# Patient Record
Sex: FEMALE | Race: WHITE | NOT HISPANIC OR LATINO | Employment: OTHER | URBAN - METROPOLITAN AREA
[De-identification: names, ages, dates, MRNs, and addresses within clinical notes are randomized per-mention and may not be internally consistent; named-entity substitution may affect disease eponyms.]

---

## 2017-01-03 RX ORDER — CLOPIDOGREL BISULFATE 75 MG/1
TABLET ORAL
Qty: 30 TABLET | Refills: 0 | Status: SHIPPED | OUTPATIENT
Start: 2017-01-03 | End: 2017-02-13 | Stop reason: SDUPTHER

## 2017-02-02 RX ORDER — PANTOPRAZOLE SODIUM 40 MG/1
TABLET, DELAYED RELEASE ORAL
Qty: 30 TABLET | Refills: 0 | Status: SHIPPED | OUTPATIENT
Start: 2017-02-02 | End: 2017-03-02 | Stop reason: SDUPTHER

## 2017-02-02 RX ORDER — EMPAGLIFLOZIN 10 MG/1
TABLET, FILM COATED ORAL
Qty: 30 TABLET | Refills: 0 | Status: SHIPPED | OUTPATIENT
Start: 2017-02-02 | End: 2017-03-02 | Stop reason: SDUPTHER

## 2017-02-13 RX ORDER — CLOPIDOGREL BISULFATE 75 MG/1
TABLET ORAL
Qty: 30 TABLET | Refills: 0 | Status: SHIPPED | OUTPATIENT
Start: 2017-02-13 | End: 2017-03-15 | Stop reason: SDUPTHER

## 2017-03-02 RX ORDER — EMPAGLIFLOZIN 10 MG/1
TABLET, FILM COATED ORAL
Qty: 30 TABLET | Refills: 0 | Status: SHIPPED | OUTPATIENT
Start: 2017-03-02 | End: 2017-03-23 | Stop reason: SDUPTHER

## 2017-03-02 RX ORDER — PANTOPRAZOLE SODIUM 40 MG/1
TABLET, DELAYED RELEASE ORAL
Qty: 30 TABLET | Refills: 0 | Status: SHIPPED | OUTPATIENT
Start: 2017-03-02 | End: 2017-04-03 | Stop reason: SDUPTHER

## 2017-03-06 DIAGNOSIS — E03.9 ACQUIRED HYPOTHYROIDISM: ICD-10-CM

## 2017-03-06 DIAGNOSIS — E11.9 CONTROLLED TYPE 2 DIABETES MELLITUS WITHOUT COMPLICATION, WITHOUT LONG-TERM CURRENT USE OF INSULIN (HCC): ICD-10-CM

## 2017-03-06 DIAGNOSIS — E78.5 HYPERLIPIDEMIA, UNSPECIFIED HYPERLIPIDEMIA TYPE: ICD-10-CM

## 2017-03-06 DIAGNOSIS — E55.9 VITAMIN D DEFICIENCY: ICD-10-CM

## 2017-03-08 LAB
25(OH)D3+25(OH)D2 SERPL-MCNC: 27.1 NG/ML (ref 30–100)
ALBUMIN SERPL-MCNC: 3.9 G/DL (ref 3.5–5.2)
ALBUMIN/GLOB SERPL: 1.4 G/DL
ALP SERPL-CCNC: 117 U/L (ref 40–129)
ALT SERPL-CCNC: 15 U/L (ref 5–33)
AST SERPL-CCNC: 15 U/L (ref 5–32)
BASOPHILS # BLD AUTO: 0.05 10*3/MM3 (ref 0–0.2)
BASOPHILS NFR BLD AUTO: 0.6 % (ref 0–2)
BILIRUB SERPL-MCNC: 0.3 MG/DL (ref 0.2–1.2)
BUN SERPL-MCNC: 17 MG/DL (ref 8–23)
BUN/CREAT SERPL: 18.3 (ref 7–25)
CALCIUM SERPL-MCNC: 9.1 MG/DL (ref 8.8–10.5)
CHLORIDE SERPL-SCNC: 100 MMOL/L (ref 98–107)
CHOLEST SERPL-MCNC: 131 MG/DL (ref 0–200)
CO2 SERPL-SCNC: 27.6 MMOL/L (ref 22–29)
CREAT SERPL-MCNC: 0.93 MG/DL (ref 0.57–1)
EOSINOPHIL # BLD AUTO: 0.1 10*3/MM3 (ref 0.1–0.3)
EOSINOPHIL NFR BLD AUTO: 1.1 % (ref 0–4)
ERYTHROCYTE [DISTWIDTH] IN BLOOD BY AUTOMATED COUNT: 18.9 % (ref 11.5–14.5)
GLOBULIN SER CALC-MCNC: 2.8 GM/DL
GLUCOSE SERPL-MCNC: 135 MG/DL (ref 65–99)
HBA1C MFR BLD: 7.3 % (ref 4.8–5.6)
HCT VFR BLD AUTO: 44.8 % (ref 37–47)
HDLC SERPL-MCNC: 43 MG/DL (ref 40–60)
HGB BLD-MCNC: 13.7 G/DL (ref 12–16)
IMM GRANULOCYTES # BLD: 0.07 10*3/MM3 (ref 0–0.03)
IMM GRANULOCYTES NFR BLD: 0.8 % (ref 0–0.5)
LDLC SERPL CALC-MCNC: 59 MG/DL (ref 0–100)
LYMPHOCYTES # BLD AUTO: 2.5 10*3/MM3 (ref 0.6–4.8)
LYMPHOCYTES NFR BLD AUTO: 27.6 % (ref 20–45)
MCH RBC QN AUTO: 24.9 PG (ref 27–31)
MCHC RBC AUTO-ENTMCNC: 30.6 G/DL (ref 31–37)
MCV RBC AUTO: 81.5 FL (ref 81–99)
MONOCYTES # BLD AUTO: 0.38 10*3/MM3 (ref 0–1)
MONOCYTES NFR BLD AUTO: 4.2 % (ref 3–8)
NEUTROPHILS # BLD AUTO: 5.95 10*3/MM3 (ref 1.5–8.3)
NEUTROPHILS NFR BLD AUTO: 65.7 % (ref 45–70)
NRBC BLD AUTO-RTO: 0 /100 WBC (ref 0–0)
PLATELET # BLD AUTO: 290 10*3/MM3 (ref 140–500)
POTASSIUM SERPL-SCNC: 4.7 MMOL/L (ref 3.5–5.2)
PROT SERPL-MCNC: 6.7 G/DL (ref 6–8.5)
RBC # BLD AUTO: 5.5 10*6/MM3 (ref 4.2–5.4)
SODIUM SERPL-SCNC: 142 MMOL/L (ref 136–145)
TRIGL SERPL-MCNC: 146 MG/DL (ref 0–150)
TSH SERPL DL<=0.005 MIU/L-ACNC: 2.03 MIU/ML (ref 0.27–4.2)
VLDLC SERPL CALC-MCNC: 29.2 MG/DL (ref 7–27)
WBC # BLD AUTO: 9.05 10*3/MM3 (ref 4.8–10.8)

## 2017-03-09 LAB
FERRITIN SERPL-MCNC: 21.14 NG/ML (ref 13–150)
IRON SATN MFR SERPL: 9 %
IRON SERPL-MCNC: 32 MCG/DL (ref 37–145)
TIBC SERPL-MCNC: 351 MCG/DL (ref 261–478)
UIBC SERPL-MCNC: 319 MCG/DL (ref 112–346)
WRITTEN AUTHORIZATION: NORMAL

## 2017-03-14 ENCOUNTER — OFFICE VISIT (OUTPATIENT)
Dept: FAMILY MEDICINE CLINIC | Facility: CLINIC | Age: 62
End: 2017-03-14

## 2017-03-14 VITALS
WEIGHT: 236 LBS | DIASTOLIC BLOOD PRESSURE: 72 MMHG | HEART RATE: 114 BPM | BODY MASS INDEX: 46.33 KG/M2 | OXYGEN SATURATION: 98 % | SYSTOLIC BLOOD PRESSURE: 122 MMHG | TEMPERATURE: 97.8 F | HEIGHT: 60 IN

## 2017-03-14 DIAGNOSIS — IMO0001 UNCONTROLLED TYPE 2 DIABETES MELLITUS WITHOUT COMPLICATION, WITHOUT LONG-TERM CURRENT USE OF INSULIN: ICD-10-CM

## 2017-03-14 DIAGNOSIS — I10 ESSENTIAL HYPERTENSION: ICD-10-CM

## 2017-03-14 DIAGNOSIS — E03.9 ACQUIRED HYPOTHYROIDISM: ICD-10-CM

## 2017-03-14 DIAGNOSIS — E78.5 HYPERLIPIDEMIA, UNSPECIFIED HYPERLIPIDEMIA TYPE: Primary | ICD-10-CM

## 2017-03-14 DIAGNOSIS — F17.210 CIGARETTE NICOTINE DEPENDENCE WITHOUT COMPLICATION: ICD-10-CM

## 2017-03-14 DIAGNOSIS — F41.8 SITUATIONAL ANXIETY: ICD-10-CM

## 2017-03-14 DIAGNOSIS — E66.01 MORBID EXOGENOUS OBESITY (HCC): ICD-10-CM

## 2017-03-14 DIAGNOSIS — E55.9 VITAMIN D DEFICIENCY: ICD-10-CM

## 2017-03-14 PROCEDURE — 99214 OFFICE O/P EST MOD 30 MIN: CPT | Performed by: FAMILY MEDICINE

## 2017-03-14 RX ORDER — ALPRAZOLAM 0.25 MG/1
0.25 TABLET ORAL DAILY
Qty: 30 TABLET | Refills: 0 | Status: SHIPPED | OUTPATIENT
Start: 2017-03-14 | End: 2018-01-23 | Stop reason: SDUPTHER

## 2017-03-14 NOTE — PROGRESS NOTES
Subjective   Rosalee Godwin is a 61 y.o. female with   Chief Complaint   Patient presents with   • Hypertension     follow up on labs   • Diabetes   .    History of Present Illness     Rosalee is a 61 yr old white female that presents for follow up of HTN, Type II Diabetes, HLD, and Vitamin D Deficiency and Hypothyroidism.  Rosalee has been actively losing weight by eating a more restricted diet.  Since losing some weight, she has had an increase of energy and stamina.    Current medications include Atorvastatin and Zetia to control cholesterol, HCTZ and Lisinopril to control HTN, and Jardiance, Metformin, to control diabetes, and Citalopram to improve depression and Levothyroxine to control thyroid.  She has tried Farxiga, Invokana, and then to Jardiance.  She has been checking her sugar at home and states that since starting Jardiance, her sugars have been fluctuating.      Rosalee is receiving injections to her eyes for Macular Degeneration and this has caused her to have an increase of Anxiety.      The following portions of the patient's history were reviewed and updated as appropriate: allergies, current medications, past family history, past medical history, past social history, past surgical history and problem list.    Review of Systems   Constitutional:        Morbidly obese   Cardiovascular:        HTN  HLD   Endocrine:        Type II Diabetes  Vit. D Def     Psychiatric/Behavioral: The patient is nervous/anxious.        Objective     Vitals:    03/14/17 0827   BP: 122/72   Pulse: 114   Temp: 97.8 °F (36.6 °C)   SpO2: 98%     BP Readings from Last 3 Encounters:   03/14/17 122/72   11/15/16 134/76   10/04/16 122/72      Wt Readings from Last 3 Encounters:   03/14/17 236 lb (107 kg)   11/15/16 249 lb (113 kg)   10/04/16 251 lb (114 kg)          No results found for this or any previous visit (from the past 168 hour(s)).  The above results have been reviewed and explained to Rosalee with her understanding.  A  copy has been provided to her.     Physical Exam   Constitutional: She is oriented to person, place, and time. She appears well-developed and well-nourished.   HENT:   Head: Normocephalic and atraumatic.   Neck: Trachea normal and phonation normal. Neck supple. Normal carotid pulses present. Carotid bruit is not present. No thyroid mass and no thyromegaly present.   Cardiovascular: Normal rate, regular rhythm and normal heart sounds.  Exam reveals no gallop and no friction rub.    No murmur heard.  Pulmonary/Chest: Effort normal and breath sounds normal. No respiratory distress. She has no decreased breath sounds. She has no wheezes. She has no rhonchi. She has no rales.   Lymphadenopathy:     She has no cervical adenopathy.   Neurological: She is alert and oriented to person, place, and time.   Skin: Skin is warm and dry. No rash noted.   Psychiatric: She has a normal mood and affect. Her speech is normal and behavior is normal. Judgment and thought content normal. Cognition and memory are normal.   Nursing note and vitals reviewed.      Assessment/Plan   Rosalee was seen today for hypertension and diabetes.    Diagnoses and all orders for this visit:    Hyperlipidemia, unspecified hyperlipidemia type    Essential hypertension    Vitamin D deficiency    Uncontrolled type 2 diabetes mellitus without complication, without long-term current use of insulin    Acquired hypothyroidism    Situational anxiety    Morbid exogenous obesity    Cigarette nicotine dependence without complication    Other orders  -     SITagliptin-MetFORMIN HCl ER (JANUMET XR)  MG tablet sustained-release 24 hour; Take 2 tablets once a day  -     ALPRAZolam (XANAX) 0.25 MG tablet; Take 1 tablet by mouth Daily.      Return in about 4 weeks (around 4/11/2017).      Scribed for Eddie Slater MD by Jacoby Portillo. 03/14/2017    IEddie MD personally performed the services described in this documentation, as scribed by Jacoby Portillo  in my presence, and it is both accurate and complete

## 2017-03-16 RX ORDER — CLOPIDOGREL BISULFATE 75 MG/1
TABLET ORAL
Qty: 30 TABLET | Refills: 0 | Status: SHIPPED | OUTPATIENT
Start: 2017-03-16 | End: 2017-04-17 | Stop reason: SDUPTHER

## 2017-03-16 NOTE — PATIENT INSTRUCTIONS
Patient has been applauded for her efforts at weight loss has been asked to continue same.  Jardiance will be started and be admitted to [facility].  She will continue to monitor sugars and follow-up with her glucometer readings in 4 weeks.  Alprazolam will be given with the purpose of using this prior to ocular injections.

## 2017-03-23 RX ORDER — EZETIMIBE 10 MG/1
TABLET ORAL
Qty: 90 TABLET | Refills: 0 | Status: SHIPPED | OUTPATIENT
Start: 2017-03-23 | End: 2017-06-28 | Stop reason: SDUPTHER

## 2017-03-23 RX ORDER — EMPAGLIFLOZIN 10 MG/1
TABLET, FILM COATED ORAL
Qty: 30 TABLET | Refills: 0 | Status: SHIPPED | OUTPATIENT
Start: 2017-03-23 | End: 2017-04-27 | Stop reason: SDUPTHER

## 2017-04-03 RX ORDER — PANTOPRAZOLE SODIUM 40 MG/1
TABLET, DELAYED RELEASE ORAL
Qty: 30 TABLET | Refills: 3 | Status: SHIPPED | OUTPATIENT
Start: 2017-04-03 | End: 2017-07-31 | Stop reason: SDUPTHER

## 2017-04-03 RX ORDER — HYDROCHLOROTHIAZIDE 25 MG/1
TABLET ORAL
Qty: 30 TABLET | Refills: 3 | Status: SHIPPED | OUTPATIENT
Start: 2017-04-03 | End: 2017-07-31 | Stop reason: SDUPTHER

## 2017-04-11 ENCOUNTER — HOSPITAL ENCOUNTER (OUTPATIENT)
Dept: GENERAL RADIOLOGY | Facility: HOSPITAL | Age: 62
Discharge: HOME | End: 2017-04-11
Admitting: FAMILY MEDICINE

## 2017-04-11 ENCOUNTER — OFFICE VISIT (OUTPATIENT)
Dept: FAMILY MEDICINE CLINIC | Facility: CLINIC | Age: 62
End: 2017-04-11

## 2017-04-11 VITALS
BODY MASS INDEX: 46.13 KG/M2 | OXYGEN SATURATION: 98 % | HEIGHT: 60 IN | DIASTOLIC BLOOD PRESSURE: 78 MMHG | TEMPERATURE: 98.3 F | SYSTOLIC BLOOD PRESSURE: 126 MMHG | WEIGHT: 235 LBS | HEART RATE: 106 BPM

## 2017-04-11 DIAGNOSIS — E55.9 VITAMIN D DEFICIENCY: ICD-10-CM

## 2017-04-11 DIAGNOSIS — E66.01 MORBID EXOGENOUS OBESITY (HCC): ICD-10-CM

## 2017-04-11 DIAGNOSIS — M54.6 THORACIC SPINE PAIN: ICD-10-CM

## 2017-04-11 DIAGNOSIS — E11.9 CONTROLLED TYPE 2 DIABETES MELLITUS WITHOUT COMPLICATION, WITHOUT LONG-TERM CURRENT USE OF INSULIN (HCC): ICD-10-CM

## 2017-04-11 DIAGNOSIS — E78.5 HYPERLIPIDEMIA, UNSPECIFIED HYPERLIPIDEMIA TYPE: ICD-10-CM
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
## 2017-04-11 DIAGNOSIS — I10 ESSENTIAL HYPERTENSION: ICD-10-CM
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
## 2017-04-11 DIAGNOSIS — E03.9 ACQUIRED HYPOTHYROIDISM: ICD-10-CM

## 2017-04-11 PROCEDURE — 99213 OFFICE O/P EST LOW 20 MIN: CPT | Performed by: FAMILY MEDICINE

## 2017-04-11 PROCEDURE — 72072 X-RAY EXAM THORAC SPINE 3VWS: CPT

## 2017-04-11 RX ORDER — CLINDAMYCIN PHOSPHATE 10 MG/ML
SOLUTION TOPICAL
COMMUNITY
Start: 2017-03-17 | End: 2019-11-12
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
## 2017-04-18 RX ORDER — CLOPIDOGREL BISULFATE 75 MG/1
TABLET ORAL
Qty: 30 TABLET | Refills: 1 | Status: SHIPPED | OUTPATIENT
Start: 2017-04-18 | End: 2017-06-20 | Stop reason: SDUPTHER

## 2017-04-27 RX ORDER — CILOSTAZOL 100 MG/1
TABLET ORAL
Qty: 60 TABLET | Refills: 0 | Status: SHIPPED | OUTPATIENT
Start: 2017-04-27 | End: 2017-06-20 | Stop reason: SDUPTHER

## 2017-04-27 RX ORDER — EMPAGLIFLOZIN 10 MG/1
TABLET, FILM COATED ORAL
Qty: 30 TABLET | Refills: 0 | Status: SHIPPED | OUTPATIENT
Start: 2017-04-27 | End: 2017-05-31 | Stop reason: SDUPTHER

## 2017-05-31 RX ORDER — EMPAGLIFLOZIN 10 MG/1
TABLET, FILM COATED ORAL
Qty: 30 TABLET | Refills: 0 | Status: SHIPPED | OUTPATIENT
Start: 2017-05-31 | End: 2017-06-28 | Stop reason: SDUPTHER

## 2017-05-31 RX ORDER — LISINOPRIL 40 MG/1
TABLET ORAL
Qty: 90 TABLET | Refills: 0 | Status: SHIPPED | OUTPATIENT
Start: 2017-05-31 | End: 2017-07-20 | Stop reason: SDUPTHER

## 2017-06-11 ENCOUNTER — RESULTS ENCOUNTER (OUTPATIENT)
Dept: FAMILY MEDICINE CLINIC | Facility: CLINIC | Age: 62
End: 2017-06-11

## 2017-06-11 DIAGNOSIS — E11.9 CONTROLLED TYPE 2 DIABETES MELLITUS WITHOUT COMPLICATION, WITHOUT LONG-TERM CURRENT USE OF INSULIN (HCC): ICD-10-CM

## 2017-06-11 DIAGNOSIS — I10 ESSENTIAL HYPERTENSION: ICD-10-CM

## 2017-06-11 DIAGNOSIS — E78.5 HYPERLIPIDEMIA, UNSPECIFIED HYPERLIPIDEMIA TYPE: ICD-10-CM

## 2017-06-11 DIAGNOSIS — E55.9 VITAMIN D DEFICIENCY: ICD-10-CM

## 2017-06-11 DIAGNOSIS — E03.9 ACQUIRED HYPOTHYROIDISM: ICD-10-CM

## 2017-06-20 RX ORDER — CILOSTAZOL 100 MG/1
TABLET ORAL
Qty: 60 TABLET | Refills: 0 | Status: SHIPPED | OUTPATIENT
Start: 2017-06-20 | End: 2017-08-16 | Stop reason: SDUPTHER

## 2017-06-20 RX ORDER — CLOPIDOGREL BISULFATE 75 MG/1
TABLET ORAL
Qty: 30 TABLET | Refills: 0 | Status: SHIPPED | OUTPATIENT
Start: 2017-06-20 | End: 2017-07-31 | Stop reason: SDUPTHER

## 2017-06-28 RX ORDER — EZETIMIBE 10 MG/1
10 TABLET ORAL DAILY
Qty: 90 TABLET | Refills: 0 | Status: SHIPPED | OUTPATIENT
Start: 2017-06-28 | End: 2017-10-03 | Stop reason: SDUPTHER

## 2017-06-28 RX ORDER — EMPAGLIFLOZIN 10 MG/1
TABLET, FILM COATED ORAL
Qty: 30 TABLET | Refills: 0 | Status: SHIPPED | OUTPATIENT
Start: 2017-06-28 | End: 2017-07-20 | Stop reason: SDUPTHER

## 2017-07-11 LAB
25(OH)D3+25(OH)D2 SERPL-MCNC: 30.5 NG/ML
ALBUMIN SERPL-MCNC: 3.8 G/DL (ref 3.5–5.2)
ALBUMIN/GLOB SERPL: 1.5 G/DL
ALP SERPL-CCNC: 97 U/L (ref 40–129)
ALT SERPL-CCNC: 15 U/L (ref 5–33)
AST SERPL-CCNC: 14 U/L (ref 5–32)
BASOPHILS # BLD AUTO: 0.04 10*3/MM3 (ref 0–0.2)
BASOPHILS NFR BLD AUTO: 0.5 % (ref 0–2)
BILIRUB SERPL-MCNC: 0.3 MG/DL (ref 0.2–1.2)
BUN SERPL-MCNC: 20 MG/DL (ref 8–23)
BUN/CREAT SERPL: 22.2 (ref 7–25)
CALCIUM SERPL-MCNC: 8.6 MG/DL (ref 8.8–10.5)
CHLORIDE SERPL-SCNC: 101 MMOL/L (ref 98–107)
CHOLEST SERPL-MCNC: 135 MG/DL (ref 0–200)
CO2 SERPL-SCNC: 25.8 MMOL/L (ref 22–29)
CREAT SERPL-MCNC: 0.9 MG/DL (ref 0.57–1)
EOSINOPHIL # BLD AUTO: 0.12 10*3/MM3 (ref 0.1–0.3)
EOSINOPHIL NFR BLD AUTO: 1.4 % (ref 0–4)
ERYTHROCYTE [DISTWIDTH] IN BLOOD BY AUTOMATED COUNT: 18.8 % (ref 11.5–14.5)
GLOBULIN SER CALC-MCNC: 2.5 GM/DL
GLUCOSE SERPL-MCNC: 105 MG/DL (ref 65–99)
HBA1C MFR BLD: 6 % (ref 4.8–5.6)
HCT VFR BLD AUTO: 43.2 % (ref 37–47)
HDLC SERPL-MCNC: 38 MG/DL (ref 40–60)
HGB BLD-MCNC: 13.3 G/DL (ref 12–16)
IMM GRANULOCYTES # BLD: 0.03 10*3/MM3 (ref 0–0.03)
IMM GRANULOCYTES NFR BLD: 0.3 % (ref 0–0.5)
LDLC SERPL CALC-MCNC: 68 MG/DL (ref 0–100)
LYMPHOCYTES # BLD AUTO: 1.94 10*3/MM3 (ref 0.6–4.8)
LYMPHOCYTES NFR BLD AUTO: 22.1 % (ref 20–45)
MCH RBC QN AUTO: 25 PG (ref 27–31)
MCHC RBC AUTO-ENTMCNC: 30.8 G/DL (ref 31–37)
MCV RBC AUTO: 81.2 FL (ref 81–99)
MONOCYTES # BLD AUTO: 0.4 10*3/MM3 (ref 0–1)
MONOCYTES NFR BLD AUTO: 4.6 % (ref 3–8)
NEUTROPHILS # BLD AUTO: 6.26 10*3/MM3 (ref 1.5–8.3)
NEUTROPHILS NFR BLD AUTO: 71.1 % (ref 45–70)
NRBC BLD AUTO-RTO: 0 /100 WBC (ref 0–0)
PLATELET # BLD AUTO: 305 10*3/MM3 (ref 140–500)
POTASSIUM SERPL-SCNC: 4.4 MMOL/L (ref 3.5–5.2)
PROT SERPL-MCNC: 6.3 G/DL (ref 6–8.5)
RBC # BLD AUTO: 5.32 10*6/MM3 (ref 4.2–5.4)
SODIUM SERPL-SCNC: 140 MMOL/L (ref 136–145)
TRIGL SERPL-MCNC: 147 MG/DL (ref 0–150)
TSH SERPL DL<=0.005 MIU/L-ACNC: 2.36 MIU/ML (ref 0.27–4.2)
VLDLC SERPL CALC-MCNC: 29.4 MG/DL (ref 7–27)
WBC # BLD AUTO: 8.79 10*3/MM3 (ref 4.8–10.8)

## 2017-07-20 ENCOUNTER — OFFICE VISIT (OUTPATIENT)
Dept: FAMILY MEDICINE CLINIC | Facility: CLINIC | Age: 62
End: 2017-07-20

## 2017-07-20 VITALS
WEIGHT: 231.7 LBS | SYSTOLIC BLOOD PRESSURE: 124 MMHG | DIASTOLIC BLOOD PRESSURE: 60 MMHG | HEART RATE: 93 BPM | HEIGHT: 61 IN | OXYGEN SATURATION: 100 % | BODY MASS INDEX: 43.75 KG/M2

## 2017-07-20 DIAGNOSIS — M25.541 ARTHRALGIA OF RIGHT HAND: ICD-10-CM

## 2017-07-20 DIAGNOSIS — E78.5 HYPERLIPIDEMIA, UNSPECIFIED HYPERLIPIDEMIA TYPE: Primary | ICD-10-CM

## 2017-07-20 DIAGNOSIS — E11.9 CONTROLLED TYPE 2 DIABETES MELLITUS WITHOUT COMPLICATION, WITHOUT LONG-TERM CURRENT USE OF INSULIN (HCC): ICD-10-CM

## 2017-07-20 DIAGNOSIS — L70.0 ACNE VULGARIS: ICD-10-CM

## 2017-07-20 DIAGNOSIS — I10 ESSENTIAL HYPERTENSION: ICD-10-CM

## 2017-07-20 DIAGNOSIS — E03.9 ACQUIRED HYPOTHYROIDISM: ICD-10-CM

## 2017-07-20 DIAGNOSIS — E55.9 VITAMIN D DEFICIENCY: ICD-10-CM

## 2017-07-20 PROCEDURE — 99214 OFFICE O/P EST MOD 30 MIN: CPT | Performed by: FAMILY MEDICINE

## 2017-07-20 RX ORDER — SULFAMETHOXAZOLE AND TRIMETHOPRIM 800; 160 MG/1; MG/1
1 TABLET ORAL 2 TIMES DAILY
Qty: 60 TABLET | Refills: 0 | Status: SHIPPED | OUTPATIENT
Start: 2017-07-20 | End: 2018-01-23 | Stop reason: HOSPADM

## 2017-07-20 RX ORDER — LEVOTHYROXINE SODIUM 0.05 MG/1
50 TABLET ORAL DAILY
Qty: 30 TABLET | Refills: 5 | Status: SHIPPED | OUTPATIENT
Start: 2017-07-20 | End: 2018-01-19 | Stop reason: SDUPTHER

## 2017-07-20 RX ORDER — LISINOPRIL 40 MG/1
40 TABLET ORAL DAILY
Qty: 30 TABLET | Refills: 5 | Status: SHIPPED | OUTPATIENT
Start: 2017-07-20 | End: 2018-02-25 | Stop reason: SDUPTHER

## 2017-07-20 RX ORDER — CITALOPRAM 20 MG/1
20 TABLET ORAL DAILY
Qty: 30 TABLET | Refills: 5 | Status: SHIPPED | OUTPATIENT
Start: 2017-07-20 | End: 2018-03-15 | Stop reason: SDUPTHER

## 2017-07-20 RX ORDER — ATORVASTATIN CALCIUM 20 MG/1
20 TABLET, FILM COATED ORAL DAILY
Qty: 30 TABLET | Refills: 5 | Status: SHIPPED | OUTPATIENT
Start: 2017-07-20 | End: 2018-08-14 | Stop reason: SDUPTHER

## 2017-07-31 RX ORDER — PANTOPRAZOLE SODIUM 40 MG/1
TABLET, DELAYED RELEASE ORAL
Qty: 30 TABLET | Refills: 2 | Status: SHIPPED | OUTPATIENT
Start: 2017-07-31 | End: 2017-11-03 | Stop reason: SDUPTHER

## 2017-07-31 RX ORDER — HYDROCHLOROTHIAZIDE 25 MG/1
TABLET ORAL
Qty: 30 TABLET | Refills: 2 | Status: SHIPPED | OUTPATIENT
Start: 2017-07-31 | End: 2017-11-03 | Stop reason: SDUPTHER

## 2017-08-01 RX ORDER — CLOPIDOGREL BISULFATE 75 MG/1
TABLET ORAL
Qty: 30 TABLET | Refills: 5 | Status: SHIPPED | OUTPATIENT
Start: 2017-08-01 | End: 2018-02-05 | Stop reason: SDUPTHER

## 2017-08-16 RX ORDER — CILOSTAZOL 100 MG/1
TABLET ORAL
Qty: 60 TABLET | Refills: 3 | Status: SHIPPED | OUTPATIENT
Start: 2017-08-16 | End: 2018-01-04 | Stop reason: SDUPTHER

## 2017-08-23 ENCOUNTER — LAB REQUISITION (OUTPATIENT)
Dept: LAB | Facility: HOSPITAL | Age: 62
End: 2017-08-23

## 2017-08-23 ENCOUNTER — OFFICE VISIT (OUTPATIENT)
Dept: CARDIOLOGY | Facility: CLINIC | Age: 62
End: 2017-08-23

## 2017-08-23 VITALS
WEIGHT: 231 LBS | BODY MASS INDEX: 43.61 KG/M2 | HEIGHT: 61 IN | SYSTOLIC BLOOD PRESSURE: 136 MMHG | HEART RATE: 92 BPM | DIASTOLIC BLOOD PRESSURE: 76 MMHG

## 2017-08-23 DIAGNOSIS — L03.012 CELLULITIS OF FINGER OF LEFT HAND: ICD-10-CM

## 2017-08-23 DIAGNOSIS — I25.10 CORONARY ARTERY DISEASE INVOLVING NATIVE CORONARY ARTERY OF NATIVE HEART WITHOUT ANGINA PECTORIS: ICD-10-CM

## 2017-08-23 DIAGNOSIS — R55 SYNCOPE, UNSPECIFIED SYNCOPE TYPE: Primary | ICD-10-CM

## 2017-08-23 PROCEDURE — 87147 CULTURE TYPE IMMUNOLOGIC: CPT | Performed by: PLASTIC SURGERY

## 2017-08-23 PROCEDURE — 99213 OFFICE O/P EST LOW 20 MIN: CPT | Performed by: INTERNAL MEDICINE

## 2017-08-23 PROCEDURE — 87205 SMEAR GRAM STAIN: CPT | Performed by: PLASTIC SURGERY

## 2017-08-23 PROCEDURE — 87070 CULTURE OTHR SPECIMN AEROBIC: CPT | Performed by: PLASTIC SURGERY

## 2017-08-23 PROCEDURE — 87075 CULTR BACTERIA EXCEPT BLOOD: CPT | Performed by: PLASTIC SURGERY

## 2017-08-24 PROCEDURE — 93000 ELECTROCARDIOGRAM COMPLETE: CPT | Performed by: INTERNAL MEDICINE

## 2017-08-24 NOTE — PROGRESS NOTES
Subjective:     Encounter Date:08/23/2017      Patient ID: Rosalee Godwin is a 61 y.o. female.    Chief Complaint: syncope, PAD, CAD    History of Present Illness    Dear Dr. Slater,     I had the pleasure of seeing your patient in cardiac followup today.  As you well know, she is a caitlin 61-year-old woman with history of obesity, smoking and peripheral arterial disease.  She sees Dr. Coats for her peripheral arterial disease care.  She had a right common iliac stent in the past.      I last saw her three years ago.  She has some diffuse atherosclerosis but no significant history of myocardial infarction.      She was recently seeing Dr. Coats and reported episodes of extreme weakness and near syncope.  These occur maybe twice a month and have not changed recently.  Because of her symptoms she was sent to see me.      She denies any complaints of angina or congestive heart failure.      I performed cardiac catheterization on her one year ago which revealed normal biventricular filling pressures and mild diffuse coronary disease.          Review of Systems   All other systems reviewed and are negative.        ECG 12 Lead  Date/Time: 8/24/2017 10:53 AM  Performed by: AUBREE HOLLEY  Authorized by: AUBREE HOLLEY   Comparison: compared with previous ECG   Similar to previous ECG  Rhythm: sinus rhythm  BPM: 92  Clinical impression: low voltage               Objective:     Physical Exam   Constitutional: She is oriented to person, place, and time. She appears well-developed and well-nourished.   HENT:   Head: Normocephalic and atraumatic.   Neck: Normal range of motion. Neck supple.   Cardiovascular: Normal rate, regular rhythm and normal heart sounds.    Pulmonary/Chest: Effort normal and breath sounds normal.   Abdominal: Soft. Bowel sounds are normal.   Musculoskeletal: Normal range of motion.   Neurological: She is alert and oriented to person, place, and time.   Skin: Skin is warm and dry.   Psychiatric: She has a  normal mood and affect. Her behavior is normal. Thought content normal.   Vitals reviewed.      Lab Review:       Assessment:          Diagnosis Plan   1. Syncope, unspecified syncope type  Adult Transthoracic Echo Complete    Cardiac Event Monitor   2. Coronary artery disease involving native coronary artery of native heart without angina pectoris            Plan:       It was a pleasure to see your patient in cardiac follow up today. As you know, she is a caitlin 61-year-old woman with a history of obesity. She has had intermittent recurrent presyncope. This could represent transient changes in her blood pressure and heart rate. I have ordered an event recorder to try and document her rhythm during these episodes.  She will check her blood pressure on a regular basis. I do not suspect she has a coronary reason for this complaint.      She will see me again in two months.       Coronary Artery Disease  Assessment  • The patient has no angina    Plan  • Lifestyle modifications discussed include adhering to a heart healthy diet, avoidance of tobacco products, maintenance of a healthy weight, medication compliance, regular exercise and regular monitoring of cholesterol and blood pressure    Subjective - Objective  • Current antiplatelet therapy includes aspirin 81 mg

## 2017-08-26 LAB
BACTERIA SPEC AEROBE CULT: ABNORMAL
GRAM STN SPEC: ABNORMAL
GRAM STN SPEC: ABNORMAL

## 2017-08-28 LAB — BACTERIA SPEC ANAEROBE CULT: NORMAL

## 2017-08-29 ENCOUNTER — TELEPHONE (OUTPATIENT)
Dept: CARDIOLOGY | Facility: CLINIC | Age: 62
End: 2017-08-29

## 2017-08-29 ENCOUNTER — HOSPITAL ENCOUNTER (OUTPATIENT)
Dept: CARDIOLOGY | Facility: HOSPITAL | Age: 62
Discharge: HOME | End: 2017-08-29
Attending: INTERNAL MEDICINE | Admitting: INTERNAL MEDICINE

## 2017-08-29 VITALS
BODY MASS INDEX: 45.35 KG/M2 | HEIGHT: 60 IN | DIASTOLIC BLOOD PRESSURE: 70 MMHG | HEART RATE: 78 BPM | WEIGHT: 231 LBS | SYSTOLIC BLOOD PRESSURE: 130 MMHG

## 2017-08-29 DIAGNOSIS — R55 SYNCOPE, UNSPECIFIED SYNCOPE TYPE: ICD-10-CM

## 2017-08-29 LAB
ASCENDING AORTA: 3 CM
BH CV ECHO MEAS - ACS: 1.9 CM
BH CV ECHO MEAS - AO MAX PG (FULL): 3.6 MMHG
BH CV ECHO MEAS - AO MAX PG: 8 MMHG
BH CV ECHO MEAS - AO MEAN PG (FULL): 1.9 MMHG
BH CV ECHO MEAS - AO MEAN PG: 4.6 MMHG
BH CV ECHO MEAS - AO ROOT AREA (BSA CORRECTED): 1.5
BH CV ECHO MEAS - AO ROOT AREA: 7.4 CM^2
BH CV ECHO MEAS - AO ROOT DIAM: 3.1 CM
BH CV ECHO MEAS - AO V2 MAX: 141.6 CM/SEC
BH CV ECHO MEAS - AO V2 MEAN: 103 CM/SEC
BH CV ECHO MEAS - AO V2 VTI: 30.5 CM
BH CV ECHO MEAS - AVA(I,A): 2.1 CM^2
BH CV ECHO MEAS - AVA(I,D): 2.1 CM^2
BH CV ECHO MEAS - AVA(V,A): 2 CM^2
BH CV ECHO MEAS - AVA(V,D): 2 CM^2
BH CV ECHO MEAS - BSA(HAYCOCK): 2.2 M^2
BH CV ECHO MEAS - BSA: 2 M^2
BH CV ECHO MEAS - BZI_BMI: 45.1 KILOGRAMS/M^2
BH CV ECHO MEAS - BZI_METRIC_HEIGHT: 152.4 CM
BH CV ECHO MEAS - BZI_METRIC_WEIGHT: 104.8 KG
BH CV ECHO MEAS - CONTRAST EF (2CH): 63.1 ML/M^2
BH CV ECHO MEAS - CONTRAST EF 4CH: 62.3 ML/M^2
BH CV ECHO MEAS - EDV(MOD-SP2): 65 ML
BH CV ECHO MEAS - EDV(MOD-SP4): 69 ML
BH CV ECHO MEAS - EDV(TEICH): 170.8 ML
BH CV ECHO MEAS - EF(CUBED): 74.1 %
BH CV ECHO MEAS - EF(MOD-SP2): 63.1 %
BH CV ECHO MEAS - EF(MOD-SP4): 62.3 %
BH CV ECHO MEAS - EF(TEICH): 65.2 %
BH CV ECHO MEAS - ESV(MOD-SP2): 24 ML
BH CV ECHO MEAS - ESV(MOD-SP4): 26 ML
BH CV ECHO MEAS - ESV(TEICH): 59.5 ML
BH CV ECHO MEAS - FS: 36.3 %
BH CV ECHO MEAS - IVS/LVPW: 1
BH CV ECHO MEAS - IVSD: 1 CM
BH CV ECHO MEAS - LAT PEAK E' VEL: 8 CM/SEC
BH CV ECHO MEAS - LV DIASTOLIC VOL/BSA (35-75): 34.8 ML/M^2
BH CV ECHO MEAS - LV MASS(C)D: 250.7 GRAMS
BH CV ECHO MEAS - LV MASS(C)DI: 126.3 GRAMS/M^2
BH CV ECHO MEAS - LV MAX PG: 4.4 MMHG
BH CV ECHO MEAS - LV MEAN PG: 2.7 MMHG
BH CV ECHO MEAS - LV SYSTOLIC VOL/BSA (12-30): 13.1 ML/M^2
BH CV ECHO MEAS - LV V1 MAX: 104.8 CM/SEC
BH CV ECHO MEAS - LV V1 MEAN: 79 CM/SEC
BH CV ECHO MEAS - LV V1 VTI: 23.8 CM
BH CV ECHO MEAS - LVIDD: 5.9 CM
BH CV ECHO MEAS - LVIDS: 3.7 CM
BH CV ECHO MEAS - LVLD AP2: 6.5 CM
BH CV ECHO MEAS - LVLD AP4: 6.8 CM
BH CV ECHO MEAS - LVLS AP2: 5.5 CM
BH CV ECHO MEAS - LVLS AP4: 5.7 CM
BH CV ECHO MEAS - LVOT AREA (M): 2.8 CM^2
BH CV ECHO MEAS - LVOT AREA: 2.7 CM^2
BH CV ECHO MEAS - LVOT DIAM: 1.9 CM
BH CV ECHO MEAS - LVPWD: 1 CM
BH CV ECHO MEAS - MED PEAK E' VEL: 8 CM/SEC
BH CV ECHO MEAS - MR MAX PG: 17.1 MMHG
BH CV ECHO MEAS - MR MAX VEL: 206.5 CM/SEC
BH CV ECHO MEAS - MV A DUR: 0.11 SEC
BH CV ECHO MEAS - MV A MAX VEL: 110.6 CM/SEC
BH CV ECHO MEAS - MV DEC SLOPE: 427.7 CM/SEC^2
BH CV ECHO MEAS - MV DEC TIME: 0.2 SEC
BH CV ECHO MEAS - MV E MAX VEL: 82.9 CM/SEC
BH CV ECHO MEAS - MV E/A: 0.75
BH CV ECHO MEAS - MV MAX PG: 6.3 MMHG
BH CV ECHO MEAS - MV MEAN PG: 3.1 MMHG
BH CV ECHO MEAS - MV P1/2T MAX VEL: 83.9 CM/SEC
BH CV ECHO MEAS - MV P1/2T: 57.5 MSEC
BH CV ECHO MEAS - MV V2 MAX: 125.1 CM/SEC
BH CV ECHO MEAS - MV V2 MEAN: 82.6 CM/SEC
BH CV ECHO MEAS - MV V2 VTI: 26.2 CM
BH CV ECHO MEAS - MVA P1/2T LCG: 2.6 CM^2
BH CV ECHO MEAS - MVA(P1/2T): 3.8 CM^2
BH CV ECHO MEAS - MVA(VTI): 2.5 CM^2
BH CV ECHO MEAS - PA ACC TIME: 0.08 SEC
BH CV ECHO MEAS - PA MAX PG (FULL): 1.9 MMHG
BH CV ECHO MEAS - PA MAX PG: 6.9 MMHG
BH CV ECHO MEAS - PA PR(ACCEL): 41 MMHG
BH CV ECHO MEAS - PA V2 MAX: 131.4 CM/SEC
BH CV ECHO MEAS - PULM A REVS DUR: 0.09 SEC
BH CV ECHO MEAS - PULM A REVS VEL: 44.1 CM/SEC
BH CV ECHO MEAS - PULM DIAS VEL: 47.5 CM/SEC
BH CV ECHO MEAS - PULM S/D: 1.3
BH CV ECHO MEAS - PULM SYS VEL: 59.7 CM/SEC
BH CV ECHO MEAS - PVA(V,A): 2.9 CM^2
BH CV ECHO MEAS - PVA(V,D): 2.9 CM^2
BH CV ECHO MEAS - QP/QS: 1
BH CV ECHO MEAS - RAP SYSTOLE: 3 MMHG
BH CV ECHO MEAS - RV MAX PG: 5 MMHG
BH CV ECHO MEAS - RV MEAN PG: 2.3 MMHG
BH CV ECHO MEAS - RV V1 MAX: 111.6 CM/SEC
BH CV ECHO MEAS - RV V1 MEAN: 72.4 CM/SEC
BH CV ECHO MEAS - RV V1 VTI: 19.1 CM
BH CV ECHO MEAS - RVOT AREA: 3.5 CM^2
BH CV ECHO MEAS - RVOT DIAM: 2.1 CM
BH CV ECHO MEAS - RVSP: 11 MMHG
BH CV ECHO MEAS - SI(AO): 113.5 ML/M^2
BH CV ECHO MEAS - SI(CUBED): 75.3 ML/M^2
BH CV ECHO MEAS - SI(LVOT): 32.8 ML/M^2
BH CV ECHO MEAS - SI(MOD-SP2): 20.7 ML/M^2
BH CV ECHO MEAS - SI(MOD-SP4): 21.7 ML/M^2
BH CV ECHO MEAS - SI(TEICH): 56.1 ML/M^2
BH CV ECHO MEAS - SUP REN AO DIAM: 1.8 CM
BH CV ECHO MEAS - SV(AO): 225.2 ML
BH CV ECHO MEAS - SV(CUBED): 149.5 ML
BH CV ECHO MEAS - SV(LVOT): 65 ML
BH CV ECHO MEAS - SV(MOD-SP2): 41 ML
BH CV ECHO MEAS - SV(MOD-SP4): 43 ML
BH CV ECHO MEAS - SV(RVOT): 66 ML
BH CV ECHO MEAS - SV(TEICH): 111.3 ML
BH CV ECHO MEAS - TAPSE (>1.6): 2.4 CM2
BH CV ECHO MEAS - TR MAX VEL: 143.5 CM/SEC
BH CV XLRA - RV BASE: 2.9 CM
BH CV XLRA - TDI S': 12 CM/SEC
E/E' RATIO: 10
LEFT ATRIUM VOLUME INDEX: 15 ML/M2
LV EF 2D ECHO EST: 62 %
SINUS: 2.9 CM
STJ: 2.5 CM

## 2017-08-29 PROCEDURE — 93306 TTE W/DOPPLER COMPLETE: CPT | Performed by: INTERNAL MEDICINE

## 2017-08-29 PROCEDURE — 93306 TTE W/DOPPLER COMPLETE: CPT

## 2017-09-18 ENCOUNTER — LAB REQUISITION (OUTPATIENT)
Dept: LAB | Facility: HOSPITAL | Age: 62
End: 2017-09-18

## 2017-09-18 DIAGNOSIS — L03.012 CELLULITIS OF FINGER OF LEFT HAND: ICD-10-CM

## 2017-09-18 PROCEDURE — 87205 SMEAR GRAM STAIN: CPT | Performed by: PLASTIC SURGERY

## 2017-09-18 PROCEDURE — 87076 CULTURE ANAEROBE IDENT EACH: CPT | Performed by: PLASTIC SURGERY

## 2017-09-18 PROCEDURE — 87185 SC STD ENZYME DETCJ PER NZM: CPT | Performed by: PLASTIC SURGERY

## 2017-09-18 PROCEDURE — 87070 CULTURE OTHR SPECIMN AEROBIC: CPT | Performed by: PLASTIC SURGERY

## 2017-09-18 PROCEDURE — 87147 CULTURE TYPE IMMUNOLOGIC: CPT | Performed by: PLASTIC SURGERY

## 2017-09-18 PROCEDURE — 87075 CULTR BACTERIA EXCEPT BLOOD: CPT | Performed by: PLASTIC SURGERY

## 2017-09-22 LAB
BACTERIA SPEC AEROBE CULT: ABNORMAL
GRAM STN SPEC: ABNORMAL
GRAM STN SPEC: ABNORMAL

## 2017-09-24 LAB
BACTERIA SPEC ANAEROBE CULT: ABNORMAL
BACTERIA SPEC ANAEROBE CULT: ABNORMAL

## 2017-10-03 RX ORDER — EZETIMIBE 10 MG/1
10 TABLET ORAL DAILY
Qty: 90 TABLET | Refills: 0 | Status: SHIPPED | OUTPATIENT
Start: 2017-10-03 | End: 2017-10-04 | Stop reason: SDUPTHER

## 2017-10-04 RX ORDER — EZETIMIBE 10 MG/1
10 TABLET ORAL DAILY
Qty: 90 TABLET | Refills: 0 | Status: SHIPPED | OUTPATIENT
Start: 2017-10-04 | End: 2018-08-14 | Stop reason: SDUPTHER

## 2017-10-24 ENCOUNTER — OFFICE VISIT (OUTPATIENT)
Dept: CARDIOLOGY | Facility: CLINIC | Age: 62
End: 2017-10-24

## 2017-10-24 VITALS
OXYGEN SATURATION: 98 % | SYSTOLIC BLOOD PRESSURE: 128 MMHG | HEART RATE: 97 BPM | WEIGHT: 232 LBS | HEIGHT: 60 IN | DIASTOLIC BLOOD PRESSURE: 70 MMHG | BODY MASS INDEX: 45.55 KG/M2

## 2017-10-24 DIAGNOSIS — R55 NEAR SYNCOPE: ICD-10-CM

## 2017-10-24 DIAGNOSIS — I25.10 CORONARY ARTERY DISEASE INVOLVING NATIVE CORONARY ARTERY OF NATIVE HEART WITHOUT ANGINA PECTORIS: Primary | ICD-10-CM

## 2017-10-24 PROCEDURE — 93000 ELECTROCARDIOGRAM COMPLETE: CPT | Performed by: PHYSICIAN ASSISTANT

## 2017-10-24 PROCEDURE — 99213 OFFICE O/P EST LOW 20 MIN: CPT | Performed by: PHYSICIAN ASSISTANT

## 2017-10-24 NOTE — PROGRESS NOTES
Date of Office Visit: 10/24/2017  Encounter Provider: TIMOTHY Bloom  Place of Service: [facility] CARDIOLOGY  Patient Name: Rosalee Godwin  :1955    Chief Complaint   Patient presents with   • Coronary Artery Disease     2 month follow up   :     HPI: Rosalee Godwin is a 62 y.o. female who presents today for Follow-up.  Old records have been obtained and reviewed by me.  She is a patient of Dr. Kapoor's with a past medical history significant for mild coronary artery disease, smoking, obesity, and peripheral arterial disease.  She follows with Dr. Александр Coats for her peripheral vascular disease.  Her last cardiac catheterization was in 2016.  This showed mild to moderate diffuse coronary artery disease.  She had 30% distal left main, 30% diffuse proximal LAD, 30-40% mid to distal LAD, 30% ramus, 30% circumflex, 50% OM1, and 30% diffuse RCA.  She had [place] veins that were feeding the RV directly.  Recommendations at that time were for medical management.  Her last echocardiogram was in 2017.  This showed a normal LV function with an EF of 62%, and no significant valvular abnormalities.  She was last in our office to see Dr. Kapoor on 2017.  At that visit, she had complaints of recurrent near syncope.  Dr. Kapoor felt that it could represent transient changes in her blood pressure and/or her heart rate.  He ordered an event recorder, I do not have the results of this yet.  She is here today for her 2 month follow-up.   She states that since she was last in our office, she's actually been feeling better.  She has not had anymore episodes of near syncope.  She did not have any episodes while she was wearing the event monitor.  I do not have the results of the monitor.  She is having this kind of subxiphoid chest pain that kind of comes and goes.  It does not sound anginal.  The best news is that she has lost 30 pounds over the past year.  This was initially  because of changes in her diabetes medication, however she has started to exercise more and has started to cook at home and limit the amount of food that she eats.  Overall she feels much better.  She is still smoking, however because she is started to feel better she is starting to cut back on the number of cigarettes that she smokes a day.      Past Medical History:   Diagnosis Date   • Allergic    • Anemia    • Angina pectoris    • Anxiety    • Arthritis    • ASCVD (arteriosclerotic cardiovascular disease)    • Bilateral leg edema    • CAD (coronary artery disease)    • Chest pain    • Depression    • Diabetes mellitus    • Disease of thyroid gland    • Dizziness    • Headache    • Hyperlipidemia    • Hypertension    • Morbid obesity    • Orthostatic hypotension    • PAD (peripheral artery disease)    • Shortness of breath    • Sleep apnea    • Tobacco abuse    • Vitamin D deficiency        Past Surgical History:   Procedure Laterality Date   • ANAL FISSURECTOMY     • BRAIN SURGERY     • CARDIAC CATHETERIZATION N/A 2016    Procedure: Coronary angiography;  Surgeon: Fredrick Kapoor MD;  Location: [facility] CATH INVASIVE LOCATION;  Service:    • CARDIAC CATHETERIZATION N/A 2016    Procedure: Left heart cath;  Surgeon: Fredrick Kapoor MD;  Location: [facility] CATH INVASIVE LOCATION;  Service:    • CARDIAC CATHETERIZATION N/A 2016    Procedure: Left ventriculography;  Surgeon: Fredrick Kapoor MD;  Location: [facility] CATH INVASIVE LOCATION;  Service:    • CARDIAC CATHETERIZATION N/A 2016    Procedure: Right heart cath;  Surgeon: Fredrick Kapoor MD;  Location: [facility] CATH INVASIVE LOCATION;  Service:    •  SECTION     • CHOLECYSTECTOMY     • ERCP     • FRACTURE SURGERY      arm   • HYSTERECTOMY     • ILIAC ARTERY STENT         Social History     Social History   • Marital status:      Spouse name: N/A   • Number of children: N/A   • Years of education: N/A     Occupational History   • Not on file.     Social  History Main Topics   • Smoking status: Current Every Day Smoker     Packs/day: 0.50   • Smokeless tobacco: Never Used   • Alcohol use 0.6 oz/week     1 Glasses of wine per week      Comment: rare   • Drug use: No   • Sexual activity: Defer     Other Topics Concern   • Not on file     Social History Narrative       Family History   Problem Relation Age of Onset   • Heart disease Mother    • Hypertension Mother    • Hyperlipidemia Mother    • Heart disease Father    • Heart attack Sister    • Heart disease Sister    • Heart disease Brother    • Heart attack Brother    • Hyperlipidemia Brother    • Diabetes Brother    • Heart attack Maternal Grandmother    • Heart disease Maternal Grandmother    • Heart failure Maternal Grandmother    • Heart failure Maternal Grandfather    • Heart disease Maternal Grandfather    • Heart attack Maternal Grandfather    • Heart attack Paternal Grandmother    • Heart disease Paternal Grandmother    • Heart failure Paternal Grandmother    • Hypertension Paternal Grandmother        Review of Systems   Constitution: Negative for chills, fever, malaise/fatigue, weight gain and weight loss.   HENT: Negative for ear pain, hearing loss, nosebleeds and sore throat.    Eyes: Negative for double vision, pain and visual disturbance.   Cardiovascular: Positive for chest pain. Negative for dyspnea on exertion, irregular heartbeat, leg swelling, near-syncope, orthopnea, palpitations, paroxysmal nocturnal dyspnea and syncope.   Respiratory: Negative for cough, shortness of breath, sleep disturbances due to breathing, snoring and wheezing.    Endocrine: Negative for cold intolerance, heat intolerance and polyuria.   Skin: Negative for itching and rash.   Musculoskeletal: Negative for joint pain, joint swelling and myalgias.   Gastrointestinal: Negative for abdominal pain, diarrhea, melena, nausea and vomiting.   Genitourinary: Negative for frequency, hematuria and hesitancy.   Neurological: Negative for  excessive daytime sleepiness, headaches, light-headedness, numbness, paresthesias and seizures.   Psychiatric/Behavioral: Negative for altered mental status and depression.   Allergic/Immunologic: Negative.    All other systems reviewed and are negative.      Allergies   Allergen Reactions   • Ciprofloxacin          Current Outpatient Prescriptions:   •  ALPRAZolam (XANAX) 0.25 MG tablet, Take 1 tablet by mouth Daily., Disp: 30 tablet, Rfl: 0  •  aspirin 81 MG chewable tablet, Chew 81 mg daily., Disp: , Rfl:   •  atorvastatin (LIPITOR) 20 MG tablet, Take 1 tablet by mouth Daily., Disp: 30 tablet, Rfl: 5  •  Calcium Carb-Cholecalciferol (CALCIUM+D3) 600-800 MG-UNIT tablet, Take 1 tablet by mouth daily., Disp: , Rfl:   •  cholecalciferol (VITAMIN D3) 1000 UNITS tablet, Take 1,000 Units by mouth daily., Disp: , Rfl:   •  cilostazol (PLETAL) 100 MG tablet, TAKE ONE TABLET BY MOUTH TWICE DAILY , Disp: 60 tablet, Rfl: 3  •  citalopram (CeleXA) 20 MG tablet, Take 1 tablet by mouth Daily., Disp: 30 tablet, Rfl: 5  •  clindamycin (CLEOCIN T) 1 % swab, , Disp: , Rfl:   •  clopidogrel (PLAVIX) 75 MG tablet, TAKE ONE TABLET BY MOUTH ONE TIME DAILY , Disp: 30 tablet, Rfl: 5  •  coenzyme Q10 100 MG capsule, Take 100 mg by mouth daily., Disp: , Rfl:   •  Empagliflozin (JARDIANCE) 10 MG tablet, Take 1 tablet by mouth Daily., Disp: 30 tablet, Rfl: 0  •  ezetimibe (ZETIA) 10 MG tablet, Take 1 tablet by mouth Daily., Disp: 90 tablet, Rfl: 0  •  hydrochlorothiazide (HYDRODIURIL) 25 MG tablet, TAKE ONE TABLET BY MOUTH ONE TIME DAILY , Disp: 30 tablet, Rfl: 2  •  levothyroxine (SYNTHROID, LEVOTHROID) 50 MCG tablet, Take 1 tablet by mouth Daily., Disp: 30 tablet, Rfl: 5  •  lisinopril (PRINIVIL,ZESTRIL) 40 MG tablet, Take 1 tablet by mouth Daily., Disp: 30 tablet, Rfl: 5  •  Multiple Vitamins-Minerals (EYE VITAMINS PO), Take 1 tablet by mouth Daily., Disp: , Rfl:   •  pantoprazole (PROTONIX) 40 MG EC tablet, TAKE ONE TABLET BY MOUTH ONE  "TIME DAILY , Disp: 30 tablet, Rfl: 2  •  Pediatric Multivitamins-Iron (FLINTSTONES PLUS IRON PO), Take 1 tablet by mouth daily., Disp: , Rfl:   •  SITagliptin-MetFORMIN HCl ER (JANUMET XR)  MG tablet sustained-release 24 hour, Take 2 tablets once a day, Disp: 60 tablet, Rfl: 2  •  sulfamethoxazole-trimethoprim (BACTRIM DS) 800-160 MG per tablet, Take 1 tablet by mouth 2 (Two) Times a Day., Disp: 60 tablet, Rfl: 0     Objective:     Vitals:    10/24/17 1024 10/24/17 1043   BP: 122/70 128/70   BP Location: Right arm Left arm   Pulse: 97    SpO2: 98%    Weight: 232 lb (105 kg)    Height: 60\" (152.4 cm)      Body mass index is 45.31 kg/(m^2).    PHYSICAL EXAM:    Physical Exam   Constitutional: She is oriented to person, place, and time. She appears well-developed and well-nourished. No distress.   HENT:   Head: Normocephalic and atraumatic.   Eyes: Pupils are equal, round, and reactive to light.   Neck: No JVD present. Carotid bruit is present. No thyromegaly present.   Cardiovascular: Normal rate, regular rhythm, normal heart sounds and intact distal pulses.    No murmur heard.  Pulmonary/Chest: Effort normal and breath sounds normal. No respiratory distress.   Abdominal: Soft. Bowel sounds are normal. She exhibits no distension. There is no splenomegaly or hepatomegaly. There is no tenderness.   Musculoskeletal: Normal range of motion. She exhibits no edema.   Neurological: She is alert and oriented to person, place, and time.   Skin: Skin is warm and dry. She is not diaphoretic. No erythema.   Psychiatric: She has a normal mood and affect. Her behavior is normal. Judgment normal.         ECG 12 Lead  Date/Time: 10/24/2017 11:03 AM  Performed by: MARLYN CORRAL.  Authorized by: MARLYN CORRAL.   Comparison: compared with previous ECG from 8/23/2017  Similar to previous ECG  Rhythm: sinus rhythm  BPM: 97  Clinical impression: normal ECG  Comments: Indication: Near syncope, coronary disease.            "   Assessment:       Diagnosis Plan   1. Coronary artery disease involving native coronary artery of native heart without angina pectoris  ECG 12 Lead   2. Near syncope  ECG 12 Lead     Orders Placed This Encounter   Procedures   • ECG 12 Lead     This order was created via procedure documentation          Plan:       1.  Coronary Artery Disease  Assessment  • The patient has no angina    Plan  • Lifestyle modifications discussed include adhering to a heart healthy diet, avoidance of tobacco products, maintenance of a healthy weight and regular exercise    Subjective - Objective  • Current antiplatelet therapy includes aspirin 81 mg and clopidogrel 75 mg  • Overall she is doing well from a cardiac standpoint.  The best news is that she has lost 30 pounds and plans to continue losing weight and exercising.  I think this is fantastic.  She knows that she needs to quit smoking, and I do think that because she is starting to feel better with her lifestyle modifications, she is going to have more success of cutting back on the cigarette smoking.  She is having this atypical chest pain, and I do not think that it is related to her heart.  I'm not going to make any changes to her medical regimen today, and I've encouraged her to keep the good work.    2.  Near-syncope.  Unfortunately she did not have any episodes while she was wearing her event monitor, I am going to obtain the results of this sometime this week.  Overall she is doing better, and her near-syncope has almost completely resolved.  I certainly think that lysed on modification has helped in the situation.  Further recommendations will be made pending the results of the event monitor.    She will follow-up with either me or Dr. Kapoor in 1 year.    As always, it has been a pleasure to participate in your patient's care.      Sincerely,         Barbara White PA-C

## 2017-11-03 ENCOUNTER — TELEPHONE (OUTPATIENT)
Dept: CARDIOLOGY | Facility: CLINIC | Age: 62
End: 2017-11-03

## 2017-11-03 RX ORDER — PANTOPRAZOLE SODIUM 40 MG/1
TABLET, DELAYED RELEASE ORAL
Qty: 30 TABLET | Refills: 1 | Status: SHIPPED | OUTPATIENT
Start: 2017-11-03 | End: 2018-01-04 | Stop reason: SDUPTHER

## 2017-11-03 RX ORDER — HYDROCHLOROTHIAZIDE 25 MG/1
TABLET ORAL
Qty: 30 TABLET | Refills: 1 | Status: SHIPPED | OUTPATIENT
Start: 2017-11-03 | End: 2018-01-04 | Stop reason: SDUPTHER

## 2017-11-03 NOTE — TELEPHONE ENCOUNTER
I spoke with the patient about the results of her event monitor.  In a 30 day period, she had a 2 minute run of atrial fibrillation and a 13 beat run of ventricular tachycardia.  She has not been symptomatic or near syncopal anymore.  She has lost 30 pounds.  She is artery on Plavix for her peripheral vascular disease.  At this time, I do not want to anticoagulate her for her atrial fibrillation that was only 2 minutes long.  She does have sleep apnea, but she has not been compliant with her CPAP machine because it is old and does not fit her anymore.  I sent a message to her PCP about another sleep study.

## 2017-11-06 DIAGNOSIS — G47.33 OBSTRUCTIVE SLEEP APNEA SYNDROME: Primary | ICD-10-CM

## 2017-12-11 ENCOUNTER — APPOINTMENT (OUTPATIENT)
Dept: SLEEP MEDICINE | Facility: HOSPITAL | Age: 62
End: 2017-12-11
Attending: INTERNAL MEDICINE

## 2018-01-04 RX ORDER — HYDROCHLOROTHIAZIDE 25 MG/1
TABLET ORAL
Qty: 30 TABLET | Refills: 0 | Status: SHIPPED | OUTPATIENT
Start: 2018-01-04 | End: 2018-02-05 | Stop reason: SDUPTHER

## 2018-01-04 RX ORDER — CILOSTAZOL 100 MG/1
TABLET ORAL
Qty: 60 TABLET | Refills: 0 | Status: SHIPPED | OUTPATIENT
Start: 2018-01-04 | End: 2018-06-14 | Stop reason: SDUPTHER

## 2018-01-04 RX ORDER — PANTOPRAZOLE SODIUM 40 MG/1
TABLET, DELAYED RELEASE ORAL
Qty: 30 TABLET | Refills: 0 | Status: SHIPPED | OUTPATIENT
Start: 2018-01-04 | End: 2018-02-05 | Stop reason: SDUPTHER

## 2018-01-19 RX ORDER — LEVOTHYROXINE SODIUM 0.05 MG/1
TABLET ORAL
Qty: 30 TABLET | Refills: 0 | Status: SHIPPED | OUTPATIENT
Start: 2018-01-19 | End: 2018-02-25 | Stop reason: SDUPTHER

## 2018-01-20 ENCOUNTER — RESULTS ENCOUNTER (OUTPATIENT)
Dept: FAMILY MEDICINE CLINIC | Facility: CLINIC | Age: 63
End: 2018-01-20

## 2018-01-20 DIAGNOSIS — E03.9 ACQUIRED HYPOTHYROIDISM: ICD-10-CM

## 2018-01-20 DIAGNOSIS — E55.9 VITAMIN D DEFICIENCY: ICD-10-CM

## 2018-01-20 DIAGNOSIS — E11.9 CONTROLLED TYPE 2 DIABETES MELLITUS WITHOUT COMPLICATION, WITHOUT LONG-TERM CURRENT USE OF INSULIN (HCC): ICD-10-CM

## 2018-01-20 DIAGNOSIS — I10 ESSENTIAL HYPERTENSION: ICD-10-CM

## 2018-01-20 DIAGNOSIS — E78.5 HYPERLIPIDEMIA, UNSPECIFIED HYPERLIPIDEMIA TYPE: ICD-10-CM

## 2018-01-20 DIAGNOSIS — M25.541 ARTHRALGIA OF RIGHT HAND: ICD-10-CM

## 2018-01-23 ENCOUNTER — OFFICE VISIT (OUTPATIENT)
Dept: FAMILY MEDICINE CLINIC | Facility: CLINIC | Age: 63
End: 2018-01-23

## 2018-01-23 VITALS
TEMPERATURE: 98.1 F | SYSTOLIC BLOOD PRESSURE: 120 MMHG | OXYGEN SATURATION: 98 % | HEIGHT: 60 IN | DIASTOLIC BLOOD PRESSURE: 72 MMHG | WEIGHT: 227 LBS | BODY MASS INDEX: 44.57 KG/M2 | HEART RATE: 99 BPM

## 2018-01-23 DIAGNOSIS — F41.0 PANIC DISORDER: ICD-10-CM

## 2018-01-23 DIAGNOSIS — E11.9 CONTROLLED TYPE 2 DIABETES MELLITUS WITHOUT COMPLICATION, WITHOUT LONG-TERM CURRENT USE OF INSULIN (HCC): Primary | ICD-10-CM

## 2018-01-23 DIAGNOSIS — F41.9 ANXIETY: ICD-10-CM

## 2018-01-23 PROCEDURE — 99213 OFFICE O/P EST LOW 20 MIN: CPT | Performed by: FAMILY MEDICINE

## 2018-01-23 RX ORDER — ALPRAZOLAM 0.25 MG/1
0.25 TABLET ORAL DAILY
Qty: 30 TABLET | Refills: 0 | Status: SHIPPED | OUTPATIENT
Start: 2018-01-23 | End: 2018-08-14 | Stop reason: SDUPTHER

## 2018-01-23 NOTE — PROGRESS NOTES
Subjective   Rosalee Godwin is a 62 y.o. female with   Chief Complaint   Patient presents with   • Diabetes     follow up medications     History of Present Illness     Patient presents today for follow up of Type II Diabetes.  She reports that she checks her sugar on a regular basis and this AM was 121 and she was upset with that reading.  She continues to tolerate current medications well.  She has been using one tablet of Jardiance at 10 mg per day  at this time, and  is no longer using Janumet.  Patient states that occasionally she would find whole tablets of Janumet in stool material.  Diet was somewhat strict over the recent holidays.  She has lost a bit of weight since last visit.  While on Janumet, she did have diarrhea approximately once a day usually in the AM.  She typically does not eat 3 meals a day but does eat breakfast and lunch regularly.  Peripheral Neuropathy has developed bilaterally to her feet after having a slight addiction to very sweet coffee and has since stopped the coffee products and the neuropathy has improved.     The following portions of the patient's history were reviewed and updated as appropriate: allergies, current medications, past family history, past medical history, past social history, past surgical history and problem list.    Review of Systems   Endocrine: Negative for cold intolerance and heat intolerance.        Type II Diabetes       Objective     Vitals:    01/23/18 0812   BP: 120/72   Pulse: 99   Temp: 98.1 °F (36.7 °C)   SpO2: 98%     BP Readings from Last 3 Encounters:   01/23/18 120/72   10/24/17 128/70   08/29/17 130/70      Wt Readings from Last 3 Encounters:   01/23/18 103 kg (227 lb)   10/24/17 105 kg (232 lb)   08/29/17 105 kg (231 lb)        Physical Exam   Constitutional: She is oriented to person, place, and time. She appears well-developed and well-nourished.   HENT:   Head: Normocephalic and atraumatic.   Neck: Trachea normal and phonation normal. Neck  supple. Normal carotid pulses present. Carotid bruit is not present. No thyroid mass and no thyromegaly present.   Cardiovascular: Normal rate, regular rhythm and normal heart sounds.  Exam reveals no gallop and no friction rub.    No murmur heard.  Pulmonary/Chest: Effort normal and breath sounds normal. No respiratory distress. She has no decreased breath sounds. She has no wheezes. She has no rhonchi. She has no rales.   Lymphadenopathy:     She has no cervical adenopathy.   Neurological: She is alert and oriented to person, place, and time.   Skin: Skin is warm and dry. No rash noted.   Psychiatric: She has a normal mood and affect. Her speech is normal and behavior is normal. Judgment and thought content normal. Cognition and memory are normal.   Nursing note and vitals reviewed.      Assessment/Plan   Rosalee was seen today for diabetes.    Diagnoses and all orders for this visit:    Controlled type 2 diabetes mellitus without complication, without long-term current use of insulin    Anxiety    Panic disorder    Other orders  -     ALPRAZolam (XANAX) 0.25 MG tablet; Take 1 tablet by mouth Daily.      Patient Instructions   Keep averages of Glucose over the next month.  Call the office with the averages.  Glimepiride may be initiated at that time.       Return in about 6 months (around 7/23/2018).     Patient's BMI is above normal parameters. Follow-up plan includes:  exercise counseling and nutrition counseling.        Scribed for Eddie Slater MD by Jacoby Portillo. 01/23/2018    IEddie MD personally performed the services described in this documentation, as scribed by Jacoby Portillo in my presence, and it is both accurate and complete

## 2018-01-23 NOTE — PATIENT INSTRUCTIONS
Keep averages of Glucose over the next month.  Call the office with the averages.  Glimepiride may be initiated at that time.

## 2018-02-05 RX ORDER — HYDROCHLOROTHIAZIDE 25 MG/1
TABLET ORAL
Qty: 30 TABLET | Refills: 5 | Status: SHIPPED | OUTPATIENT
Start: 2018-02-05 | End: 2018-08-01 | Stop reason: SDUPTHER

## 2018-02-05 RX ORDER — PANTOPRAZOLE SODIUM 40 MG/1
TABLET, DELAYED RELEASE ORAL
Qty: 30 TABLET | Refills: 5 | Status: SHIPPED | OUTPATIENT
Start: 2018-02-05 | End: 2018-08-02 | Stop reason: SDUPTHER

## 2018-02-05 RX ORDER — CLOPIDOGREL BISULFATE 75 MG/1
TABLET ORAL
Qty: 30 TABLET | Refills: 5 | Status: SHIPPED | OUTPATIENT
Start: 2018-02-05 | End: 2018-08-14 | Stop reason: SDUPTHER

## 2018-02-20 ENCOUNTER — TELEPHONE (OUTPATIENT)
Dept: FAMILY MEDICINE CLINIC | Facility: CLINIC | Age: 63
End: 2018-02-20

## 2018-02-26 RX ORDER — LEVOTHYROXINE SODIUM 0.05 MG/1
TABLET ORAL
Qty: 30 TABLET | Refills: 4 | Status: SHIPPED | OUTPATIENT
Start: 2018-02-26 | End: 2018-08-01 | Stop reason: SDUPTHER

## 2018-02-26 RX ORDER — LISINOPRIL 40 MG/1
TABLET ORAL
Qty: 30 TABLET | Refills: 4 | Status: SHIPPED | OUTPATIENT
Start: 2018-02-26 | End: 2018-08-02 | Stop reason: SDUPTHER

## 2018-03-15 RX ORDER — CITALOPRAM 20 MG/1
TABLET ORAL
Qty: 30 TABLET | Refills: 4 | Status: SHIPPED | OUTPATIENT
Start: 2018-03-15 | End: 2018-08-14 | Stop reason: SDUPTHER

## 2018-06-14 RX ORDER — CILOSTAZOL 100 MG/1
TABLET ORAL
Qty: 60 TABLET | Refills: 0 | Status: SHIPPED | OUTPATIENT
Start: 2018-06-14 | End: 2018-08-01 | Stop reason: SDUPTHER

## 2018-07-23 DIAGNOSIS — M25.541 ARTHRALGIA OF RIGHT HAND: ICD-10-CM

## 2018-07-23 DIAGNOSIS — E78.5 HYPERLIPIDEMIA, UNSPECIFIED HYPERLIPIDEMIA TYPE: Primary | ICD-10-CM

## 2018-07-23 DIAGNOSIS — E55.9 VITAMIN D DEFICIENCY: ICD-10-CM

## 2018-07-23 DIAGNOSIS — E03.9 ACQUIRED HYPOTHYROIDISM: ICD-10-CM

## 2018-07-23 DIAGNOSIS — E11.9 CONTROLLED TYPE 2 DIABETES MELLITUS WITHOUT COMPLICATION, WITHOUT LONG-TERM CURRENT USE OF INSULIN (HCC): ICD-10-CM

## 2018-08-02 RX ORDER — PANTOPRAZOLE SODIUM 40 MG/1
TABLET, DELAYED RELEASE ORAL
Qty: 30 TABLET | Refills: 4 | Status: SHIPPED | OUTPATIENT
Start: 2018-08-02 | End: 2018-08-14 | Stop reason: SDUPTHER

## 2018-08-02 RX ORDER — LISINOPRIL 40 MG/1
TABLET ORAL
Qty: 30 TABLET | Refills: 3 | Status: SHIPPED | OUTPATIENT
Start: 2018-08-02 | End: 2018-11-06 | Stop reason: SDUPTHER

## 2018-08-02 RX ORDER — CILOSTAZOL 100 MG/1
TABLET ORAL
Qty: 60 TABLET | Refills: 0 | Status: SHIPPED | OUTPATIENT
Start: 2018-08-02 | End: 2018-08-14 | Stop reason: SDUPTHER

## 2018-08-02 RX ORDER — HYDROCHLOROTHIAZIDE 25 MG/1
TABLET ORAL
Qty: 30 TABLET | Refills: 4 | Status: SHIPPED | OUTPATIENT
Start: 2018-08-02 | End: 2018-08-14 | Stop reason: SDUPTHER

## 2018-08-02 RX ORDER — LEVOTHYROXINE SODIUM 0.05 MG/1
TABLET ORAL
Qty: 30 TABLET | Refills: 3 | Status: SHIPPED | OUTPATIENT
Start: 2018-08-02 | End: 2018-08-14 | Stop reason: DRUGHIGH

## 2018-08-07 LAB
25(OH)D3+25(OH)D2 SERPL-MCNC: 29.9 NG/ML
ALBUMIN SERPL-MCNC: 4 G/DL (ref 3.5–5.2)
ALBUMIN/GLOB SERPL: 1.4 G/DL
ALP SERPL-CCNC: 136 U/L (ref 40–129)
ALT SERPL-CCNC: 16 U/L (ref 5–33)
AST SERPL-CCNC: 15 U/L (ref 5–32)
BASOPHILS # BLD AUTO: 0.04 10*3/MM3 (ref 0–0.2)
BASOPHILS NFR BLD AUTO: 0.4 % (ref 0–2)
BILIRUB SERPL-MCNC: 0.3 MG/DL (ref 0.2–1.2)
BUN SERPL-MCNC: 23 MG/DL (ref 8–23)
BUN/CREAT SERPL: 19.8 (ref 7–25)
CALCIUM SERPL-MCNC: 9.3 MG/DL (ref 8.8–10.5)
CHLORIDE SERPL-SCNC: 98 MMOL/L (ref 98–107)
CHOLEST SERPL-MCNC: 182 MG/DL (ref 0–200)
CO2 SERPL-SCNC: 28.4 MMOL/L (ref 22–29)
CREAT SERPL-MCNC: 1.16 MG/DL (ref 0.57–1)
EOSINOPHIL # BLD AUTO: 0.1 10*3/MM3 (ref 0.1–0.3)
EOSINOPHIL NFR BLD AUTO: 0.9 % (ref 0–4)
ERYTHROCYTE [DISTWIDTH] IN BLOOD BY AUTOMATED COUNT: 19 % (ref 11.5–14.5)
GLOBULIN SER CALC-MCNC: 2.9 GM/DL
GLUCOSE SERPL-MCNC: 200 MG/DL (ref 65–99)
HBA1C MFR BLD: 8.9 % (ref 4.8–5.6)
HCT VFR BLD AUTO: 42.9 % (ref 37–47)
HDLC SERPL-MCNC: 48 MG/DL (ref 40–60)
HGB BLD-MCNC: 12.8 G/DL (ref 12–16)
IMM GRANULOCYTES # BLD: 0.05 10*3/MM3 (ref 0–0.03)
IMM GRANULOCYTES NFR BLD: 0.5 % (ref 0–0.5)
LDLC SERPL CALC-MCNC: 105 MG/DL (ref 0–100)
LYMPHOCYTES # BLD AUTO: 1.79 10*3/MM3 (ref 0.6–4.8)
LYMPHOCYTES NFR BLD AUTO: 16.2 % (ref 20–45)
MCH RBC QN AUTO: 24.2 PG (ref 27–31)
MCHC RBC AUTO-ENTMCNC: 29.8 G/DL (ref 31–37)
MCV RBC AUTO: 80.9 FL (ref 81–99)
MONOCYTES # BLD AUTO: 0.59 10*3/MM3 (ref 0–1)
MONOCYTES NFR BLD AUTO: 5.3 % (ref 3–8)
NEUTROPHILS # BLD AUTO: 8.49 10*3/MM3 (ref 1.5–8.3)
NEUTROPHILS NFR BLD AUTO: 76.7 % (ref 45–70)
NRBC BLD AUTO-RTO: 0 /100 WBC (ref 0–0)
PLATELET # BLD AUTO: 305 10*3/MM3 (ref 140–500)
POTASSIUM SERPL-SCNC: 5.2 MMOL/L (ref 3.5–5.2)
PROT SERPL-MCNC: 6.9 G/DL (ref 6–8.5)
RBC # BLD AUTO: 5.3 10*6/MM3 (ref 4.2–5.4)
SODIUM SERPL-SCNC: 139 MMOL/L (ref 136–145)
TRIGL SERPL-MCNC: 147 MG/DL (ref 0–150)
TSH SERPL DL<=0.005 MIU/L-ACNC: 2.92 MIU/ML (ref 0.27–4.2)
URATE SERPL-MCNC: 6.4 MG/DL (ref 3.4–7)
VLDLC SERPL CALC-MCNC: 29.4 MG/DL (ref 7–27)
WBC # BLD AUTO: 11.06 10*3/MM3 (ref 4.8–10.8)

## 2018-08-09 LAB
FERRITIN SERPL-MCNC: 18 NG/ML (ref 13–150)
IRON SATN MFR SERPL: 7 %
IRON SERPL-MCNC: 26 MCG/DL (ref 37–145)
TIBC SERPL-MCNC: 351 MCG/DL (ref 261–478)
UIBC SERPL-MCNC: 325 MCG/DL (ref 112–346)
WRITTEN AUTHORIZATION: NORMAL

## 2018-08-14 ENCOUNTER — OFFICE VISIT (OUTPATIENT)
Dept: FAMILY MEDICINE CLINIC | Facility: CLINIC | Age: 63
End: 2018-08-14

## 2018-08-14 VITALS
TEMPERATURE: 98.3 F | SYSTOLIC BLOOD PRESSURE: 120 MMHG | WEIGHT: 235 LBS | DIASTOLIC BLOOD PRESSURE: 72 MMHG | OXYGEN SATURATION: 98 % | HEIGHT: 60 IN | RESPIRATION RATE: 18 BRPM | BODY MASS INDEX: 46.13 KG/M2 | HEART RATE: 89 BPM

## 2018-08-14 DIAGNOSIS — E78.5 HYPERLIPIDEMIA, UNSPECIFIED HYPERLIPIDEMIA TYPE: ICD-10-CM

## 2018-08-14 DIAGNOSIS — E55.9 VITAMIN D DEFICIENCY: ICD-10-CM

## 2018-08-14 DIAGNOSIS — I10 ESSENTIAL HYPERTENSION: Primary | ICD-10-CM

## 2018-08-14 DIAGNOSIS — E61.1 IRON DEFICIENCY: ICD-10-CM

## 2018-08-14 DIAGNOSIS — R71.8 MICROCYTOSIS: ICD-10-CM

## 2018-08-14 DIAGNOSIS — IMO0001 UNCONTROLLED TYPE 2 DIABETES MELLITUS WITHOUT COMPLICATION, WITHOUT LONG-TERM CURRENT USE OF INSULIN: ICD-10-CM

## 2018-08-14 DIAGNOSIS — F17.210 CIGARETTE NICOTINE DEPENDENCE WITHOUT COMPLICATION: ICD-10-CM

## 2018-08-14 DIAGNOSIS — E03.9 ACQUIRED HYPOTHYROIDISM: ICD-10-CM

## 2018-08-14 DIAGNOSIS — E66.01 MORBID EXOGENOUS OBESITY (HCC): ICD-10-CM

## 2018-08-14 PROBLEM — D64.9 ANEMIA: Status: RESOLVED | Noted: 2018-08-14 | Resolved: 2018-08-14

## 2018-08-14 PROCEDURE — 99214 OFFICE O/P EST MOD 30 MIN: CPT | Performed by: FAMILY MEDICINE

## 2018-08-14 RX ORDER — CITALOPRAM 20 MG/1
20 TABLET ORAL DAILY
Qty: 90 TABLET | Refills: 1 | Status: SHIPPED | OUTPATIENT
Start: 2018-08-14 | End: 2019-02-28 | Stop reason: SDUPTHER

## 2018-08-14 RX ORDER — HYDROCHLOROTHIAZIDE 25 MG/1
25 TABLET ORAL DAILY
Qty: 90 TABLET | Refills: 1 | Status: SHIPPED | OUTPATIENT
Start: 2018-08-14 | End: 2019-02-28 | Stop reason: SDUPTHER

## 2018-08-14 RX ORDER — DAPAGLIFLOZIN 5 MG/1
5 TABLET, FILM COATED ORAL DAILY
Qty: 30 TABLET | Refills: 0 | COMMUNITY
Start: 2018-08-14 | End: 2018-09-25 | Stop reason: DRUGHIGH

## 2018-08-14 RX ORDER — METFORMIN HYDROCHLORIDE 750 MG/1
750 TABLET, EXTENDED RELEASE ORAL
Qty: 30 TABLET | Refills: 1 | Status: SHIPPED | OUTPATIENT
Start: 2018-08-14 | End: 2018-09-25 | Stop reason: DRUGHIGH

## 2018-08-14 RX ORDER — CILOSTAZOL 100 MG/1
100 TABLET ORAL 2 TIMES DAILY
Qty: 180 TABLET | Refills: 1 | Status: SHIPPED | OUTPATIENT
Start: 2018-08-14 | End: 2019-11-12

## 2018-08-14 RX ORDER — ATORVASTATIN CALCIUM 20 MG/1
20 TABLET, FILM COATED ORAL DAILY
Qty: 90 TABLET | Refills: 1 | Status: SHIPPED | OUTPATIENT
Start: 2018-08-14 | End: 2019-07-31 | Stop reason: SDUPTHER

## 2018-08-14 RX ORDER — CLOPIDOGREL BISULFATE 75 MG/1
75 TABLET ORAL DAILY
Qty: 90 TABLET | Refills: 1 | Status: SHIPPED | OUTPATIENT
Start: 2018-08-14 | End: 2019-02-28 | Stop reason: SDUPTHER

## 2018-08-14 RX ORDER — METFORMIN HYDROCHLORIDE 750 MG/1
750 TABLET, EXTENDED RELEASE ORAL
Qty: 90 TABLET | Refills: 1 | Status: SHIPPED | OUTPATIENT
Start: 2018-08-14 | End: 2018-08-14 | Stop reason: SDUPTHER

## 2018-08-14 RX ORDER — LEVOTHYROXINE SODIUM 0.07 MG/1
75 TABLET ORAL DAILY
Qty: 30 TABLET | Refills: 1 | Status: SHIPPED | OUTPATIENT
Start: 2018-08-14 | End: 2019-10-29 | Stop reason: SDUPTHER

## 2018-08-14 RX ORDER — EZETIMIBE 10 MG/1
10 TABLET ORAL DAILY
Qty: 90 TABLET | Refills: 1 | Status: SHIPPED | OUTPATIENT
Start: 2018-08-14 | End: 2018-11-06

## 2018-08-14 RX ORDER — LEVOTHYROXINE SODIUM 0.07 MG/1
75 TABLET ORAL DAILY
Qty: 90 TABLET | Refills: 1 | Status: SHIPPED | OUTPATIENT
Start: 2018-08-14 | End: 2018-08-14 | Stop reason: SDUPTHER

## 2018-08-14 RX ORDER — ALPRAZOLAM 0.25 MG/1
0.25 TABLET ORAL DAILY
Qty: 90 TABLET | Refills: 1 | Status: SHIPPED | OUTPATIENT
Start: 2018-08-14 | End: 2018-09-25 | Stop reason: SDUPTHER

## 2018-08-14 RX ORDER — PANTOPRAZOLE SODIUM 40 MG/1
40 TABLET, DELAYED RELEASE ORAL DAILY
Qty: 90 TABLET | Refills: 1 | Status: SHIPPED | OUTPATIENT
Start: 2018-08-14 | End: 2019-02-28 | Stop reason: SDUPTHER

## 2018-08-14 NOTE — PATIENT INSTRUCTIONS
Results for orders placed or performed in visit on 07/23/18   Comprehensive Metabolic Panel   Result Value Ref Range    Glucose 200 (H) 65 - 99 mg/dL    BUN 23 8 - 23 mg/dL    Creatinine 1.16 (H) 0.57 - 1.00 mg/dL    eGFR Non African Am 47 (L) >60 mL/min/1.73    eGFR African Am 57 (L) >60 mL/min/1.73    BUN/Creatinine Ratio 19.8 7.0 - 25.0    Sodium 139 136 - 145 mmol/L    Potassium 5.2 3.5 - 5.2 mmol/L    Chloride 98 98 - 107 mmol/L    Total CO2 28.4 22.0 - 29.0 mmol/L    Calcium 9.3 8.8 - 10.5 mg/dL    Total Protein 6.9 6.0 - 8.5 g/dL    Albumin 4.00 3.50 - 5.20 g/dL    Globulin 2.9 gm/dL    A/G Ratio 1.4 g/dL    Total Bilirubin 0.3 0.2 - 1.2 mg/dL    Alkaline Phosphatase 136 (H) 40 - 129 U/L    AST (SGOT) 15 5 - 32 U/L    ALT (SGPT) 16 5 - 33 U/L   Hemoglobin A1c   Result Value Ref Range    Hemoglobin A1C 8.90 (H) 4.80 - 5.60 %   Lipid Panel   Result Value Ref Range    Total Cholesterol 182 0 - 200 mg/dL    Triglycerides 147 0 - 150 mg/dL    HDL Cholesterol 48 40 - 60 mg/dL    VLDL Cholesterol 29.4 (H) 7 - 27 mg/dL    LDL Cholesterol  105 (H) 0 - 100 mg/dL   TSH   Result Value Ref Range    TSH 2.920 0.270 - 4.200 mIU/mL   Uric Acid   Result Value Ref Range    Uric Acid 6.4 3.4 - 7.0 mg/dL   Vitamin D 25 Hydroxy   Result Value Ref Range    25 Hydroxy, Vitamin D 29.9 ng/ml   Iron Profile   Result Value Ref Range    TIBC 351 261 - 478 mcg/dL    UIBC 325 112 - 346 mcg/dL    Iron 26 (L) 37 - 145 mcg/dL    Iron Saturation 7 %   Ferritin   Result Value Ref Range    Ferritin 18.00 13.00 - 150.00 ng/mL   Written Authorization   Result Value Ref Range    Written Authorization Comment    CBC & Differential   Result Value Ref Range    WBC 11.06 (H) 4.80 - 10.80 10*3/mm3    RBC 5.30 4.20 - 5.40 10*6/mm3    Hemoglobin 12.8 12.0 - 16.0 g/dL    Hematocrit 42.9 37.0 - 47.0 %    MCV 80.9 (L) 81.0 - 99.0 fL    MCH 24.2 (L) 27.0 - 31.0 pg    MCHC 29.8 (L) 31.0 - 37.0 g/dL    RDW 19.0 (H) 11.5 - 14.5 %    Platelets 305 140 - 500  10*3/mm3    Neutrophil Rel % 76.7 (H) 45.0 - 70.0 %    Lymphocyte Rel % 16.2 (L) 20.0 - 45.0 %    Monocyte Rel % 5.3 3.0 - 8.0 %    Eosinophil Rel % 0.9 0.0 - 4.0 %    Basophil Rel % 0.4 0.0 - 2.0 %    Neutrophils Absolute 8.49 (H) 1.50 - 8.30 10*3/mm3    Lymphocytes Absolute 1.79 0.60 - 4.80 10*3/mm3    Monocytes Absolute 0.59 0.00 - 1.00 10*3/mm3    Eosinophils Absolute 0.10 0.10 - 0.30 10*3/mm3    Basophils Absolute 0.04 0.00 - 0.20 10*3/mm3    Immature Granulocyte Rel % 0.5 0.0 - 0.5 %    Immature Grans Absolute 0.05 (H) 0.00 - 0.03 10*3/mm3    nRBC 0.0 0.0 - 0.0 /100 WBC     Consider starting Vitamin D3 5000 Daily.  Watch fat and cholesterol in your diet.  (Over-the-counter iron sulfate at 325 mg daily.  This will be in addition to her multivitamins that contain iron.  Patient will return to using her glucometer fasting 3 times a week and be followed in this office in one month to review these numbers.  Return in 6-8 weeks for follow up on labs for Thyroid and possible Anemia

## 2018-08-14 NOTE — PROGRESS NOTES
Subjective   Rosalee Hargrove is a 62 y.o. female with   Chief Complaint   Patient presents with   • Follow-up     labs, discuss medication   .    History of Present Illness     Pt is a 61 yo white female who presents to follow up on recent labwork and to discuss medications.  Pt's labs were done for stable HLD, unstable Type  2 Diabetes, and unstable Vitamin D Def.  Pt is a smoker.  Pt recently fell in her kitchen she has a broken area in her shoulder, humerus and the radial head of the shoulder.  She had contusions of the knee and her face.  Pt has never had a Dexa Scan.  Pt presents with stable HTN, she has a history of morbid exogenous obesity.  She has gained 8 pounds since she was last seen.  Patient is a type II non-insulin-dependent diabetic who has been using Jardiance at 10 mg daily.  She had used Farxiga in the past with some success but her previous insurance forced to use the Jardiance.  She now has Medicare with silver scripts coverage.  She has not attempted metformin even though this has been prescribed in the past due to things that she has read on the Internet.  Pt has no other questions or complaints.    The following portions of the patient's history were reviewed and updated as appropriate: allergies, current medications, past family history, past medical history, past social history, past surgical history and problem list.    Review of Systems   Constitutional:        Morbid exogenous obesity   Cardiovascular:        HTN  HLD   Endocrine: Negative for cold intolerance and heat intolerance.        Type 2 Diabees  Hypothyroidism  Vitamin D Def   All other systems reviewed and are negative.      Objective     Vitals:    08/14/18 0814   BP: 120/72   Pulse: 89   Resp: 18   Temp: 98.3 °F (36.8 °C)   SpO2: 98%     BP Readings from Last 3 Encounters:   08/14/18 120/72   01/23/18 120/72   10/24/17 128/70      Wt Readings from Last 3 Encounters:   08/14/18 107 kg (235 lb)   01/23/18 103 kg (227 lb)    10/24/17 105 kg (232 lb)        No results found for this or any previous visit (from the past 168 hour(s)).    Physical Exam   Constitutional: She is oriented to person, place, and time. She appears well-developed and well-nourished.   HENT:   Head: Normocephalic and atraumatic.   Neck: Trachea normal and phonation normal. Neck supple. Normal carotid pulses present. Carotid bruit is not present. No thyroid mass and no thyromegaly present.   Cardiovascular: Normal rate, regular rhythm and normal heart sounds.  Exam reveals no gallop and no friction rub.    No murmur heard.  Pulmonary/Chest: Effort normal and breath sounds normal. No respiratory distress. She has no decreased breath sounds. She has no wheezes. She has no rhonchi. She has no rales.   Lymphadenopathy:     She has no cervical adenopathy.   Neurological: She is alert and oriented to person, place, and time.   Skin: Skin is warm and dry. No rash noted.   Psychiatric: She has a normal mood and affect. Her speech is normal and behavior is normal. Judgment and thought content normal. Cognition and memory are normal.   Nursing note and vitals reviewed.    Orders Only on 07/23/2018   Component Date Value Ref Range Status   • WBC 08/07/2018 11.06* 4.80 - 10.80 10*3/mm3 Final   • RBC 08/07/2018 5.30  4.20 - 5.40 10*6/mm3 Final   • Hemoglobin 08/07/2018 12.8  12.0 - 16.0 g/dL Final   • Hematocrit 08/07/2018 42.9  37.0 - 47.0 % Final   • MCV 08/07/2018 80.9* 81.0 - 99.0 fL Final   • MCH 08/07/2018 24.2* 27.0 - 31.0 pg Final   • MCHC 08/07/2018 29.8* 31.0 - 37.0 g/dL Final   • RDW 08/07/2018 19.0* 11.5 - 14.5 % Final   • Platelets 08/07/2018 305  140 - 500 10*3/mm3 Final   • Neutrophil Rel % 08/07/2018 76.7* 45.0 - 70.0 % Final   • Lymphocyte Rel % 08/07/2018 16.2* 20.0 - 45.0 % Final   • Monocyte Rel % 08/07/2018 5.3  3.0 - 8.0 % Final   • Eosinophil Rel % 08/07/2018 0.9  0.0 - 4.0 % Final   • Basophil Rel % 08/07/2018 0.4  0.0 - 2.0 % Final   • Neutrophils  Absolute 08/07/2018 8.49* 1.50 - 8.30 10*3/mm3 Final   • Lymphocytes Absolute 08/07/2018 1.79  0.60 - 4.80 10*3/mm3 Final   • Monocytes Absolute 08/07/2018 0.59  0.00 - 1.00 10*3/mm3 Final   • Eosinophils Absolute 08/07/2018 0.10  0.10 - 0.30 10*3/mm3 Final   • Basophils Absolute 08/07/2018 0.04  0.00 - 0.20 10*3/mm3 Final   • Immature Granulocyte Rel % 08/07/2018 0.5  0.0 - 0.5 % Final   • Immature Grans Absolute 08/07/2018 0.05* 0.00 - 0.03 10*3/mm3 Final   • nRBC 08/07/2018 0.0  0.0 - 0.0 /100 WBC Final   • Glucose 08/07/2018 200* 65 - 99 mg/dL Final   • BUN 08/07/2018 23  8 - 23 mg/dL Final   • Creatinine 08/07/2018 1.16* 0.57 - 1.00 mg/dL Final   • eGFR Non  Am 08/07/2018 47* >60 mL/min/1.73 Final   • eGFR African Am 08/07/2018 57* >60 mL/min/1.73 Final   • BUN/Creatinine Ratio 08/07/2018 19.8  7.0 - 25.0 Final   • Sodium 08/07/2018 139  136 - 145 mmol/L Final   • Potassium 08/07/2018 5.2  3.5 - 5.2 mmol/L Final   • Chloride 08/07/2018 98  98 - 107 mmol/L Final   • Total CO2 08/07/2018 28.4  22.0 - 29.0 mmol/L Final   • Calcium 08/07/2018 9.3  8.8 - 10.5 mg/dL Final   • Total Protein 08/07/2018 6.9  6.0 - 8.5 g/dL Final   • Albumin 08/07/2018 4.00  3.50 - 5.20 g/dL Final   • Globulin 08/07/2018 2.9  gm/dL Final   • A/G Ratio 08/07/2018 1.4  g/dL Final   • Total Bilirubin 08/07/2018 0.3  0.2 - 1.2 mg/dL Final   • Alkaline Phosphatase 08/07/2018 136* 40 - 129 U/L Final   • AST (SGOT) 08/07/2018 15  5 - 32 U/L Final   • ALT (SGPT) 08/07/2018 16  5 - 33 U/L Final   • Hemoglobin A1C 08/07/2018 8.90* 4.80 - 5.60 % Final   • Total Cholesterol 08/07/2018 182  0 - 200 mg/dL Final   • Triglycerides 08/07/2018 147  0 - 150 mg/dL Final   • HDL Cholesterol 08/07/2018 48  40 - 60 mg/dL Final   • VLDL Cholesterol 08/07/2018 29.4* 7 - 27 mg/dL Final   • LDL Cholesterol  08/07/2018 105* 0 - 100 mg/dL Final   • TSH 08/07/2018 2.920  0.270 - 4.200 mIU/mL Final   • Uric Acid 08/07/2018 6.4  3.4 - 7.0 mg/dL Final   • 25  Hydroxy, Vitamin D 08/07/2018 29.9  ng/ml Final    Comment: Reference Range for Total Vitamin D 25(OH)  Deficiency    <20.0 ng/mL  Insufficiency 21-29 ng/mL  Sufficiency   30-74 ng/mL     • TIBC 08/07/2018 351  261 - 478 mcg/dL Final   • UIBC 08/07/2018 325  112 - 346 mcg/dL Final   • Iron 08/07/2018 26* 37 - 145 mcg/dL Final   • Iron Saturation 08/07/2018 7  % Final   • Ferritin 08/07/2018 18.00  13.00 - 150.00 ng/mL Final   • Written Authorization 08/07/2018 Comment   Final    Comment: Written Authorization Received.  Authorization received from YARELY BELTRAN DO 08-  Logged by Gisela Lee           Assessment/Elena   Rosalee was seen today for follow-up.    Diagnoses and all orders for this visit:    Essential hypertension    Hyperlipidemia, unspecified hyperlipidemia type  -     Comprehensive Metabolic Panel; Future    Uncontrolled type 2 diabetes mellitus without complication, without long-term current use of insulin (CMS/HCC)    Acquired hypothyroidism  -     TSH; Future    Vitamin D deficiency    Morbid exogenous obesity (CMS/HCC)    Microcytosis  -     CBC & Differential; Future  -     Ferritin; Future  -     Iron Profile; Future    Iron deficiency    Cigarette nicotine dependence without complication    Other orders  -     levothyroxine (SYNTHROID) 75 MCG tablet; Take 1 tablet by mouth Daily.  -     metFORMIN ER (GLUCOPHAGE-XR) 750 MG 24 hr tablet; Take 1 tablet by mouth Daily With Breakfast.  -     FARXIGA 5 MG tablet tablet; Take 1 tablet by mouth Daily.      Patient Instructions     Results for orders placed or performed in visit on 07/23/18   Comprehensive Metabolic Panel   Result Value Ref Range    Glucose 200 (H) 65 - 99 mg/dL    BUN 23 8 - 23 mg/dL    Creatinine 1.16 (H) 0.57 - 1.00 mg/dL    eGFR Non African Am 47 (L) >60 mL/min/1.73    eGFR African Am 57 (L) >60 mL/min/1.73    BUN/Creatinine Ratio 19.8 7.0 - 25.0    Sodium 139 136 - 145 mmol/L    Potassium 5.2 3.5 - 5.2 mmol/L    Chloride  98 98 - 107 mmol/L    Total CO2 28.4 22.0 - 29.0 mmol/L    Calcium 9.3 8.8 - 10.5 mg/dL    Total Protein 6.9 6.0 - 8.5 g/dL    Albumin 4.00 3.50 - 5.20 g/dL    Globulin 2.9 gm/dL    A/G Ratio 1.4 g/dL    Total Bilirubin 0.3 0.2 - 1.2 mg/dL    Alkaline Phosphatase 136 (H) 40 - 129 U/L    AST (SGOT) 15 5 - 32 U/L    ALT (SGPT) 16 5 - 33 U/L   Hemoglobin A1c   Result Value Ref Range    Hemoglobin A1C 8.90 (H) 4.80 - 5.60 %   Lipid Panel   Result Value Ref Range    Total Cholesterol 182 0 - 200 mg/dL    Triglycerides 147 0 - 150 mg/dL    HDL Cholesterol 48 40 - 60 mg/dL    VLDL Cholesterol 29.4 (H) 7 - 27 mg/dL    LDL Cholesterol  105 (H) 0 - 100 mg/dL   TSH   Result Value Ref Range    TSH 2.920 0.270 - 4.200 mIU/mL   Uric Acid   Result Value Ref Range    Uric Acid 6.4 3.4 - 7.0 mg/dL   Vitamin D 25 Hydroxy   Result Value Ref Range    25 Hydroxy, Vitamin D 29.9 ng/ml   Iron Profile   Result Value Ref Range    TIBC 351 261 - 478 mcg/dL    UIBC 325 112 - 346 mcg/dL    Iron 26 (L) 37 - 145 mcg/dL    Iron Saturation 7 %   Ferritin   Result Value Ref Range    Ferritin 18.00 13.00 - 150.00 ng/mL   Written Authorization   Result Value Ref Range    Written Authorization Comment    CBC & Differential   Result Value Ref Range    WBC 11.06 (H) 4.80 - 10.80 10*3/mm3    RBC 5.30 4.20 - 5.40 10*6/mm3    Hemoglobin 12.8 12.0 - 16.0 g/dL    Hematocrit 42.9 37.0 - 47.0 %    MCV 80.9 (L) 81.0 - 99.0 fL    MCH 24.2 (L) 27.0 - 31.0 pg    MCHC 29.8 (L) 31.0 - 37.0 g/dL    RDW 19.0 (H) 11.5 - 14.5 %    Platelets 305 140 - 500 10*3/mm3    Neutrophil Rel % 76.7 (H) 45.0 - 70.0 %    Lymphocyte Rel % 16.2 (L) 20.0 - 45.0 %    Monocyte Rel % 5.3 3.0 - 8.0 %    Eosinophil Rel % 0.9 0.0 - 4.0 %    Basophil Rel % 0.4 0.0 - 2.0 %    Neutrophils Absolute 8.49 (H) 1.50 - 8.30 10*3/mm3    Lymphocytes Absolute 1.79 0.60 - 4.80 10*3/mm3    Monocytes Absolute 0.59 0.00 - 1.00 10*3/mm3    Eosinophils Absolute 0.10 0.10 - 0.30 10*3/mm3    Basophils Absolute  0.04 0.00 - 0.20 10*3/mm3    Immature Granulocyte Rel % 0.5 0.0 - 0.5 %    Immature Grans Absolute 0.05 (H) 0.00 - 0.03 10*3/mm3    nRBC 0.0 0.0 - 0.0 /100 WBC     Consider starting Vitamin D3 5000 Daily.  Watch fat and cholesterol in your diet.  (Over-the-counter iron sulfate at 325 mg daily.  This will be in addition to her multivitamins that contain iron.  Patient will return to using her glucometer fasting 3 times a week and be followed in this office in one month to review these numbers.  Return in 6-8 weeks for follow up on labs for Thyroid and possible Anemia      Return in about 6 months (around 2/14/2019).    Scribed for Eddie Slater MD by Gris Johnston CMA. 08/14/2018    IEddie MD personally performed the services described in this documentation, as scribed by Gris Johnston CMA in my presence, and it is both accurate and complete

## 2018-08-28 ENCOUNTER — TELEPHONE (OUTPATIENT)
Dept: FAMILY MEDICINE CLINIC | Facility: CLINIC | Age: 63
End: 2018-08-28

## 2018-08-28 NOTE — TELEPHONE ENCOUNTER
Should Rosalee be taking Farxiga 5 mg one daily or twice? Or should she just be taking a ten mg once daily.

## 2018-09-25 ENCOUNTER — OFFICE VISIT (OUTPATIENT)
Dept: FAMILY MEDICINE CLINIC | Facility: CLINIC | Age: 63
End: 2018-09-25

## 2018-09-25 VITALS
WEIGHT: 228 LBS | HEART RATE: 82 BPM | BODY MASS INDEX: 44.76 KG/M2 | DIASTOLIC BLOOD PRESSURE: 84 MMHG | OXYGEN SATURATION: 97 % | HEIGHT: 60 IN | RESPIRATION RATE: 18 BRPM | TEMPERATURE: 98 F | SYSTOLIC BLOOD PRESSURE: 122 MMHG

## 2018-09-25 DIAGNOSIS — E11.9 CONTROLLED TYPE 2 DIABETES MELLITUS WITHOUT COMPLICATION, WITHOUT LONG-TERM CURRENT USE OF INSULIN (HCC): Primary | ICD-10-CM

## 2018-09-25 DIAGNOSIS — E66.01 MORBID EXOGENOUS OBESITY (HCC): ICD-10-CM

## 2018-09-25 DIAGNOSIS — I10 ESSENTIAL HYPERTENSION: ICD-10-CM

## 2018-09-25 PROCEDURE — 99213 OFFICE O/P EST LOW 20 MIN: CPT | Performed by: FAMILY MEDICINE

## 2018-09-25 RX ORDER — ALPRAZOLAM 0.25 MG/1
0.25 TABLET ORAL DAILY
Qty: 90 TABLET | Refills: 1 | Status: SHIPPED | OUTPATIENT
Start: 2018-09-25 | End: 2019-07-03 | Stop reason: SDUPTHER

## 2018-09-25 RX ORDER — GLIMEPIRIDE 2 MG/1
2 TABLET ORAL
Qty: 30 TABLET | Refills: 5 | Status: SHIPPED | OUTPATIENT
Start: 2018-09-25 | End: 2019-04-09 | Stop reason: SDUPTHER

## 2018-09-25 RX ORDER — GLIMEPIRIDE 2 MG/1
2 TABLET ORAL
Qty: 30 TABLET | Refills: 5 | Status: SHIPPED | OUTPATIENT
Start: 2018-09-25 | End: 2018-09-25 | Stop reason: SDUPTHER

## 2018-09-25 NOTE — PROGRESS NOTES
Subjective   Rosalee Hargrove is a 63 y.o. female with   Chief Complaint   Patient presents with   • Follow-up     check on sugars   • Diabetes   .    History of Present Illness     Pt is a 64 yo white female who presents for follow up on controlled type 2 diabetes.  Pt has a history of stable HTN and morbid exogenous obesity.  She has actually lost 7 pounds since her last visit.  She checks her blood sugar at home and her results, she states have been high.  Her 7 day average has been 156 her 14 day average around 155 and her 30 day average around 164.  She is currently prescribed Farxiga 10 mg and Metformin 750 mg daily.  She also takes HCTZ 25 mg for HTN.    The following portions of the patient's history were reviewed and updated as appropriate: allergies, current medications, past family history, past medical history, past social history, past surgical history and problem list.    Review of Systems   Cardiovascular: Negative for chest pain, palpitations and leg swelling.        HTN   Endocrine: Negative for cold intolerance and heat intolerance.        Controlled Type 2 Diabetes   All other systems reviewed and are negative.      Objective     Vitals:    09/25/18 0800   BP: 122/84   Pulse: 82   Resp: 18   Temp: 98 °F (36.7 °C)   SpO2: 97%     BP Readings from Last 3 Encounters:   09/25/18 122/84   08/14/18 120/72   01/23/18 120/72      Wt Readings from Last 3 Encounters:   09/25/18 103 kg (228 lb)   08/14/18 107 kg (235 lb)   01/23/18 103 kg (227 lb)        No results found for this or any previous visit (from the past 168 hour(s)).    Physical Exam   Constitutional: She is oriented to person, place, and time. She appears well-developed and well-nourished.   HENT:   Head: Normocephalic and atraumatic.   Neck: Trachea normal and phonation normal. Neck supple. Normal carotid pulses present. Carotid bruit is not present. No thyroid mass and no thyromegaly present.   Cardiovascular: Normal rate, regular rhythm and  normal heart sounds.  Exam reveals no gallop and no friction rub.    No murmur heard.  Pulmonary/Chest: Effort normal and breath sounds normal. No respiratory distress. She has no decreased breath sounds. She has no wheezes. She has no rhonchi. She has no rales.   Lymphadenopathy:     She has no cervical adenopathy.   Neurological: She is alert and oriented to person, place, and time.   Skin: Skin is warm and dry. No rash noted.   Psychiatric: She has a normal mood and affect. Her speech is normal and behavior is normal. Judgment and thought content normal. Cognition and memory are normal.   Nursing note and vitals reviewed.      Assessment/Plan   Rosalee was seen today for follow-up and diabetes.    Diagnoses and all orders for this visit:    Controlled type 2 diabetes mellitus without complication, without long-term current use of insulin (CMS/[facility])  -     Dapagliflozin Propanediol (FARXIGA) 10 MG tablet; Take 1 daily  -     Discontinue: metFORMIN (GLUCOPHAGE) 1000 MG tablet; Take 1 tablet by mouth 2 (Two) Times a Day With Meals.  -     Discontinue: glimepiride (AMARYL) 2 MG tablet; Take 1 tablet by mouth Every Morning Before Breakfast.  -     metFORMIN (GLUCOPHAGE) 1000 MG tablet; Take 1 tablet by mouth 2 (Two) Times a Day With Meals.  -     glimepiride (AMARYL) 2 MG tablet; Take 1 tablet by mouth Every Morning Before Breakfast.    Essential hypertension    Morbid exogenous obesity (CMS/[facility])    Other orders  -     ALPRAZolam (XANAX) 0.25 MG tablet; Take 1 tablet by mouth Daily.      Return in about 1 month (around 10/25/2018).    Scribed for Eddie Slater MD by Gris Johnston CMA. 09/25/2018    IEddie MD personally performed the services described in this documentation, as scribed by Gris Johnston CMA in my presence, and it is both accurate and complete

## 2018-11-05 ENCOUNTER — OFFICE VISIT (OUTPATIENT)
Dept: CARDIOLOGY | Facility: CLINIC | Age: 63
End: 2018-11-05

## 2018-11-05 VITALS
BODY MASS INDEX: 44.57 KG/M2 | HEIGHT: 60 IN | WEIGHT: 227 LBS | HEART RATE: 82 BPM | OXYGEN SATURATION: 99 % | SYSTOLIC BLOOD PRESSURE: 122 MMHG | DIASTOLIC BLOOD PRESSURE: 62 MMHG

## 2018-11-05 DIAGNOSIS — I25.10 CORONARY ARTERY DISEASE INVOLVING NATIVE CORONARY ARTERY OF NATIVE HEART WITHOUT ANGINA PECTORIS: Primary | ICD-10-CM

## 2018-11-05 PROCEDURE — 99213 OFFICE O/P EST LOW 20 MIN: CPT | Performed by: PHYSICIAN ASSISTANT

## 2018-11-05 PROCEDURE — 93000 ELECTROCARDIOGRAM COMPLETE: CPT | Performed by: PHYSICIAN ASSISTANT

## 2018-11-05 NOTE — PROGRESS NOTES
Date of Office Visit: 2018  Encounter Provider: TIMOTHY Mcgraw  Place of Service: [facility] CARDIOLOGY  Patient Name: Rosalee Hargrove  :1955    Chief Complaint   Patient presents with   • Coronary Artery Disease     1 year follow up   :     HPI: Rosalee Hargrove is a 63 y.o. female who presents today for follow-up.  Old records have been obtained and reviewed by me.  She is a patient of Dr. Kapoor's with a past medical history significant for mild coronary artery disease, smoking, obesity, and peripheral arterial disease.  She follows with Dr. Александр Coats for her peripheral vascular disease.  Her last cardiac catheterization was in 2016.  This showed mild to moderate diffuse coronary artery disease.  She had 30% distal left main, 30% diffuse proximal LAD, 30-40% mid to distal LAD, 30% ramus, 30% circumflex, 50% OM1, and 30% diffuse RCA.  She had [place] veins that were feeding the RV directly.  Recommendations at that time were for medical management.  Her last echocardiogram was in 2017.  This showed a normal LV function with an EF of 62%, and no significant valvular abnormalities.  She was last in our office to see Dr. Kapoor on 2017.  At that visit, she had complaints of recurrent near syncope.  Dr. Kapoor felt that it could represent transient changes in her blood pressure and/or her heart rate.  He ordered an event recorder.  Her 30 day event monitor showed a single 2 minute episode of atrial fibrillation and one run of ventricular tachycardia that lasted 13 beats at 121 bpm.  She was anticoagulated on Plavix for her peripheral vascular disease, and my recommendation was to hold off on any further anticoagulation considering her one of atrial fibrillation was so short.  I did recommend that she get another sleep study as if she had been noncompliant with her CPAP machine.  She does follow with neurology for her near syncopal episodes.   Over the past year  she's been doing fairly well from a cardiac standpoint.  She is still having these near syncopal episodes.  They are random in their occurrence.  The other day she was standing in the kitchen cooking dinner, and felt as if the energy drained out of her body.  She did not lose consciousness.  She stated at the kitchen counter and took a couple of deep breaths and after a few seconds the episode passed.  She also fell and broke her arm this year, she tripped over something.  Overall her life is pretty stressful.  She cares for her mother who fell and broke her hip, she is now helping to care for her grandchildren, and she still works full-time.  She is back to smoking.  She denies any chest pain, palpitations, edema, orthopnea, or PND.  She did go get a sleep study and is trying to work with the pulmonology office to get her CPAP adjusted.    Past Medical History:   Diagnosis Date   • Allergic    • Anemia    • Angina pectoris (CMS/HCC)    • Anxiety    • Arthritis    • ASCVD (arteriosclerotic cardiovascular disease)    • Bilateral leg edema    • CAD (coronary artery disease)    • Chest pain    • Depression    • Diabetes mellitus (CMS/HCC)    • Disease of thyroid gland    • Dizziness    • Headache    • Hyperlipidemia    • Hypertension    • Morbid obesity (CMS/HCC)    • Orthostatic hypotension    • PAD (peripheral artery disease) (CMS/HCC)    • Shortness of breath    • Sleep apnea    • Tobacco abuse    • Vitamin D deficiency        Past Surgical History:   Procedure Laterality Date   • ANAL FISSURECTOMY     • BRAIN SURGERY     • CARDIAC CATHETERIZATION N/A 9/23/2016    Procedure: Coronary angiography;  Surgeon: Fredrick Kapoor MD;  Location: [facility] INVASIVE LOCATION;  Service:    • CARDIAC CATHETERIZATION N/A 9/23/2016    Procedure: Left heart cath;  Surgeon: Fredrick Kapoor MD;  Location: [facility] CATH INVASIVE LOCATION;  Service:    • CARDIAC CATHETERIZATION N/A 9/23/2016    Procedure: Left ventriculography;  Surgeon: Fredrick  MD Antwon;  Location: [facility] CATH INVASIVE LOCATION;  Service:    • CARDIAC CATHETERIZATION N/A 2016    Procedure: Right heart cath;  Surgeon: Fredrick Kapoor MD;  Location: [facility] INVASIVE LOCATION;  Service:    •  SECTION     • CHOLECYSTECTOMY     • ERCP     • FRACTURE SURGERY      arm   • HYSTERECTOMY     • ILIAC ARTERY STENT         Social History     Social History   • Marital status:      Spouse name: N/A   • Number of children: N/A   • Years of education: N/A     Occupational History   • Not on file.     Social History Main Topics   • Smoking status: Current Every Day Smoker     Packs/day: 0.50   • Smokeless tobacco: Never Used   • Alcohol use 0.6 oz/week     1 Glasses of wine per week      Comment: rare   • Drug use: No   • Sexual activity: Defer     Other Topics Concern   • Not on file     Social History Narrative   • No narrative on file       Family History   Problem Relation Age of Onset   • Heart disease Mother    • Hypertension Mother    • Hyperlipidemia Mother    • Heart disease Father    • Heart attack Sister    • Heart disease Sister    • Heart disease Brother    • Heart attack Brother    • Hyperlipidemia Brother    • Diabetes Brother    • Heart attack Maternal Grandmother    • Heart disease Maternal Grandmother    • Heart failure Maternal Grandmother    • Heart failure Maternal Grandfather    • Heart disease Maternal Grandfather    • Heart attack Maternal Grandfather    • Heart attack Paternal Grandmother    • Heart disease Paternal Grandmother    • Heart failure Paternal Grandmother    • Hypertension Paternal Grandmother        Review of Systems   Constitution: Negative for chills, fever and malaise/fatigue.   Cardiovascular: Positive for near-syncope. Negative for chest pain, dyspnea on exertion, leg swelling, orthopnea, palpitations, paroxysmal nocturnal dyspnea and syncope.   Respiratory: Negative for cough and shortness of breath.    Musculoskeletal: Negative for joint pain,  joint swelling and myalgias.   Gastrointestinal: Negative for abdominal pain, diarrhea, melena, nausea and vomiting.   Genitourinary: Negative for frequency and hematuria.   Neurological: Negative for light-headedness, numbness, paresthesias and seizures.   Allergic/Immunologic: Negative.    All other systems reviewed and are negative.      Allergies   Allergen Reactions   • Ciprofloxacin          Current Outpatient Prescriptions:   •  ALPRAZolam (XANAX) 0.25 MG tablet, Take 1 tablet by mouth Daily., Disp: 90 tablet, Rfl: 1  •  aspirin 81 MG chewable tablet, Chew 81 mg daily., Disp: , Rfl:   •  atorvastatin (LIPITOR) 20 MG tablet, Take 1 tablet by mouth Daily., Disp: 90 tablet, Rfl: 1  •  Calcium Carb-Cholecalciferol (CALCIUM+D3) 600-800 MG-UNIT tablet, Take 1 tablet by mouth daily., Disp: , Rfl:   •  cholecalciferol (VITAMIN D3) 1000 UNITS tablet, Take 1,000 Units by mouth daily., Disp: , Rfl:   •  cilostazol (PLETAL) 100 MG tablet, Take 1 tablet by mouth 2 (Two) Times a Day., Disp: 180 tablet, Rfl: 1  •  citalopram (CeleXA) 20 MG tablet, Take 1 tablet by mouth Daily., Disp: 90 tablet, Rfl: 1  •  clindamycin (CLEOCIN T) 1 % swab, , Disp: , Rfl:   •  clopidogrel (PLAVIX) 75 MG tablet, Take 1 tablet by mouth Daily., Disp: 90 tablet, Rfl: 1  •  coenzyme Q10 100 MG capsule, Take 100 mg by mouth daily., Disp: , Rfl:   •  Dapagliflozin Propanediol (FARXIGA) 10 MG tablet, Take 1 daily, Disp: 30 tablet, Rfl: 5  •  ezetimibe (ZETIA) 10 MG tablet, Take 1 tablet by mouth Daily., Disp: 90 tablet, Rfl: 1  •  glimepiride (AMARYL) 2 MG tablet, Take 1 tablet by mouth Every Morning Before Breakfast., Disp: 30 tablet, Rfl: 5  •  hydrochlorothiazide (HYDRODIURIL) 25 MG tablet, Take 1 tablet by mouth Daily., Disp: 90 tablet, Rfl: 1  •  levothyroxine (SYNTHROID) 75 MCG tablet, Take 1 tablet by mouth Daily., Disp: 30 tablet, Rfl: 1  •  lisinopril (PRINIVIL,ZESTRIL) 40 MG tablet, TAKE ONE TABLET BY MOUTH ONE TIME DAILY , Disp: 30 tablet,  "Rfl: 3  •  metFORMIN (GLUCOPHAGE) 1000 MG tablet, Take 1 tablet by mouth 2 (Two) Times a Day With Meals., Disp: 60 tablet, Rfl: 5  •  Multiple Vitamins-Minerals (EYE VITAMINS PO), Take 1 tablet by mouth Daily., Disp: , Rfl:   •  pantoprazole (PROTONIX) 40 MG EC tablet, Take 1 tablet by mouth Daily., Disp: 90 tablet, Rfl: 1  •  Pediatric Multivitamins-Iron (FLINTSTONES PLUS IRON PO), Take 1 tablet by mouth daily., Disp: , Rfl:       Objective:     Vitals:    11/05/18 0936 11/05/18 0950   BP: 118/58 122/62   BP Location: Right arm Left arm   Pulse: 82    SpO2: 99%    Weight: 103 kg (227 lb)    Height: 152.4 cm (60\")      Body mass index is 44.33 kg/m².    PHYSICAL EXAM:    Physical Exam   Constitutional: She is oriented to person, place, and time. She appears well-developed and well-nourished. No distress.   HENT:   Head: Normocephalic and atraumatic.   Eyes: Pupils are equal, round, and reactive to light.   Neck: No JVD present. No thyromegaly present.   Cardiovascular: Normal rate, regular rhythm, normal heart sounds and intact distal pulses.    No murmur heard.  Pulmonary/Chest: Effort normal and breath sounds normal. No respiratory distress.   Abdominal: Soft. Bowel sounds are normal. She exhibits no distension. There is no splenomegaly or hepatomegaly. There is no tenderness.   Musculoskeletal: Normal range of motion. She exhibits no edema.   Neurological: She is alert and oriented to person, place, and time.   Skin: Skin is warm and dry. She is not diaphoretic. No erythema.   Psychiatric: She has a normal mood and affect. Her behavior is normal. Judgment normal.         ECG 12 Lead  Date/Time: 11/5/2018 10:02 AM  Performed by: MARLYN NEWTON  Authorized by: MARLYN NEWTON   Comparison: compared with previous ECG from 10/24/2017  Similar to previous ECG  Rhythm: sinus rhythm  BPM: 88  Clinical impression: normal ECG  Comments: Indication: Coronary disease.              Assessment:       Diagnosis Plan   1. " Coronary artery disease involving native coronary artery of native heart without angina pectoris  ECG 12 Lead     Orders Placed This Encounter   Procedures   • ECG 12 Lead     This order was created via procedure documentation          Plan:       1.  Coronary Artery Disease  Assessment  • The patient has no angina    Plan  • Lifestyle modifications discussed include adhering to a heart healthy diet, avoidance of tobacco products, regular exercise and regular monitoring of cholesterol and blood pressure    Subjective - Objective  • Current antiplatelet therapy includes aspirin 81 mg and clopidogrel 75 mg  • Overall I think she stable from a cardiac standpoint.  I'm not really sure what to make of these episodes that she's having.  I honestly think it could be stress and anxiety.  She could also be having some episodes of hypotension or some vasovagal episodes.  I did encourage her to stay well-hydrated and to wear compression stockings.  I've also asked her to try cutting her hydrochlorothiazide and half to see if this helps.  She will monitor her blood pressure at home and call me if it starts to creep up.  We had a long discussion about the necessity for her to take care of herself, this includes getting regular exercise.  I think that the majority of her issues stem from stress.    I'm not going to make any changes to her medical regimen today other than cut the hydrochlorothiazide in half.  She will call me if she has any issues with her blood pressure.  Otherwise we will see her back in 1 year with Dr. Kapoor.  As always, it has been a pleasure to participate in your patient's care.      Sincerely,         Barbara Quinones PA-C

## 2018-11-06 ENCOUNTER — OFFICE VISIT (OUTPATIENT)
Dept: FAMILY MEDICINE CLINIC | Facility: CLINIC | Age: 63
End: 2018-11-06

## 2018-11-06 VITALS
SYSTOLIC BLOOD PRESSURE: 122 MMHG | OXYGEN SATURATION: 96 % | HEIGHT: 60 IN | RESPIRATION RATE: 18 BRPM | BODY MASS INDEX: 45.16 KG/M2 | TEMPERATURE: 98 F | WEIGHT: 230 LBS | DIASTOLIC BLOOD PRESSURE: 80 MMHG | HEART RATE: 78 BPM

## 2018-11-06 DIAGNOSIS — E78.2 MIXED HYPERLIPIDEMIA: ICD-10-CM

## 2018-11-06 DIAGNOSIS — E03.9 ACQUIRED HYPOTHYROIDISM: ICD-10-CM

## 2018-11-06 DIAGNOSIS — E66.01 MORBID EXOGENOUS OBESITY (HCC): ICD-10-CM

## 2018-11-06 DIAGNOSIS — I10 ESSENTIAL HYPERTENSION: ICD-10-CM

## 2018-11-06 DIAGNOSIS — E11.9 CONTROLLED TYPE 2 DIABETES MELLITUS WITHOUT COMPLICATION, WITHOUT LONG-TERM CURRENT USE OF INSULIN (HCC): Primary | ICD-10-CM

## 2018-11-06 DIAGNOSIS — E55.9 VITAMIN D DEFICIENCY: ICD-10-CM

## 2018-11-06 PROCEDURE — 99213 OFFICE O/P EST LOW 20 MIN: CPT | Performed by: FAMILY MEDICINE

## 2018-11-06 RX ORDER — LISINOPRIL 40 MG/1
40 TABLET ORAL DAILY
Qty: 90 TABLET | Refills: 2 | Status: SHIPPED | OUTPATIENT
Start: 2018-11-06 | End: 2019-06-24 | Stop reason: SDUPTHER

## 2018-11-06 NOTE — PROGRESS NOTES
Subjective   Rosalee Hargrove is a 63 y.o. female with   Chief Complaint   Patient presents with   • Follow-up     one month on Dm   .    History of Present Illness     62 yo white female who presents for a follow up on Diabetes Mellitus type 2.  She states she usually wants something sweet in the mornings.  She brought her glucometer from home and her 7 day average shows to be 103, 14 day average is 104, and a 30 day average is 100.  She has been finding that her sugar drops each morning and she becomes quite shaky.  Pt's with stable HTN today, she also has a history of morbid exogenous obesity and her weight remains stable.  Pt is currently prescribed Farxiga 10 mg daily Amaryl 2 mg daily and Metformin 100 mg twice daily.  Pt tolerates those medications.    The following portions of the patient's history were reviewed and updated as appropriate: allergies, current medications, past family history, past medical history, past social history, past surgical history and problem list.    Review of Systems   Constitutional:        Morbid Exogenous Obesity   Cardiovascular: Negative for chest pain, palpitations and leg swelling.        HTN   Endocrine: Negative for cold intolerance and heat intolerance.        Controlled Type 2 Diabetes Mellitus   All other systems reviewed and are negative.      Objective     Vitals:    11/06/18 0950   BP: 122/80   Pulse: 78   Resp: 18   Temp: 98 °F (36.7 °C)   SpO2: 96%     BP Readings from Last 3 Encounters:   11/06/18 122/80   11/05/18 122/62   09/25/18 122/84      Wt Readings from Last 3 Encounters:   11/06/18 104 kg (230 lb)   11/05/18 103 kg (227 lb)   09/25/18 103 kg (228 lb)        No results found for this or any previous visit (from the past 168 hour(s)).    Physical Exam   Constitutional: She is oriented to person, place, and time. She appears well-developed and well-nourished.   HENT:   Head: Normocephalic and atraumatic.   Neck: Trachea normal and phonation normal. Neck  supple. Normal carotid pulses present. Carotid bruit is not present. No thyroid mass and no thyromegaly present.   Cardiovascular: Normal rate, regular rhythm and normal heart sounds.  Exam reveals no gallop and no friction rub.    No murmur heard.  Pulmonary/Chest: Effort normal and breath sounds normal. No respiratory distress. She has no decreased breath sounds. She has no wheezes. She has no rhonchi. She has no rales.   Lymphadenopathy:     She has no cervical adenopathy.   Neurological: She is alert and oriented to person, place, and time.   Skin: Skin is warm and dry. No rash noted.   Psychiatric: She has a normal mood and affect. Her speech is normal and behavior is normal. Judgment and thought content normal. Cognition and memory are normal.   Nursing note and vitals reviewed.      Assessment/Plan   Rosalee was seen today for follow-up.    Diagnoses and all orders for this visit:    Controlled type 2 diabetes mellitus without complication, without long-term current use of insulin (CMS/HCC)  -     CBC & Differential; Future  -     Comprehensive Metabolic Panel; Future  -     Hemoglobin A1c; Future    Essential hypertension    Mixed hyperlipidemia  -     CBC & Differential; Future  -     Comprehensive Metabolic Panel; Future  -     Lipid Panel; Future    Vitamin D deficiency  -     Vitamin D 25 Hydroxy; Future    Acquired hypothyroidism  -     TSH; Future    Morbid exogenous obesity (CMS/HCC)        Return in about 2 months (around 1/6/2019).    Scribed for Eddie Slater MD by Gris Johnston CMA. 11/06/2018    IEddie MD personally performed the services described in this documentation, as scribed by Gris Johnston CMA in my presence, and it is both accurate and complete

## 2018-11-14 ENCOUNTER — RESULTS ENCOUNTER (OUTPATIENT)
Dept: FAMILY MEDICINE CLINIC | Facility: CLINIC | Age: 63
End: 2018-11-14

## 2018-11-14 DIAGNOSIS — E03.9 ACQUIRED HYPOTHYROIDISM: ICD-10-CM

## 2018-11-14 DIAGNOSIS — R71.8 MICROCYTOSIS: ICD-10-CM

## 2018-11-14 DIAGNOSIS — E78.5 HYPERLIPIDEMIA, UNSPECIFIED HYPERLIPIDEMIA TYPE: ICD-10-CM

## 2018-12-19 DIAGNOSIS — E78.2 MIXED HYPERLIPIDEMIA: ICD-10-CM

## 2018-12-19 DIAGNOSIS — E03.9 ACQUIRED HYPOTHYROIDISM: ICD-10-CM

## 2018-12-19 DIAGNOSIS — E55.9 VITAMIN D DEFICIENCY: ICD-10-CM

## 2018-12-19 DIAGNOSIS — E11.9 CONTROLLED TYPE 2 DIABETES MELLITUS WITHOUT COMPLICATION, WITHOUT LONG-TERM CURRENT USE OF INSULIN (HCC): ICD-10-CM

## 2019-01-04 LAB
25(OH)D3+25(OH)D2 SERPL-MCNC: 25.2 NG/ML
ALBUMIN SERPL-MCNC: 4.2 G/DL (ref 3.5–5.2)
ALBUMIN/GLOB SERPL: 1.7 G/DL
ALP SERPL-CCNC: 100 U/L (ref 40–129)
ALT SERPL-CCNC: 20 U/L (ref 5–33)
AST SERPL-CCNC: 15 U/L (ref 5–32)
BASOPHILS # BLD AUTO: 0.03 10*3/MM3 (ref 0–0.2)
BASOPHILS NFR BLD AUTO: 0.4 % (ref 0–2)
BILIRUB SERPL-MCNC: 0.2 MG/DL (ref 0.2–1.2)
BUN SERPL-MCNC: 23 MG/DL (ref 8–23)
BUN/CREAT SERPL: 28.8 (ref 7–25)
CALCIUM SERPL-MCNC: 9 MG/DL (ref 8.8–10.5)
CHLORIDE SERPL-SCNC: 105 MMOL/L (ref 98–107)
CHOLEST SERPL-MCNC: 177 MG/DL (ref 0–200)
CO2 SERPL-SCNC: 28.5 MMOL/L (ref 22–29)
CREAT SERPL-MCNC: 0.8 MG/DL (ref 0.57–1)
EOSINOPHIL # BLD AUTO: 0.16 10*3/MM3 (ref 0.1–0.3)
EOSINOPHIL NFR BLD AUTO: 1.9 % (ref 0–4)
ERYTHROCYTE [DISTWIDTH] IN BLOOD BY AUTOMATED COUNT: 19.7 % (ref 11.5–14.5)
GLOBULIN SER CALC-MCNC: 2.5 GM/DL
GLUCOSE SERPL-MCNC: 126 MG/DL (ref 65–99)
HBA1C MFR BLD: 6.2 % (ref 4.8–5.6)
HCT VFR BLD AUTO: 43.6 % (ref 37–47)
HDLC SERPL-MCNC: 35 MG/DL (ref 40–60)
HGB BLD-MCNC: 12.8 G/DL (ref 12–16)
IMM GRANULOCYTES # BLD AUTO: 0.04 10*3/MM3 (ref 0–0.03)
IMM GRANULOCYTES NFR BLD AUTO: 0.5 % (ref 0–0.5)
LDLC SERPL CALC-MCNC: 115 MG/DL (ref 0–100)
LYMPHOCYTES # BLD AUTO: 2.03 10*3/MM3 (ref 0.6–4.8)
LYMPHOCYTES NFR BLD AUTO: 24.6 % (ref 20–45)
MCH RBC QN AUTO: 24.3 PG (ref 27–31)
MCHC RBC AUTO-ENTMCNC: 29.4 G/DL (ref 31–37)
MCV RBC AUTO: 82.7 FL (ref 81–99)
MONOCYTES # BLD AUTO: 0.56 10*3/MM3 (ref 0–1)
MONOCYTES NFR BLD AUTO: 6.8 % (ref 3–8)
NEUTROPHILS # BLD AUTO: 5.44 10*3/MM3 (ref 1.5–8.3)
NEUTROPHILS NFR BLD AUTO: 65.8 % (ref 45–70)
NRBC BLD AUTO-RTO: 0 /100 WBC (ref 0–0)
PLATELET # BLD AUTO: 303 10*3/MM3 (ref 140–500)
POTASSIUM SERPL-SCNC: 4.8 MMOL/L (ref 3.5–5.2)
PROT SERPL-MCNC: 6.7 G/DL (ref 6–8.5)
RBC # BLD AUTO: 5.27 10*6/MM3 (ref 4.2–5.4)
SODIUM SERPL-SCNC: 144 MMOL/L (ref 136–145)
TRIGL SERPL-MCNC: 135 MG/DL (ref 0–150)
TSH SERPL DL<=0.005 MIU/L-ACNC: 2.78 MIU/ML (ref 0.27–4.2)
VLDLC SERPL CALC-MCNC: 27 MG/DL (ref 7–27)
WBC # BLD AUTO: 8.26 10*3/MM3 (ref 4.8–10.8)

## 2019-01-08 ENCOUNTER — OFFICE VISIT (OUTPATIENT)
Dept: FAMILY MEDICINE CLINIC | Facility: CLINIC | Age: 64
End: 2019-01-08

## 2019-01-08 VITALS
SYSTOLIC BLOOD PRESSURE: 130 MMHG | RESPIRATION RATE: 16 BRPM | WEIGHT: 230 LBS | HEART RATE: 78 BPM | DIASTOLIC BLOOD PRESSURE: 80 MMHG | TEMPERATURE: 98.1 F | HEIGHT: 60 IN | BODY MASS INDEX: 45.16 KG/M2 | OXYGEN SATURATION: 94 %

## 2019-01-08 DIAGNOSIS — I67.5 MOYAMOYA DISEASE: ICD-10-CM

## 2019-01-08 DIAGNOSIS — I65.23 ATHEROSCLEROSIS OF BOTH CAROTID ARTERIES: ICD-10-CM

## 2019-01-08 DIAGNOSIS — I70.0 ATHEROSCLEROSIS OF AORTA (HCC): ICD-10-CM

## 2019-01-08 DIAGNOSIS — I25.10 CORONARY ARTERY DISEASE INVOLVING NATIVE CORONARY ARTERY OF NATIVE HEART WITHOUT ANGINA PECTORIS: ICD-10-CM

## 2019-01-08 DIAGNOSIS — I10 ESSENTIAL HYPERTENSION: Primary | ICD-10-CM

## 2019-01-08 DIAGNOSIS — E78.2 MIXED HYPERLIPIDEMIA: ICD-10-CM

## 2019-01-08 DIAGNOSIS — E03.9 ACQUIRED HYPOTHYROIDISM: ICD-10-CM

## 2019-01-08 DIAGNOSIS — E55.9 VITAMIN D DEFICIENCY: ICD-10-CM

## 2019-01-08 DIAGNOSIS — I77.9 PERIPHERAL ARTERIAL OCCLUSIVE DISEASE (HCC): ICD-10-CM

## 2019-01-08 DIAGNOSIS — E11.319 TYPE 2 DIABETES MELLITUS WITH RETINOPATHY WITHOUT MACULAR EDEMA, WITHOUT LONG-TERM CURRENT USE OF INSULIN, UNSPECIFIED LATERALITY, UNSPECIFIED RETINOPATHY SEVERITY (HCC): ICD-10-CM

## 2019-01-08 DIAGNOSIS — E66.01 MORBID EXOGENOUS OBESITY (HCC): ICD-10-CM

## 2019-01-08 DIAGNOSIS — E11.9 CONTROLLED TYPE 2 DIABETES MELLITUS WITHOUT COMPLICATION, WITHOUT LONG-TERM CURRENT USE OF INSULIN (HCC): ICD-10-CM

## 2019-01-08 PROCEDURE — 99213 OFFICE O/P EST LOW 20 MIN: CPT | Performed by: FAMILY MEDICINE

## 2019-01-09 PROBLEM — E11.319 TYPE 2 DIABETES MELLITUS WITH RETINOPATHY WITHOUT MACULAR EDEMA, WITHOUT LONG-TERM CURRENT USE OF INSULIN (HCC): Status: ACTIVE | Noted: 2019-01-09

## 2019-01-09 NOTE — PROGRESS NOTES
Subjective   Rosalee Hargrove is a 63 y.o. female with   Chief Complaint   Patient presents with   • Follow-up     on medication   .    History of Present Illness   63-year-old white female with multiple medical problems here in follow-up.  Patient with known history of atherosclerotic cardiovascular disease, essential hypertension, hyperlipidemia as well as type II non-insulin-dependent diabetes mellitus.  There is also a long history of morbid exogenous obesity which at least to the holidays has not worsened.  Hypothyroidism as well as GERD are also issues the patient deals with.  Medications are multiple and are as listed.  All medications are used on a regular basis and are well tolerated.  Fasting labs have been acquired prior to this visit.  The following portions of the patient's history were reviewed and updated as appropriate: allergies, current medications, past family history, past medical history, past social history, past surgical history and problem list.    Review of Systems   Constitutional:        Morbid exogenous obesity   Cardiovascular:        Atherosclerotic cardiovascular disease, essential hypertension, hyperlipidemia   Endocrine: Negative for polydipsia, polyphagia and polyuria.        Type II non-insulin-dependent diabetes mellitus       Objective     Vitals:    01/08/19 0851   BP: 130/80   Pulse: 78   Resp: 16   Temp: 98.1 °F (36.7 °C)   SpO2: 94%       Recent Results (from the past 168 hour(s))   Vitamin D 25 Hydroxy    Collection Time: 01/04/19  7:59 AM   Result Value Ref Range    25 Hydroxy, Vitamin D 25.2 ng/ml   TSH    Collection Time: 01/04/19  7:59 AM   Result Value Ref Range    TSH 2.780 0.270 - 4.200 mIU/mL   Lipid Panel    Collection Time: 01/04/19  7:59 AM   Result Value Ref Range    Total Cholesterol 177 0 - 200 mg/dL    Triglycerides 135 0 - 150 mg/dL    HDL Cholesterol 35 (L) 40 - 60 mg/dL    VLDL Cholesterol 27 7 - 27 mg/dL    LDL Cholesterol  115 (H) 0 - 100 mg/dL   Hemoglobin  A1c    Collection Time: 01/04/19  7:59 AM   Result Value Ref Range    Hemoglobin A1C 6.20 (H) 4.80 - 5.60 %   Comprehensive Metabolic Panel    Collection Time: 01/04/19  7:59 AM   Result Value Ref Range    Glucose 126 (H) 65 - 99 mg/dL    BUN 23 8 - 23 mg/dL    Creatinine 0.80 0.57 - 1.00 mg/dL    eGFR Non African Am 72 >60 mL/min/1.73    eGFR African Am 87 >60 mL/min/1.73    BUN/Creatinine Ratio 28.8 (H) 7.0 - 25.0    Sodium 144 136 - 145 mmol/L    Potassium 4.8 3.5 - 5.2 mmol/L    Chloride 105 98 - 107 mmol/L    Total CO2 28.5 22.0 - 29.0 mmol/L    Calcium 9.0 8.8 - 10.5 mg/dL    Total Protein 6.7 6.0 - 8.5 g/dL    Albumin 4.20 3.50 - 5.20 g/dL    Globulin 2.5 gm/dL    A/G Ratio 1.7 g/dL    Total Bilirubin 0.2 0.2 - 1.2 mg/dL    Alkaline Phosphatase 100 40 - 129 U/L    AST (SGOT) 15 5 - 32 U/L    ALT (SGPT) 20 5 - 33 U/L   CBC & Differential    Collection Time: 01/04/19  7:59 AM   Result Value Ref Range    WBC 8.26 4.80 - 10.80 10*3/mm3    RBC 5.27 4.20 - 5.40 10*6/mm3    Hemoglobin 12.8 12.0 - 16.0 g/dL    Hematocrit 43.6 37.0 - 47.0 %    MCV 82.7 81.0 - 99.0 fL    MCH 24.3 (L) 27.0 - 31.0 pg    MCHC 29.4 (L) 31.0 - 37.0 g/dL    RDW 19.7 (H) 11.5 - 14.5 %    Platelets 303 140 - 500 10*3/mm3    Neutrophil Rel % 65.8 45.0 - 70.0 %    Lymphocyte Rel % 24.6 20.0 - 45.0 %    Monocyte Rel % 6.8 3.0 - 8.0 %    Eosinophil Rel % 1.9 0.0 - 4.0 %    Basophil Rel % 0.4 0.0 - 2.0 %    Neutrophils Absolute 5.44 1.50 - 8.30 10*3/mm3    Lymphocytes Absolute 2.03 0.60 - 4.80 10*3/mm3    Monocytes Absolute 0.56 0.00 - 1.00 10*3/mm3    Eosinophils Absolute 0.16 0.10 - 0.30 10*3/mm3    Basophils Absolute 0.03 0.00 - 0.20 10*3/mm3    Immature Granulocyte Rel % 0.5 0.0 - 0.5 %    Immature Grans Absolute 0.04 (H) 0.00 - 0.03 10*3/mm3    nRBC 0.0 0.0 - 0.0 /100 WBC       Physical Exam   Constitutional: She is oriented to person, place, and time. She appears well-developed and well-nourished.   HENT:   Head: Normocephalic and atraumatic.    Neck: Trachea normal and phonation normal. Neck supple. Normal carotid pulses present. Carotid bruit is not present. No thyroid mass and no thyromegaly present.   Cardiovascular: Normal rate, regular rhythm and normal heart sounds. Exam reveals no gallop and no friction rub.   No murmur heard.  Pulmonary/Chest: Effort normal and breath sounds normal. No respiratory distress. She has no decreased breath sounds. She has no wheezes. She has no rhonchi. She has no rales.   Lymphadenopathy:     She has no cervical adenopathy.   Neurological: She is alert and oriented to person, place, and time.   Skin: Skin is warm and dry. No rash noted.   Psychiatric: She has a normal mood and affect. Her speech is normal and behavior is normal. Judgment and thought content normal. Cognition and memory are normal.   Nursing note and vitals reviewed.      Assessment/Plan   Rosalee was seen today for follow-up.    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

## 2019-03-01 RX ORDER — CLOPIDOGREL BISULFATE 75 MG/1
TABLET ORAL
Qty: 90 TABLET | Refills: 1 | Status: SHIPPED | OUTPATIENT
Start: 2019-03-01 | End: 2019-10-29 | Stop reason: SDUPTHER

## 2019-03-01 RX ORDER — CITALOPRAM 20 MG/1
TABLET ORAL
Qty: 90 TABLET | Refills: 1 | Status: SHIPPED | OUTPATIENT
Start: 2019-03-01 | End: 2019-10-29 | Stop reason: SDUPTHER

## 2019-03-01 RX ORDER — LEVOTHYROXINE SODIUM 0.07 MG/1
TABLET ORAL
Qty: 90 TABLET | Refills: 1 | Status: SHIPPED | OUTPATIENT
Start: 2019-03-01 | End: 2019-07-02 | Stop reason: SDUPTHER

## 2019-03-01 RX ORDER — PANTOPRAZOLE SODIUM 40 MG/1
TABLET, DELAYED RELEASE ORAL
Qty: 90 TABLET | Refills: 1 | Status: SHIPPED | OUTPATIENT
Start: 2019-03-01 | End: 2019-10-30 | Stop reason: SDUPTHER

## 2019-03-01 RX ORDER — HYDROCHLOROTHIAZIDE 25 MG/1
TABLET ORAL
Qty: 90 TABLET | Refills: 1 | Status: SHIPPED | OUTPATIENT
Start: 2019-03-01 | End: 2019-10-29 | Stop reason: SDUPTHER

## 2019-04-09 DIAGNOSIS — E11.9 CONTROLLED TYPE 2 DIABETES MELLITUS WITHOUT COMPLICATION, WITHOUT LONG-TERM CURRENT USE OF INSULIN (HCC): ICD-10-CM

## 2019-04-09 RX ORDER — GLIMEPIRIDE 2 MG/1
TABLET ORAL
Qty: 30 TABLET | Refills: 5 | Status: SHIPPED | OUTPATIENT
Start: 2019-04-09 | End: 2019-10-09 | Stop reason: SDUPTHER

## 2019-04-11 DIAGNOSIS — E11.9 CONTROLLED TYPE 2 DIABETES MELLITUS WITHOUT COMPLICATION, WITHOUT LONG-TERM CURRENT USE OF INSULIN (HCC): ICD-10-CM

## 2019-04-15 DIAGNOSIS — E11.9 CONTROLLED TYPE 2 DIABETES MELLITUS WITHOUT COMPLICATION, WITHOUT LONG-TERM CURRENT USE OF INSULIN (HCC): ICD-10-CM

## 2019-04-25 DIAGNOSIS — E78.2 MIXED HYPERLIPIDEMIA: Primary | ICD-10-CM

## 2019-04-25 DIAGNOSIS — E11.9 CONTROLLED TYPE 2 DIABETES MELLITUS WITHOUT COMPLICATION, WITHOUT LONG-TERM CURRENT USE OF INSULIN (HCC): ICD-10-CM

## 2019-04-25 DIAGNOSIS — E55.9 VITAMIN D DEFICIENCY: ICD-10-CM

## 2019-04-25 DIAGNOSIS — E03.9 ACQUIRED HYPOTHYROIDISM: ICD-10-CM

## 2019-06-24 RX ORDER — LEVOTHYROXINE SODIUM 0.07 MG/1
TABLET ORAL
Qty: 90 TABLET | Refills: 0 | Status: SHIPPED | OUTPATIENT
Start: 2019-06-24 | End: 2019-07-02 | Stop reason: SDUPTHER

## 2019-06-24 RX ORDER — CLOPIDOGREL BISULFATE 75 MG/1
TABLET ORAL
Qty: 90 TABLET | Refills: 0 | Status: SHIPPED | OUTPATIENT
Start: 2019-06-24 | End: 2019-07-02 | Stop reason: SDUPTHER

## 2019-06-24 RX ORDER — HYDROCHLOROTHIAZIDE 25 MG/1
TABLET ORAL
Qty: 90 TABLET | Refills: 0 | Status: SHIPPED | OUTPATIENT
Start: 2019-06-24 | End: 2019-07-02 | Stop reason: SDUPTHER

## 2019-06-24 RX ORDER — CITALOPRAM 20 MG/1
TABLET ORAL
Qty: 90 TABLET | Refills: 0 | Status: SHIPPED | OUTPATIENT
Start: 2019-06-24 | End: 2019-07-02 | Stop reason: SDUPTHER

## 2019-06-24 RX ORDER — LISINOPRIL 40 MG/1
TABLET ORAL
Qty: 90 TABLET | Refills: 0 | Status: SHIPPED | OUTPATIENT
Start: 2019-06-24 | End: 2019-10-29 | Stop reason: SDUPTHER

## 2019-06-24 RX ORDER — PANTOPRAZOLE SODIUM 40 MG/1
TABLET, DELAYED RELEASE ORAL
Qty: 90 TABLET | Refills: 0 | Status: SHIPPED | OUTPATIENT
Start: 2019-06-24 | End: 2019-07-02 | Stop reason: SDUPTHER

## 2019-06-26 LAB
25(OH)D3+25(OH)D2 SERPL-MCNC: 27.7 NG/ML (ref 30–100)
ALBUMIN SERPL-MCNC: 4 G/DL (ref 3.5–5.2)
ALBUMIN/GLOB SERPL: 1.6 G/DL
ALP SERPL-CCNC: 104 U/L (ref 39–117)
ALT SERPL-CCNC: 16 U/L (ref 1–33)
AST SERPL-CCNC: 11 U/L (ref 1–32)
BASOPHILS # BLD AUTO: 0.01 10*3/MM3 (ref 0–0.2)
BASOPHILS NFR BLD AUTO: 0.1 % (ref 0–1.5)
BILIRUB SERPL-MCNC: 0.2 MG/DL (ref 0.2–1.2)
BUN SERPL-MCNC: 22 MG/DL (ref 8–23)
BUN/CREAT SERPL: 22.9 (ref 7–25)
CALCIUM SERPL-MCNC: 8.9 MG/DL (ref 8.6–10.5)
CHLORIDE SERPL-SCNC: 102 MMOL/L (ref 98–107)
CHOLEST SERPL-MCNC: 152 MG/DL (ref 0–200)
CO2 SERPL-SCNC: 28 MMOL/L (ref 22–29)
CREAT SERPL-MCNC: 0.96 MG/DL (ref 0.57–1)
EOSINOPHIL # BLD AUTO: 0.14 10*3/MM3 (ref 0–0.4)
EOSINOPHIL NFR BLD AUTO: 1.4 % (ref 0.3–6.2)
ERYTHROCYTE [DISTWIDTH] IN BLOOD BY AUTOMATED COUNT: 19.7 % (ref 12.3–15.4)
GLOBULIN SER CALC-MCNC: 2.5 GM/DL
GLUCOSE SERPL-MCNC: 97 MG/DL (ref 65–99)
HBA1C MFR BLD: 6.9 % (ref 4.8–5.6)
HCT VFR BLD AUTO: 40.6 % (ref 34–46.6)
HDLC SERPL-MCNC: 34 MG/DL (ref 40–60)
HGB BLD-MCNC: 12.2 G/DL (ref 12–15.9)
IMM GRANULOCYTES # BLD AUTO: 0.03 10*3/MM3 (ref 0–0.05)
IMM GRANULOCYTES NFR BLD AUTO: 0.3 % (ref 0–0.5)
LDLC SERPL CALC-MCNC: 84 MG/DL (ref 0–100)
LYMPHOCYTES # BLD AUTO: 2.76 10*3/MM3 (ref 0.7–3.1)
LYMPHOCYTES NFR BLD AUTO: 27.3 % (ref 19.6–45.3)
MCH RBC QN AUTO: 24.4 PG (ref 26.6–33)
MCHC RBC AUTO-ENTMCNC: 30 G/DL (ref 31.5–35.7)
MCV RBC AUTO: 81.4 FL (ref 79–97)
MONOCYTES # BLD AUTO: 0.6 10*3/MM3 (ref 0.1–0.9)
MONOCYTES NFR BLD AUTO: 5.9 % (ref 5–12)
NEUTROPHILS # BLD AUTO: 6.6 10*3/MM3 (ref 1.7–7)
NEUTROPHILS NFR BLD AUTO: 65.3 % (ref 42.7–76)
PLATELET # BLD AUTO: 281 10*3/MM3 (ref 140–450)
POTASSIUM SERPL-SCNC: 4.6 MMOL/L (ref 3.5–5.2)
PROT SERPL-MCNC: 6.5 G/DL (ref 6–8.5)
RBC # BLD AUTO: 4.99 10*6/MM3 (ref 3.77–5.28)
SODIUM SERPL-SCNC: 140 MMOL/L (ref 136–145)
TRIGL SERPL-MCNC: 172 MG/DL (ref 0–150)
TSH SERPL DL<=0.005 MIU/L-ACNC: 2.17 MIU/ML (ref 0.27–4.2)
VLDLC SERPL CALC-MCNC: 34.4 MG/DL
WBC # BLD AUTO: 10.11 10*3/MM3 (ref 3.4–10.8)

## 2019-07-02 ENCOUNTER — OFFICE VISIT (OUTPATIENT)
Dept: FAMILY MEDICINE CLINIC | Facility: CLINIC | Age: 64
End: 2019-07-02

## 2019-07-02 VITALS
HEART RATE: 95 BPM | SYSTOLIC BLOOD PRESSURE: 126 MMHG | BODY MASS INDEX: 45.55 KG/M2 | HEIGHT: 60 IN | WEIGHT: 232 LBS | OXYGEN SATURATION: 95 % | DIASTOLIC BLOOD PRESSURE: 70 MMHG

## 2019-07-02 DIAGNOSIS — E03.9 ACQUIRED HYPOTHYROIDISM: ICD-10-CM

## 2019-07-02 DIAGNOSIS — E78.2 MIXED HYPERLIPIDEMIA: ICD-10-CM

## 2019-07-02 DIAGNOSIS — Z79.899 HIGH RISK MEDICATION USE: Primary | ICD-10-CM

## 2019-07-02 DIAGNOSIS — E11.319 TYPE 2 DIABETES MELLITUS WITH RETINOPATHY WITHOUT MACULAR EDEMA, WITHOUT LONG-TERM CURRENT USE OF INSULIN, UNSPECIFIED LATERALITY, UNSPECIFIED RETINOPATHY SEVERITY (HCC): ICD-10-CM

## 2019-07-02 DIAGNOSIS — Z79.899 HIGH RISK MEDICATION USE: ICD-10-CM

## 2019-07-02 DIAGNOSIS — I25.10 CORONARY ARTERY DISEASE INVOLVING NATIVE CORONARY ARTERY OF NATIVE HEART WITHOUT ANGINA PECTORIS: ICD-10-CM

## 2019-07-02 DIAGNOSIS — I10 ESSENTIAL HYPERTENSION: ICD-10-CM

## 2019-07-02 DIAGNOSIS — E55.9 VITAMIN D DEFICIENCY: ICD-10-CM

## 2019-07-02 DIAGNOSIS — E66.01 MORBID EXOGENOUS OBESITY (HCC): ICD-10-CM

## 2019-07-02 PROCEDURE — 99213 OFFICE O/P EST LOW 20 MIN: CPT | Performed by: FAMILY MEDICINE

## 2019-07-02 NOTE — PATIENT INSTRUCTIONS
Weight loss would be beneficial in regards to sugar status.  This will require a more disciplined diet and activity.  It has been recommended that patient use vitamin D3 at 5000 IU/day.  Anticipate follow-up in 4 to 5 months with repeat lab work.

## 2019-07-02 NOTE — PROGRESS NOTES
Subjective   Rosalee Hargrove is a 63 y.o. female with   Chief Complaint   Patient presents with   • Hypertension     med and lab follow up   • Diabetes   .    History of Present Illness   63-year-old white female here in follow-up for essential hypertension as well as type II non-insulin-dependent diabetes mellitus.  Patient with multiple medical problems and uses a multitude of medications-are as listed.  Medications are used on a regular basis and are well-tolerated without side effects.  Fasting labs have been acquired prior to this visit.  Patient with long history of morbid exogenous obesity and with actual weight gain since last visit.  She states that she is gone back to drinking 1 large regular Pepsi a day.  The following portions of the patient's history were reviewed and updated as appropriate: allergies, current medications, past family history, past medical history, past social history, past surgical history and problem list.    Review of Systems   Constitutional:        Morbidly obese   Cardiovascular: Negative for chest pain, palpitations and leg swelling.        Hyperlipidemia-hypertension   Endocrine: Negative for polydipsia, polyphagia and polyuria.        Type II non-insulin-dependent diabetes mellitus       Objective     Vitals:    07/02/19 0849   BP: 126/70   Pulse: 95   SpO2: 95%       No results found for this or any previous visit (from the past 168 hour(s)).    Physical Exam   Constitutional: She is oriented to person, place, and time. She appears well-developed and well-nourished.   HENT:   Head: Normocephalic and atraumatic.   Neck: Trachea normal and phonation normal. Neck supple. Normal carotid pulses present. Carotid bruit is not present. No thyroid mass and no thyromegaly present.   Cardiovascular: Normal rate, regular rhythm and normal heart sounds. Exam reveals no gallop and no friction rub.   No murmur heard.  Pulmonary/Chest: Effort normal and breath sounds normal. No respiratory  distress. She has no decreased breath sounds. She has no wheezes. She has no rhonchi. She has no rales.   Lymphadenopathy:     She has no cervical adenopathy.   Neurological: She is alert and oriented to person, place, and time.   Skin: Skin is warm and dry. No rash noted.   Psychiatric: She has a normal mood and affect. Her speech is normal and behavior is normal. Judgment and thought content normal. Cognition and memory are normal.   Nursing note and vitals reviewed.    No visits with results within 4 Week(s) from this visit.   Latest known visit with results is:   Orders Only on 04/25/2019   Component Date Value Ref Range Status   • WBC 06/25/2019 10.11  3.40 - 10.80 10*3/mm3 Final   • RBC 06/25/2019 4.99  3.77 - 5.28 10*6/mm3 Final   • Hemoglobin 06/25/2019 12.2  12.0 - 15.9 g/dL Final   • Hematocrit 06/25/2019 40.6  34.0 - 46.6 % Final   • MCV 06/25/2019 81.4  79.0 - 97.0 fL Final   • MCH 06/25/2019 24.4* 26.6 - 33.0 pg Final   • MCHC 06/25/2019 30.0* 31.5 - 35.7 g/dL Final   • RDW 06/25/2019 19.7* 12.3 - 15.4 % Final   • Platelets 06/25/2019 281  140 - 450 10*3/mm3 Final   • Neutrophil Rel % 06/25/2019 65.3  42.7 - 76.0 % Final   • Lymphocyte Rel % 06/25/2019 27.3  19.6 - 45.3 % Final   • Monocyte Rel % 06/25/2019 5.9  5.0 - 12.0 % Final   • Eosinophil Rel % 06/25/2019 1.4  0.3 - 6.2 % Final   • Basophil Rel % 06/25/2019 0.1  0.0 - 1.5 % Final   • Neutrophils Absolute 06/25/2019 6.60  1.70 - 7.00 10*3/mm3 Final   • Lymphocytes Absolute 06/25/2019 2.76  0.70 - 3.10 10*3/mm3 Final   • Monocytes Absolute 06/25/2019 0.60  0.10 - 0.90 10*3/mm3 Final   • Eosinophils Absolute 06/25/2019 0.14  0.00 - 0.40 10*3/mm3 Final   • Basophils Absolute 06/25/2019 0.01  0.00 - 0.20 10*3/mm3 Final   • Immature Granulocyte Rel % 06/25/2019 0.3  0.0 - 0.5 % Final   • Immature Grans Absolute 06/25/2019 0.03  0.00 - 0.05 10*3/mm3 Final   • Glucose 06/25/2019 97  65 - 99 mg/dL Final   • BUN 06/25/2019 22  8 - 23 mg/dL Final   •  Creatinine 06/25/2019 0.96  0.57 - 1.00 mg/dL Final   • eGFR Non African Am 06/25/2019 58* >60 mL/min/1.73 Final   • eGFR African Am 06/25/2019 71  >60 mL/min/1.73 Final   • BUN/Creatinine Ratio 06/25/2019 22.9  7.0 - 25.0 Final   • Sodium 06/25/2019 140  136 - 145 mmol/L Final   • Potassium 06/25/2019 4.6  3.5 - 5.2 mmol/L Final   • Chloride 06/25/2019 102  98 - 107 mmol/L Final   • Total CO2 06/25/2019 28.0  22.0 - 29.0 mmol/L Final   • Calcium 06/25/2019 8.9  8.6 - 10.5 mg/dL Final   • Total Protein 06/25/2019 6.5  6.0 - 8.5 g/dL Final   • Albumin 06/25/2019 4.00  3.50 - 5.20 g/dL Final   • Globulin 06/25/2019 2.5  gm/dL Final   • A/G Ratio 06/25/2019 1.6  g/dL Final   • Total Bilirubin 06/25/2019 0.2  0.2 - 1.2 mg/dL Final   • Alkaline Phosphatase 06/25/2019 104  39 - 117 U/L Final   • AST (SGOT) 06/25/2019 11  1 - 32 U/L Final   • ALT (SGPT) 06/25/2019 16  1 - 33 U/L Final   • Hemoglobin A1C 06/25/2019 6.90* 4.80 - 5.60 % Final    Comment: Hemoglobin A1C Ranges:  Increased Risk for Diabetes  5.7% to 6.4%  Diabetes                     >= 6.5%  Diabetic Goal                < 7.0%     • Total Cholesterol 06/25/2019 152  0 - 200 mg/dL Final   • Triglycerides 06/25/2019 172* 0 - 150 mg/dL Final   • HDL Cholesterol 06/25/2019 34* 40 - 60 mg/dL Final   • VLDL Cholesterol 06/25/2019 34.4  mg/dL Final   • LDL Cholesterol  06/25/2019 84  0 - 100 mg/dL Final   • TSH 06/25/2019 2.170  0.270 - 4.200 mIU/mL Final   • 25 Hydroxy, Vitamin D 06/25/2019 27.7* 30.0 - 100.0 ng/ml Final    Comment: Reference Range for Total Vitamin D 25(OH)  Deficiency <20.0 ng/mL  Insufficiency 21-29 ng/mL  Sufficiency  ng/mL  Toxicity >100 ng/ml           Assessment/Plan   Rosalee was seen today for hypertension and diabetes.    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

## 2019-07-03 RX ORDER — ALPRAZOLAM 0.25 MG/1
0.25 TABLET ORAL DAILY
Qty: 90 TABLET | Refills: 1 | Status: SHIPPED | OUTPATIENT
Start: 2019-07-03 | End: 2020-11-17 | Stop reason: ALTCHOICE

## 2019-07-07 LAB — DRUGS UR: NORMAL

## 2019-07-31 RX ORDER — ATORVASTATIN CALCIUM 20 MG/1
20 TABLET, FILM COATED ORAL DAILY
Qty: 90 TABLET | Refills: 1 | Status: SHIPPED | OUTPATIENT
Start: 2019-07-31 | End: 2019-07-31 | Stop reason: SDUPTHER

## 2019-07-31 RX ORDER — ATORVASTATIN CALCIUM 20 MG/1
20 TABLET, FILM COATED ORAL DAILY
Qty: 90 TABLET | Refills: 1 | Status: SHIPPED | OUTPATIENT
Start: 2019-07-31 | End: 2020-08-04 | Stop reason: SDUPTHER

## 2019-10-09 DIAGNOSIS — E11.9 CONTROLLED TYPE 2 DIABETES MELLITUS WITHOUT COMPLICATION, WITHOUT LONG-TERM CURRENT USE OF INSULIN (HCC): ICD-10-CM

## 2019-10-09 RX ORDER — GLIMEPIRIDE 2 MG/1
TABLET ORAL
Qty: 90 TABLET | Refills: 1 | Status: SHIPPED | OUTPATIENT
Start: 2019-10-09 | End: 2020-04-07

## 2019-10-14 RX ORDER — CITALOPRAM 20 MG/1
TABLET ORAL
Qty: 90 TABLET | Refills: 0 | Status: SHIPPED | OUTPATIENT
Start: 2019-10-14 | End: 2021-01-12 | Stop reason: SDUPTHER

## 2019-10-14 RX ORDER — LISINOPRIL 40 MG/1
TABLET ORAL
Qty: 90 TABLET | Refills: 0 | Status: SHIPPED | OUTPATIENT
Start: 2019-10-14 | End: 2020-08-05 | Stop reason: SDUPTHER

## 2019-10-14 RX ORDER — CLOPIDOGREL BISULFATE 75 MG/1
TABLET ORAL
Qty: 90 TABLET | Refills: 0 | Status: SHIPPED | OUTPATIENT
Start: 2019-10-14 | End: 2020-08-05 | Stop reason: SDUPTHER

## 2019-10-14 RX ORDER — HYDROCHLOROTHIAZIDE 25 MG/1
TABLET ORAL
Qty: 90 TABLET | Refills: 0 | Status: SHIPPED | OUTPATIENT
Start: 2019-10-14 | End: 2020-08-05 | Stop reason: SDUPTHER

## 2019-10-14 RX ORDER — LEVOTHYROXINE SODIUM 0.07 MG/1
TABLET ORAL
Qty: 90 TABLET | Refills: 0 | Status: SHIPPED | OUTPATIENT
Start: 2019-10-14 | End: 2020-08-05 | Stop reason: SDUPTHER

## 2019-10-23 LAB
25(OH)D3+25(OH)D2 SERPL-MCNC: 27.4 NG/ML (ref 30–100)
ALBUMIN SERPL-MCNC: 4.2 G/DL (ref 3.5–5.2)
ALBUMIN/GLOB SERPL: 1.8 G/DL
ALP SERPL-CCNC: 103 U/L (ref 39–117)
ALT SERPL-CCNC: 15 U/L (ref 1–33)
AST SERPL-CCNC: 11 U/L (ref 1–32)
BASOPHILS # BLD AUTO: 0.04 10*3/MM3 (ref 0–0.2)
BASOPHILS NFR BLD AUTO: 0.4 % (ref 0–1.5)
BILIRUB SERPL-MCNC: <0.2 MG/DL (ref 0.2–1.2)
BUN SERPL-MCNC: 21 MG/DL (ref 8–23)
BUN/CREAT SERPL: 20.6 (ref 7–25)
CALCIUM SERPL-MCNC: 9.2 MG/DL (ref 8.6–10.5)
CHLORIDE SERPL-SCNC: 103 MMOL/L (ref 98–107)
CHOLEST SERPL-MCNC: 168 MG/DL (ref 0–200)
CO2 SERPL-SCNC: 29.5 MMOL/L (ref 22–29)
CREAT SERPL-MCNC: 1.02 MG/DL (ref 0.57–1)
EOSINOPHIL # BLD AUTO: 0.13 10*3/MM3 (ref 0–0.4)
EOSINOPHIL NFR BLD AUTO: 1.3 % (ref 0.3–6.2)
ERYTHROCYTE [DISTWIDTH] IN BLOOD BY AUTOMATED COUNT: 17.8 % (ref 12.3–15.4)
GLOBULIN SER CALC-MCNC: 2.4 GM/DL
GLUCOSE SERPL-MCNC: 111 MG/DL (ref 65–99)
HBA1C MFR BLD: 7 % (ref 4.8–5.6)
HCT VFR BLD AUTO: 40.9 % (ref 34–46.6)
HDLC SERPL-MCNC: 37 MG/DL (ref 40–60)
HGB BLD-MCNC: 12.5 G/DL (ref 12–15.9)
IMM GRANULOCYTES # BLD AUTO: 0.04 10*3/MM3 (ref 0–0.05)
IMM GRANULOCYTES NFR BLD AUTO: 0.4 % (ref 0–0.5)
LDLC SERPL CALC-MCNC: 98 MG/DL (ref 0–100)
LYMPHOCYTES # BLD AUTO: 2.73 10*3/MM3 (ref 0.7–3.1)
LYMPHOCYTES NFR BLD AUTO: 28.3 % (ref 19.6–45.3)
MCH RBC QN AUTO: 24.2 PG (ref 26.6–33)
MCHC RBC AUTO-ENTMCNC: 30.6 G/DL (ref 31.5–35.7)
MCV RBC AUTO: 79.1 FL (ref 79–97)
MONOCYTES # BLD AUTO: 0.55 10*3/MM3 (ref 0.1–0.9)
MONOCYTES NFR BLD AUTO: 5.7 % (ref 5–12)
NEUTROPHILS # BLD AUTO: 6.14 10*3/MM3 (ref 1.7–7)
NEUTROPHILS NFR BLD AUTO: 63.9 % (ref 42.7–76)
NRBC BLD AUTO-RTO: 0 /100 WBC (ref 0–0.2)
PLATELET # BLD AUTO: 260 10*3/MM3 (ref 140–450)
POTASSIUM SERPL-SCNC: 5 MMOL/L (ref 3.5–5.2)
PROT SERPL-MCNC: 6.6 G/DL (ref 6–8.5)
RBC # BLD AUTO: 5.17 10*6/MM3 (ref 3.77–5.28)
SODIUM SERPL-SCNC: 142 MMOL/L (ref 136–145)
TRIGL SERPL-MCNC: 165 MG/DL (ref 0–150)
TSH SERPL DL<=0.005 MIU/L-ACNC: 2.21 UIU/ML (ref 0.27–4.2)
VLDLC SERPL CALC-MCNC: 33 MG/DL
WBC # BLD AUTO: 9.63 10*3/MM3 (ref 3.4–10.8)

## 2019-10-29 ENCOUNTER — OFFICE VISIT (OUTPATIENT)
Dept: FAMILY MEDICINE CLINIC | Facility: CLINIC | Age: 64
End: 2019-10-29

## 2019-10-29 VITALS
HEIGHT: 60 IN | DIASTOLIC BLOOD PRESSURE: 80 MMHG | OXYGEN SATURATION: 96 % | SYSTOLIC BLOOD PRESSURE: 126 MMHG | WEIGHT: 234 LBS | BODY MASS INDEX: 45.94 KG/M2 | HEART RATE: 80 BPM

## 2019-10-29 DIAGNOSIS — Z23 IMMUNIZATION DUE: ICD-10-CM

## 2019-10-29 DIAGNOSIS — E78.2 MIXED HYPERLIPIDEMIA: ICD-10-CM

## 2019-10-29 DIAGNOSIS — E03.9 ACQUIRED HYPOTHYROIDISM: ICD-10-CM

## 2019-10-29 DIAGNOSIS — K21.9 GASTROESOPHAGEAL REFLUX DISEASE WITHOUT ESOPHAGITIS: ICD-10-CM

## 2019-10-29 DIAGNOSIS — E66.01 MORBID EXOGENOUS OBESITY (HCC): ICD-10-CM

## 2019-10-29 DIAGNOSIS — F17.210 CIGARETTE NICOTINE DEPENDENCE WITHOUT COMPLICATION: ICD-10-CM

## 2019-10-29 DIAGNOSIS — I10 ESSENTIAL HYPERTENSION: ICD-10-CM

## 2019-10-29 DIAGNOSIS — E11.319 TYPE 2 DIABETES MELLITUS WITH RETINOPATHY WITHOUT MACULAR EDEMA, WITHOUT LONG-TERM CURRENT USE OF INSULIN, UNSPECIFIED LATERALITY, UNSPECIFIED RETINOPATHY SEVERITY (HCC): ICD-10-CM

## 2019-10-29 DIAGNOSIS — I25.10 CORONARY ARTERY DISEASE INVOLVING NATIVE CORONARY ARTERY OF NATIVE HEART WITHOUT ANGINA PECTORIS: ICD-10-CM

## 2019-10-29 DIAGNOSIS — I70.0 ATHEROSCLEROSIS OF AORTA (HCC): ICD-10-CM

## 2019-10-29 DIAGNOSIS — E11.9 CONTROLLED TYPE 2 DIABETES MELLITUS WITHOUT COMPLICATION, WITHOUT LONG-TERM CURRENT USE OF INSULIN (HCC): Primary | ICD-10-CM

## 2019-10-29 DIAGNOSIS — I65.23 ATHEROSCLEROSIS OF BOTH CAROTID ARTERIES: ICD-10-CM

## 2019-10-29 DIAGNOSIS — F41.0 PANIC DISORDER: ICD-10-CM

## 2019-10-29 DIAGNOSIS — I77.9 PERIPHERAL ARTERIAL OCCLUSIVE DISEASE (HCC): ICD-10-CM

## 2019-10-29 DIAGNOSIS — E55.9 VITAMIN D DEFICIENCY: ICD-10-CM

## 2019-10-29 PROCEDURE — G0439 PPPS, SUBSEQ VISIT: HCPCS | Performed by: FAMILY MEDICINE

## 2019-10-29 PROCEDURE — 99213 OFFICE O/P EST LOW 20 MIN: CPT | Performed by: FAMILY MEDICINE

## 2019-10-29 RX ORDER — ZOSTER VACCINE RECOMBINANT, ADJUVANTED 50 MCG/0.5
50 KIT INTRAMUSCULAR
Qty: 1 EACH | Refills: 1 | Status: SHIPPED | OUTPATIENT
Start: 2019-10-29 | End: 2020-04-27

## 2019-10-29 NOTE — PROGRESS NOTES
QUICK REFERENCE INFORMATION:  The ABCs of Providing the Welcome to Medicare Wellness Visit   CMS.[facility]    Welcome to Medicare Visit    Subjective   History of Present Illness    Rosalee Hargrove is a 64 y.o. female an established patient presenting for a Welcome to Medicare Visit. In addition, we addressed the following health issues: 64-year-old white female here for further medical management of essential hypertension, hyperlipidemia, peripheral arterial disease, CAD, carotid artery disease, and atherosclerosis of the aorta.  A history of vitamin D deficiency as well as morbid exogenous obesity.  She for the most part has a controlled type II non-insulin-dependent diabetes mellitus although retinopathy is present.  She continues to work as a hairdresser and otherwise leads a rather sedentary lifestyle.  She has a long history of nicotine addiction there is a past history of panic disorder with generalized anxiety.  Medications are as listed and used on a regular basis-well-tolerated.  Fasting labs have been acquired prior to this visit.       PMH, PSH, SocHx, FamHx, Allergies, and Medications: Reviewed and updated in the Visit Navigator.     Outpatient Medications Prior to Visit   Medication Sig Dispense Refill   • ALPRAZolam (XANAX) 0.25 MG tablet Take 1 tablet by mouth Daily. 90 tablet 1   • aspirin 81 MG chewable tablet Chew 81 mg daily.     • atorvastatin (LIPITOR) 20 MG tablet Take 1 tablet by mouth Daily. 90 tablet 1   • Calcium Carb-Cholecalciferol (CALCIUM+D3) 600-800 MG-UNIT tablet Take 1 tablet by mouth daily.     • cholecalciferol (VITAMIN D3) 1000 UNITS tablet Take 1,000 Units by mouth daily.     • cilostazol (PLETAL) 100 MG tablet Take 1 tablet by mouth 2 (Two) Times a Day. 180 tablet 1   • citalopram (CeleXA) 20 MG tablet TAKE 1 TABLET DAILY 90 tablet 0   • clindamycin (CLEOCIN T) 1 % swab      • clopidogrel (PLAVIX) 75 MG tablet TAKE 1 TABLET DAILY.       DISCONTINUE PLETAL. 90 tablet 0    • coenzyme Q10 100 MG capsule Take 100 mg by mouth daily.     • glimepiride (AMARYL) 2 MG tablet TAKE 1 TABLET BY MOUTH EVERY DAY BEFORE BREAKFAST 90 tablet 1   • hydroCHLOROthiazide (HYDRODIURIL) 25 MG tablet TAKE 1 TABLET DAILY 90 tablet 0   • levothyroxine (SYNTHROID, LEVOTHROID) 75 MCG tablet TAKE 1 TABLET DAILY 90 tablet 0   • lisinopril (PRINIVIL,ZESTRIL) 40 MG tablet TAKE 1 TABLET DAILY 90 tablet 0   • metFORMIN (GLUCOPHAGE) 1000 MG tablet TAKE 1 TABLET BY MOUTH TWICE A DAY WITH MEALS 180 tablet 1   • Multiple Vitamins-Minerals (EYE VITAMINS PO) Take 1 tablet by mouth Daily.     • Pediatric Multivitamins-Iron (FLINTSTONES PLUS IRON PO) Take 1 tablet by mouth daily.     • Dapagliflozin Propanediol (FARXIGA) 10 MG tablet TAKE 1 TABLET BY MOUTH EVERY DAY 30 tablet 5   • pantoprazole (PROTONIX) 40 MG EC tablet TAKE 1 TABLET DAILY 90 tablet 1   • citalopram (CeleXA) 20 MG tablet TAKE 1 TABLET DAILY 90 tablet 1   • clopidogrel (PLAVIX) 75 MG tablet TAKE 1 TABLET DAILY.       DISCONTINUE PLETAL. 90 tablet 1   • hydrochlorothiazide (HYDRODIURIL) 25 MG tablet TAKE 1 TABLET DAILY 90 tablet 1   • levothyroxine (SYNTHROID) 75 MCG tablet Take 1 tablet by mouth Daily. 30 tablet 1   • lisinopril (PRINIVIL,ZESTRIL) 40 MG tablet TAKE 1 TABLET DAILY 90 tablet 0     No facility-administered medications prior to visit.        Patient Active Problem List   Diagnosis   • Mixed hyperlipidemia   • Essential hypertension   • Acquired hypothyroidism   • Vitamin D deficiency   • Morbid exogenous obesity (CMS/[facility])   • Nicotine dependence, uncomplicated   • Atopic rhinitis   • Anxiety   • Gastroesophageal reflux disease without esophagitis   • Panic disorder   • Peripheral arterial occlusive disease (CMS/[facility])   • Peripheral edema   • Calculus of kidney   • CAD (coronary artery disease)   • Type 2 diabetes mellitus, controlled (CMS/[facility])   • Carotid atherosclerosis   • [place] disease   • Atherosclerosis of aorta (CMS/[facility])   • Type 2  diabetes mellitus with retinopathy without macular edema, without long-term current use of insulin (CMS/[facility])       Health Habits:  Dental Exam. up to date  Eye Exam. up to date  Exercise: 0 times/week.  Current exercise activities include: none.    Social:  See review in SnapShot activity and in SocHx section of Visit Navigator.    Health Risk Assessment:  The patient has completed a Health Risk Assessment. This has been reviewed with them and has been scanned into OnBase as a separate document.    Current Medical Providers:  Patient Care Team:  Eddie Slater DO as PCP - General  Dominic Ho MD as PCP - Claims Attributed    The [facility] providers who are involved in the care of this patient are listed above. Additional providers and suppliers are listed below:  Dr. Fredrick Kapoor    Recent Hospitalizations:  No recent hospitalization(s)..     Age-appropriate Screening Schedule:  Refer to the list below for future screening recommendations based on patient's age. Orders for these recommended tests are listed in the plan section. The patient has been provided with a written plan.    Health Maintenance   Topic Date Due   • HEMOGLOBIN A1C  04/22/2020   • DIABETIC EYE EXAM  09/17/2020   • PNEUMOCOCCAL VACCINES (65+ LOW/MEDIUM RISK) (2 of 2 - PPSV23) 10/15/2020   • LIPID PANEL  10/22/2020   • MEDICARE ANNUAL WELLNESS  10/29/2020   • DIABETIC FOOT EXAM  10/29/2020   • URINE MICROALBUMIN  10/29/2020   • COLONOSCOPY  08/14/2028   • INFLUENZA VACCINE  Completed   • HEPATITIS C SCREENING  Addressed   • MAMMOGRAM  Discontinued   • TDAP/TD VACCINES  Discontinued   • ZOSTER VACCINE  Discontinued       Depression Screen:   PHQ-2/PHQ-9 Depression Screening 10/29/2019   Little interest or pleasure in doing things 0   Feeling down, depressed, or hopeless 0   Total Score 0       Functional and Cognitive Screening:  Functional & Cognitive Status 10/29/2019   Do you have difficulty preparing food and eating? No   Do  you have difficulty bathing yourself, getting dressed or grooming yourself? No   Do you have difficulty using the toilet? No   Do you have difficulty moving around from place to place? No   Do you have trouble with steps or getting out of a bed or a chair? No   Current Diet Well Balanced Diet   Dental Exam Up to date   Eye Exam Up to date   Exercise (times per week) 0 times per week   Do you need help using the phone?  No   Are you deaf or do you have serious difficulty hearing?  No   Do you need help with transportation? No   Do you need help shopping? No   Do you need help preparing meals?  No   Do you need help with housework?  No   Do you need help with laundry? No   Do you need help taking your medications? No   Do you need help managing money? No   Do you ever drive or ride in a car without wearing a seat belt? No   Have you felt unusual stress, anger or loneliness in the last month? No   Who do you live with? Spouse   If you need help, do you have trouble finding someone available to you? No   Do you have difficulty concentrating, remembering or making decisions? No       Does the patient have evidence of cognitive impairment? No    Advanced Care Planning:  Patient does not have an advance directive - not interested in additional information    Identification of Risk Factors:  Risk factors include: Abdominal Aortic Aneurysm Screening  Advance Directive Discussion  Breast Cancer/Mammogram Screening  Cardiovascular risk  Colon Cancer Screening  Depression/Dysphoria  Diabetic Lab Screening   Fall Risk  Glaucoma Risk  Immunizations Discussed/Encouraged (specific immunizations; Pneumococcal 23 and Shingrix )  Inactivity/Sedentary  Obesity/Overweight   Osteoprorosis Risk  Polypharmacy  Tobacco Use/Dependance (use dotphrase .tobaccocessation for documentation).    Review of Systems   Constitutional:        Exogenous obesity   Cardiovascular: Positive for leg swelling.        CAD, carotid artery disease, PAD,  "essential hypertension, hyperlipidemia   Endocrine:        Type 2 diabetes mellitus, hypothyroidism   Allergic/Immunologic: Positive for environmental allergies.   All other systems reviewed and are negative.      Pertinent items are noted in HPI.    Objective    Physical Exam   Constitutional: She is oriented to person, place, and time. She appears well-developed and well-nourished.   HENT:   Head: Normocephalic and atraumatic.   Neck: Trachea normal and phonation normal. Neck supple. Normal carotid pulses present. Carotid bruit is not present. No thyroid mass and no thyromegaly present.   Cardiovascular: Normal rate, regular rhythm and normal heart sounds. Exam reveals no gallop and no friction rub.   No murmur heard.  Pulmonary/Chest: Effort normal and breath sounds normal. No respiratory distress. She has no decreased breath sounds. She has no wheezes. She has no rhonchi. She has no rales.    Rosalee had a diabetic foot exam performed today.   During the foot exam she had a monofilament test performed.  Lymphadenopathy:     She has no cervical adenopathy.   Neurological: She is alert and oriented to person, place, and time.   Skin: Skin is warm and dry. No rash noted.   Psychiatric: She has a normal mood and affect. Her speech is normal and behavior is normal. Judgment and thought content normal. Cognition and memory are normal.   Nursing note and vitals reviewed.      Physical exam as listed above.    Vitals:    10/29/19 0935   BP: 126/80   Pulse: 80   SpO2: 96%   Weight: 106 kg (234 lb)   Height: 152.4 cm (60\")       Body mass index is 45.7 kg/m².    Assessment/Plan   Patient Self-Management and Personalized Health Advice  The patient has been provided with information about: diet, exercise, weight management and prevention of cardiac or vascular disease and preventive services including:   · Annual Wellness Visit (AWV)  · Bone Density Measurements  · Cardiovascular Disease Screening Tests (may do this order " every 5 years in beneficiaries without signs or symptoms of cardiovascular disease)  · Colorectal Cancer Screening, Colonoscopy  · Counseling to Prevent Tobacco Use (use of smartset and @cessation@ smartphrase for documentation)  · Diabetes Screening-Lab Order for either glucose quantitative blood (except reagent strip), glucose;post glucose dose(includes glucose), or glucose tolerance test-3 specimens(includes glucose)  · Glaucoma screening (for individuals with diabetes mellitus, family history of glaucoma, -Americans (> or =) age 50, -Americans (> or =) age 65)  · Influenza Vaccine and Administration  · Lung Cancer Screening Counseling and Annual Screening for Lung Cancer with Low Dose Computed Tomography (LDCT); (use of smartset for low dose CT for lung cancer screening for documentation and orders)  · Pneumococcal Vaccine and Administration  · Screening Mammography   · Screening Pap Tests.    Discussed the patient's BMI with her. The BMI is above average; no BMI management plan is appropriate..    Orders:  Orders Placed This Encounter   Procedures   • Microalbumin / Creatinine Urine Ratio - Urine, Clean Catch   • Lipid panel   • Hemoglobin A1c   • Comprehensive metabolic panel   • Vitamin D 25 hydroxy   • TSH   • CBC w AUTO Differential       Follow Up:  Return in about 6 months (around 4/29/2020) for Recheck.     An After Visit Summary and PPPS with all of these plans were given to the patient.

## 2019-10-30 PROBLEM — R55 NEAR SYNCOPE: Status: RESOLVED | Noted: 2017-10-24 | Resolved: 2019-10-30

## 2019-10-30 LAB
ALBUMIN/CREAT UR: 14.1 MG/G CREAT (ref 0–30)
CREAT UR-MCNC: 46.2 MG/DL
MICROALBUMIN UR-MCNC: 6.5 UG/ML

## 2019-10-30 RX ORDER — PANTOPRAZOLE SODIUM 40 MG/1
40 TABLET, DELAYED RELEASE ORAL DAILY
Qty: 90 TABLET | Refills: 1 | Status: SHIPPED | OUTPATIENT
Start: 2019-10-30 | End: 2020-06-15

## 2019-10-31 ENCOUNTER — TELEPHONE (OUTPATIENT)
Dept: FAMILY MEDICINE CLINIC | Facility: CLINIC | Age: 64
End: 2019-10-31

## 2019-11-12 ENCOUNTER — OFFICE VISIT (OUTPATIENT)
Dept: CARDIOLOGY | Facility: CLINIC | Age: 64
End: 2019-11-12

## 2019-11-12 VITALS
HEART RATE: 74 BPM | BODY MASS INDEX: 45.16 KG/M2 | DIASTOLIC BLOOD PRESSURE: 80 MMHG | WEIGHT: 230 LBS | OXYGEN SATURATION: 95 % | HEIGHT: 60 IN | SYSTOLIC BLOOD PRESSURE: 128 MMHG

## 2019-11-12 DIAGNOSIS — I25.10 CORONARY ARTERY DISEASE INVOLVING NATIVE CORONARY ARTERY OF NATIVE HEART WITHOUT ANGINA PECTORIS: Primary | ICD-10-CM

## 2019-11-12 DIAGNOSIS — I48.0 PAROXYSMAL ATRIAL FIBRILLATION (HCC): ICD-10-CM

## 2019-11-12 PROCEDURE — 99213 OFFICE O/P EST LOW 20 MIN: CPT | Performed by: PHYSICIAN ASSISTANT

## 2019-11-12 PROCEDURE — 93000 ELECTROCARDIOGRAM COMPLETE: CPT | Performed by: PHYSICIAN ASSISTANT

## 2019-11-12 NOTE — PROGRESS NOTES
PCP controls cholesterol   General Sunscreen Counseling: I recommended a broad spectrum sunscreen with a SPF of 30 or higher.  I explained that SPF 30 sunscreens block approximately 97 percent of the sun's harmful rays.  Sunscreens should be applied at least 15 minutes prior to expected sun exposure and then every 2 hours after that as long as sun exposure continues. If swimming or exercising sunscreen should be reapplied every 45 minutes to an hour after getting wet or sweating.  One ounce, or the equivalent of a shot glass full of sunscreen, is adequate to protect the skin not covered by a bathing suit. I also recommended a lip balm with a sunscreen as well. Sun protective clothing can be used in lieu of sunscreen but must be worn the entire time you are exposed to the sun's rays. Detail Level: Generalized

## 2019-11-12 NOTE — PROGRESS NOTES
Date of Office Visit: 2019  Encounter Provider: TIMOTHY Mcgraw  Place of Service: [facility] CARDIOLOGY  Patient Name: Rosalee Hargrove  :1955    Chief Complaint   Patient presents with   • Coronary Artery Disease     1 year follow up   :     HPI: Rosalee Hargrove is a 64 y.o. female who presents today for follow-up.  Old records have been obtained and reviewed by me.  She is a patient of Dr. Kapoor's with a past medical history significant for mild coronary artery disease, smoking, obesity, and peripheral arterial disease.  She follows with Dr. Александр Coats for her peripheral vascular disease.  Her last cardiac catheterization was in 2016.  This showed mild to moderate diffuse coronary artery disease.  She had 30% distal left main, 30% diffuse proximal LAD, 30-40% mid to distal LAD, 30% ramus, 30% circumflex, 50% OM1, and 30% diffuse RCA.  She had [place] veins that were feeding the RV directly.  Recommendations at that time were for medical management.  Her last echocardiogram was in 2017.  This showed a normal LV function with an EF of 62%, and no significant valvular abnormalities.  She was last in our office to see Dr. Kapoor on 2017.  At that visit, she had complaints of recurrent near syncope.  Dr. Kapoor felt that it could represent transient changes in her blood pressure and/or her heart rate.  He ordered an event recorder.  Her 30 day event monitor showed a single 2 minute episode of atrial fibrillation and one run of ventricular tachycardia that lasted 13 beats at 121 bpm.  She was anticoagulated on Plavix for her peripheral vascular disease, and my recommendation was to hold off on any further anticoagulation considering her one of atrial fibrillation was so short.  I did recommend that she get another sleep study as if she had been noncompliant with her CPAP machine.  She does follow with neurology for her near syncopal episodes.   She was last in  our office to see me on 11/5/2018.  She was still having near syncopal episodes.  Her life is very stressful, she was caring for her mother and her grandchildren and she was still working full-time.  She was back to smoking.  She did see sleep medicine and was working to get her CPAP adjusted.  I felt that her near syncope was stress and anxiety or possibly hypotension or vasovagal episodes.  My suggestion was to stay well-hydrated and wear compression stockings, and I asked her to cut her hydrochlorothiazide in half.  We had a long discussion about managing stress.  She is here today for yearly visit.   Last year she is been about the same.  She still smoking.  She states that she quit for a few months but then started back when her  decided to go cold turkey from any kind of substance.  He evidently is a recovering alcoholic.  As far as her near syncopal episodes ago, these have pretty much resolved.  She complains of a pain in the center of her chest that happens when she positions her torso in a certain way.  It does not happen on exertion although she really does not exert herself.  She denies any worsening shortness of breath, palpitations, edema, or syncope.  She follows regularly with Dr. Александр Coats for her vascular disease.  This has been stable.    Past Medical History:   Diagnosis Date   • Allergic    • Anemia    • Angina pectoris (CMS/HCC)    • Anxiety    • Arthritis    • ASCVD (arteriosclerotic cardiovascular disease)    • Bilateral leg edema    • CAD (coronary artery disease)    • Chest pain    • Depression    • Diabetes mellitus (CMS/HCC)    • Disease of thyroid gland    • Dizziness    • Headache    • Hyperlipidemia    • Hypertension    • Morbid obesity (CMS/HCC)    • Orthostatic hypotension    • PAD (peripheral artery disease) (CMS/HCC)    • Shortness of breath    • Sleep apnea    • Tobacco abuse    • Vitamin D deficiency        Past Surgical History:   Procedure Laterality Date   • ANAL  FISSURECTOMY     • BRAIN SURGERY     • CARDIAC CATHETERIZATION N/A 2016    Procedure: Coronary angiography;  Surgeon: Fredrick Kapoor MD;  Location:  [facility] CATH INVASIVE LOCATION;  Service:    • CARDIAC CATHETERIZATION N/A 2016    Procedure: Left heart cath;  Surgeon: Fredrick Kapoor MD;  Location:  [facility] CATH INVASIVE LOCATION;  Service:    • CARDIAC CATHETERIZATION N/A 2016    Procedure: Left ventriculography;  Surgeon: Fredrick Kapoor MD;  [facility];  Service:    • CARDIAC CATHETERIZATION N/A 2016    Procedure: Right heart cath;  Surgeon: Fredrick Kapoor MD;  Location:  [facility] CATH INVASIVE LOCATION;  Service:    •  SECTION     • CHOLECYSTECTOMY     • ERCP     • FRACTURE SURGERY      arm   • HYSTERECTOMY     • ILIAC ARTERY STENT         Social History     Socioeconomic History   • Marital status:      Spouse name: Not on file   • Number of children: Not on file   • Years of education: Not on file   • Highest education level: Not on file   Tobacco Use   • Smoking status: Current Every Day Smoker     Packs/day: 0.50   • Smokeless tobacco: Never Used   Substance and Sexual Activity   • Alcohol use: Yes     Alcohol/week: 0.6 oz     Types: 1 Glasses of wine per week     Comment: rare   • Drug use: No   • Sexual activity: Defer       Family History   Problem Relation Age of Onset   • Heart disease Mother    • Hypertension Mother    • Hyperlipidemia Mother    • Heart disease Father    • Heart attack Sister    • Heart disease Sister    • Heart disease Brother    • Heart attack Brother    • Hyperlipidemia Brother    • Diabetes Brother    • Heart attack Maternal Grandmother    • Heart disease Maternal Grandmother    • Heart failure Maternal Grandmother    • Heart failure Maternal Grandfather    • Heart disease Maternal Grandfather    • Heart attack Maternal Grandfather    • Heart attack Paternal Grandmother    • Heart disease Paternal Grandmother    • Heart failure Paternal  Grandmother    • Hypertension Paternal Grandmother        Review of Systems   Constitution: Negative for chills, fever and malaise/fatigue.   Cardiovascular: Positive for chest pain and dyspnea on exertion. Negative for leg swelling, near-syncope, orthopnea, palpitations, paroxysmal nocturnal dyspnea and syncope.   Respiratory: Negative for cough and shortness of breath.    Musculoskeletal: Negative for joint pain, joint swelling and myalgias.   Gastrointestinal: Negative for abdominal pain, diarrhea, melena, nausea and vomiting.   Genitourinary: Negative for frequency and hematuria.   Neurological: Negative for light-headedness, numbness, paresthesias and seizures.   Allergic/Immunologic: Negative.    All other systems reviewed and are negative.      Allergies   Allergen Reactions   • Ciprofloxacin          Current Outpatient Medications:   •  ALPRAZolam (XANAX) 0.25 MG tablet, Take 1 tablet by mouth Daily., Disp: 90 tablet, Rfl: 1  •  aspirin 81 MG chewable tablet, Chew 81 mg daily., Disp: , Rfl:   •  atorvastatin (LIPITOR) 20 MG tablet, Take 1 tablet by mouth Daily., Disp: 90 tablet, Rfl: 1  •  Calcium Carb-Cholecalciferol (CALCIUM+D3) 600-800 MG-UNIT tablet, Take 1 tablet by mouth daily., Disp: , Rfl:   •  cholecalciferol (VITAMIN D3) 1000 UNITS tablet, Take 1,000 Units by mouth daily., Disp: , Rfl:   •  citalopram (CeleXA) 20 MG tablet, TAKE 1 TABLET DAILY, Disp: 90 tablet, Rfl: 0  •  clopidogrel (PLAVIX) 75 MG tablet, TAKE 1 TABLET DAILY.       DISCONTINUE PLETAL., Disp: 90 tablet, Rfl: 0  •  Dapagliflozin Propanediol (FARXIGA) 10 MG tablet, TAKE 1 TABLET BY MOUTH EVERY DAY, Disp: 30 tablet, Rfl: 5  •  glimepiride (AMARYL) 2 MG tablet, TAKE 1 TABLET BY MOUTH EVERY DAY BEFORE BREAKFAST, Disp: 90 tablet, Rfl: 1  •  hydroCHLOROthiazide (HYDRODIURIL) 25 MG tablet, TAKE 1 TABLET DAILY, Disp: 90 tablet, Rfl: 0  •  levothyroxine (SYNTHROID, LEVOTHROID) 75 MCG tablet, TAKE 1 TABLET DAILY, Disp: 90 tablet, Rfl: 0  •   "lisinopril (PRINIVIL,ZESTRIL) 40 MG tablet, TAKE 1 TABLET DAILY, Disp: 90 tablet, Rfl: 0  •  metFORMIN (GLUCOPHAGE) 1000 MG tablet, TAKE 1 TABLET BY MOUTH TWICE A DAY WITH MEALS, Disp: 180 tablet, Rfl: 1  •  Multiple Vitamins-Minerals (EYE VITAMINS PO), Take 1 tablet by mouth Daily., Disp: , Rfl:   •  pantoprazole (PROTONIX) 40 MG EC tablet, Take 1 tablet by mouth Daily., Disp: 90 tablet, Rfl: 1  •  Pediatric Multivitamins-Iron (FLINTSTONES PLUS IRON PO), Take 1 tablet by mouth daily., Disp: , Rfl:   •  SHINGRIX 50 MCG/0.5ML reconstituted suspension, Inject 0.5 mL into the appropriate muscle as directed by prescriber Every 6 (Six) Months for 2 doses., Disp: 1 each, Rfl: 1      Objective:     Vitals:    11/12/19 1053 11/12/19 1111   BP: 122/78 128/80   BP Location: Right arm Left arm   Pulse: 74    SpO2: 95%    Weight: 104 kg (230 lb)    Height: 152.4 cm (60\")      Body mass index is 44.92 kg/m².    PHYSICAL EXAM:    Physical Exam   Constitutional: She is oriented to person, place, and time. She appears well-developed and well-nourished. No distress.   HENT:   Head: Normocephalic and atraumatic.   Eyes: Pupils are equal, round, and reactive to light.   Neck: No JVD present. No thyromegaly present.   Cardiovascular: Normal rate, regular rhythm, normal heart sounds and intact distal pulses.   No murmur heard.  Pulmonary/Chest: Effort normal and breath sounds normal. No respiratory distress.   Abdominal: Soft. Bowel sounds are normal. She exhibits no distension. There is no splenomegaly or hepatomegaly. There is no tenderness.   Musculoskeletal: Normal range of motion. She exhibits no edema.   Neurological: She is alert and oriented to person, place, and time.   Skin: Skin is warm and dry. She is not diaphoretic. No erythema.   Psychiatric: She has a normal mood and affect. Her behavior is normal. Judgment normal.         ECG 12 Lead  Date/Time: 11/12/2019 11:24 AM  Performed by: Barbara Quinones PA  Authorized by: " Barbara Quinones PA   Comparison: compared with previous ECG from 11/5/2018  Similar to previous ECG  Rhythm: sinus rhythm  BPM: 77    Clinical impression: normal ECG  Comments: Indication: Coronary disease              Assessment:       Diagnosis Plan   1. Coronary artery disease involving native coronary artery of native heart without angina pectoris  ECG 12 Lead   2. Paroxysmal atrial fibrillation (CMS/HCC)  ECG 12 Lead     Orders Placed This Encounter   Procedures   • ECG 12 Lead     This order was created via procedure documentation          Plan:       Overall she is stable.  She has coronary disease that has been mild to moderate and we have been managing this medically.  She had about a 2-minute episode of atrial fibrillation on a 30-day event monitor and because of its short duration we have elected to just keep an eye on this.  Since then she has gotten her CPAP change to a BiPAP and things are straightening out on that and.  Her biggest issue remains her inability to really make significant lifestyle changes.  We had a long discussion about this again today.  She is a very sweet lady who really just needs to take better care of herself.  I think the first priority is to quit smoking.  Otherwise she is stable and I am not going to make any changes.  She will follow-up with Dr. Mei in 1 year as a transference of care from Dr. Kapoor.    As always, it has been a pleasure to participate in your patient's care.      Sincerely,         Barbara Quinones PA-C

## 2019-11-18 RX ORDER — ATORVASTATIN CALCIUM 20 MG/1
TABLET, FILM COATED ORAL
Qty: 90 TABLET | Refills: 1 | Status: SHIPPED | OUTPATIENT
Start: 2019-11-18 | End: 2020-08-05 | Stop reason: SDUPTHER

## 2020-02-28 ENCOUNTER — TELEPHONE (OUTPATIENT)
Dept: FAMILY MEDICINE CLINIC | Facility: CLINIC | Age: 65
End: 2020-02-28

## 2020-02-28 DIAGNOSIS — Z20.828 EXPOSURE TO THE FLU: Primary | ICD-10-CM

## 2020-02-28 RX ORDER — OSELTAMIVIR PHOSPHATE 75 MG/1
75 CAPSULE ORAL DAILY
Qty: 10 CAPSULE | Refills: 0 | Status: SHIPPED | OUTPATIENT
Start: 2020-02-28 | End: 2020-03-09

## 2020-02-28 NOTE — TELEPHONE ENCOUNTER
Pt called requesting tamiflu. She stated her brother was diagnosis yesterday and she was exposed to him.     If sending in she would like this sent to the [facility] in [place]

## 2020-04-07 DIAGNOSIS — E11.9 CONTROLLED TYPE 2 DIABETES MELLITUS WITHOUT COMPLICATION, WITHOUT LONG-TERM CURRENT USE OF INSULIN (HCC): ICD-10-CM

## 2020-04-07 RX ORDER — GLIMEPIRIDE 2 MG/1
TABLET ORAL
Qty: 90 TABLET | Refills: 1 | Status: SHIPPED | OUTPATIENT
Start: 2020-04-07 | End: 2020-10-01

## 2020-04-30 ENCOUNTER — RESULTS ENCOUNTER (OUTPATIENT)
Dept: FAMILY MEDICINE CLINIC | Facility: CLINIC | Age: 65
End: 2020-04-30

## 2020-04-30 DIAGNOSIS — E11.319 TYPE 2 DIABETES MELLITUS WITH RETINOPATHY WITHOUT MACULAR EDEMA, WITHOUT LONG-TERM CURRENT USE OF INSULIN, UNSPECIFIED LATERALITY, UNSPECIFIED RETINOPATHY SEVERITY (HCC): ICD-10-CM

## 2020-04-30 DIAGNOSIS — K21.9 GASTROESOPHAGEAL REFLUX DISEASE WITHOUT ESOPHAGITIS: ICD-10-CM

## 2020-04-30 DIAGNOSIS — Z23 IMMUNIZATION DUE: ICD-10-CM

## 2020-04-30 DIAGNOSIS — E03.9 ACQUIRED HYPOTHYROIDISM: ICD-10-CM

## 2020-04-30 DIAGNOSIS — E11.9 CONTROLLED TYPE 2 DIABETES MELLITUS WITHOUT COMPLICATION, WITHOUT LONG-TERM CURRENT USE OF INSULIN (HCC): ICD-10-CM

## 2020-04-30 DIAGNOSIS — I70.0 ATHEROSCLEROSIS OF AORTA (HCC): ICD-10-CM

## 2020-04-30 DIAGNOSIS — I10 ESSENTIAL HYPERTENSION: ICD-10-CM

## 2020-04-30 DIAGNOSIS — I65.23 ATHEROSCLEROSIS OF BOTH CAROTID ARTERIES: ICD-10-CM

## 2020-04-30 DIAGNOSIS — E55.9 VITAMIN D DEFICIENCY: ICD-10-CM

## 2020-04-30 DIAGNOSIS — E66.01 MORBID EXOGENOUS OBESITY (HCC): ICD-10-CM

## 2020-04-30 DIAGNOSIS — F41.0 PANIC DISORDER: ICD-10-CM

## 2020-04-30 DIAGNOSIS — E78.2 MIXED HYPERLIPIDEMIA: ICD-10-CM

## 2020-04-30 DIAGNOSIS — I77.9 PERIPHERAL ARTERIAL OCCLUSIVE DISEASE (HCC): ICD-10-CM

## 2020-04-30 DIAGNOSIS — F17.210 CIGARETTE NICOTINE DEPENDENCE WITHOUT COMPLICATION: ICD-10-CM

## 2020-04-30 DIAGNOSIS — I25.10 CORONARY ARTERY DISEASE INVOLVING NATIVE CORONARY ARTERY OF NATIVE HEART WITHOUT ANGINA PECTORIS: ICD-10-CM

## 2020-06-14 DIAGNOSIS — K21.9 GASTROESOPHAGEAL REFLUX DISEASE WITHOUT ESOPHAGITIS: ICD-10-CM

## 2020-06-15 RX ORDER — PANTOPRAZOLE SODIUM 40 MG/1
TABLET, DELAYED RELEASE ORAL
Qty: 90 TABLET | Refills: 1 | Status: SHIPPED | OUTPATIENT
Start: 2020-06-15 | End: 2020-12-21

## 2020-07-29 LAB
25(OH)D3+25(OH)D2 SERPL-MCNC: 27.7 NG/ML (ref 30–100)
ALBUMIN SERPL-MCNC: 4.2 G/DL (ref 3.5–5.2)
ALBUMIN/GLOB SERPL: 1.8 G/DL
ALP SERPL-CCNC: 103 U/L (ref 39–117)
ALT SERPL-CCNC: 17 U/L (ref 1–33)
AST SERPL-CCNC: 14 U/L (ref 1–32)
BASOPHILS # BLD AUTO: 0.04 10*3/MM3 (ref 0–0.2)
BASOPHILS NFR BLD AUTO: 0.5 % (ref 0–1.5)
BILIRUB SERPL-MCNC: 0.3 MG/DL (ref 0–1.2)
BUN SERPL-MCNC: 21 MG/DL (ref 8–23)
BUN/CREAT SERPL: 23.9 (ref 7–25)
CALCIUM SERPL-MCNC: 8.9 MG/DL (ref 8.6–10.5)
CHLORIDE SERPL-SCNC: 100 MMOL/L (ref 98–107)
CHOLEST SERPL-MCNC: 199 MG/DL (ref 0–200)
CO2 SERPL-SCNC: 27.4 MMOL/L (ref 22–29)
CREAT SERPL-MCNC: 0.88 MG/DL (ref 0.57–1)
EOSINOPHIL # BLD AUTO: 0.13 10*3/MM3 (ref 0–0.4)
EOSINOPHIL NFR BLD AUTO: 1.6 % (ref 0.3–6.2)
ERYTHROCYTE [DISTWIDTH] IN BLOOD BY AUTOMATED COUNT: 17.3 % (ref 12.3–15.4)
GLOBULIN SER CALC-MCNC: 2.3 GM/DL
GLUCOSE SERPL-MCNC: 114 MG/DL (ref 65–99)
HBA1C MFR BLD: 7.3 % (ref 4.8–5.6)
HCT VFR BLD AUTO: 40.5 % (ref 34–46.6)
HDLC SERPL-MCNC: 33 MG/DL (ref 40–60)
HGB BLD-MCNC: 13.1 G/DL (ref 12–15.9)
IMM GRANULOCYTES # BLD AUTO: 0.04 10*3/MM3 (ref 0–0.05)
IMM GRANULOCYTES NFR BLD AUTO: 0.5 % (ref 0–0.5)
LDLC SERPL CALC-MCNC: 127 MG/DL (ref 0–100)
LYMPHOCYTES # BLD AUTO: 2.35 10*3/MM3 (ref 0.7–3.1)
LYMPHOCYTES NFR BLD AUTO: 29.5 % (ref 19.6–45.3)
MCH RBC QN AUTO: 24.8 PG (ref 26.6–33)
MCHC RBC AUTO-ENTMCNC: 32.3 G/DL (ref 31.5–35.7)
MCV RBC AUTO: 76.7 FL (ref 79–97)
MONOCYTES # BLD AUTO: 0.36 10*3/MM3 (ref 0.1–0.9)
MONOCYTES NFR BLD AUTO: 4.5 % (ref 5–12)
NEUTROPHILS # BLD AUTO: 5.04 10*3/MM3 (ref 1.7–7)
NEUTROPHILS NFR BLD AUTO: 63.4 % (ref 42.7–76)
NRBC BLD AUTO-RTO: 0 /100 WBC (ref 0–0.2)
PLATELET # BLD AUTO: 282 10*3/MM3 (ref 140–450)
POTASSIUM SERPL-SCNC: 4.6 MMOL/L (ref 3.5–5.2)
PROT SERPL-MCNC: 6.5 G/DL (ref 6–8.5)
RBC # BLD AUTO: 5.28 10*6/MM3 (ref 3.77–5.28)
SODIUM SERPL-SCNC: 137 MMOL/L (ref 136–145)
TRIGL SERPL-MCNC: 197 MG/DL (ref 0–150)
TSH SERPL DL<=0.005 MIU/L-ACNC: 1.71 UIU/ML (ref 0.27–4.2)
VLDLC SERPL CALC-MCNC: 39.4 MG/DL
WBC # BLD AUTO: 7.96 10*3/MM3 (ref 3.4–10.8)

## 2020-08-04 ENCOUNTER — OFFICE VISIT (OUTPATIENT)
Dept: FAMILY MEDICINE CLINIC | Facility: CLINIC | Age: 65
End: 2020-08-04

## 2020-08-04 VITALS
WEIGHT: 227 LBS | DIASTOLIC BLOOD PRESSURE: 80 MMHG | SYSTOLIC BLOOD PRESSURE: 120 MMHG | OXYGEN SATURATION: 97 % | TEMPERATURE: 97.8 F | HEIGHT: 60 IN | HEART RATE: 71 BPM | BODY MASS INDEX: 44.57 KG/M2

## 2020-08-04 DIAGNOSIS — E11.9 CONTROLLED TYPE 2 DIABETES MELLITUS WITHOUT COMPLICATION, WITHOUT LONG-TERM CURRENT USE OF INSULIN (HCC): ICD-10-CM

## 2020-08-04 DIAGNOSIS — K21.9 GASTROESOPHAGEAL REFLUX DISEASE WITHOUT ESOPHAGITIS: ICD-10-CM

## 2020-08-04 DIAGNOSIS — E78.2 MIXED HYPERLIPIDEMIA: ICD-10-CM

## 2020-08-04 DIAGNOSIS — E66.01 MORBID EXOGENOUS OBESITY (HCC): ICD-10-CM

## 2020-08-04 DIAGNOSIS — E03.9 ACQUIRED HYPOTHYROIDISM: ICD-10-CM

## 2020-08-04 DIAGNOSIS — E11.319 TYPE 2 DIABETES MELLITUS WITH RETINOPATHY WITHOUT MACULAR EDEMA, WITHOUT LONG-TERM CURRENT USE OF INSULIN, UNSPECIFIED LATERALITY, UNSPECIFIED RETINOPATHY SEVERITY (HCC): ICD-10-CM

## 2020-08-04 DIAGNOSIS — I65.23 ATHEROSCLEROSIS OF BOTH CAROTID ARTERIES: ICD-10-CM

## 2020-08-04 DIAGNOSIS — F17.210 CIGARETTE NICOTINE DEPENDENCE WITHOUT COMPLICATION: ICD-10-CM

## 2020-08-04 DIAGNOSIS — F41.0 PANIC DISORDER: ICD-10-CM

## 2020-08-04 DIAGNOSIS — E55.9 VITAMIN D DEFICIENCY: ICD-10-CM

## 2020-08-04 DIAGNOSIS — I25.10 CORONARY ARTERY DISEASE INVOLVING NATIVE CORONARY ARTERY OF NATIVE HEART WITHOUT ANGINA PECTORIS: ICD-10-CM

## 2020-08-04 DIAGNOSIS — I10 ESSENTIAL HYPERTENSION: ICD-10-CM

## 2020-08-04 DIAGNOSIS — Z79.899 HIGH RISK MEDICATION USE: Primary | ICD-10-CM

## 2020-08-04 DIAGNOSIS — I77.9 PERIPHERAL ARTERIAL OCCLUSIVE DISEASE (HCC): ICD-10-CM

## 2020-08-04 PROCEDURE — 99213 OFFICE O/P EST LOW 20 MIN: CPT | Performed by: FAMILY MEDICINE

## 2020-08-05 RX ORDER — HYDROCHLOROTHIAZIDE 25 MG/1
25 TABLET ORAL DAILY
Qty: 90 TABLET | Refills: 1 | Status: SHIPPED | OUTPATIENT
Start: 2020-08-05 | End: 2021-01-12 | Stop reason: SDUPTHER

## 2020-08-05 RX ORDER — LEVOTHYROXINE SODIUM 0.07 MG/1
75 TABLET ORAL DAILY
Qty: 90 TABLET | Refills: 1 | Status: SHIPPED | OUTPATIENT
Start: 2020-08-05 | End: 2021-01-12 | Stop reason: SDUPTHER

## 2020-08-05 RX ORDER — LISINOPRIL 40 MG/1
40 TABLET ORAL DAILY
Qty: 90 TABLET | Refills: 1 | Status: SHIPPED | OUTPATIENT
Start: 2020-08-05 | End: 2020-12-21

## 2020-08-05 RX ORDER — CLOPIDOGREL BISULFATE 75 MG/1
75 TABLET ORAL DAILY
Qty: 90 TABLET | Refills: 1 | Status: SHIPPED | OUTPATIENT
Start: 2020-08-05 | End: 2021-01-12 | Stop reason: SDUPTHER

## 2020-08-05 RX ORDER — ATORVASTATIN CALCIUM 20 MG/1
20 TABLET, FILM COATED ORAL DAILY
Qty: 90 TABLET | Refills: 1 | Status: SHIPPED | OUTPATIENT
Start: 2020-08-05 | End: 2020-11-17 | Stop reason: SDUPTHER

## 2020-08-05 NOTE — PROGRESS NOTES
Subjective   Rosalee Hargrove is a 64 y.o. female with   Chief Complaint   Patient presents with   • Diabetes   .    History of Present Illness   64-year-old white female with multiple medical problems here for further medical management.  Patient with known history of essential hypertension, hyperlipidemia as well as CAD, carotid atherosclerosis as well as morbid exogenous obesity and vitamin D deficiency.  There is also history of hypothyroidism type II non-insulin-dependent diabetes mellitus.  Medications are multiple and are as listed.  She does use her medications on a regular basis and very have been well-tolerated without side effects.  She admits to a rather poor diet during coronavirus pandemic and does not exercise at all.  Fasting labs have been acquired prior to this visit.  She continues to smoke in the form of cigarettes and is currently not willing to quit.  The following portions of the patient's history were reviewed and updated as appropriate: allergies, current medications, past family history, past medical history, past social history, past surgical history and problem list.    Review of Systems   Constitutional:        Nicotine addiction, vitamin D deficiency, morbid exogenous obesity   Cardiovascular:        CAD, carotid atherosclerosis, hypertension, hyperlipidemia   Gastrointestinal:        GERD   Endocrine:        Hypothyroidism, type II non-insulin-dependent diabetes mellitus   Psychiatric/Behavioral: The patient is nervous/anxious.        Objective     Vitals:    08/04/20 0921   BP: 120/80   Pulse: 71   Temp: 97.8 °F (36.6 °C)   SpO2: 97%       Recent Results (from the past 672 hour(s))   Lipid panel    Collection Time: 07/28/20  9:38 AM   Result Value Ref Range    Total Cholesterol 199 0 - 200 mg/dL    Triglycerides 197 (H) 0 - 150 mg/dL    HDL Cholesterol 33 (L) 40 - 60 mg/dL    VLDL Cholesterol 39.4 mg/dL    LDL Cholesterol  127 (H) 0 - 100 mg/dL   Hemoglobin A1c    Collection Time:  07/28/20  9:38 AM   Result Value Ref Range    Hemoglobin A1C 7.30 (H) 4.80 - 5.60 %   Comprehensive metabolic panel    Collection Time: 07/28/20  9:38 AM   Result Value Ref Range    Glucose 114 (H) 65 - 99 mg/dL    BUN 21 8 - 23 mg/dL    Creatinine 0.88 0.57 - 1.00 mg/dL    eGFR Non African Am 64 >60 mL/min/1.73    eGFR African Am 78 >60 mL/min/1.73    BUN/Creatinine Ratio 23.9 7.0 - 25.0    Sodium 137 136 - 145 mmol/L    Potassium 4.6 3.5 - 5.2 mmol/L    Chloride 100 98 - 107 mmol/L    Total CO2 27.4 22.0 - 29.0 mmol/L    Calcium 8.9 8.6 - 10.5 mg/dL    Total Protein 6.5 6.0 - 8.5 g/dL    Albumin 4.20 3.50 - 5.20 g/dL    Globulin 2.3 gm/dL    A/G Ratio 1.8 g/dL    Total Bilirubin 0.3 0.0 - 1.2 mg/dL    Alkaline Phosphatase 103 39 - 117 U/L    AST (SGOT) 14 1 - 32 U/L    ALT (SGPT) 17 1 - 33 U/L   Vitamin D 25 hydroxy    Collection Time: 07/28/20  9:38 AM   Result Value Ref Range    25 Hydroxy, Vitamin D 27.7 (L) 30.0 - 100.0 ng/ml   TSH    Collection Time: 07/28/20  9:38 AM   Result Value Ref Range    TSH 1.710 0.270 - 4.200 uIU/mL   CBC w AUTO Differential    Collection Time: 07/28/20  9:38 AM   Result Value Ref Range    WBC 7.96 3.40 - 10.80 10*3/mm3    RBC 5.28 3.77 - 5.28 10*6/mm3    Hemoglobin 13.1 12.0 - 15.9 g/dL    Hematocrit 40.5 34.0 - 46.6 %    MCV 76.7 (L) 79.0 - 97.0 fL    MCH 24.8 (L) 26.6 - 33.0 pg    MCHC 32.3 31.5 - 35.7 g/dL    RDW 17.3 (H) 12.3 - 15.4 %    Platelets 282 140 - 450 10*3/mm3    Neutrophil Rel % 63.4 42.7 - 76.0 %    Lymphocyte Rel % 29.5 19.6 - 45.3 %    Monocyte Rel % 4.5 (L) 5.0 - 12.0 %    Eosinophil Rel % 1.6 0.3 - 6.2 %    Basophil Rel % 0.5 0.0 - 1.5 %    Neutrophils Absolute 5.04 1.70 - 7.00 10*3/mm3    Lymphocytes Absolute 2.35 0.70 - 3.10 10*3/mm3    Monocytes Absolute 0.36 0.10 - 0.90 10*3/mm3    Eosinophils Absolute 0.13 0.00 - 0.40 10*3/mm3    Basophils Absolute 0.04 0.00 - 0.20 10*3/mm3    Immature Granulocyte Rel % 0.5 0.0 - 0.5 %    Immature Grans Absolute 0.04 0.00 -  0.05 10*3/mm3    nRBC 0.0 0.0 - 0.2 /100 WBC       Physical Exam   Constitutional: She is oriented to person, place, and time. She appears well-developed and well-nourished.   Morbidly obese with a BMI of 44.3   HENT:   Head: Normocephalic and atraumatic.   Neck: Trachea normal and phonation normal. Neck supple. Normal carotid pulses present. Carotid bruit is not present. No thyroid mass and no thyromegaly present.   Cardiovascular: Normal rate, regular rhythm and normal heart sounds. Exam reveals no gallop and no friction rub.   No murmur heard.  Pulmonary/Chest: Effort normal and breath sounds normal. No respiratory distress. She has no decreased breath sounds. She has no wheezes. She has no rhonchi. She has no rales.   Lymphadenopathy:     She has no cervical adenopathy.   Neurological: She is alert and oriented to person, place, and time.   Skin: Skin is warm and dry. No rash noted.   Psychiatric: She has a normal mood and affect. Her speech is normal and behavior is normal. Judgment and thought content normal. Cognition and memory are normal.   Nursing note and vitals reviewed.      Assessment/Plan   Rosalee was seen today for diabetes.    Diagnoses and all orders for this visit:    High risk medication use  -     Compliance Drug Analysis, Ur - Urine, Clean Catch  -     Lipid panel; Future  -     Hemoglobin A1c; Future  -     Comprehensive metabolic panel; Future  -     TSH; Future  -     Vitamin D 25 hydroxy; Future  -     CBC w AUTO Differential; Future    Essential hypertension  -     lisinopril (PRINIVIL,ZESTRIL) 40 MG tablet; Take 1 tablet by mouth Daily.  -     hydroCHLOROthiazide (HYDRODIURIL) 25 MG tablet; Take 1 tablet by mouth Daily.  -     Lipid panel; Future  -     Hemoglobin A1c; Future  -     Comprehensive metabolic panel; Future  -     TSH; Future  -     Vitamin D 25 hydroxy; Future  -     CBC w AUTO Differential; Future    Mixed hyperlipidemia  -     atorvastatin (LIPITOR) 20 MG tablet; Take 1  tablet by mouth Daily.  -     Lipid panel; Future  -     Hemoglobin A1c; Future  -     Comprehensive metabolic panel; Future  -     TSH; Future  -     Vitamin D 25 hydroxy; Future  -     CBC w AUTO Differential; Future    Peripheral arterial occlusive disease (CMS/HCC)  -     clopidogrel (PLAVIX) 75 MG tablet; Take 1 tablet by mouth Daily.  -     Lipid panel; Future  -     Hemoglobin A1c; Future  -     Comprehensive metabolic panel; Future  -     TSH; Future  -     Vitamin D 25 hydroxy; Future  -     CBC w AUTO Differential; Future    Coronary artery disease involving native coronary artery of native heart without angina pectoris  -     clopidogrel (PLAVIX) 75 MG tablet; Take 1 tablet by mouth Daily.  -     Lipid panel; Future  -     Hemoglobin A1c; Future  -     Comprehensive metabolic panel; Future  -     TSH; Future  -     Vitamin D 25 hydroxy; Future  -     CBC w AUTO Differential; Future    Atherosclerosis of both carotid arteries  -     clopidogrel (PLAVIX) 75 MG tablet; Take 1 tablet by mouth Daily.  -     Lipid panel; Future  -     Hemoglobin A1c; Future  -     Comprehensive metabolic panel; Future  -     TSH; Future  -     Vitamin D 25 hydroxy; Future  -     CBC w AUTO Differential; Future    Vitamin D deficiency  -     Lipid panel; Future  -     Hemoglobin A1c; Future  -     Comprehensive metabolic panel; Future  -     TSH; Future  -     Vitamin D 25 hydroxy; Future  -     CBC w AUTO Differential; Future    Morbid exogenous obesity (CMS/[facility])  -     Lipid panel; Future  -     Hemoglobin A1c; Future  -     Comprehensive metabolic panel; Future  -     TSH; Future  -     Vitamin D 25 hydroxy; Future  -     CBC w AUTO Differential; Future    Gastroesophageal reflux disease without esophagitis  -     Lipid panel; Future  -     Hemoglobin A1c; Future  -     Comprehensive metabolic panel; Future  -     TSH; Future  -     Vitamin D 25 hydroxy; Future  -     CBC w AUTO Differential; Future    Type 2 diabetes  mellitus with retinopathy without macular edema, without long-term current use of insulin, unspecified laterality, unspecified retinopathy severity (CMS/HCC)  -     Lipid panel; Future  -     Hemoglobin A1c; Future  -     Comprehensive metabolic panel; Future  -     TSH; Future  -     Vitamin D 25 hydroxy; Future  -     CBC w AUTO Differential; Future    Acquired hypothyroidism  -     levothyroxine (SYNTHROID, LEVOTHROID) 75 MCG tablet; Take 1 tablet by mouth Daily.  -     Lipid panel; Future  -     Hemoglobin A1c; Future  -     Comprehensive metabolic panel; Future  -     TSH; Future  -     Vitamin D 25 hydroxy; Future  -     CBC w AUTO Differential; Future    Cigarette nicotine dependence without complication  -     Lipid panel; Future  -     Hemoglobin A1c; Future  -     Comprehensive metabolic panel; Future  -     TSH; Future  -     Vitamin D 25 hydroxy; Future  -     CBC w AUTO Differential; Future    Panic disorder  -     Lipid panel; Future  -     Hemoglobin A1c; Future  -     Comprehensive metabolic panel; Future  -     TSH; Future  -     Vitamin D 25 hydroxy; Future  -     CBC w AUTO Differential; Future    Controlled type 2 diabetes mellitus without complication, without long-term current use of insulin (CMS/[facility])  -     metFORMIN (GLUCOPHAGE) 1000 MG tablet; Take 1 tablet by mouth 2 (Two) Times a Day With Meals.  -     Lipid panel; Future  -     Hemoglobin A1c; Future  -     Comprehensive metabolic panel; Future  -     TSH; Future  -     Vitamin D 25 hydroxy; Future  -     CBC w AUTO Differential; Future    Patient must improve lifestyle including a more disciplined diet and increase activity.  Weight loss would be essential.  We will not change medication regimen but place the burden on her lifestyle change and recheck labs in mid November prior to the holidays with follow-up with me thereafter.    Return in about 3 months (around 11/4/2020) for Recheck.

## 2020-08-06 ENCOUNTER — TELEPHONE (OUTPATIENT)
Dept: FAMILY MEDICINE CLINIC | Facility: CLINIC | Age: 65
End: 2020-08-06

## 2020-08-06 RX ORDER — EMPAGLIFLOZIN 10 MG/1
10 TABLET, FILM COATED ORAL DAILY
Qty: 30 TABLET | Refills: 0 | COMMUNITY
Start: 2020-08-06 | End: 2021-06-29

## 2020-08-06 NOTE — TELEPHONE ENCOUNTER
Pt called to let you know she is taking Jardiance 10mg not the Farxiga.  I have updated her med list.  She has 5 bottles of 30 each at home currently

## 2020-08-07 LAB — DRUGS UR: NORMAL

## 2020-10-01 DIAGNOSIS — E11.9 CONTROLLED TYPE 2 DIABETES MELLITUS WITHOUT COMPLICATION, WITHOUT LONG-TERM CURRENT USE OF INSULIN (HCC): ICD-10-CM

## 2020-10-01 RX ORDER — GLIMEPIRIDE 2 MG/1
TABLET ORAL
Qty: 90 TABLET | Refills: 1 | Status: SHIPPED | OUTPATIENT
Start: 2020-10-01 | End: 2021-05-01 | Stop reason: SDUPTHER

## 2020-11-17 ENCOUNTER — OFFICE VISIT (OUTPATIENT)
Dept: CARDIOLOGY | Facility: CLINIC | Age: 65
End: 2020-11-17

## 2020-11-17 VITALS
BODY MASS INDEX: 44.76 KG/M2 | HEIGHT: 60 IN | SYSTOLIC BLOOD PRESSURE: 138 MMHG | WEIGHT: 228 LBS | HEART RATE: 73 BPM | DIASTOLIC BLOOD PRESSURE: 90 MMHG

## 2020-11-17 DIAGNOSIS — I48.0 PAROXYSMAL ATRIAL FIBRILLATION (HCC): Primary | ICD-10-CM

## 2020-11-17 DIAGNOSIS — E78.2 MIXED HYPERLIPIDEMIA: ICD-10-CM

## 2020-11-17 PROCEDURE — 93000 ELECTROCARDIOGRAM COMPLETE: CPT | Performed by: INTERNAL MEDICINE

## 2020-11-17 PROCEDURE — 99214 OFFICE O/P EST MOD 30 MIN: CPT | Performed by: INTERNAL MEDICINE

## 2020-11-17 RX ORDER — ATORVASTATIN CALCIUM 40 MG/1
40 TABLET, FILM COATED ORAL DAILY
Qty: 90 TABLET | Refills: 3 | Status: SHIPPED | OUTPATIENT
Start: 2020-11-17 | End: 2022-01-03 | Stop reason: SDUPTHER

## 2020-11-17 NOTE — PROGRESS NOTES
Subjective:     Encounter Date:11/17/2020      Patient ID: Rosalee Hargrove is a 65 y.o. female.    Chief Complaint: Nonobstructive CAD, peripheral vascular disease, A. fib  HPI:   This is a 65-year-old woman previously followed by Dr. Kapoor.  She presents today to establish care with me.  Several years of ago she underwent cardiac catheterization which showed mild, nonobstructive coronary disease throughout her coronary arteries.  She has a history of peripheral arterial disease and is followed by Dr. Coats.  She has a right iliac artery stent which on last evaluation earlier this year showed moderate in-stent restenosis.  She also has severe peripheral disease of the descending aorta with scattered ulcerations and plaque formation.    In 2017 she wore a cardiac event monitor due to complaints of presyncope.  She was noted to have an episode of atrial fibrillation lasting 2 minutes.  Despite her elevated chads Vascor she was not anticoagulated due to the short duration of A. Fib.    She has never had a stroke as far she knows.  She continues to feel presyncopal at times.  She denies palpitations, angina.  She is obese and has stable dyspnea.  She has atypical sharp stabbing chest pain in the substernal area, however this is stable over a number of years.    She continues to smoke 1/2 pack/day.  There is a strong family history of coronary disease.  Her younger brother was treated by my father.  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    The following portions of the patient's history were reviewed and updated as appropriate: allergies, current medications, past family history, past medical history, past social history, past surgical history and problem list.     REVIEW OF SYSTEMS:   All systems reviewed.  Pertinent positives identified in HPI.  All other systems are negative.    Past Medical History:   Diagnosis Date   • Allergic    • Anemia    • Angina pectoris (CMS/HCC)    • Anxiety    • Arthritis     • ASCVD (arteriosclerotic cardiovascular disease)    • Bilateral leg edema    • CAD (coronary artery disease)    • Chest pain    • Depression    • Diabetes mellitus (CMS/[facility])    • Disease of thyroid gland    • Dizziness    • Headache    • Hyperlipidemia    • Hypertension    • Morbid obesity (CMS/[facility])    • Orthostatic hypotension    • PAD (peripheral artery disease) (CMS/[facility])    • Shortness of breath    • Sleep apnea    • Tobacco abuse    • Vitamin D deficiency        Family History   Problem Relation Age of Onset   • Heart disease Mother    • Hypertension Mother    • Hyperlipidemia Mother    • Heart disease Father    • Heart attack Sister    • Heart disease Sister    • Heart disease Brother    • Heart attack Brother    • Hyperlipidemia Brother    • Diabetes Brother    • Heart attack Maternal Grandmother    • Heart disease Maternal Grandmother    • Heart failure Maternal Grandmother    • Heart failure Maternal Grandfather    • Heart disease Maternal Grandfather    • Heart attack Maternal Grandfather    • Heart attack Paternal Grandmother    • Heart disease Paternal Grandmother    • Heart failure Paternal Grandmother    • Hypertension Paternal Grandmother        Social History     Socioeconomic History   • Marital status:      Spouse name: Not on file   • Number of children: Not on file   • Years of education: Not on file   • Highest education level: Not on file   Tobacco Use   • Smoking status: Current Every Day Smoker     Packs/day: 0.50   • Smokeless tobacco: Never Used   Substance and Sexual Activity   • Alcohol use: Yes     Alcohol/week: 1.0 standard drinks     Types: 1 Glasses of wine per week     Comment: rare   • Drug use: No   • Sexual activity: Defer       Allergies   Allergen Reactions   • Ciprofloxacin Hives and Swelling       Past Surgical History:   Procedure Laterality Date   • ANAL FISSURECTOMY     • BRAIN SURGERY     • CARDIAC CATHETERIZATION N/A 9/23/2016    Procedure: Coronary angiography;   Surgeon: Fredrick Kapoor MD;  Location:  [facility] CATH INVASIVE LOCATION;  Service:    • CARDIAC CATHETERIZATION N/A 2016    Procedure: Left heart cath;  Surgeon: Fredrick Kapoor MD;  Location:  [facility];  Service:    • CARDIAC CATHETERIZATION N/A 2016    Procedure: Left ventriculography;  Surgeon: Fredrick Kapoor MD;  Location:  [facility];  Service:    • CARDIAC CATHETERIZATION N/A 2016    Procedure: Right heart cath;  Surgeon: Fredrick Kapoor MD;  [facility];  Service:    •  SECTION     • CHOLECYSTECTOMY     • ERCP     • FRACTURE SURGERY      arm   • HYSTERECTOMY     • ILIAC ARTERY STENT           ECG 12 Lead    Date/Time: 2020 12:44 PM  Performed by: Nohemi Mei MD  Authorized by: Nohemi eMi MD   Comparison: compared with previous ECG from 2019  Similar to previous ECG  Rhythm: sinus rhythm  Rate: normal  Conduction: conduction normal  ST Segments: ST segments normal  T Waves: T waves normal  QRS axis: normal  Other findings: low voltage    Clinical impression: normal ECG               Objective:         PHYSICAL EXAM:  GEN: VSS, no distress,   Eyes: normal sclera, normal lids and lashes  HENT: moist mucus membranes,   Respiratory: CTAB, no rales or wheezes  CV: RRR, no murmurs, , +2 DP and 2+ carotid pulses b/l  GI: NABS, soft,  Nontender, nondistended  MSK: no edema, no scoliosis or kyphosis  Skin: no rash, warm, dry  Heme/Lymph: no bruising or bleeding  Psych: organized thought, normal behavior and affect  Neuro: Cranial nerves grossly intact, Alert and Oriented x 3.         Assessment:          Diagnosis Plan   1. Paroxysmal atrial fibrillation (CMS/HCC)  Cardiac Event Monitor   2. Mixed hyperlipidemia  atorvastatin (LIPITOR) 40 MG tablet          Plan:       1.  Paroxysmal atrial fibrillation: Noted on a cardiac event monitor 3 years ago.  She had one episode 2 minutes in duration has not been anticoagulated.  Her  CHADS2-VASc score is 4.  We will repeat her event monitor today.  If she continues to have A. fib I would strongly suggest anticoagulating her given her numerous comorbid conditions.  2.  Hyperlipidemia: Her LDL is greater than 100.  Goal is less than 70 due to her severe peripheral vascular disease.  I will increase her atorvastatin from 20 mg to 40 mg.  She has had myalgias on high doses of Crestor in the past.  3.  Coronary artery disease, nonobstructive: No real angina.  Continue to monitor.  4.  Peripheral arterial disease: She has evidence of aortic ulceration on the CT angiogram 2015.  We discussed the importance of tobacco cessation, blood pressure control, aggressive lipid reduction.  He follows with Dr. Coats who implanted a right iliac stent.  Continue aspirin and Plavix for now.  5.  Hypertension: Well-controlled on lisinopril 40 and HCTZ.  6.  Diabetes: On Jardiance.    Dr. Slater, thank you very much for referring this kind patient to me. Please call me with any questions or concerns. I will see the patient again in the office in 6 months.       Nohemi Mei MD  11/17/20  [facility]    Outpatient Encounter Medications as of 11/17/2020   Medication Sig Dispense Refill   • ALPRAZolam (XANAX) 0.25 MG tablet Take 1 tablet by mouth Daily. 90 tablet 1   • aspirin 81 MG chewable tablet Chew 81 mg daily.     • atorvastatin (LIPITOR) 20 MG tablet Take 1 tablet by mouth Daily. 90 tablet 1   • Calcium Carb-Cholecalciferol (CALCIUM+D3) 600-800 MG-UNIT tablet Take 1 tablet by mouth daily.     • cholecalciferol (VITAMIN D3) 1000 UNITS tablet Take 1,000 Units by mouth daily.     • citalopram (CeleXA) 20 MG tablet TAKE 1 TABLET DAILY 90 tablet 0   • clopidogrel (PLAVIX) 75 MG tablet Take 1 tablet by mouth Daily. 90 tablet 1   • glimepiride (AMARYL) 2 MG tablet TAKE 1 TABLET BY MOUTH EVERY DAY BEFORE BREAKFAST 90 tablet 1   • hydroCHLOROthiazide (HYDRODIURIL) 25 MG tablet Take 1 tablet by mouth Daily.  90 tablet 1   • JARDIANCE 10 MG tablet Take 10 mg by mouth Daily. 30 tablet 0   • levothyroxine (SYNTHROID, LEVOTHROID) 75 MCG tablet Take 1 tablet by mouth Daily. 90 tablet 1   • lisinopril (PRINIVIL,ZESTRIL) 40 MG tablet Take 1 tablet by mouth Daily. 90 tablet 1   • metFORMIN (GLUCOPHAGE) 1000 MG tablet Take 1 tablet by mouth 2 (Two) Times a Day With Meals. 180 tablet 1   • Multiple Vitamins-Minerals (EYE VITAMINS PO) Take 1 tablet by mouth Daily.     • pantoprazole (PROTONIX) 40 MG EC tablet TAKE 1 TABLET BY MOUTH EVERY DAY 90 tablet 1   • Pediatric Multivitamins-Iron (FLINTSTONES PLUS IRON PO) Take 1 tablet by mouth daily.       No facility-administered encounter medications on file as of 11/17/2020.

## 2020-11-30 ENCOUNTER — RESULTS ENCOUNTER (OUTPATIENT)
Dept: FAMILY MEDICINE CLINIC | Facility: CLINIC | Age: 65
End: 2020-11-30

## 2020-11-30 DIAGNOSIS — F17.210 CIGARETTE NICOTINE DEPENDENCE WITHOUT COMPLICATION: ICD-10-CM

## 2020-11-30 DIAGNOSIS — E66.01 MORBID EXOGENOUS OBESITY (HCC): ICD-10-CM

## 2020-11-30 DIAGNOSIS — K21.9 GASTROESOPHAGEAL REFLUX DISEASE WITHOUT ESOPHAGITIS: ICD-10-CM

## 2020-11-30 DIAGNOSIS — E78.2 MIXED HYPERLIPIDEMIA: ICD-10-CM

## 2020-11-30 DIAGNOSIS — I25.10 CORONARY ARTERY DISEASE INVOLVING NATIVE CORONARY ARTERY OF NATIVE HEART WITHOUT ANGINA PECTORIS: ICD-10-CM

## 2020-11-30 DIAGNOSIS — F41.0 PANIC DISORDER: ICD-10-CM

## 2020-11-30 DIAGNOSIS — E03.9 ACQUIRED HYPOTHYROIDISM: ICD-10-CM

## 2020-11-30 DIAGNOSIS — E11.9 CONTROLLED TYPE 2 DIABETES MELLITUS WITHOUT COMPLICATION, WITHOUT LONG-TERM CURRENT USE OF INSULIN (HCC): ICD-10-CM

## 2020-11-30 DIAGNOSIS — Z79.899 HIGH RISK MEDICATION USE: ICD-10-CM

## 2020-11-30 DIAGNOSIS — I10 ESSENTIAL HYPERTENSION: ICD-10-CM

## 2020-11-30 DIAGNOSIS — I77.9 PERIPHERAL ARTERIAL OCCLUSIVE DISEASE (HCC): ICD-10-CM

## 2020-11-30 DIAGNOSIS — E11.319 TYPE 2 DIABETES MELLITUS WITH RETINOPATHY WITHOUT MACULAR EDEMA, WITHOUT LONG-TERM CURRENT USE OF INSULIN, UNSPECIFIED LATERALITY, UNSPECIFIED RETINOPATHY SEVERITY (HCC): ICD-10-CM

## 2020-11-30 DIAGNOSIS — E55.9 VITAMIN D DEFICIENCY: ICD-10-CM

## 2020-11-30 DIAGNOSIS — I65.23 ATHEROSCLEROSIS OF BOTH CAROTID ARTERIES: ICD-10-CM

## 2020-12-19 DIAGNOSIS — I10 ESSENTIAL HYPERTENSION: ICD-10-CM

## 2020-12-19 DIAGNOSIS — K21.9 GASTROESOPHAGEAL REFLUX DISEASE WITHOUT ESOPHAGITIS: ICD-10-CM

## 2020-12-19 DIAGNOSIS — E78.2 MIXED HYPERLIPIDEMIA: ICD-10-CM

## 2020-12-21 RX ORDER — PANTOPRAZOLE SODIUM 40 MG/1
TABLET, DELAYED RELEASE ORAL
Qty: 90 TABLET | Refills: 1 | Status: SHIPPED | OUTPATIENT
Start: 2020-12-21 | End: 2021-06-18

## 2020-12-21 RX ORDER — LISINOPRIL 40 MG/1
TABLET ORAL
Qty: 90 TABLET | Refills: 1 | Status: SHIPPED | OUTPATIENT
Start: 2020-12-21 | End: 2021-04-29 | Stop reason: SDUPTHER

## 2020-12-21 RX ORDER — ATORVASTATIN CALCIUM 20 MG/1
TABLET, FILM COATED ORAL
Qty: 90 TABLET | Refills: 1 | Status: SHIPPED | OUTPATIENT
Start: 2020-12-21 | End: 2021-01-12 | Stop reason: DRUGHIGH

## 2020-12-29 ENCOUNTER — TELEPHONE (OUTPATIENT)
Dept: CARDIOLOGY | Facility: CLINIC | Age: 65
End: 2020-12-29

## 2020-12-29 NOTE — TELEPHONE ENCOUNTER
Pt called requesting results for event, she was notified that results are not in as of yet and we will call when they are.

## 2020-12-30 LAB
25(OH)D3+25(OH)D2 SERPL-MCNC: 27.9 NG/ML (ref 30–100)
ALBUMIN SERPL-MCNC: 4 G/DL (ref 3.5–5.2)
ALBUMIN/GLOB SERPL: 1.4 G/DL
ALP SERPL-CCNC: 124 U/L (ref 39–117)
ALT SERPL-CCNC: 19 U/L (ref 1–33)
AST SERPL-CCNC: 13 U/L (ref 1–32)
BASOPHILS # BLD AUTO: 0.03 10*3/MM3 (ref 0–0.2)
BASOPHILS NFR BLD AUTO: 0.3 % (ref 0–1.5)
BILIRUB SERPL-MCNC: 0.2 MG/DL (ref 0–1.2)
BUN SERPL-MCNC: 27 MG/DL (ref 8–23)
BUN/CREAT SERPL: 23.3 (ref 7–25)
CALCIUM SERPL-MCNC: 8.8 MG/DL (ref 8.6–10.5)
CHLORIDE SERPL-SCNC: 104 MMOL/L (ref 98–107)
CHOLEST SERPL-MCNC: 141 MG/DL (ref 0–200)
CO2 SERPL-SCNC: 31.1 MMOL/L (ref 22–29)
CREAT SERPL-MCNC: 1.16 MG/DL (ref 0.57–1)
EOSINOPHIL # BLD AUTO: 0.22 10*3/MM3 (ref 0–0.4)
EOSINOPHIL NFR BLD AUTO: 2.3 % (ref 0.3–6.2)
ERYTHROCYTE [DISTWIDTH] IN BLOOD BY AUTOMATED COUNT: 17 % (ref 12.3–15.4)
GLOBULIN SER CALC-MCNC: 2.8 GM/DL
GLUCOSE SERPL-MCNC: 94 MG/DL (ref 65–99)
HBA1C MFR BLD: 6.7 % (ref 4.8–5.6)
HCT VFR BLD AUTO: 40.6 % (ref 34–46.6)
HDLC SERPL-MCNC: 33 MG/DL (ref 40–60)
HGB BLD-MCNC: 12.8 G/DL (ref 12–15.9)
IMM GRANULOCYTES # BLD AUTO: 0.03 10*3/MM3 (ref 0–0.05)
IMM GRANULOCYTES NFR BLD AUTO: 0.3 % (ref 0–0.5)
LDLC SERPL CALC-MCNC: 73 MG/DL (ref 0–100)
LYMPHOCYTES # BLD AUTO: 2.55 10*3/MM3 (ref 0.7–3.1)
LYMPHOCYTES NFR BLD AUTO: 27.2 % (ref 19.6–45.3)
MCH RBC QN AUTO: 25 PG (ref 26.6–33)
MCHC RBC AUTO-ENTMCNC: 31.5 G/DL (ref 31.5–35.7)
MCV RBC AUTO: 79.1 FL (ref 79–97)
MONOCYTES # BLD AUTO: 0.58 10*3/MM3 (ref 0.1–0.9)
MONOCYTES NFR BLD AUTO: 6.2 % (ref 5–12)
NEUTROPHILS # BLD AUTO: 5.98 10*3/MM3 (ref 1.7–7)
NEUTROPHILS NFR BLD AUTO: 63.7 % (ref 42.7–76)
NRBC BLD AUTO-RTO: 0 /100 WBC (ref 0–0.2)
PLATELET # BLD AUTO: 294 10*3/MM3 (ref 140–450)
POTASSIUM SERPL-SCNC: 4.9 MMOL/L (ref 3.5–5.2)
PROT SERPL-MCNC: 6.8 G/DL (ref 6–8.5)
RBC # BLD AUTO: 5.13 10*6/MM3 (ref 3.77–5.28)
SODIUM SERPL-SCNC: 145 MMOL/L (ref 136–145)
TRIGL SERPL-MCNC: 211 MG/DL (ref 0–150)
TSH SERPL DL<=0.005 MIU/L-ACNC: 2.08 UIU/ML (ref 0.27–4.2)
VLDLC SERPL CALC-MCNC: 35 MG/DL (ref 5–40)
WBC # BLD AUTO: 9.39 10*3/MM3 (ref 3.4–10.8)

## 2021-01-04 ENCOUNTER — TELEPHONE (OUTPATIENT)
Dept: CARDIOLOGY | Facility: CLINIC | Age: 66
End: 2021-01-04

## 2021-01-04 NOTE — TELEPHONE ENCOUNTER
----- Message from Nohemi Mei MD sent at 1/4/2021 12:23 PM EST -----  Please call patient with their normal test results. she had no AF

## 2021-01-12 ENCOUNTER — TELEMEDICINE (OUTPATIENT)
Dept: FAMILY MEDICINE CLINIC | Facility: CLINIC | Age: 66
End: 2021-01-12

## 2021-01-12 DIAGNOSIS — F41.0 PANIC DISORDER: ICD-10-CM

## 2021-01-12 DIAGNOSIS — E66.01 MORBID EXOGENOUS OBESITY (HCC): ICD-10-CM

## 2021-01-12 DIAGNOSIS — I10 ESSENTIAL HYPERTENSION: Primary | ICD-10-CM

## 2021-01-12 DIAGNOSIS — I25.10 CORONARY ARTERY DISEASE INVOLVING NATIVE CORONARY ARTERY OF NATIVE HEART WITHOUT ANGINA PECTORIS: ICD-10-CM

## 2021-01-12 DIAGNOSIS — F41.9 ANXIETY: ICD-10-CM

## 2021-01-12 DIAGNOSIS — E55.9 VITAMIN D DEFICIENCY: ICD-10-CM

## 2021-01-12 DIAGNOSIS — I77.9 PERIPHERAL ARTERIAL OCCLUSIVE DISEASE (HCC): ICD-10-CM

## 2021-01-12 DIAGNOSIS — E11.9 CONTROLLED TYPE 2 DIABETES MELLITUS WITHOUT COMPLICATION, WITHOUT LONG-TERM CURRENT USE OF INSULIN (HCC): ICD-10-CM

## 2021-01-12 DIAGNOSIS — I67.5 MOYAMOYA DISEASE: ICD-10-CM

## 2021-01-12 DIAGNOSIS — E78.2 MIXED HYPERLIPIDEMIA: ICD-10-CM

## 2021-01-12 DIAGNOSIS — K21.9 GASTROESOPHAGEAL REFLUX DISEASE WITHOUT ESOPHAGITIS: ICD-10-CM

## 2021-01-12 DIAGNOSIS — F17.210 CIGARETTE NICOTINE DEPENDENCE WITHOUT COMPLICATION: ICD-10-CM

## 2021-01-12 DIAGNOSIS — E03.9 ACQUIRED HYPOTHYROIDISM: ICD-10-CM

## 2021-01-12 DIAGNOSIS — I65.23 ATHEROSCLEROSIS OF BOTH CAROTID ARTERIES: ICD-10-CM

## 2021-01-12 DIAGNOSIS — E11.319 TYPE 2 DIABETES MELLITUS WITH RETINOPATHY WITHOUT MACULAR EDEMA, WITHOUT LONG-TERM CURRENT USE OF INSULIN, UNSPECIFIED LATERALITY, UNSPECIFIED RETINOPATHY SEVERITY (HCC): ICD-10-CM

## 2021-01-12 PROCEDURE — 99213 OFFICE O/P EST LOW 20 MIN: CPT | Performed by: FAMILY MEDICINE

## 2021-01-12 RX ORDER — LEVOTHYROXINE SODIUM 0.07 MG/1
75 TABLET ORAL DAILY
Qty: 90 TABLET | Refills: 1 | Status: SHIPPED | OUTPATIENT
Start: 2021-01-12 | End: 2021-06-29 | Stop reason: SDUPTHER

## 2021-01-12 RX ORDER — CITALOPRAM 20 MG/1
20 TABLET ORAL DAILY
Qty: 90 TABLET | Refills: 1 | Status: SHIPPED | OUTPATIENT
Start: 2021-01-12 | End: 2021-06-29 | Stop reason: SDUPTHER

## 2021-01-12 RX ORDER — HYDROCHLOROTHIAZIDE 25 MG/1
25 TABLET ORAL DAILY
Qty: 90 TABLET | Refills: 1 | Status: SHIPPED | OUTPATIENT
Start: 2021-01-12 | End: 2021-06-29 | Stop reason: SDUPTHER

## 2021-01-12 RX ORDER — CLOPIDOGREL BISULFATE 75 MG/1
75 TABLET ORAL DAILY
Qty: 90 TABLET | Refills: 1 | Status: SHIPPED | OUTPATIENT
Start: 2021-01-12 | End: 2021-06-17 | Stop reason: DRUGHIGH

## 2021-01-12 NOTE — PROGRESS NOTES
Subjective   Rosalee Hargrove is a 65 y.o. female with No chief complaint on file.  .    History of Present Illness   65-year-old white female with multiple medical issues here in a consented video visit for further medical management of the above.  Patient with coronary artery disease, hypertension, hyperlipidemia as well as type II non-insulin-dependent diabetes mellitus.  She is addicted to nicotine in the form of cigarettes and continues to smoke in spite of warnings from her cardiologist.  She has recently worn a 30-day monitor with no evidence of arrhythmia.  She has tried to discipline her diet over the last several months in spite of Donald and fasting labs were performed at the end of December 2020.  Overall she feels that she is doing well and is without acute complaint.  She does state that she has had some upper thoracic pain and she has been using leftover 800 mg ibuprofen per day.  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  The following portions of the patient's history were reviewed and updated as appropriate: allergies, current medications, past family history, past medical history, past social history, past surgical history and problem list.    Review of Systems   Constitutional:        Nicotine addiction, morbidly obese, vitamin D deficiency   Cardiovascular:        CAD, hypertension, hyperlipidemia   Musculoskeletal: Positive for arthralgias and back pain.       Objective     There were no vitals filed for this visit.    Recent Results (from the past 672 hour(s))   Lipid panel    Collection Time: 12/29/20  9:02 AM    Specimen: Blood   Result Value Ref Range    Total Cholesterol 141 0 - 200 mg/dL    Triglycerides 211 (H) 0 - 150 mg/dL    HDL Cholesterol 33 (L) 40 - 60 mg/dL    VLDL Cholesterol [place] 35 5 - 40 mg/dL    LDL Chol Calc (NIH) 73 0 - 100 mg/dL   Hemoglobin A1c     Collection Time: 12/29/20  9:02 AM    Specimen: Blood   Result Value Ref Range    Hemoglobin A1C 6.70 (H) 4.80 - 5.60 %   Comprehensive metabolic panel    Collection Time: 12/29/20  9:02 AM    Specimen: Blood   Result Value Ref Range    Glucose 94 65 - 99 mg/dL    BUN 27 (H) 8 - 23 mg/dL    Creatinine 1.16 (H) 0.57 - 1.00 mg/dL    eGFR Non African Am 47 (L) >60 mL/min/1.73    eGFR African Am 56 (L) >60 mL/min/1.73    BUN/Creatinine Ratio 23.3 7.0 - 25.0    Sodium 145 136 - 145 mmol/L    Potassium 4.9 3.5 - 5.2 mmol/L    Chloride 104 98 - 107 mmol/L    Total CO2 31.1 (H) 22.0 - 29.0 mmol/L    Calcium 8.8 8.6 - 10.5 mg/dL    Total Protein 6.8 6.0 - 8.5 g/dL    Albumin 4.00 3.50 - 5.20 g/dL    Globulin 2.8 gm/dL    A/G Ratio 1.4 g/dL    Total Bilirubin 0.2 0.0 - 1.2 mg/dL    Alkaline Phosphatase 124 (H) 39 - 117 U/L    AST (SGOT) 13 1 - 32 U/L    ALT (SGPT) 19 1 - 33 U/L   TSH    Collection Time: 12/29/20  9:02 AM    Specimen: Blood   Result Value Ref Range    TSH 2.080 0.270 - 4.200 uIU/mL   Vitamin D 25 hydroxy    Collection Time: 12/29/20  9:02 AM    Specimen: Blood   Result Value Ref Range    25 Hydroxy, Vitamin D 27.9 (L) 30.0 - 100.0 ng/ml   CBC w AUTO Differential    Collection Time: 12/29/20  9:02 AM    Specimen: Blood   Result Value Ref Range    WBC 9.39 3.40 - 10.80 10*3/mm3    RBC 5.13 3.77 - 5.28 10*6/mm3    Hemoglobin 12.8 12.0 - 15.9 g/dL    Hematocrit 40.6 34.0 - 46.6 %    MCV 79.1 79.0 - 97.0 fL    MCH 25.0 (L) 26.6 - 33.0 pg    MCHC 31.5 31.5 - 35.7 g/dL    RDW 17.0 (H) 12.3 - 15.4 %    Platelets 294 140 - 450 10*3/mm3    Neutrophil Rel % 63.7 42.7 - 76.0 %    Lymphocyte Rel % 27.2 19.6 - 45.3 %    Monocyte Rel % 6.2 5.0 - 12.0 %    Eosinophil Rel % 2.3 0.3 - 6.2 %    Basophil Rel % 0.3 0.0 - 1.5 %    Neutrophils Absolute 5.98 1.70 - 7.00 10*3/mm3    Lymphocytes Absolute 2.55 0.70 - 3.10 10*3/mm3    Monocytes Absolute 0.58 0.10 - 0.90 10*3/mm3    Eosinophils Absolute 0.22 0.00 - 0.40 10*3/mm3     Basophils Absolute 0.03 0.00 - 0.20 10*3/mm3    Immature Granulocyte Rel % 0.3 0.0 - 0.5 %    Immature Grans Absolute 0.03 0.00 - 0.05 10*3/mm3    nRBC 0.0 0.0 - 0.2 /100 WBC       Physical Exam  Constitutional:       Appearance: Normal appearance. She is well-developed and well-groomed. She is morbidly obese.   HENT:      Head: Normocephalic and atraumatic.   Neck:      Musculoskeletal: Neck supple.   Neurological:      Mental Status: She is alert and oriented to person, place, and time.   Psychiatric:         Attention and Perception: Attention and perception normal.         Mood and Affect: Mood and affect normal.         Speech: Speech normal.         Behavior: Behavior normal. Behavior is cooperative.         Thought Content: Thought content normal.         Cognition and Memory: Cognition and memory normal.         Judgment: Judgment normal.     Further medical evaluation is not possible given this mode of visit.    Assessment/Plan   Diagnoses and all orders for this visit:    1. Essential hypertension (Primary)  -     hydroCHLOROthiazide (HYDRODIURIL) 25 MG tablet; Take 1 tablet by mouth Daily.  Dispense: 90 tablet; Refill: 1  -     Lipid panel; Future  -     Hemoglobin A1c; Future  -     Comprehensive metabolic panel; Future  -     TSH; Future  -     Vitamin D 25 hydroxy; Future  -     CBC w AUTO Differential; Future    2. Mixed hyperlipidemia  -     Lipid panel; Future  -     Hemoglobin A1c; Future  -     Comprehensive metabolic panel; Future  -     TSH; Future  -     Vitamin D 25 hydroxy; Future  -     CBC w AUTO Differential; Future    3. Coronary artery disease involving native coronary artery of native heart without angina pectoris  -     clopidogrel (PLAVIX) 75 MG tablet; Take 1 tablet by mouth Daily.  Dispense: 90 tablet; Refill: 1  -     Lipid panel; Future  -     Hemoglobin A1c; Future  -     Comprehensive metabolic panel; Future  -     TSH; Future  -     Vitamin D 25 hydroxy; Future  -     CBC w  AUTO Differential; Future    4. Atherosclerosis of both carotid arteries  -     clopidogrel (PLAVIX) 75 MG tablet; Take 1 tablet by mouth Daily.  Dispense: 90 tablet; Refill: 1  -     Lipid panel; Future  -     Hemoglobin A1c; Future  -     Comprehensive metabolic panel; Future  -     TSH; Future  -     Vitamin D 25 hydroxy; Future  -     CBC w AUTO Differential; Future    5. Type 2 diabetes mellitus with retinopathy without macular edema, without long-term current use of insulin, unspecified laterality, unspecified retinopathy severity (CMS/[facility])  -     Lipid panel; Future  -     Hemoglobin A1c; Future  -     Comprehensive metabolic panel; Future  -     TSH; Future  -     Vitamin D 25 hydroxy; Future  -     CBC w AUTO Differential; Future    6. Acquired hypothyroidism  -     levothyroxine (SYNTHROID, LEVOTHROID) 75 MCG tablet; Take 1 tablet by mouth Daily.  Dispense: 90 tablet; Refill: 1  -     Lipid panel; Future  -     Hemoglobin A1c; Future  -     Comprehensive metabolic panel; Future  -     TSH; Future  -     Vitamin D 25 hydroxy; Future  -     CBC w AUTO Differential; Future    7. Vitamin D deficiency  -     Lipid panel; Future  -     Hemoglobin A1c; Future  -     Comprehensive metabolic panel; Future  -     TSH; Future  -     Vitamin D 25 hydroxy; Future  -     CBC w AUTO Differential; Future    8. Morbid exogenous obesity (CMS/[facility])  -     Lipid panel; Future  -     Hemoglobin A1c; Future  -     Comprehensive metabolic panel; Future  -     TSH; Future  -     Vitamin D 25 hydroxy; Future  -     CBC w AUTO Differential; Future    9. Gastroesophageal reflux disease without esophagitis  -     Lipid panel; Future  -     Hemoglobin A1c; Future  -     Comprehensive metabolic panel; Future  -     TSH; Future  -     Vitamin D 25 hydroxy; Future  -     CBC w AUTO Differential; Future    10. [place] disease  -     Lipid panel; Future  -     Hemoglobin A1c; Future  -     Comprehensive metabolic panel; Future  -     TSH;  Future  -     Vitamin D 25 hydroxy; Future  -     CBC w AUTO Differential; Future    11. Cigarette nicotine dependence without complication  -     Lipid panel; Future  -     Hemoglobin A1c; Future  -     Comprehensive metabolic panel; Future  -     TSH; Future  -     Vitamin D 25 hydroxy; Future  -     CBC w AUTO Differential; Future    12. Panic disorder  -     citalopram (CeleXA) 20 MG tablet; Take 1 tablet by mouth Daily.  Dispense: 90 tablet; Refill: 1  -     Lipid panel; Future  -     Hemoglobin A1c; Future  -     Comprehensive metabolic panel; Future  -     TSH; Future  -     Vitamin D 25 hydroxy; Future  -     CBC w AUTO Differential; Future    13. Anxiety  -     citalopram (CeleXA) 20 MG tablet; Take 1 tablet by mouth Daily.  Dispense: 90 tablet; Refill: 1  -     Lipid panel; Future  -     Hemoglobin A1c; Future  -     Comprehensive metabolic panel; Future  -     TSH; Future  -     Vitamin D 25 hydroxy; Future  -     CBC w AUTO Differential; Future    14. Controlled type 2 diabetes mellitus without complication, without long-term current use of insulin (CMS/[facility])  -     metFORMIN (GLUCOPHAGE) 1000 MG tablet; Take 1 tablet by mouth 2 (Two) Times a Day With Meals.  Dispense: 180 tablet; Refill: 1  -     Lipid panel; Future  -     Hemoglobin A1c; Future  -     Comprehensive metabolic panel; Future  -     TSH; Future  -     Vitamin D 25 hydroxy; Future  -     CBC w AUTO Differential; Future    15. Peripheral arterial occlusive disease (CMS/[facility])  -     clopidogrel (PLAVIX) 75 MG tablet; Take 1 tablet by mouth Daily.  Dispense: 90 tablet; Refill: 1  -     Lipid panel; Future  -     Hemoglobin A1c; Future  -     Comprehensive metabolic panel; Future  -     TSH; Future  -     Vitamin D 25 hydroxy; Future  -     CBC w AUTO Differential; Future      Consented video visit required 15 minutes for completion.  Return in about 6 months (around 7/12/2021) for Recheck.

## 2021-03-02 ENCOUNTER — OFFICE VISIT (OUTPATIENT)
Dept: FAMILY MEDICINE CLINIC | Facility: CLINIC | Age: 66
End: 2021-03-02

## 2021-03-02 VITALS
TEMPERATURE: 97.1 F | DIASTOLIC BLOOD PRESSURE: 66 MMHG | OXYGEN SATURATION: 96 % | WEIGHT: 222 LBS | BODY MASS INDEX: 43.59 KG/M2 | SYSTOLIC BLOOD PRESSURE: 122 MMHG | RESPIRATION RATE: 18 BRPM | HEART RATE: 80 BPM | HEIGHT: 60 IN

## 2021-03-02 DIAGNOSIS — H66.92 LEFT OTITIS MEDIA, UNSPECIFIED OTITIS MEDIA TYPE: Primary | ICD-10-CM

## 2021-03-02 PROCEDURE — 99213 OFFICE O/P EST LOW 20 MIN: CPT | Performed by: NURSE PRACTITIONER

## 2021-03-02 RX ORDER — METRONIDAZOLE 7.5 MG/G
GEL TOPICAL 2 TIMES DAILY
Qty: 45 G | Refills: 0 | Status: SHIPPED | OUTPATIENT
Start: 2021-03-02 | End: 2021-05-18

## 2021-03-02 RX ORDER — FLUTICASONE PROPIONATE 50 MCG
2 SPRAY, SUSPENSION (ML) NASAL DAILY
COMMUNITY
Start: 2021-03-02

## 2021-03-02 RX ORDER — AMOXICILLIN 500 MG/1
1000 CAPSULE ORAL 3 TIMES DAILY
Qty: 42 CAPSULE | Refills: 0 | Status: SHIPPED | OUTPATIENT
Start: 2021-03-02 | End: 2021-03-09

## 2021-03-02 NOTE — PROGRESS NOTES
"Subjective      Chief Complaint   Patient presents with   • Earache       Rosalee Hargrove is a 65 y.o. female who presents with ear pain and possible ear infection. Symptoms include: bilateral ear pain. Onset of symptoms was several weeks ago, and have been gradually worsening since that time. Left ear worse than right.  Notices white drainage from ear.  Associated symptoms include: ears itching.  Patient denies: achiness, chills, congestion, post nasal drip, productive cough and sore throat. She is drinking moderate amounts of fluids.  Home remedies: OTC:  Cortisone cream behind ear due to drainage causing irritation to back of ear.     The following portions of the patient's history were reviewed and updated as appropriate: allergies, current medications, past family history, past medical history, past social history, past surgical history and problem list.      Objective   /66 (BP Location: Left arm, Patient Position: Sitting, Cuff Size: Large Adult)   Pulse 80   Temp 97.1 °F (36.2 °C)   Resp 18   Ht 152.4 cm (60\")   Wt 101 kg (222 lb)   SpO2 96%   BMI 43.36 kg/m²   General:  alert, appears stated age and cooperative   Right Ear: Normal TM.  No redness or drainage.     Left Ear: red, with serous discharge and erythema rash behind left ear   Mouth:  lips, mucosa, and tongue normal; teeth and gums normal   Neck: Enlarged left cervical lymph node.       Assessment/Plan   Left acute otitis media    Treatment: Amoxicillin.  MetroGel bid to erythema behind left ear.  OTC analgesia as needed.  Fluids, rest, avoid carbonated/alcoholic and caffeinated beverages.   Follow up in 1 week if not improving.    Patient was wearing face mask when I entered the room and throughout our encounter. Protective equipment was worn throughout this patient encounter including a face mask, eye protection, and gloves. Hand hygiene was performed before donning protective equipment and after removal when leaving the room. "       Lucy Lopez, APRN

## 2021-04-16 ENCOUNTER — TELEPHONE (OUTPATIENT)
Dept: FAMILY MEDICINE CLINIC | Facility: CLINIC | Age: 66
End: 2021-04-16

## 2021-04-21 ENCOUNTER — TELEPHONE (OUTPATIENT)
Dept: FAMILY MEDICINE CLINIC | Facility: CLINIC | Age: 66
End: 2021-04-21

## 2021-04-21 NOTE — TELEPHONE ENCOUNTER
Caller: Rosalee Hargrove    Relationship to patient: Self    Best call back number: [phone]    New or established patient?  [] New  [x] Established    Date of discharge: 04/18/2021    Facility discharged from: [facility]    Diagnosis/Symptoms: STROKE    Length of stay (If applicable): 2 DAYS

## 2021-04-29 ENCOUNTER — OFFICE VISIT (OUTPATIENT)
Dept: FAMILY MEDICINE CLINIC | Facility: CLINIC | Age: 66
End: 2021-04-29

## 2021-04-29 VITALS
HEART RATE: 85 BPM | TEMPERATURE: 97.1 F | HEIGHT: 60 IN | BODY MASS INDEX: 42.84 KG/M2 | DIASTOLIC BLOOD PRESSURE: 62 MMHG | WEIGHT: 218.2 LBS | SYSTOLIC BLOOD PRESSURE: 118 MMHG | OXYGEN SATURATION: 94 %

## 2021-04-29 DIAGNOSIS — E11.319 TYPE 2 DIABETES MELLITUS WITH RETINOPATHY WITHOUT MACULAR EDEMA, WITHOUT LONG-TERM CURRENT USE OF INSULIN, UNSPECIFIED LATERALITY, UNSPECIFIED RETINOPATHY SEVERITY (HCC): ICD-10-CM

## 2021-04-29 DIAGNOSIS — K21.9 GASTROESOPHAGEAL REFLUX DISEASE WITHOUT ESOPHAGITIS: ICD-10-CM

## 2021-04-29 DIAGNOSIS — E11.9 CONTROLLED TYPE 2 DIABETES MELLITUS WITHOUT COMPLICATION, WITHOUT LONG-TERM CURRENT USE OF INSULIN (HCC): ICD-10-CM

## 2021-04-29 DIAGNOSIS — E66.01 MORBID EXOGENOUS OBESITY (HCC): ICD-10-CM

## 2021-04-29 DIAGNOSIS — I70.0 ATHEROSCLEROSIS OF AORTA (HCC): ICD-10-CM

## 2021-04-29 DIAGNOSIS — I77.9 PERIPHERAL ARTERIAL OCCLUSIVE DISEASE (HCC): ICD-10-CM

## 2021-04-29 DIAGNOSIS — E55.9 VITAMIN D DEFICIENCY: ICD-10-CM

## 2021-04-29 DIAGNOSIS — I67.5 MOYAMOYA DISEASE: ICD-10-CM

## 2021-04-29 DIAGNOSIS — E03.9 ACQUIRED HYPOTHYROIDISM: ICD-10-CM

## 2021-04-29 DIAGNOSIS — E78.2 MIXED HYPERLIPIDEMIA: ICD-10-CM

## 2021-04-29 DIAGNOSIS — I65.23 ATHEROSCLEROSIS OF BOTH CAROTID ARTERIES: ICD-10-CM

## 2021-04-29 DIAGNOSIS — Z86.73 STATUS POST CVA: Primary | ICD-10-CM

## 2021-04-29 DIAGNOSIS — I25.10 CORONARY ARTERY DISEASE INVOLVING NATIVE CORONARY ARTERY OF NATIVE HEART WITHOUT ANGINA PECTORIS: ICD-10-CM

## 2021-04-29 DIAGNOSIS — F17.210 CIGARETTE NICOTINE DEPENDENCE WITHOUT COMPLICATION: ICD-10-CM

## 2021-04-29 DIAGNOSIS — I10 ESSENTIAL HYPERTENSION: ICD-10-CM

## 2021-04-29 PROCEDURE — 99214 OFFICE O/P EST MOD 30 MIN: CPT | Performed by: FAMILY MEDICINE

## 2021-04-29 RX ORDER — FLUOCINOLONE ACETONIDE 0.11 MG/ML
OIL AURICULAR (OTIC)
COMMUNITY
Start: 2021-03-15 | End: 2022-01-12 | Stop reason: SDUPTHER

## 2021-04-29 RX ORDER — LISINOPRIL 40 MG/1
40 TABLET ORAL DAILY
COMMUNITY
Start: 2020-12-21 | End: 2021-07-19

## 2021-05-01 RX ORDER — GLIMEPIRIDE 2 MG/1
2 TABLET ORAL
Qty: 90 TABLET | Refills: 1 | Status: SHIPPED | OUTPATIENT
Start: 2021-05-01 | End: 2022-01-03 | Stop reason: SDUPTHER

## 2021-05-01 NOTE — PROGRESS NOTES
Subjective   Rosalee Hargrove is a 65 y.o. female with   Chief Complaint   Patient presents with   • Hospital Follow Up Visit     stroke    .    History of Present Illness   65-year-old white female with recent hospitalization for CVA here for hospital follow-up. She was told that her stroke was secondary to moyamoya and will be seen the specialist in this area within the next 1 to 2 weeks. Work-up was extensive at [facility] to include lab work, CT of the brain as well as CT of the chest to evaluate thoracic aorta.  MRI of the brain was achieved as well as 2D echo of the heart.  Labs for the most part were unremarkable with a hemoglobin A1c of 6.8.  LDL cholesterol however was 105.  CT of the brain was unremarkable with CT of the chest showing extensive atherosclerosis of the thoracic aorta without aneurysm or dissection.  MRI of the brain did show a punctate area of ischemia in the subcortical area of the right parietal region.  She had weakness of the left side of her upper and lower extremity correlating with the above findings.  During her hospitalization she seemed to improve and was discharged in good condition.  She continues to have fatigue and although has returned to work is finding that working more than 3 to 4 hours is not possible at this time.  She has quit smoking which she understands is crucial.  Her  who also smokes has agreed to also quit to support patient.  She also has appointment with cardiology and neurology in the near future.  The following portions of the patient's history were reviewed and updated as appropriate: allergies, current medications, past family history, past medical history, past social history, past surgical history and problem list.    Review of Systems   Constitutional: Positive for fatigue.        Morbidly obese, nicotine addiction   Cardiovascular:        CVA-right parietal, CAD, carotid artery disease, hypertension, hyperlipidemia    Gastrointestinal:        GERD   Endocrine:        Type 2 diabetes mellitus, hypothyroidism       Objective     Vitals:    04/29/21 1101   BP: 118/62   Pulse: 85   Temp: 97.1 °F (36.2 °C)   SpO2: 94%       Recent Results (from the past 672 hour(s))   TROPONIN (IN-HOUSE)    Collection Time: 04/16/21  8:35 PM    Specimen: Fresh Frozen Plasma    Specimen type and source: Plasma, Blood   Result Value Ref Range    Troponin I <0.010 0.000 - 0.034 ng/mL   CBC AND DIFFERENTIAL    Collection Time: 04/16/21  8:35 PM    Specimen type and source: Whole Blood, Blood   Result Value Ref Range    WBC 10.97 4.5 - 11.0 10*3/uL    RBC 5.26 (H) 4.0 - 5.2 10*6/uL    Hemoglobin 12.7 12.0 - 16.0 g/dL    Hematocrit 41.4 36.0 - 46.0 %    MCV 78.7 (L) 80.0 - 100.0 fL    MCH 24.1 (L) 26.0 - 34.0 pg    MCHC 30.7 (L) 31.0 - 37.0 g/dL    RDW 18.6 (H) 12.0 - 16.8 %    Platelets 326 140 - 440 10*3/uL    MPV 10.2 8.4 - 12.4 fL    Differential Type Hospital CBC w/AutoDiff (arb'U)    Neutrophil Rel % 61.4 45 - 80 %    Lymphocyte Rel % 29.6 15 - 50 %    Monocyte Rel % 5.7 0 - 15 %    Eosinophil % 2.6 0 - 7 %    Basophil Rel % 0.3 0 - 2 %    Immature Grans % 0.4 0.0 - 1.0 %    nRBC 0 0 /100(WBC)    Neutrophils Absolute 6.74 2.0 - 8.8 10*3/uL    Lymphocytes Absolute 3.25 0.7 - 5.5 10*3/uL    Monocytes Absolute 0.62 0.0 - 1.7 10*3/uL    Eosinophils Absolute 0.29 0.0 - 0.8 10*3/uL    Basophils Absolute 0.03 0.0 - 0.2 10*3/uL    Immature Grans, Absolute 0.04 0.00 - 0.10 10*3/uL   PROTIME-INR    Collection Time: 04/16/21  8:35 PM    Specimen: Fresh Frozen Plasma    Specimen type and source: Plasma, Blood   Result Value Ref Range    Protime 12.1 10.3 - 13.3 s    INR 1.0 INR   APTT    Collection Time: 04/16/21  8:35 PM    Specimen: Fresh Frozen Plasma    Specimen type and source: Plasma, Blood   Result Value Ref Range    PTT 35.2 (H) 25.3 - 35.0 s   TYPE AND SCREEN    Collection Time: 04/16/21  8:36 PM    Specimen type and source: Whole Blood, Blood   Result  Value Ref Range    ABORh O Positive     Antibody Screen Negative     Crossmatch Expiration 04/19/2021    CBC AND DIFFERENTIAL    Collection Time: 04/17/21  3:55 AM    Specimen type and source: Whole Blood, Blood   Result Value Ref Range    WBC 8.59 4.5 - 11.0 10*3/uL    RBC 4.83 4.0 - 5.2 10*6/uL    Hemoglobin 11.6 (L) 12.0 - 16.0 g/dL    Hematocrit 38.4 36.0 - 46.0 %    MCV 79.5 (L) 80.0 - 100.0 fL    MCH 24.0 (L) 26.0 - 34.0 pg    MCHC 30.2 (L) 31.0 - 37.0 g/dL    RDW 18.3 (H) 12.0 - 16.8 %    Platelets 290 140 - 440 10*3/uL    MPV 9.7 8.4 - 12.4 fL    Differential Type Hospital CBC w/AutoDiff (arb'U)    Neutrophil Rel % 54.7 45 - 80 %    Lymphocyte Rel % 34.2 15 - 50 %    Monocyte Rel % 6.2 0 - 15 %    Eosinophil % 4.1 0 - 7 %    Basophil Rel % 0.3 0 - 2 %    Immature Grans % 0.5 0.0 - 1.0 %    nRBC 0 0 /100(WBC)    Neutrophils Absolute 4.70 2.0 - 8.8 10*3/uL    Lymphocytes Absolute 2.94 0.7 - 5.5 10*3/uL    Monocytes Absolute 0.53 0.0 - 1.7 10*3/uL    Eosinophils Absolute 0.35 0.0 - 0.8 10*3/uL    Basophils Absolute 0.03 0.0 - 0.2 10*3/uL    Immature Grans, Absolute 0.04 0.00 - 0.10 10*3/uL   VITAMIN B12    Collection Time: 04/17/21  3:55 AM    Specimen type and source: Serum, Blood   Result Value Ref Range    Vitamin B-12 348 213 - 816 pg/mL   TSH    Collection Time: 04/17/21  3:55 AM    Specimen: Fresh Frozen Plasma    Specimen type and source: Plasma, Blood   Result Value Ref Range    TSH 1.800 0.470 - 4.680 u(iU)/mL   HEMOGLOBIN A1C    Collection Time: 04/17/21  3:55 AM    Specimen type and source: Whole Blood, Blood   Result Value Ref Range    Hemoglobin A1C 6.8 (H) 4.3 - 5.6 %    Mean Bld Glu Estim. 148 mg/dL       Physical Exam  Vitals and nursing note reviewed.   Constitutional:       Appearance: Normal appearance. She is well-developed and well-groomed. She is morbidly obese.      Comments: Morbid exogenous obesity with a BMI of 42.6   HENT:      Head: Normocephalic and atraumatic.   Neck:      Thyroid:  No thyroid mass or thyromegaly.      Vascular: Normal carotid pulses. No carotid bruit.      Trachea: Trachea and phonation normal.   Cardiovascular:      Rate and Rhythm: Normal rate and regular rhythm.      Heart sounds: Normal heart sounds. No murmur heard.   No friction rub. No gallop.    Pulmonary:      Effort: Pulmonary effort is normal. No respiratory distress.      Breath sounds: Normal breath sounds. No decreased breath sounds, wheezing, rhonchi or rales.   Musculoskeletal:      Cervical back: Neck supple.   Lymphadenopathy:      Cervical: No cervical adenopathy.   Skin:     General: Skin is warm and dry.      Findings: No rash.   Neurological:      Mental Status: She is alert and oriented to person, place, and time.   Psychiatric:         Attention and Perception: Attention and perception normal.         Mood and Affect: Mood and affect normal.         Speech: Speech normal.         Behavior: Behavior normal. Behavior is cooperative.         Thought Content: Thought content normal.         Cognition and Memory: Cognition and memory normal.         Judgment: Judgment normal.         Assessment/Plan   Diagnoses and all orders for this visit:    1. Status post CVA (Primary)  -     Hemoglobin A1c; Future  -     Comprehensive metabolic panel; Future  -     TSH; Future  -     Vitamin D 25 hydroxy; Future  -     Lipid panel; Future  -     CBC w AUTO Differential; Future    2. Moyamoya disease  -     Hemoglobin A1c; Future  -     Comprehensive metabolic panel; Future  -     TSH; Future  -     Vitamin D 25 hydroxy; Future  -     Lipid panel; Future  -     CBC w AUTO Differential; Future    3. Cigarette nicotine dependence without complication  -     Hemoglobin A1c; Future  -     Comprehensive metabolic panel; Future  -     TSH; Future  -     Vitamin D 25 hydroxy; Future  -     Lipid panel; Future  -     CBC w AUTO Differential; Future    4. 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 Morbid exogenous obesity (CMS/[facility])  -     Hemoglobin A1c; Future  -     Comprehensive metabolic panel; Future  -     TSH; Future  -     Vitamin D 25 hydroxy; Future  -     Lipid panel; Future  -     CBC w AUTO Differential; Future    6. Acquired hypothyroidism  -     Hemoglobin A1c; Future  -     Comprehensive metabolic panel; Future  -     TSH; Future  -     Vitamin D 25 hydroxy; Future  -     Lipid panel; Future  -     CBC w AUTO Differential; Future    7. Peripheral arterial occlusive disease (CMS/[facility])  -     Hemoglobin A1c; Future  -     Comprehensive metabolic panel; Future  -     TSH; Future  -     Vitamin D 25 hydroxy; Future  -     Lipid panel; Future  -     CBC w AUTO Differential; Future    8. Mixed hyperlipidemia  -     Hemoglobin A1c; Future  -     Comprehensive metabolic panel; Future  -     TSH; Future  -     Vitamin D 25 hydroxy; Future  -     Lipid panel; Future  -     CBC w AUTO Differential; Future    9. Essential hypertension  -     Hemoglobin A1c; Future  -     Comprehensive metabolic panel; Future  -     TSH; Future  -     Vitamin D 25 hydroxy; Future  -     Lipid panel; Future  -     CBC w AUTO Differential; Future    10. Atherosclerosis of both carotid arteries  -     Hemoglobin A1c; Future  -     Comprehensive metabolic panel; Future  -     TSH; Future  -     Vitamin D 25 hydroxy; Future  -     Lipid panel; Future  -     CBC w AUTO Differential; Future    11. Coronary artery disease involving native coronary artery of native heart without angina pectoris  -     Hemoglobin A1c; Future  -     Comprehensive metabolic panel; Future  -     TSH; Future  -     Vitamin D 25 hydroxy; Future  -     Lipid  panel; Future  -     CBC w AUTO Differential; Future    12. Atherosclerosis of aorta (CMS/HCC)  -     Hemoglobin A1c; Future  -     Comprehensive metabolic panel; Future  -     TSH; Future  -     Vitamin D 25 hydroxy; Future  -     Lipid panel; Future  -     CBC w AUTO Differential; Future    13. Vitamin D deficiency  -     Hemoglobin A1c; Future  -     Comprehensive metabolic panel; Future  -     TSH; Future  -     Vitamin D 25 hydroxy; Future  -     Lipid panel; Future  -     CBC w AUTO Differential; Future    14. Gastroesophageal reflux disease without esophagitis  -     Hemoglobin A1c; Future  -     Comprehensive metabolic panel; Future  -     TSH; Future  -     Vitamin D 25 hydroxy; Future  -     Lipid panel; Future  -     CBC w AUTO Differential; Future    15. Controlled type 2 diabetes mellitus without complication, without long-term current use of insulin (CMS/[facility])  -     glimepiride (AMARYL) 2 MG tablet; Take 1 tablet by mouth Every Morning Before Breakfast.  Dispense: 90 tablet; Refill: 1  -     Hemoglobin A1c; Future  -     Comprehensive metabolic panel; Future  -     TSH; Future  -     Vitamin D 25 hydroxy; Future  -     Lipid panel; Future  -     CBC w AUTO Differential; Future    Entire hospital record was reviewed while patient was sitting in the exam room.  Anticipate follow-up with specialist as mentioned above with repeat fasting labs in 3 months and follow-up with me thereafter.  Patient will continue with a smoking cessation plan as well as decreasing cholesterol in her diet.  20 minutes of a 30-minute appointment spent counseling in regards to expectation after a stroke.    Return in about 3 months (around 7/29/2021) for Recheck.

## 2021-05-18 ENCOUNTER — OFFICE VISIT (OUTPATIENT)
Dept: CARDIOLOGY | Facility: CLINIC | Age: 66
End: 2021-05-18

## 2021-05-18 VITALS
WEIGHT: 223 LBS | HEIGHT: 60 IN | BODY MASS INDEX: 43.78 KG/M2 | DIASTOLIC BLOOD PRESSURE: 72 MMHG | SYSTOLIC BLOOD PRESSURE: 130 MMHG | HEART RATE: 68 BPM

## 2021-05-18 DIAGNOSIS — I10 ESSENTIAL HYPERTENSION: ICD-10-CM

## 2021-05-18 DIAGNOSIS — I63.19 CEREBRAL INFARCTION DUE TO EMBOLISM OF OTHER PRECEREBRAL ARTERY (HCC): ICD-10-CM

## 2021-05-18 DIAGNOSIS — E78.5 HYPERLIPIDEMIA, UNSPECIFIED HYPERLIPIDEMIA TYPE: ICD-10-CM

## 2021-05-18 DIAGNOSIS — I63.9 CEREBROVASCULAR ACCIDENT (CVA), UNSPECIFIED MECHANISM (HCC): Primary | ICD-10-CM

## 2021-05-18 PROCEDURE — 99215 OFFICE O/P EST HI 40 MIN: CPT | Performed by: INTERNAL MEDICINE

## 2021-05-18 NOTE — PROGRESS NOTES
Subjective:     Encounter Date: 05/18/21      Patient ID: Rosalee Hargrove is a 65 y.o. female.    Chief Complaint: Nonobstructive CAD, peripheral vascular disease, A. Fib, CVA  HPI:     This is a 65-year-old woman with recent CVA, nonobstructive CAD, PAD, graham graham disease s/p r CEA, HTN, HLD.      Several years of ago she underwent cardiac catheterization which showed mild, nonobstructive coronary disease throughout her coronary arteries.  She has a history of peripheral arterial disease and is followed by Dr. Caots. 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 She has a right iliac artery stent which on last evaluation earlier this year showed moderate in-stent restenosis.  She also has severe peripheral disease of the descending aorta with scattered ulcerations and plaque formation.    In 2017 she wore a cardiac event monitor due to complaints of presyncope.  She was noted to have an episode of atrial fibrillation lasting 2 minutes.  Despite her elevated chads Vascor she was not anticoagulated due to the short duration of A. Fib.    In November 2020 I saw her and she wore a cardiac event monitor for 30 days without AF.     In April 2021 she had acute left sided weakness and facial droop. She was evaluated at [facility] and found to have a small CVA. She was hypotensive so lisionpril was held. Echo unremarkable. 48 hour holter showed no AF. LDL and TG were uncontrolled and atorva increased to 40.     Since returning home about 4 weeks ago she has cut back smoking form 1ppd to 1/2. She is measuring BP running between 110-140 with and without lisinopril. She is working part time and has occasional weakness and fatigue.      There is a strong family history of coronary disease.  Her younger brother was treated by my father.  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    The following portions of the patient's history were reviewed and updated as appropriate: allergies, current medications,  past family history, past medical history, past social history, past surgical history and problem list.     REVIEW OF SYSTEMS:   All systems reviewed.  Pertinent positives identified in HPI.  All other systems are negative.    Past Medical History:   Diagnosis Date   • Allergic    • Anemia    • Angina pectoris (CMS/[facility])    • Anxiety    • Arthritis    • ASCVD (arteriosclerotic cardiovascular disease)    • Bilateral leg edema    • CAD (coronary artery disease)    • Chest pain    • Depression    • Diabetes mellitus (CMS/[facility])    • Disease of thyroid gland    • Dizziness    • Headache    • Hyperlipidemia    • Hypertension    • Morbid obesity (CMS/[facility])    • Orthostatic hypotension    • PAD (peripheral artery disease) (CMS/[facility])    • Shortness of breath    • Sleep apnea    • Tobacco abuse    • Vitamin D deficiency        Family History   Problem Relation Age of Onset   • Heart disease Mother    • Hypertension Mother    • Hyperlipidemia Mother    • Heart disease Father    • Heart attack Sister    • Heart disease Sister    • Heart disease Brother    • Heart attack Brother    • Hyperlipidemia Brother    • Diabetes Brother    • Heart attack Maternal Grandmother    • Heart disease Maternal Grandmother    • Heart failure Maternal Grandmother    • Heart failure Maternal Grandfather    • Heart disease Maternal Grandfather    • Heart attack Maternal Grandfather    • Heart attack Paternal Grandmother    • Heart disease Paternal Grandmother    • Heart failure Paternal Grandmother    • Hypertension Paternal Grandmother        Social History     Socioeconomic History   • Marital status:      Spouse name: Not on file   • Number of children: Not on file   • Years of education: Not on file   • Highest education level: Not on file   Tobacco Use   • Smoking status: Current Every Day Smoker     Packs/day: 0.50   • Smokeless tobacco: Never Used   Substance and Sexual Activity   • Alcohol use: Yes     Alcohol/week: 1.0 standard drinks      Types: 1 Glasses of wine per week     Comment: rare   • Drug use: No   • Sexual activity: Defer       Allergies   Allergen Reactions   • Ciprofloxacin Hives and Swelling       Past Surgical History:   Procedure Laterality Date   • ANAL FISSURECTOMY     • BRAIN SURGERY     • CARDIAC CATHETERIZATION N/A 2016    Procedure: Coronary angiography;  Surgeon: Fredrick Kapoor MD;  Location:  [facility] CATH INVASIVE LOCATION;  Service:    • CARDIAC CATHETERIZATION N/A 2016    Procedure: Left heart cath;  Surgeon: Fredrick Kapoor MD;  Location:  [facility]VAS[facility]ATION;  Service:    • CARDIAC CATHETERIZATION N/A 2016    Procedure: Left ventriculography;  Surgeon: Fredrick Kapoor MD;  Location:  [facility] INVAS[facility] LOCATION;  Service:    • CARDIAC CATHETERIZATION N/A 2016    Procedure: Right heart cath;  Surgeon: Fredrick Kapoor MD;  [facility];  Service:    •  SECTION     • CHOLECYSTECTOMY     • ERCP     • FRACTURE SURGERY      arm   • HYSTERECTOMY     • ILIAC ARTERY STENT         Procedures       Objective:         PHYSICAL EXAM:  GEN: VSS, no distress,   Eyes: normal sclera, normal lids and lashes  HENT: moist mucus membranes,   Respiratory: CTAB, no rales or wheezes  CV: RRR, no murmurs, , +2 DP and 2+ carotid pulses b/l  GI: NABS, soft,  Nontender, nondistended  MSK: no edema, no scoliosis or kyphosis  Skin: no rash, warm, dry  Heme/Lymph: no bruising or bleeding  Psych: organized thought, normal behavior and affect  Neuro: Cranial nerves grossly intact, Alert and Oriented x 3.         Assessment:          Diagnosis Plan   1. Cerebrovascular accident (CVA), unspecified mechanism (CMS/HCC)  Duplex Carotid Ultrasound CAR    Case Request Cath Lab: Loop insertion   2. Cerebral infarction due to embolism of other precerebral artery (CMS/HCC)   Duplex Carotid Ultrasound CAR   3. Essential hypertension     4. Hyperlipidemia, unspecified hyperlipidemia type            Plan:       1.   Paroxysmal atrial fibrillation: CHADSVASC is 6. One episode documented several years ago for a few minutes and was not anticoagulated. I suggested a Linq implant given her recent stroke. She is agreeable.   2.  Hyperlipidemia: Her LDL and TG uncontrolled. Atorva was increased from 20 to 40 at hospital d/c. She will have labs repeated in June. If still uncontrolled I would use lipitor 80 and add Vascepa. Discussed.   3.  Coronary artery disease, nonobstructive: No real angina.  Continue to monitor.  4.  Peripheral arterial disease: She  follows with Dr. Coats who implanted a right iliac stent and is s/p r CEA. She now complains of left sided vision changes, optho has suggested repeat carotids which I have ordered.  Continue aspirin and Plavix for now.  5.  Hypertension: IS taking a strict diary BID. I advised her to go back on lisinopril, if she gets hypotensive we will decrease to 20mg daily.   6.  Smoking cessation: I suggested Wellbutrin given her worsened anxiety and difficulty quitting. She will consider it.     We spent over 40 minutes discussing her recent CVA, smoking cessation, the need for possible full anticoagulation, Linq implant risks and beneftis, and BP management.     Dr. Slater, thank you very much for referring this kind patient to me. Please call me with any questions or concerns. I will see the patient again in the office in 2-3 months.       Nohemi Mei MD  05/18/21  [facility]    Outpatient Encounter Medications as of 5/18/2021   Medication Sig Dispense Refill   • aspirin 81 MG chewable tablet Chew 81 mg daily.     • atorvastatin (LIPITOR) 40 MG tablet Take 1 tablet by mouth Daily. 90 tablet 3   • citalopram (CeleXA) 20 MG tablet Take 1 tablet by mouth Daily. 90 tablet 1   • clopidogrel (PLAVIX) 75 MG tablet Take 1 tablet by mouth Daily. 90 tablet 1   • fluocinolone acetonide (DERMOTIC) 0.01 % oil otic oil LOCATION  BOTH EARS. 4 5 DROPS IN AFFECTED EAR(S) ONCE DAILY X 2 WEEKS,  THEN 2 3 X A WEEK AS NEEDED     • fluticasone (Flonase) 50 MCG/ACT nasal spray 2 sprays into the nostril(s) as directed by provider Daily.     • glimepiride (AMARYL) 2 MG tablet Take 1 tablet by mouth Every Morning Before Breakfast. 90 tablet 1   • hydroCHLOROthiazide (HYDRODIURIL) 25 MG tablet Take 1 tablet by mouth Daily. 90 tablet 1   • JARDIANCE 10 MG tablet Take 10 mg by mouth Daily. 30 tablet 0   • levothyroxine (SYNTHROID, LEVOTHROID) 75 MCG tablet Take 1 tablet by mouth Daily. 90 tablet 1   • lisinopril (PRINIVIL,ZESTRIL) 40 MG tablet Take 40 mg by mouth Daily.     • metFORMIN (GLUCOPHAGE) 1000 MG tablet Take 1 tablet by mouth 2 (Two) Times a Day With Meals. 180 tablet 1   • mupirocin (BACTROBAN) 2 % ointment APPLY PEA SIZE DROPLET TO QTIP AND APPLY TWICE DAILY FOR 7 DAYS     • pantoprazole (PROTONIX) 40 MG EC tablet TAKE 1 TABLET BY MOUTH EVERY DAY 90 tablet 1   • [DISCONTINUED] metroNIDAZOLE (METROGEL) 0.75 % gel Apply  topically to the appropriate area as directed 2 (Two) Times a Day. 45 g 0   • [DISCONTINUED] Multiple Vitamins-Minerals (EYE VITAMINS PO) Take 1 tablet by mouth Daily.     • [DISCONTINUED] Pediatric Multivitamins-Iron (FLINTSTONES PLUS IRON PO) Take 1 tablet by mouth daily.       No facility-administered encounter medications on file as of 5/18/2021.

## 2021-05-23 ENCOUNTER — HOSPITAL ENCOUNTER (OUTPATIENT)
Facility: HOSPITAL | Age: 66
Setting detail: HOSPITAL OUTPATIENT SURGERY
End: 2021-05-23
Attending: INTERNAL MEDICINE | Admitting: INTERNAL MEDICINE

## 2021-05-23 DIAGNOSIS — I63.9 CEREBROVASCULAR ACCIDENT (CVA), UNSPECIFIED MECHANISM (HCC): ICD-10-CM

## 2021-05-24 ENCOUNTER — TRANSCRIBE ORDERS (OUTPATIENT)
Dept: OBSTETRICS AND GYNECOLOGY | Facility: CLINIC | Age: 66
End: 2021-05-24

## 2021-05-24 DIAGNOSIS — Z01.818 OTHER SPECIFIED PRE-OPERATIVE EXAMINATION: Primary | ICD-10-CM

## 2021-05-25 ENCOUNTER — HOSPITAL ENCOUNTER (OUTPATIENT)
Dept: CARDIOLOGY | Facility: HOSPITAL | Age: 66
Discharge: HOME | End: 2021-05-25
Admitting: INTERNAL MEDICINE

## 2021-05-25 DIAGNOSIS — I63.9 CEREBROVASCULAR ACCIDENT (CVA), UNSPECIFIED MECHANISM (HCC): ICD-10-CM

## 2021-05-25 DIAGNOSIS — I63.19 CEREBRAL INFARCTION DUE TO EMBOLISM OF OTHER PRECEREBRAL ARTERY (HCC): ICD-10-CM

## 2021-05-25 PROCEDURE — 93880 EXTRACRANIAL BILAT STUDY: CPT | Performed by: INTERNAL MEDICINE

## 2021-05-25 PROCEDURE — 93880 EXTRACRANIAL BILAT STUDY: CPT

## 2021-05-26 LAB
BH CV XLRA MEAS LEFT DIST CCA EDV: -26 CM/SEC
BH CV XLRA MEAS LEFT DIST CCA PSV: -96.2 CM/SEC
BH CV XLRA MEAS LEFT DIST ICA EDV: -54.9 CM/SEC
BH CV XLRA MEAS LEFT DIST ICA PSV: -140 CM/SEC
BH CV XLRA MEAS LEFT ICA/CCA RATIO: 1.5
BH CV XLRA MEAS LEFT MID CCA EDV: 24.3 CM/SEC
BH CV XLRA MEAS LEFT MID CCA PSV: 76.3 CM/SEC
BH CV XLRA MEAS LEFT MID ICA EDV: -39 CM/SEC
BH CV XLRA MEAS LEFT MID ICA PSV: -101 CM/SEC
BH CV XLRA MEAS LEFT PROX ECA PSV: -187 CM/SEC
BH CV XLRA MEAS LEFT PROX ICA EDV: -29.3 CM/SEC
BH CV XLRA MEAS LEFT PROX ICA PSV: -89 CM/SEC
BH CV XLRA MEAS LEFT PROX SCLA PSV: 260 CM/SEC
BH CV XLRA MEAS LEFT VERTEBRAL A PSV: -36.8 CM/SEC
BH CV XLRA MEAS RIGHT DIST CCA EDV: 78.5 CM/SEC
BH CV XLRA MEAS RIGHT DIST CCA PSV: 415 CM/SEC
BH CV XLRA MEAS RIGHT MID CCA EDV: 10.5 CM/SEC
BH CV XLRA MEAS RIGHT MID CCA PSV: 40 CM/SEC
BH CV XLRA MEAS RIGHT PROX ECA PSV: -93.9 CM/SEC
BH CV XLRA MEAS RIGHT PROX ICA EDV: -10.5 CM/SEC
BH CV XLRA MEAS RIGHT PROX ICA PSV: -26.8 CM/SEC
BH CV XLRA MEAS RIGHT PROX SCLA PSV: 244 CM/SEC
BH CV XLRA MEAS RIGHT VERTEBRAL A PSV: -59 CM/SEC
MAXIMAL PREDICTED HEART RATE: 153 BPM
STRESS TARGET HR: 130 BPM

## 2021-05-27 NOTE — PROGRESS NOTES
Left VM with results- apparently occluded right carotid. She follows with Dr. Coats and has had a CEA on the right in the past with him. I asked her to schedule a f/u with him to look over these results.

## 2021-06-01 ENCOUNTER — LAB (OUTPATIENT)
Dept: LAB | Facility: HOSPITAL | Age: 66
End: 2021-06-01

## 2021-06-01 DIAGNOSIS — Z01.818 OTHER SPECIFIED PRE-OPERATIVE EXAMINATION: ICD-10-CM

## 2021-06-02 ENCOUNTER — TELEPHONE (OUTPATIENT)
Dept: CARDIOLOGY | Facility: CLINIC | Age: 66
End: 2021-06-02

## 2021-06-02 NOTE — TELEPHONE ENCOUNTER
Patient was scheduled for loop insertion 6/3, but had a stroke and went to [facility].  If you and the patient decide to reschedule this procedure in the future please place a new order at that time.    Thank you    Fernanda GTZ

## 2021-06-14 DIAGNOSIS — I77.9 PERIPHERAL ARTERIAL OCCLUSIVE DISEASE (HCC): ICD-10-CM

## 2021-06-14 DIAGNOSIS — I25.10 CORONARY ARTERY DISEASE INVOLVING NATIVE CORONARY ARTERY OF NATIVE HEART WITHOUT ANGINA PECTORIS: ICD-10-CM

## 2021-06-14 DIAGNOSIS — E11.9 CONTROLLED TYPE 2 DIABETES MELLITUS WITHOUT COMPLICATION, WITHOUT LONG-TERM CURRENT USE OF INSULIN (HCC): ICD-10-CM

## 2021-06-14 DIAGNOSIS — E55.9 VITAMIN D DEFICIENCY: ICD-10-CM

## 2021-06-14 DIAGNOSIS — E66.01 MORBID EXOGENOUS OBESITY (HCC): ICD-10-CM

## 2021-06-14 DIAGNOSIS — F41.9 ANXIETY: ICD-10-CM

## 2021-06-14 DIAGNOSIS — I67.5 MOYAMOYA DISEASE: ICD-10-CM

## 2021-06-14 DIAGNOSIS — I10 ESSENTIAL HYPERTENSION: ICD-10-CM

## 2021-06-14 DIAGNOSIS — E11.319 TYPE 2 DIABETES MELLITUS WITH RETINOPATHY WITHOUT MACULAR EDEMA, WITHOUT LONG-TERM CURRENT USE OF INSULIN, UNSPECIFIED LATERALITY, UNSPECIFIED RETINOPATHY SEVERITY (HCC): ICD-10-CM

## 2021-06-14 DIAGNOSIS — I65.23 ATHEROSCLEROSIS OF BOTH CAROTID ARTERIES: ICD-10-CM

## 2021-06-14 DIAGNOSIS — K21.9 GASTROESOPHAGEAL REFLUX DISEASE WITHOUT ESOPHAGITIS: ICD-10-CM

## 2021-06-14 DIAGNOSIS — E03.9 ACQUIRED HYPOTHYROIDISM: ICD-10-CM

## 2021-06-14 DIAGNOSIS — F41.0 PANIC DISORDER: ICD-10-CM

## 2021-06-14 DIAGNOSIS — F17.210 CIGARETTE NICOTINE DEPENDENCE WITHOUT COMPLICATION: ICD-10-CM

## 2021-06-14 DIAGNOSIS — E78.2 MIXED HYPERLIPIDEMIA: ICD-10-CM

## 2021-06-17 ENCOUNTER — OFFICE VISIT (OUTPATIENT)
Dept: FAMILY MEDICINE CLINIC | Facility: CLINIC | Age: 66
End: 2021-06-17

## 2021-06-17 VITALS
HEIGHT: 60 IN | HEART RATE: 82 BPM | DIASTOLIC BLOOD PRESSURE: 76 MMHG | WEIGHT: 220 LBS | BODY MASS INDEX: 43.19 KG/M2 | TEMPERATURE: 97.3 F | OXYGEN SATURATION: 97 % | SYSTOLIC BLOOD PRESSURE: 124 MMHG

## 2021-06-17 DIAGNOSIS — H69.82 DYSFUNCTION OF LEFT EUSTACHIAN TUBE: ICD-10-CM

## 2021-06-17 DIAGNOSIS — I63.231 CEREBROVASCULAR ACCIDENT (CVA) DUE TO OCCLUSION OF RIGHT CAROTID ARTERY (HCC): ICD-10-CM

## 2021-06-17 DIAGNOSIS — J01.00 ACUTE NON-RECURRENT MAXILLARY SINUSITIS: Primary | ICD-10-CM

## 2021-06-17 PROCEDURE — 99214 OFFICE O/P EST MOD 30 MIN: CPT | Performed by: FAMILY MEDICINE

## 2021-06-17 RX ORDER — AMOXICILLIN AND CLAVULANATE POTASSIUM 875; 125 MG/1; MG/1
1 TABLET, FILM COATED ORAL 2 TIMES DAILY
Qty: 20 TABLET | Refills: 0 | Status: SHIPPED | OUTPATIENT
Start: 2021-06-17 | End: 2021-06-29

## 2021-06-17 RX ORDER — CLOPIDOGREL BISULFATE 75 MG/1
75 TABLET ORAL
COMMUNITY
Start: 2021-06-04 | End: 2021-07-19

## 2021-06-17 NOTE — PROGRESS NOTES
Subjective   Rosalee Hargrove is a 65 y.o. female with   Chief Complaint   Patient presents with   • Hospital Follow Up Visit     S/P CVA   .    History of Present Illness   65-year-old white female with multiple medical problems here for further medical management.  Patient with significant atherosclerotic cardiovascular disease who was recently admitted to [facility] with a CVA.  Work-up ensued finding nearly completely occluded carotid artery on the right.  These underwent angioplasty and stent placement.  She apparently tolerated the procedure well without any further adverse effect.  She will see vascular surgery next week.  She apparently had work-up started with her cardiologist who performed carotid Doppler study.  Before she could get back to the cardiologist in follow-up the CVA occurred.  Her biggest complaint at this point is marked decreased hearing in the left ear.  She states that listening to somebody on the phone is like listening to [employer] talk to her.  She has had sinus pressure with increased congestion and onset of the above within the last 3 to 4 days.  She does admit to some drainage and cough and there has been no fever, chest pain, shortness of air or audible wheezing.  She does smoke and has reduce cigarette intake to less than half a pack per day.  With her vascular issues she is trying to quit altogether.  The following portions of the patient's history were reviewed and updated as appropriate: allergies, current medications, past family history, past medical history, past social history, past surgical history and problem list.    Review of Systems   Constitutional: Positive for fatigue. Negative for fever.        Morbid exogenous obesity   HENT: Positive for congestion, ear pain, hearing loss, postnasal drip and sinus pressure.    Respiratory: Positive for cough. Negative for shortness of breath and wheezing.    Cardiovascular: Negative for chest pain.         [place], hypertension, hyperlipidemia, CAD, PAD, carotid artery disease, status post CVA   Endocrine:        Type II non-insulin-dependent diabetes mellitus       Objective     Vitals:    06/17/21 1326   BP: 124/76   Pulse: 82   Temp: 97.3 °F (36.3 °C)   SpO2: 97%       Recent Results (from the past 672 hour(s))   Duplex Carotid Ultrasound CAR    Collection Time: 05/25/21  2:21 PM   Result Value Ref Range    left Mid CCA PSV 76.3 cm/sec    Right Mid CCA PSV 40.0 cm/sec    left Mid CCA EDV 24.3 cm/sec    right Mid CCA EDV 10.5 cm/sec    Dist CCA PSV -96.2 cm/sec    Dist CCA .0 cm/sec    Dist CCA EDV -26.0 cm/sec    Dist CCA EDV 78.5 cm/sec    Prox ECA PSV -187.0 cm/sec    Prox ECA PSV -93.9 cm/sec    Prox ICA PSV -89.0 cm/sec    Prox ICA PSV -26.8 cm/sec    Prox ICA EDV -29.3 cm/sec    Prox ICA EDV -10.5 cm/sec    Mid ICA PSV -101.0 cm/sec    Mid ICA EDV -39.0 cm/sec    Dist ICA PSV -140.0 cm/sec    Dist ICA EDV -54.9 cm/sec    Vertebral A PSV -36.8 cm/sec    Vertebral A PSV -59.0 cm/sec    Prox SCLA .0 cm/sec    Prox SCLA .0 cm/sec    ICA/CCA ratio 1.5     Target HR (85%) 130 bpm    Max. Pred. HR (100%) 153 bpm   TROPONIN (IN-HOUSE)    Collection Time: 05/31/21  6:45 PM    Specimen: Fresh Frozen Plasma    Specimen type and source: Plasma, Blood   Result Value Ref Range    Troponin I <0.010 0.000 - 0.034 ng/mL   CBC AND DIFFERENTIAL    Collection Time: 05/31/21  6:45 PM    Specimen type and source: Whole Blood, Blood   Result Value Ref Range    WBC 10.98 4.5 - 11.0 10*3/uL    RBC 4.90 4.0 - 5.2 10*6/uL    Hemoglobin 11.9 (L) 12.0 - 16.0 g/dL    Hematocrit 38.4 36.0 - 46.0 %    MCV 78.4 (L) 80.0 - 100.0 fL    MCH 24.3 (L) 26.0 - 34.0 pg    MCHC 31.0 31.0 - 37.0 g/dL    RDW 17.9 (H) 12.0 - 16.8 %    Platelets 305 140 - 440 10*3/uL    MPV 10.2 8.4 - 12.4 fL    Differential [facility] CBC w/AutoDiff (arb'U)    Neutrophil Rel % 58.7 45 - 80 %    Lymphocyte Rel % 30.9 15 - 50 %    Monocyte Rel %  6.1 0 - 15 %    Eosinophil % 3.3 0 - 7 %    Basophil Rel % 0.5 0 - 2 %    Immature Grans % 0.5 0.0 - 1.0 %    nRBC 0 0 /100(WBC)    Neutrophils Absolute 6.45 2.0 - 8.8 10*3/uL    Lymphocytes Absolute 3.39 0.7 - 5.5 10*3/uL    Monocytes Absolute 0.67 0.0 - 1.7 10*3/uL    Eosinophils Absolute 0.36 0.0 - 0.8 10*3/uL    Basophils Absolute 0.05 0.0 - 0.2 10*3/uL    Immature Grans, Absolute 0.06 0.00 - 0.10 10*3/uL   PROTIME-INR    Collection Time: 05/31/21  6:45 PM    Specimen: Fresh Frozen Plasma    Specimen type and source: Plasma, Blood   Result Value Ref Range    Protime 12.0 10.3 - 13.3 s    INR 1.0 INR   APTT    Collection Time: 05/31/21  6:45 PM    Specimen: Fresh Frozen Plasma    Specimen type and source: Plasma, Blood   Result Value Ref Range    PTT 33.5 25.1 - 36.5 s   TYPE AND SCREEN    Collection Time: 05/31/21  6:45 PM    Specimen type and source: Whole Blood, Blood   Result Value Ref Range    ABORh O Positive     Antibody Screen Negative     Crossmatch Expiration 06/03/2021    VITAMIN B12    Collection Time: 06/01/21  3:52 AM    Specimen type and source: Serum, Blood   Result Value Ref Range    Vitamin B-12 381 213 - 816 pg/mL   HEMOGLOBIN A1C    Collection Time: 06/01/21  3:52 AM    Specimen type and source: Whole Blood, Blood   Result Value Ref Range    Hemoglobin A1C 6.7 (H) 4.3 - 5.6 %    Mean Bld Glu Estim. 146 mg/dL   TROPONIN (IN-HOUSE)    Collection Time: 06/01/21  3:52 AM    Specimen: Fresh Frozen Plasma    Specimen type and source: Plasma, Blood   Result Value Ref Range    Troponin I <0.010 0.000 - 0.034 ng/mL   CBC AND DIFFERENTIAL    Collection Time: 06/01/21  3:52 AM    Specimen type and source: Whole Blood, Blood   Result Value Ref Range    WBC 9.56 4.5 - 11.0 10*3/uL    RBC 5.17 4.0 - 5.2 10*6/uL    Hemoglobin 12.1 12.0 - 16.0 g/dL    Hematocrit 40.7 36.0 - 46.0 %    MCV 78.7 (L) 80.0 - 100.0 fL    MCH 23.4 (L) 26.0 - 34.0 pg    MCHC 29.7 (L) 31.0 - 37.0 g/dL    RDW 18.0 (H) 12.0 - 16.8 %     Platelets 323 140 - 440 10*3/uL    MPV 9.9 8.4 - 12.4 fL    Differential Type Hospital CBC w/AutoDiff (arb'U)    Neutrophil Rel % 56.3 45 - 80 %    Lymphocyte Rel % 35.1 15 - 50 %    Monocyte Rel % 5.1 0 - 15 %    Eosinophil % 2.8 0 - 7 %    Basophil Rel % 0.3 0 - 2 %    Immature Grans % 0.4 0.0 - 1.0 %    nRBC 0 0 /100(WBC)    Neutrophils Absolute 5.37 2.0 - 8.8 10*3/uL    Lymphocytes Absolute 3.36 0.7 - 5.5 10*3/uL    Monocytes Absolute 0.49 0.0 - 1.7 10*3/uL    Eosinophils Absolute 0.27 0.0 - 0.8 10*3/uL    Basophils Absolute 0.03 0.0 - 0.2 10*3/uL    Immature Grans, Absolute 0.04 0.00 - 0.10 10*3/uL   CBC AND DIFFERENTIAL    Collection Time: 06/03/21  6:45 AM    Specimen type and source: Whole Blood, Blood   Result Value Ref Range    WBC 7.86 4.5 - 11.0 10*3/uL    RBC 4.63 4.0 - 5.2 10*6/uL    Hemoglobin 11.0 (L) 12.0 - 16.0 g/dL    Hematocrit 35.7 (L) 36.0 - 46.0 %    MCV 77.1 (L) 80.0 - 100.0 fL    MCH 23.8 (L) 26.0 - 34.0 pg    MCHC 30.8 (L) 31.0 - 37.0 g/dL    RDW 17.6 (H) 12.0 - 16.8 %    Platelets 260 140 - 440 10*3/uL    MPV 9.8 8.4 - 12.4 fL    Differential Type Hospital CBC w/AutoDiff (arb'U)    Neutrophil Rel % 62.7 45 - 80 %    Lymphocyte Rel % 28.1 15 - 50 %    Monocyte Rel % 5.7 0 - 15 %    Eosinophil % 2.3 0 - 7 %    Basophil Rel % 0.6 0 - 2 %    Immature Grans % 0.6 0.0 - 1.0 %    nRBC 0 0 /100(WBC)    Neutrophils Absolute 4.92 2.0 - 8.8 10*3/uL    Lymphocytes Absolute 2.21 0.7 - 5.5 10*3/uL    Monocytes Absolute 0.45 0.0 - 1.7 10*3/uL    Eosinophils Absolute 0.18 0.0 - 0.8 10*3/uL    Basophils Absolute 0.05 0.0 - 0.2 10*3/uL    Immature Grans, Absolute 0.05 0.00 - 0.10 10*3/uL   CBC AND DIFFERENTIAL    Collection Time: 06/04/21  3:51 AM    Specimen type and source: Whole Blood, Blood   Result Value Ref Range    WBC 7.52 4.5 - 11.0 10*3/uL    RBC 4.53 4.0 - 5.2 10*6/uL    Hemoglobin 10.8 (L) 12.0 - 16.0 g/dL    Hematocrit 35.7 (L) 36.0 - 46.0 %    MCV 78.8 (L) 80.0 - 100.0 fL    MCH 23.8 (L) 26.0  - 34.0 pg    MCHC 30.3 (L) 31.0 - 37.0 g/dL    RDW 17.6 (H) 12.0 - 16.8 %    Platelets 276 140 - 440 10*3/uL    MPV 10.3 8.4 - 12.4 fL    Differential Type Hospital CBC w/AutoDiff (arb'U)    Neutrophil Rel % 57.2 45 - 80 %    Lymphocyte Rel % 32.4 15 - 50 %    Monocyte Rel % 6.8 0 - 15 %    Eosinophil % 2.8 0 - 7 %    Basophil Rel % 0.3 0 - 2 %    Immature Grans % 0.5 0.0 - 1.0 %    nRBC 0 0 /100(WBC)    Neutrophils Absolute 4.30 2.0 - 8.8 10*3/uL    Lymphocytes Absolute 2.44 0.7 - 5.5 10*3/uL    Monocytes Absolute 0.51 0.0 - 1.7 10*3/uL    Eosinophils Absolute 0.21 0.0 - 0.8 10*3/uL    Basophils Absolute 0.02 0.0 - 0.2 10*3/uL    Immature Grans, Absolute 0.04 0.00 - 0.10 10*3/uL       Physical Exam  Vitals and nursing note reviewed.   Constitutional:       Appearance: Normal appearance. She is well-developed and well-groomed. She is morbidly obese.      Comments: Morbid exogenous obesity with a BMI of 43   HENT:      Head: Normocephalic and atraumatic.      Right Ear: Hearing, tympanic membrane, ear canal and external ear normal.      Left Ear: Ear canal and external ear normal. A middle ear effusion is present. Tympanic membrane is erythematous.      Nose:      Right Turbinates: Enlarged and swollen.      Left Turbinates: Enlarged and swollen.      Comments: Turbinates with increased erythema     Mouth/Throat:      Lips: Pink.      Mouth: Mucous membranes are moist.      Pharynx: Uvula midline. Oropharyngeal exudate present.   Neck:      Thyroid: No thyroid mass or thyromegaly.      Vascular: Normal carotid pulses. No carotid bruit.      Trachea: Trachea and phonation normal.   Cardiovascular:      Rate and Rhythm: Normal rate and regular rhythm.      Heart sounds: Normal heart sounds. No murmur heard.   No friction rub. No gallop.    Pulmonary:      Effort: Pulmonary effort is normal. No respiratory distress.      Breath sounds: Normal breath sounds. No decreased breath sounds, wheezing, rhonchi or rales.    Musculoskeletal:      Cervical back: Neck supple.   Lymphadenopathy:      Cervical: No cervical adenopathy.   Skin:     General: Skin is warm and dry.      Findings: No rash.   Neurological:      Mental Status: She is alert and oriented to person, place, and time.   Psychiatric:         Attention and Perception: Attention and perception normal.         Mood and Affect: Mood and affect normal.         Speech: Speech normal.         Behavior: Behavior normal. Behavior is cooperative.         Thought Content: Thought content normal.         Cognition and Memory: Cognition and memory normal.         Judgment: Judgment normal.         Assessment/Plan   Diagnoses and all orders for this visit:    1. Acute non-recurrent maxillary sinusitis (Primary)  -     amoxicillin-clavulanate (Augmentin) 875-125 MG per tablet; Take 1 tablet by mouth 2 (Two) Times a Day.  Dispense: 20 tablet; Refill: 0    2. Dysfunction of left eustachian tube  -     amoxicillin-clavulanate (Augmentin) 875-125 MG per tablet; Take 1 tablet by mouth 2 (Two) Times a Day.  Dispense: 20 tablet; Refill: 0    3. Cerebrovascular accident (CVA) due to occlusion of right carotid artery (CMS/HCC)        Return if symptoms worsen or fail to improve, for Next scheduled follow up.

## 2021-06-18 DIAGNOSIS — K21.9 GASTROESOPHAGEAL REFLUX DISEASE WITHOUT ESOPHAGITIS: ICD-10-CM

## 2021-06-18 RX ORDER — PANTOPRAZOLE SODIUM 40 MG/1
TABLET, DELAYED RELEASE ORAL
Qty: 90 TABLET | Refills: 1 | Status: SHIPPED | OUTPATIENT
Start: 2021-06-18 | End: 2021-12-16

## 2021-06-23 LAB
25(OH)D3+25(OH)D2 SERPL-MCNC: 28.1 NG/ML (ref 30–100)
ALBUMIN SERPL-MCNC: 4.1 G/DL (ref 3.5–5.2)
ALBUMIN/GLOB SERPL: 1.6 G/DL
ALP SERPL-CCNC: 116 U/L (ref 39–117)
ALT SERPL-CCNC: 13 U/L (ref 1–33)
AST SERPL-CCNC: 12 U/L (ref 1–32)
BASOPHILS # BLD AUTO: 0.03 10*3/MM3 (ref 0–0.2)
BASOPHILS NFR BLD AUTO: 0.4 % (ref 0–1.5)
BILIRUB SERPL-MCNC: 0.3 MG/DL (ref 0–1.2)
BUN SERPL-MCNC: 23 MG/DL (ref 8–23)
BUN/CREAT SERPL: 25.8 (ref 7–25)
CALCIUM SERPL-MCNC: 8.8 MG/DL (ref 8.6–10.5)
CHLORIDE SERPL-SCNC: 103 MMOL/L (ref 98–107)
CHOLEST SERPL-MCNC: 139 MG/DL (ref 0–200)
CO2 SERPL-SCNC: 28.8 MMOL/L (ref 22–29)
CREAT SERPL-MCNC: 0.89 MG/DL (ref 0.57–1)
EOSINOPHIL # BLD AUTO: 0.16 10*3/MM3 (ref 0–0.4)
EOSINOPHIL NFR BLD AUTO: 1.9 % (ref 0.3–6.2)
ERYTHROCYTE [DISTWIDTH] IN BLOOD BY AUTOMATED COUNT: 16.9 % (ref 12.3–15.4)
GLOBULIN SER CALC-MCNC: 2.5 GM/DL
GLUCOSE SERPL-MCNC: 71 MG/DL (ref 65–99)
HBA1C MFR BLD: 6.3 % (ref 4.8–5.6)
HCT VFR BLD AUTO: 34.9 % (ref 34–46.6)
HDLC SERPL-MCNC: 33 MG/DL (ref 40–60)
HGB BLD-MCNC: 11.1 G/DL (ref 12–15.9)
IMM GRANULOCYTES # BLD AUTO: 0.04 10*3/MM3 (ref 0–0.05)
IMM GRANULOCYTES NFR BLD AUTO: 0.5 % (ref 0–0.5)
LDLC SERPL CALC-MCNC: 80 MG/DL (ref 0–100)
LYMPHOCYTES # BLD AUTO: 2.61 10*3/MM3 (ref 0.7–3.1)
LYMPHOCYTES NFR BLD AUTO: 31.3 % (ref 19.6–45.3)
MCH RBC QN AUTO: 24 PG (ref 26.6–33)
MCHC RBC AUTO-ENTMCNC: 31.8 G/DL (ref 31.5–35.7)
MCV RBC AUTO: 75.5 FL (ref 79–97)
MONOCYTES # BLD AUTO: 0.43 10*3/MM3 (ref 0.1–0.9)
MONOCYTES NFR BLD AUTO: 5.2 % (ref 5–12)
NEUTROPHILS # BLD AUTO: 5.07 10*3/MM3 (ref 1.7–7)
NEUTROPHILS NFR BLD AUTO: 60.7 % (ref 42.7–76)
NRBC BLD AUTO-RTO: 0.1 /100 WBC (ref 0–0.2)
PLATELET # BLD AUTO: 323 10*3/MM3 (ref 140–450)
POTASSIUM SERPL-SCNC: 4 MMOL/L (ref 3.5–5.2)
PROT SERPL-MCNC: 6.6 G/DL (ref 6–8.5)
RBC # BLD AUTO: 4.62 10*6/MM3 (ref 3.77–5.28)
SODIUM SERPL-SCNC: 140 MMOL/L (ref 136–145)
TRIGL SERPL-MCNC: 145 MG/DL (ref 0–150)
TSH SERPL DL<=0.005 MIU/L-ACNC: 1.6 UIU/ML (ref 0.27–4.2)
VLDLC SERPL CALC-MCNC: 26 MG/DL (ref 5–40)
WBC # BLD AUTO: 8.34 10*3/MM3 (ref 3.4–10.8)

## 2021-06-29 ENCOUNTER — OFFICE VISIT (OUTPATIENT)
Dept: FAMILY MEDICINE CLINIC | Facility: CLINIC | Age: 66
End: 2021-06-29

## 2021-06-29 VITALS
BODY MASS INDEX: 43 KG/M2 | OXYGEN SATURATION: 96 % | HEIGHT: 60 IN | TEMPERATURE: 97.3 F | WEIGHT: 219 LBS | DIASTOLIC BLOOD PRESSURE: 74 MMHG | SYSTOLIC BLOOD PRESSURE: 126 MMHG | HEART RATE: 90 BPM

## 2021-06-29 DIAGNOSIS — E55.9 VITAMIN D DEFICIENCY: ICD-10-CM

## 2021-06-29 DIAGNOSIS — F17.210 CIGARETTE NICOTINE DEPENDENCE WITHOUT COMPLICATION: ICD-10-CM

## 2021-06-29 DIAGNOSIS — E03.9 ACQUIRED HYPOTHYROIDISM: ICD-10-CM

## 2021-06-29 DIAGNOSIS — E11.9 CONTROLLED TYPE 2 DIABETES MELLITUS WITHOUT COMPLICATION, WITHOUT LONG-TERM CURRENT USE OF INSULIN (HCC): ICD-10-CM

## 2021-06-29 DIAGNOSIS — F41.9 ANXIETY: ICD-10-CM

## 2021-06-29 DIAGNOSIS — I10 ESSENTIAL HYPERTENSION: ICD-10-CM

## 2021-06-29 DIAGNOSIS — E11.319 TYPE 2 DIABETES MELLITUS WITH RETINOPATHY WITHOUT MACULAR EDEMA, WITHOUT LONG-TERM CURRENT USE OF INSULIN, UNSPECIFIED LATERALITY, UNSPECIFIED RETINOPATHY SEVERITY (HCC): ICD-10-CM

## 2021-06-29 DIAGNOSIS — I65.23 ATHEROSCLEROSIS OF BOTH CAROTID ARTERIES: ICD-10-CM

## 2021-06-29 DIAGNOSIS — E66.01 MORBID EXOGENOUS OBESITY (HCC): ICD-10-CM

## 2021-06-29 DIAGNOSIS — F41.0 PANIC DISORDER: ICD-10-CM

## 2021-06-29 DIAGNOSIS — Z00.00 MEDICARE ANNUAL WELLNESS VISIT, SUBSEQUENT: Primary | ICD-10-CM

## 2021-06-29 DIAGNOSIS — K21.9 GASTROESOPHAGEAL REFLUX DISEASE WITHOUT ESOPHAGITIS: ICD-10-CM

## 2021-06-29 DIAGNOSIS — I77.9 PERIPHERAL ARTERIAL OCCLUSIVE DISEASE (HCC): ICD-10-CM

## 2021-06-29 DIAGNOSIS — E78.2 MIXED HYPERLIPIDEMIA: ICD-10-CM

## 2021-06-29 PROCEDURE — G0439 PPPS, SUBSEQ VISIT: HCPCS | Performed by: FAMILY MEDICINE

## 2021-06-29 PROCEDURE — 99214 OFFICE O/P EST MOD 30 MIN: CPT | Performed by: FAMILY MEDICINE

## 2021-06-29 RX ORDER — CITALOPRAM 20 MG/1
20 TABLET ORAL DAILY
Qty: 90 TABLET | Refills: 1 | Status: SHIPPED | OUTPATIENT
Start: 2021-06-29 | End: 2022-01-03 | Stop reason: SDUPTHER

## 2021-06-29 RX ORDER — BUPROPION HYDROCHLORIDE 300 MG/1
300 TABLET ORAL EVERY MORNING
Qty: 30 TABLET | Refills: 5 | Status: SHIPPED | OUTPATIENT
Start: 2021-06-29 | End: 2022-01-03

## 2021-06-29 RX ORDER — LEVOTHYROXINE SODIUM 0.07 MG/1
75 TABLET ORAL DAILY
Qty: 90 TABLET | Refills: 1 | Status: SHIPPED | OUTPATIENT
Start: 2021-06-29 | End: 2022-01-03 | Stop reason: SDUPTHER

## 2021-06-29 RX ORDER — HYDROCHLOROTHIAZIDE 25 MG/1
25 TABLET ORAL DAILY
Qty: 90 TABLET | Refills: 1 | Status: SHIPPED | OUTPATIENT
Start: 2021-06-29 | End: 2022-01-03 | Stop reason: SDUPTHER

## 2021-06-29 NOTE — PROGRESS NOTES
QUICK REFERENCE INFORMATION:  The ABCs of Providing the Annual Wellness Visit   CMS.[employer] Subsequent Wellness Visit      Subjective   History of Present Illness    Rosalee Hargrove is a 65 y.o. female who presents for an Subsequent Wellness Visit. In addition, we addressed the following health issues: 65-year-old white female with multiple medical problems here for further medical management.  Fasting labs have been acquired prior to this visit.  She has extensive cardiovascular disease as well as coronary artery disease.  She also has a history of hypertension and hyperlipidemia.  There is also history of hypothyroidism, vitamin D deficiency as well as type II non-insulin-dependent diabetes mellitus.  She has long history of morbid exogenous obesity, GERD and has issues with panic disorder/anxiety.  And is just recently status post CVA.  She also has a history of moyamoya.  She has known history of peripheral arterial disease, carotid artery disease as well as coronary artery disease.  Patient continues to smoke cigarettes and states that she has cut down to below 1 pack of cigarettes per day.  She realizes that she needs to quit but has had difficulty tolerating Chantix in the past.  This caused worsening depression and anxiety.  She has been trying generic nicotine patches which have not worked and that they do not stick well.  She has inquired about the use of Wellbutrin and has also discussed acquiring brand-name nicotine patches medications are multiple and are as listed.  All medications are used on a regular basis and are well-tolerated without side effects.  In regards to her diabetes she is only using Metformin having run out of the Jardiance and not refilled this.      PMH, PSH, SocHx, FamHx, Allergies, and Medications: Reviewed and updated in the Visit Navigator.     Outpatient Medications Prior to Visit   Medication Sig Dispense Refill   • aspirin 81 MG chewable tablet Chew 81  mg daily.     • atorvastatin (LIPITOR) 40 MG tablet Take 1 tablet by mouth Daily. 90 tablet 3   • clopidogrel (PLAVIX) 75 MG tablet Take 75 mg by mouth.     • fluocinolone acetonide (DERMOTIC) 0.01 % oil otic oil LOCATION  BOTH EARS. 4 5 DROPS IN AFFECTED EAR(S) ONCE DAILY X 2 WEEKS, THEN 2 3 X A WEEK AS NEEDED     • fluticasone (Flonase) 50 MCG/ACT nasal spray 2 sprays into the nostril(s) as directed by provider Daily.     • glimepiride (AMARYL) 2 MG tablet Take 1 tablet by mouth Every Morning Before Breakfast. 90 tablet 1   • lisinopril (PRINIVIL,ZESTRIL) 40 MG tablet Take 40 mg by mouth Daily.     • mupirocin (BACTROBAN) 2 % ointment APPLY PEA SIZE DROPLET TO QTIP AND APPLY TWICE DAILY FOR 7 DAYS     • pantoprazole (PROTONIX) 40 MG EC tablet TAKE 1 TABLET BY MOUTH EVERY DAY 90 tablet 1   • citalopram (CeleXA) 20 MG tablet Take 1 tablet by mouth Daily. 90 tablet 1   • hydroCHLOROthiazide (HYDRODIURIL) 25 MG tablet Take 1 tablet by mouth Daily. 90 tablet 1   • JARDIANCE 10 MG tablet Take 10 mg by mouth Daily. 30 tablet 0   • levothyroxine (SYNTHROID, LEVOTHROID) 75 MCG tablet Take 1 tablet by mouth Daily. 90 tablet 1   • metFORMIN (GLUCOPHAGE) 1000 MG tablet Take 1 tablet by mouth 2 (Two) Times a Day With Meals. 180 tablet 1   • amoxicillin-clavulanate (Augmentin) 875-125 MG per tablet Take 1 tablet by mouth 2 (Two) Times a Day. 20 tablet 0     No facility-administered medications prior to visit.       Patient Active Problem List   Diagnosis   • Mixed hyperlipidemia   • Essential hypertension   • Acquired hypothyroidism   • Vitamin D deficiency   • Morbid exogenous obesity (CMS/HCC)   • Nicotine dependence, uncomplicated   • Atopic rhinitis   • Anxiety   • Gastroesophageal reflux disease without esophagitis   • Panic disorder   • Peripheral arterial occlusive disease (CMS/HCC)   • Peripheral edema   • Calculus of kidney   • CAD (coronary artery disease)   • Carotid atherosclerosis   • Moyamoya disease   •  Atherosclerosis of aorta (CMS/[facility])   • Type 2 diabetes mellitus 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 up to date  Eye Exam. up to date  Exercise: 4 times/week.  Current exercise activities include: walking    Social:  See review in SnapShot activity and in SocHx section of Visit Navigator.    Health Risk Assessment:  The patient has completed a Health Risk Assessment. This has been reviewed with them and has been scanned into [employer] as a separate document.    Current Medical Providers:  Patient Care Team:  Eddie Slater DO as PCP - General    The [facility] providers who are involved in the care of this patient are listed above. Additional providers and suppliers are listed below:  Cardiology, neurology urology    Recent Hospitalizations:  Recently treated at the following:  Other: [facility].    Age-appropriate Screening Schedule:  Refer to the list below for future screening recommendations based on patient's age. Orders for these recommended tests are listed in the plan section. The patient has been provided with a written plan.    Health Maintenance   Topic Date Due   • DXA SCAN  Never done   • COVID-19 Vaccine (1) Never done   • Pneumococcal Vaccine 65+ (2 of 2 - PPSV23) 10/15/2020   • DIABETIC FOOT EXAM  10/29/2020   • URINE MICROALBUMIN  10/29/2020   • INFLUENZA VACCINE  08/01/2021   • HEMOGLOBIN A1C  12/22/2021   • DIABETIC EYE EXAM  05/04/2022   • LIPID PANEL  06/22/2022   • ANNUAL WELLNESS VISIT  06/29/2022   • COLORECTAL CANCER SCREENING  08/14/2028   • HEPATITIS C SCREENING  Addressed   • MAMMOGRAM  Discontinued   • PAP SMEAR  Discontinued   • TDAP/TD VACCINES  Discontinued   • ZOSTER VACCINE  Discontinued       Depression Screen:   PHQ-2/PHQ-9 Depression Screening 6/29/2021   Little interest or pleasure in doing things 0   Feeling down, depressed, or hopeless  1   Total Score 1       Functional and Cognitive Screening:  Functional & Cognitive Status 6/29/2021   Do you have difficulty preparing food and eating? No   Do you have difficulty bathing yourself, getting dressed or grooming yourself? No   Do you have difficulty using the toilet? No   Do you have difficulty moving around from place to place? No   Do you have trouble with steps or getting out of a bed or a chair? No   Current Diet Well Balanced Diet   Dental Exam Up to date   Eye Exam Up to date   Exercise (times per week) 4 times per week   Current Exercises Include Walking   Do you need help using the phone?  No   Are you deaf or do you have serious difficulty hearing?  No   Do you need help with transportation? No   Do you need help shopping? No   Do you need help preparing meals?  No   Do you need help with housework?  No   Do you need help with laundry? No   Do you need help taking your medications? No   Do you need help managing money? No   Do you ever drive or ride in a car without wearing a seat belt? No   Have you felt unusual stress, anger or loneliness in the last month? No   Who do you live with? Spouse   If you need help, do you have trouble finding someone available to you? No   Do you have difficulty concentrating, remembering or making decisions? No       Does the patient have evidence of cognitive impairment? No    Identification of Risk Factors:  Risk factors include: Advance Directive Discussion  Breast Cancer/Mammogram Screening  Cardiovascular risk  Chronic Pain   Colon Cancer Screening  Dementia/Memory   Depression/Dysphoria  Diabetic Lab Screening   Fall Risk  Hearing Problem  Immunizations Discussed/Encouraged (specific immunizations; Tdap, Influenza, Pneumococcal 23 and Shingrix )  Inactivity/Sedentary  Obesity/Overweight   Osteoporosis Risk  Polypharmacy.    Review of Systems   Constitutional:        Morbid exogenous obesity   Cardiovascular:        Hypertension, hyperlipidemia, CAD,  "carotid artery disease, PAD   Gastrointestinal:        GERD   Endocrine:        Type II non-insulin-dependent diabetes mellitus, hypothyroidism   Musculoskeletal: Positive for arthralgias and back pain.   Allergic/Immunologic: Positive for environmental allergies.   Psychiatric/Behavioral: Positive for dysphoric mood. The patient is nervous/anxious.    All other systems reviewed and are negative.      Pertinent items are noted in HPI.    Objective     Vitals:    06/29/21 0907   BP: 126/74   Pulse: 90   Temp: 97.3 °F (36.3 °C)   SpO2: 96%   Weight: 99.3 kg (219 lb)   Height: 152.4 cm (60\")   Physical Exam  Vitals and nursing note reviewed.   Constitutional:       Appearance: Normal appearance. She is well-developed and well-groomed. She is morbidly obese.      Comments: Morbid exogenous obesity with a BMI of 42.8   HENT:      Head: Normocephalic and atraumatic.   Neck:      Thyroid: No thyroid mass or thyromegaly.      Vascular: Normal carotid pulses. No carotid bruit.      Trachea: Trachea and phonation normal.   Cardiovascular:      Rate and Rhythm: Normal rate and regular rhythm.      Heart sounds: Normal heart sounds. No murmur heard.   No friction rub. No gallop.    Pulmonary:      Effort: Pulmonary effort is normal. No respiratory distress.      Breath sounds: Normal breath sounds. No decreased breath sounds, wheezing, rhonchi or rales.   Musculoskeletal:      Cervical back: Neck supple.   Lymphadenopathy:      Cervical: No cervical adenopathy.   Skin:     General: Skin is warm and dry.      Findings: No rash.   Neurological:      Mental Status: She is alert and oriented to person, place, and time.   Psychiatric:         Attention and Perception: Attention and perception normal.         Mood and Affect: Mood and affect normal.         Speech: Speech normal.         Behavior: Behavior normal. Behavior is cooperative.         Thought Content: Thought content normal.         Cognition and Memory: Cognition and " memory normal.         Judgment: Judgment normal.       Orders Only on 06/14/2021   Component Date Value Ref Range Status   • WBC 06/22/2021 8.34  3.40 - 10.80 10*3/mm3 Final   • RBC 06/22/2021 4.62  3.77 - 5.28 10*6/mm3 Final   • Hemoglobin 06/22/2021 11.1* 12.0 - 15.9 g/dL Final   • Hematocrit 06/22/2021 34.9  34.0 - 46.6 % Final   • MCV 06/22/2021 75.5* 79.0 - 97.0 fL Final   • MCH 06/22/2021 24.0* 26.6 - 33.0 pg Final   • MCHC 06/22/2021 31.8  31.5 - 35.7 g/dL Final   • RDW 06/22/2021 16.9* 12.3 - 15.4 % Final   • Platelets 06/22/2021 323  140 - 450 10*3/mm3 Final   • Neutrophil Rel % 06/22/2021 60.7  42.7 - 76.0 % Final   • Lymphocyte Rel % 06/22/2021 31.3  19.6 - 45.3 % Final   • Monocyte Rel % 06/22/2021 5.2  5.0 - 12.0 % Final   • Eosinophil Rel % 06/22/2021 1.9  0.3 - 6.2 % Final   • Basophil Rel % 06/22/2021 0.4  0.0 - 1.5 % Final   • Neutrophils Absolute 06/22/2021 5.07  1.70 - 7.00 10*3/mm3 Final   • Lymphocytes Absolute 06/22/2021 2.61  0.70 - 3.10 10*3/mm3 Final   • Monocytes Absolute 06/22/2021 0.43  0.10 - 0.90 10*3/mm3 Final   • Eosinophils Absolute 06/22/2021 0.16  0.00 - 0.40 10*3/mm3 Final   • Basophils Absolute 06/22/2021 0.03  0.00 - 0.20 10*3/mm3 Final   • Immature Granulocyte Rel % 06/22/2021 0.5  0.0 - 0.5 % Final   • Immature Grans Absolute 06/22/2021 0.04  0.00 - 0.05 10*3/mm3 Final   • nRBC 06/22/2021 0.1  0.0 - 0.2 /100 WBC Final   • 25 Hydroxy, Vitamin D 06/22/2021 28.1* 30.0 - 100.0 ng/ml Final    Comment: Results may be falsely increased if patient taking Biotin.  Reference Range for Total Vitamin D 25(OH)  Deficiency <20.0 ng/mL  Insufficiency 21-29 ng/mL  Sufficiency  ng/mL  Toxicity >100 ng/ml     • TSH 06/22/2021 1.600  0.270 - 4.200 uIU/mL Final   • Glucose 06/22/2021 71  65 - 99 mg/dL Final   • BUN 06/22/2021 23  8 - 23 mg/dL Final   • Creatinine 06/22/2021 0.89  0.57 - 1.00 mg/dL Final   • eGFR Non  Am 06/22/2021 63  >60 mL/min/1.73 Final    Comment: GFR Normal  >60  Chronic Kidney Disease <60  Kidney Failure <15     • eGFR  Am 06/22/2021 77  >60 mL/min/1.73 Final   • BUN/Creatinine Ratio 06/22/2021 25.8* 7.0 - 25.0 Final   • Sodium 06/22/2021 140  136 - 145 mmol/L Final   • Potassium 06/22/2021 4.0  3.5 - 5.2 mmol/L Final   • Chloride 06/22/2021 103  98 - 107 mmol/L Final   • Total CO2 06/22/2021 28.8  22.0 - 29.0 mmol/L Final   • Calcium 06/22/2021 8.8  8.6 - 10.5 mg/dL Final   • Total Protein 06/22/2021 6.6  6.0 - 8.5 g/dL Final   • Albumin 06/22/2021 4.10  3.50 - 5.20 g/dL Final   • Globulin 06/22/2021 2.5  gm/dL Final   • A/G Ratio 06/22/2021 1.6  g/dL Final   • Total Bilirubin 06/22/2021 0.3  0.0 - 1.2 mg/dL Final   • Alkaline Phosphatase 06/22/2021 116  39 - 117 U/L Final   • AST (SGOT) 06/22/2021 12  1 - 32 U/L Final   • ALT (SGPT) 06/22/2021 13  1 - 33 U/L Final   • Hemoglobin A1C 06/22/2021 6.30* 4.80 - 5.60 % Final    Comment: Hemoglobin A1C Ranges:  Increased Risk for Diabetes  5.7% to 6.4%  Diabetes                     >= 6.5%  Diabetic Goal                < 7.0%     • Total Cholesterol 06/22/2021 139  0 - 200 mg/dL Final    Comment: Cholesterol Reference Ranges  (U.S. Department of Health and Human Services ATP III  Classifications)  Desirable          <200 mg/dL  Borderline High    200-239 mg/dL  High Risk          >240 mg/dL  Triglyceride Reference Ranges  (U.S. Department of Health and Human Services ATP III  Classifications)  Normal           <150 mg/dL  Borderline High  150-199 mg/dL  High             200-499 mg/dL  Very High        >500 mg/dL  HDL Reference Ranges  (U.S. Department of Health and Human Services ATP III  Classifcations)  Low     <40 mg/dl (major risk factor for CHD)  High    >60 mg/dl ('negative' risk factor for CHD)  LDL Reference Ranges  (U.S. Department of Health and Human Services ATP III  Classifcations)  Optimal          <100 mg/dL  Near Optimal     100-129 mg/dL  Borderline High  130-159 mg/dL  High             160-189  mg/dL  Very High        >189 mg/dL     • Triglycerides 06/22/2021 145  0 - 150 mg/dL Final   • HDL Cholesterol 06/22/2021 33* 40 - 60 mg/dL Final   • VLDL Cholesterol [place] 06/22/2021 26  5 - 40 mg/dL Final   • LDL Chol Calc ([facility]) 06/22/2021 80  0 - 100 mg/dL Final         Body mass index is 42.77 kg/m².    Assessment/Plan   Patient Self-Management and Personalized Health Advice  The patient has been provided with information about: diet, exercise, weight management and prevention of cardiac or vascular disease and preventive services including:   · Annual Wellness Visit (AWV)  · Bone Density Measurements  · Cardiovascular Disease Screening Tests (may do this order every 5 years in beneficiaries without signs or symptoms of cardiovascular disease)  · Colorectal Cancer Screening, Colonoscopy  · Diabetes Self-Management Training (DSMT)   · Influenza Vaccine and Administration  · Pneumococcal Vaccine and Administration  · Screening Mammography .    Discussed the patient's BMI with her. The BMI is above average; no BMI management plan is appropriate..    Orders:  Orders Placed This Encounter   Procedures   • Hemoglobin A1c   • Comprehensive metabolic panel   • TSH   • Vitamin D 25 hydroxy   • Lipid panel   • CBC w AUTO Differential       Follow Up:  Return in about 6 months (around 12/29/2021) for Recheck.     An After Visit Summary and PPPS with all of these plans were given to the patient.

## 2021-07-19 DIAGNOSIS — I25.10 ATHEROSCLEROTIC HEART DISEASE OF NATIVE CORONARY ARTERY WITHOUT ANGINA PECTORIS: ICD-10-CM

## 2021-07-19 DIAGNOSIS — I10 ESSENTIAL (PRIMARY) HYPERTENSION: ICD-10-CM

## 2021-07-19 DIAGNOSIS — I77.9 DISORDER OF ARTERIES AND ARTERIOLES, UNSPECIFIED (HCC): ICD-10-CM

## 2021-07-19 RX ORDER — CLOPIDOGREL BISULFATE 75 MG/1
TABLET ORAL
Qty: 90 TABLET | Refills: 1 | Status: SHIPPED | OUTPATIENT
Start: 2021-07-19 | End: 2022-01-03 | Stop reason: SDUPTHER

## 2021-07-19 RX ORDER — LISINOPRIL 40 MG/1
TABLET ORAL
Qty: 90 TABLET | Refills: 1 | Status: SHIPPED | OUTPATIENT
Start: 2021-07-19 | End: 2021-08-19 | Stop reason: SINTOL

## 2021-08-19 ENCOUNTER — OFFICE VISIT (OUTPATIENT)
Dept: FAMILY MEDICINE CLINIC | Facility: CLINIC | Age: 66
End: 2021-08-19

## 2021-08-19 VITALS
TEMPERATURE: 97.7 F | HEART RATE: 90 BPM | DIASTOLIC BLOOD PRESSURE: 76 MMHG | SYSTOLIC BLOOD PRESSURE: 130 MMHG | HEIGHT: 60 IN | OXYGEN SATURATION: 95 % | BODY MASS INDEX: 42.68 KG/M2 | WEIGHT: 217.4 LBS

## 2021-08-19 DIAGNOSIS — I10 ESSENTIAL HYPERTENSION: Primary | ICD-10-CM

## 2021-08-19 PROCEDURE — 99213 OFFICE O/P EST LOW 20 MIN: CPT | Performed by: FAMILY MEDICINE

## 2021-08-19 RX ORDER — AMLODIPINE BESYLATE 5 MG/1
5 TABLET ORAL DAILY
Qty: 30 TABLET | Refills: 1 | Status: SHIPPED | OUTPATIENT
Start: 2021-08-19 | End: 2021-09-15 | Stop reason: SDUPTHER

## 2021-08-22 NOTE — PROGRESS NOTES
Subjective   Rosalee Hargrove is a 65 y.o. female with   Chief Complaint   Patient presents with   • Hypertension     Discuss change BP meds    .    History of Present Illness   65-year-old white female with severe vascular disease as well as essential hypertension here for further medical management.  Medications are multiple and are as listed.  She has recently seen her cardiologist and has been found stable.  She does have a known history of CAD, carotid atherosclerosis as well as atherosclerosis of the aorta.  There is also peripheral arterial occlusive disease as well as hyperlipidemia.  She is a type II non-insulin-dependent diabetic and also suffers from moyamoya disease.  She has had a recent CVA.  She continues to use tobacco in the form of cigarettes but is trying to reduce this use.  CAD, PAD, CAD, PAD, carotid artery disease, atherosclerosis of the aorta, hypertension, hyperlipidemia.  CAD CAD  The following portions of the patient's history were reviewed and updated as appropriate: allergies, current medications, past family history, past medical history, past social history, past surgical history and problem list.    Review of Systems   Cardiovascular:        CAD,PAD,S/P CVA       Objective     Vitals:    08/19/21 1437   BP: 130/76   Pulse: 90   Temp: 97.7 °F (36.5 °C)   SpO2: 95%       No results found for this or any previous visit (from the past 672 hour(s)).    Physical Exam  Vitals and nursing note reviewed.   Constitutional:       Appearance: Normal appearance. She is well-developed and well-groomed. She is morbidly obese.      Comments: Morbid exogenous obesity   HENT:      Head: Normocephalic and atraumatic.   Neck:      Thyroid: No thyroid mass or thyromegaly.      Vascular: Normal carotid pulses. No carotid bruit.      Trachea: Trachea and phonation normal.   Cardiovascular:      Rate and Rhythm: Normal rate and regular rhythm.      Heart sounds: Normal heart sounds. No murmur heard.   No  friction rub. No gallop.    Pulmonary:      Effort: Pulmonary effort is normal. No respiratory distress.      Breath sounds: Normal breath sounds. No decreased breath sounds, wheezing, rhonchi or rales.   Musculoskeletal:      Cervical back: Neck supple.   Lymphadenopathy:      Cervical: No cervical adenopathy.   Skin:     General: Skin is warm and dry.      Findings: No rash.   Neurological:      Mental Status: She is alert and oriented to person, place, and time.   Psychiatric:         Attention and Perception: Attention and perception normal.         Mood and Affect: Mood and affect normal.         Speech: Speech normal.         Behavior: Behavior normal. Behavior is cooperative.         Thought Content: Thought content normal.         Cognition and Memory: Cognition and memory normal.         Judgment: Judgment normal.         Assessment/Plan   Diagnoses and all orders for this visit:    1. Essential hypertension (Primary)  -     amLODIPine (NORVASC) 5 MG tablet; Take 1 tablet by mouth Daily.  Dispense: 30 tablet; Refill: 1        Return in about 4 weeks (around 9/16/2021) for Recheck.

## 2021-08-31 ENCOUNTER — OFFICE VISIT (OUTPATIENT)
Dept: CARDIOLOGY | Facility: CLINIC | Age: 66
End: 2021-08-31

## 2021-08-31 VITALS
HEIGHT: 60 IN | DIASTOLIC BLOOD PRESSURE: 68 MMHG | BODY MASS INDEX: 42.8 KG/M2 | HEART RATE: 68 BPM | SYSTOLIC BLOOD PRESSURE: 142 MMHG | WEIGHT: 218 LBS

## 2021-08-31 DIAGNOSIS — I48.0 PAROXYSMAL ATRIAL FIBRILLATION (HCC): Primary | ICD-10-CM

## 2021-08-31 DIAGNOSIS — I10 ESSENTIAL HYPERTENSION: ICD-10-CM

## 2021-08-31 DIAGNOSIS — E78.5 HYPERLIPIDEMIA, UNSPECIFIED HYPERLIPIDEMIA TYPE: ICD-10-CM

## 2021-08-31 DIAGNOSIS — I63.9 CEREBROVASCULAR ACCIDENT (CVA), UNSPECIFIED MECHANISM (HCC): ICD-10-CM

## 2021-08-31 PROCEDURE — 99213 OFFICE O/P EST LOW 20 MIN: CPT | Performed by: INTERNAL MEDICINE

## 2021-08-31 NOTE — PROGRESS NOTES
Subjective:     Encounter Date: 08/31/21      Patient ID: Rosalee Hargrove is a 66 y.o. female.    Chief Complaint: Nonobstructive CAD, peripheral vascular disease, A. Fib, CVA  HPI:     This is a 66-year-old woman with recent CVA, nonobstructive CAD, PAD, graham graham disease s/p r CEA, HTN, HLD.      Several years of ago she underwent cardiac catheterization which showed mild, nonobstructive coronary disease throughout her coronary arteries.  She has a history of peripheral arterial disease and is followed by Dr. Coats. 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 She has a right iliac artery stent which on last evaluation 2021 showed moderate in-stent restenosis.  She also has severe peripheral disease of the descending aorta with scattered ulcerations and plaque formation.    In 2017 she wore a cardiac event monitor due to complaints of presyncope.  She was noted to have an episode of atrial fibrillation lasting 2 minutes.  Despite her elevated chads Vascor she was not anticoagulated due to the short duration of A. Fib. In November 2020 I saw her and she wore a cardiac event monitor for 30 days without AF.     In April 2021 she had acute left sided weakness and facial droop. She was evaluated at [facility] and found to have a small CVA. Echo unremarkable. 48 hour holter showed no AF.  When I saw her in follow-up we plan to implant a Linq device to follow for occult atrial fibrillation.  She had a carotid Doppler also which showed an occluded right carotid artery.  She then presented to [place] with recurrent stroke in Memorial Day of 2021.  She underwent carotid artery stenting with Dr. Castanon.  She also had a Linq implanted by the cardiology team there.    Since then she has been feeling okay.  No further symptoms or signs of strokes.  Blood pressures been well controlled in the 130s over 80s on amlodipine 5.  She was previously on lisinopril but that was discontinued due to cough and angioedema.  She has  been taking Plavix 75 mg twice daily due to low platelet reactivity and inability to tolerate Brilinta due to dyspnea.  She plans to cut back to once a day in a few weeks.  She is still on aspirin 81.  Her she is on Lipitor 40.  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  She continues to smoke.  She tried to use Wellbutrin but she felt it made her more jittery and more desirous of smoking.     There is a strong family history of coronary disease.  Her younger brother was treated by my father.      The following portions of the patient's history were reviewed and updated as appropriate: allergies, current medications, past family history, past medical history, past social history, past surgical history and problem list.     REVIEW OF SYSTEMS:   All systems reviewed.  Pertinent positives identified in HPI.  All other systems are negative.    Past Medical History:   Diagnosis Date   • Allergic    • Anemia    • Angina pectoris (CMS/HCC)    • Anxiety    • Arthritis    • ASCVD (arteriosclerotic cardiovascular disease)    • Bilateral leg edema    • CAD (coronary artery disease)    • Chest pain    • Depression    • Diabetes mellitus (CMS/HCC)    • Disease of thyroid gland    • Dizziness    • Headache    • Hyperlipidemia    • Hypertension    • Morbid obesity (CMS/HCC)    • Orthostatic hypotension    • PAD (peripheral artery disease) (CMS/HCC)    • Shortness of breath    • Sleep apnea    • Tobacco abuse    • Vitamin D deficiency        Family History   Problem Relation Age of Onset   • Heart disease Mother    • Hypertension Mother    • Hyperlipidemia Mother    • Heart disease Father    • Heart attack Sister    • Heart disease Sister    • Heart disease Brother    • Heart attack Brother    • Hyperlipidemia Brother    • Diabetes Brother    • Heart attack Maternal Grandmother    • Heart disease Maternal Grandmother    • Heart failure Maternal Grandmother    • Heart failure Maternal Grandfather    • Heart  disease Maternal Grandfather    • Heart attack Maternal Grandfather    • Heart attack Paternal Grandmother    • Heart disease Paternal Grandmother    • Heart failure Paternal Grandmother    • Hypertension Paternal Grandmother        Social History     Socioeconomic History   • Marital status:      Spouse name: Not on file   • Number of children: Not on file   • Years of education: Not on file   • Highest education level: Not on file   Tobacco Use   • Smoking status: Current Every Day Smoker     Packs/day: 0.25     Types: Cigarettes     Start date:    • Smokeless tobacco: Never Used   Substance and Sexual Activity   • Alcohol use: Yes     Alcohol/week: 1.0 standard drinks     Types: 1 Glasses of wine per week     Comment: rare   • Drug use: No   • Sexual activity: Defer       Allergies   Allergen Reactions   • Ciprofloxacin Hives and Swelling   • Lisinopril Swelling   • Ticagrelor Shortness Of Breath       Past Surgical History:   Procedure Laterality Date   • ANAL FISSURECTOMY     • BRAIN SURGERY     • CARDIAC CATHETERIZATION N/A 2016    Procedure: Coronary angiography;  Surgeon: Fredrick Kapoor MD;  Location: [facility] CATH INVASIVE LOCATION;  Service:    • CARDIAC CATHETERIZATION N/A 2016    Procedure: Left heart cath;  Surgeon: Fredrick Kapoor MD;  Location: [facility] CATH INVASIVE LOCATION;  Service:    • CARDIAC CATHETERIZATION N/A 2016    Procedure: Left ventriculography;  Surgeon: Fredrick aKpoor MD;  [facility];  Service:    • CARDIAC CATHETERIZATION N/A 2016    Procedure: Right heart cath;  Surgeon: Fredrick Kapoor MD;  Location: [facility] CATH INVASIVE LOCATION;  Service:    •  SECTION     • CHOLECYSTECTOMY     • ERCP     • FRACTURE SURGERY      arm   • HYSTERECTOMY     • ILIAC ARTERY STENT         Procedures       Objective:         PHYSICAL EXAM:  GEN: VSS, no distress,   Eyes: normal sclera, normal lids and lashes  HENT: moist mucus membranes,   Respiratory: CTAB,  no rales or wheezes  CV: RRR, no murmurs, , +2 DP and 2+ carotid pulses b/l  GI: NABS, soft,  Nontender, nondistended  MSK: no edema, no scoliosis or kyphosis  Skin: no rash, warm, dry  Heme/Lymph: no bruising or bleeding  Psych: organized thought, normal behavior and affect  Neuro: Cranial nerves grossly intact, Alert and Oriented x 3.         Assessment:          Diagnosis Plan   1. Paroxysmal atrial fibrillation (CMS/HCC)     2. Essential hypertension     3. Hyperlipidemia, unspecified hyperlipidemia type     4. Cerebrovascular accident (CVA), unspecified mechanism (CMS/HCC)            Plan:       1.  Paroxysmal atrial fibrillation: CHADSVASC is 6. One episode documented several years ago for a few minutes and was not anticoagulated.  A Linq was implanted at her most recent hospitalization at [place] in May 2021 at the time of a stroke.  So far no A. fib has been detected.    2.  Hyperlipidemia: LDL is 80 on Lipitor 40.  Continue.  3.  Coronary artery disease, nonobstructive: No real angina.  Continue to monitor.  4.  Peripheral arterial disease: She  follows with Dr. Coats who implanted a right iliac stent and is s/p r CEA.  Recent right carotid endarterectomy with Dr. Castanon  5.  Hypertension: Controlled on amlodipine 5  6.  Smoking cessation: On Wellbutrin.  Still smoking.  Discussed in detail.  Wants to quit.  7.  CVA: Multiple CVAs in the last year.  Most recently May 2021.  Now status post right CEA and Linq implantation.  Continue aspirin and Plavix per neurology guidance.    Dr. Slater, thank you very much for referring this kind patient to me. Please call me with any questions or concerns. I will see the patient again in the office in 6 months.       Nohemi Mei MD  08/31/21  [facility]    Outpatient Encounter Medications as of 8/31/2021   Medication Sig Dispense Refill   • amLODIPine (NORVASC) 5 MG tablet Take 1 tablet by mouth Daily. 30 tablet 1   • aspirin 81 MG chewable tablet Chew 81  mg daily.     • atorvastatin (LIPITOR) 40 MG tablet Take 1 tablet by mouth Daily. 90 tablet 3   • buPROPion XL (Wellbutrin XL) 300 MG 24 hr tablet Take 1 tablet by mouth Every Morning. 30 tablet 5   • citalopram (CeleXA) 20 MG tablet Take 1 tablet by mouth Daily. 90 tablet 1   • clopidogrel (PLAVIX) 75 MG tablet TAKE 1 TABLET BY MOUTH EVERY DAY 90 tablet 1   • fluocinolone acetonide (DERMOTIC) 0.01 % oil otic oil LOCATION  BOTH EARS. 4 5 DROPS IN AFFECTED EAR(S) ONCE DAILY X 2 WEEKS, THEN 2 3 X A WEEK AS NEEDED     • fluticasone (Flonase) 50 MCG/ACT nasal spray 2 sprays into the nostril(s) as directed by provider Daily.     • glimepiride (AMARYL) 2 MG tablet Take 1 tablet by mouth Every Morning Before Breakfast. 90 tablet 1   • hydroCHLOROthiazide (HYDRODIURIL) 25 MG tablet Take 1 tablet by mouth Daily. 90 tablet 1   • levothyroxine (SYNTHROID, LEVOTHROID) 75 MCG tablet Take 1 tablet by mouth Daily. 90 tablet 1   • metFORMIN (GLUCOPHAGE) 1000 MG tablet Take 1 tablet by mouth 2 (Two) Times a Day With Meals. 180 tablet 1   • mupirocin (BACTROBAN) 2 % ointment APPLY PEA SIZE DROPLET TO QTIP AND APPLY TWICE DAILY FOR 7 DAYS     • pantoprazole (PROTONIX) 40 MG EC tablet TAKE 1 TABLET BY MOUTH EVERY DAY 90 tablet 1     No facility-administered encounter medications on file as of 8/31/2021.

## 2021-09-11 DIAGNOSIS — I10 ESSENTIAL HYPERTENSION: ICD-10-CM

## 2021-09-13 RX ORDER — AMLODIPINE BESYLATE 5 MG/1
TABLET ORAL
Qty: 30 TABLET | Refills: 1 | OUTPATIENT
Start: 2021-09-13

## 2021-09-15 ENCOUNTER — OFFICE VISIT (OUTPATIENT)
Dept: FAMILY MEDICINE CLINIC | Facility: CLINIC | Age: 66
End: 2021-09-15

## 2021-09-15 VITALS
HEIGHT: 60 IN | SYSTOLIC BLOOD PRESSURE: 120 MMHG | OXYGEN SATURATION: 97 % | HEART RATE: 79 BPM | TEMPERATURE: 97.8 F | BODY MASS INDEX: 43.19 KG/M2 | DIASTOLIC BLOOD PRESSURE: 72 MMHG | WEIGHT: 220 LBS

## 2021-09-15 DIAGNOSIS — I65.23 ATHEROSCLEROSIS OF BOTH CAROTID ARTERIES: ICD-10-CM

## 2021-09-15 DIAGNOSIS — I77.9 PERIPHERAL ARTERIAL OCCLUSIVE DISEASE (HCC): ICD-10-CM

## 2021-09-15 DIAGNOSIS — I67.5 MOYAMOYA DISEASE: ICD-10-CM

## 2021-09-15 DIAGNOSIS — E11.319 TYPE 2 DIABETES MELLITUS WITH RETINOPATHY WITHOUT MACULAR EDEMA, WITHOUT LONG-TERM CURRENT USE OF INSULIN, UNSPECIFIED LATERALITY, UNSPECIFIED RETINOPATHY SEVERITY (HCC): ICD-10-CM

## 2021-09-15 DIAGNOSIS — F17.210 CIGARETTE NICOTINE DEPENDENCE WITHOUT COMPLICATION: ICD-10-CM

## 2021-09-15 DIAGNOSIS — I25.10 CORONARY ARTERY DISEASE INVOLVING NATIVE CORONARY ARTERY OF NATIVE HEART WITHOUT ANGINA PECTORIS: ICD-10-CM

## 2021-09-15 DIAGNOSIS — E03.9 ACQUIRED HYPOTHYROIDISM: ICD-10-CM

## 2021-09-15 DIAGNOSIS — E78.2 MIXED HYPERLIPIDEMIA: ICD-10-CM

## 2021-09-15 DIAGNOSIS — I70.0 ATHEROSCLEROSIS OF AORTA (HCC): ICD-10-CM

## 2021-09-15 DIAGNOSIS — E66.01 MORBID EXOGENOUS OBESITY (HCC): ICD-10-CM

## 2021-09-15 DIAGNOSIS — K21.9 GASTROESOPHAGEAL REFLUX DISEASE WITHOUT ESOPHAGITIS: ICD-10-CM

## 2021-09-15 DIAGNOSIS — E55.9 VITAMIN D DEFICIENCY: ICD-10-CM

## 2021-09-15 DIAGNOSIS — I63.9 CEREBROVASCULAR ACCIDENT (CVA), UNSPECIFIED MECHANISM (HCC): ICD-10-CM

## 2021-09-15 DIAGNOSIS — I10 ESSENTIAL HYPERTENSION: Primary | ICD-10-CM

## 2021-09-15 PROCEDURE — 99214 OFFICE O/P EST MOD 30 MIN: CPT | Performed by: FAMILY MEDICINE

## 2021-09-15 RX ORDER — AMLODIPINE BESYLATE 5 MG/1
5 TABLET ORAL DAILY
Qty: 90 TABLET | Refills: 1 | Status: SHIPPED | OUTPATIENT
Start: 2021-09-15 | End: 2022-03-16

## 2021-09-15 NOTE — PROGRESS NOTES
Subjective   Rosalee Hargrove is a 66 y.o. female with   Chief Complaint   Patient presents with   • Hypertension   .    History of Present Illness   66-year-old white female with known history of essential hypertension here for further medical management.  Patient with significant vascular disease including carotid arteries, peripheral arterial disease as well as CAD.  She is followed by vascular surgery as well as cardiology and due to a recent stroke is now followed by the stroke clinic.  Current medications are multiple and are as listed.  All medications are used on a regular basis and are well-tolerated without side effects.  During the CVA patient was admitted to [facility].  She did have a right carotid artery procedure done while admitted in this institution.  A new medicine added to her upon last visit is amlodipine 5 mg daily.  Blood pressure is much improved patient states that there have been no side effects with the use of this product.  The following portions of the patient's history were reviewed and updated as appropriate: allergies, current medications, past family history, past medical history, past social history, past surgical history and problem list.    Review of Systems   Constitutional:        Morbid exogenous obesity   Cardiovascular:        CAD, carotid artery disease, peripheral arterial disease, hypertension, hyperlipidemia, moyamoya   Gastrointestinal:        GERD   Endocrine:        Type II non-insulin-dependent diabetes mellitus   Allergic/Immunologic: Positive for environmental allergies.   Neurological:        Status post CVA       Objective     Vitals:    09/15/21 1111   BP: 120/72   Pulse: 79   Temp: 97.8 °F (36.6 °C)   SpO2: 97%       No results found for this or any previous visit (from the past 672 hour(s)).    Physical Exam  Vitals and nursing note reviewed.   Constitutional:       Appearance: Normal appearance. She is well-developed and well-groomed. She is  morbidly obese.      Comments: Morbid exogenous obesity with a BMI of 43   HENT:      Head: Normocephalic and atraumatic.   Neck:      Thyroid: No thyroid mass or thyromegaly.      Vascular: Normal carotid pulses. Carotid bruit present.      Trachea: Trachea and phonation normal.   Cardiovascular:      Rate and Rhythm: Normal rate and regular rhythm.      Heart sounds: Normal heart sounds. No murmur heard.   No friction rub. No gallop.    Pulmonary:      Effort: Pulmonary effort is normal. No respiratory distress.      Breath sounds: Normal breath sounds. No decreased breath sounds, wheezing, rhonchi or rales.   Musculoskeletal:      Cervical back: Neck supple.   Lymphadenopathy:      Cervical: No cervical adenopathy.   Skin:     General: Skin is warm and dry.      Findings: No rash.   Neurological:      Mental Status: She is alert and oriented to person, place, and time.   Psychiatric:         Attention and Perception: Attention and perception normal.         Mood and Affect: Mood and affect normal.         Speech: Speech normal.         Behavior: Behavior normal. Behavior is cooperative.         Thought Content: Thought content normal.         Cognition and Memory: Cognition and memory normal.         Judgment: Judgment normal.         Assessment/Plan   Diagnoses and all orders for this visit:    1. Essential hypertension (Primary)  -     amLODIPine (NORVASC) 5 MG tablet; Take 1 tablet by mouth Daily.  Dispense: 90 tablet; Refill: 1  -     Lipid panel; Future  -     Hemoglobin A1c; Future  -     Comprehensive metabolic panel; Future  -     Vitamin D 25 hydroxy; Future  -     TSH; Future  -     CBC w AUTO Differential; Future    2. Mixed hyperlipidemia  -     Lipid panel; Future  -     Hemoglobin A1c; Future  -     Comprehensive metabolic panel; Future  -     Vitamin D 25 hydroxy; Future  -     TSH; Future  -     CBC w AUTO Differential; Future    3. Peripheral arterial occlusive disease (CMS/HCC)  -     Lipid  panel; Future  -     Hemoglobin A1c; Future  -     Comprehensive metabolic panel; Future  -     Vitamin D 25 hydroxy; Future  -     TSH; Future  -     CBC w AUTO Differential; Future    4. Atherosclerosis of both carotid arteries  -     Lipid panel; Future  -     Hemoglobin A1c; Future  -     Comprehensive metabolic panel; Future  -     Vitamin D 25 hydroxy; Future  -     TSH; Future  -     CBC w AUTO Differential; Future    5. Coronary artery disease involving native coronary artery of native heart without angina pectoris  -     Lipid panel; Future  -     Hemoglobin A1c; Future  -     Comprehensive metabolic panel; Future  -     Vitamin D 25 hydroxy; Future  -     TSH; Future  -     CBC w AUTO Differential; Future    6. Atherosclerosis of aorta (CMS/[facility])  -     Lipid panel; Future  -     Hemoglobin A1c; Future  -     Comprehensive metabolic panel; Future  -     Vitamin D 25 hydroxy; Future  -     TSH; Future  -     CBC w AUTO Differential; Future    7. Acquired hypothyroidism  -     Lipid panel; Future  -     Hemoglobin A1c; Future  -     Comprehensive metabolic panel; Future  -     Vitamin D 25 hydroxy; Future  -     TSH; Future  -     CBC w AUTO Differential; Future    8. Morbid exogenous obesity (CMS/[facility])  -     Lipid panel; Future  -     Hemoglobin A1c; Future  -     Comprehensive metabolic panel; Future  -     Vitamin D 25 hydroxy; Future  -     TSH; Future  -     CBC w AUTO Differential; Future    9. Type 2 diabetes mellitus with retinopathy without macular edema, without long-term current use of insulin, unspecified laterality, unspecified retinopathy severity (CMS/[facility])  -     Lipid panel; Future  -     Hemoglobin A1c; Future  -     Comprehensive metabolic panel; Future  -     Vitamin D 25 hydroxy; Future  -     TSH; Future  -     CBC w AUTO Differential; Future    10. Vitamin D deficiency  -     Lipid panel; Future  -     Hemoglobin A1c; Future  -     Comprehensive metabolic panel; Future  -     Vitamin D  25 hydroxy; Future  -     TSH; Future  -     CBC w AUTO Differential; Future    11. Gastroesophageal reflux disease without esophagitis  -     Lipid panel; Future  -     Hemoglobin A1c; Future  -     Comprehensive metabolic panel; Future  -     Vitamin D 25 hydroxy; Future  -     TSH; Future  -     CBC w AUTO Differential; Future    12. Cerebrovascular accident (CVA), unspecified mechanism (CMS/HCC)  -     Lipid panel; Future  -     Hemoglobin A1c; Future  -     Comprehensive metabolic panel; Future  -     Vitamin D 25 hydroxy; Future  -     TSH; Future  -     CBC w AUTO Differential; Future    13. Moyamoya disease  -     Lipid panel; Future  -     Hemoglobin A1c; Future  -     Comprehensive metabolic panel; Future  -     Vitamin D 25 hydroxy; Future  -     TSH; Future  -     CBC w AUTO Differential; Future    14. Cigarette nicotine dependence without complication  -     Lipid panel; Future  -     Hemoglobin A1c; Future  -     Comprehensive metabolic panel; Future  -     Vitamin D 25 hydroxy; Future  -     TSH; Future  -     CBC w AUTO Differential; Future        Return in about 3 months (around 12/15/2021).

## 2021-11-10 ENCOUNTER — FLU SHOT (OUTPATIENT)
Dept: FAMILY MEDICINE CLINIC | Facility: CLINIC | Age: 66
End: 2021-11-10

## 2021-11-10 ENCOUNTER — TELEPHONE (OUTPATIENT)
Dept: FAMILY MEDICINE CLINIC | Facility: CLINIC | Age: 66
End: 2021-11-10

## 2021-11-10 DIAGNOSIS — Z23 NEED FOR INFLUENZA VACCINATION: Primary | ICD-10-CM

## 2021-11-10 PROCEDURE — G0008 ADMIN INFLUENZA VIRUS VAC: HCPCS | Performed by: FAMILY MEDICINE

## 2021-11-10 PROCEDURE — 90662 IIV NO PRSV INCREASED AG IM: CPT | Performed by: FAMILY MEDICINE

## 2021-11-10 NOTE — TELEPHONE ENCOUNTER
Can you tell me what it was for-there is a MetroGel that we use on the face and another that we use intravaginally

## 2021-11-10 NOTE — TELEPHONE ENCOUNTER
Pt is asking for a new tube of Metrogel 0.75% gel.  It was last filled 3/2/21 but she had left the tube in her car and it became too hot and now runs like liquid.  It was actually give to her by Lucy but removed from her med list 5/2021 by someone outside of our office.  Will you give her another tube?

## 2021-11-11 RX ORDER — METRONIDAZOLE 10 MG/G
GEL TOPICAL DAILY
Qty: 55 G | Refills: 2 | Status: SHIPPED | OUTPATIENT
Start: 2021-11-11 | End: 2021-11-15 | Stop reason: CLARIF

## 2021-11-15 RX ORDER — METRONIDAZOLE 7.5 MG/G
GEL TOPICAL 2 TIMES DAILY
Qty: 45 G | Refills: 0 | Status: SHIPPED | OUTPATIENT
Start: 2021-11-15 | End: 2022-01-03

## 2021-12-16 DIAGNOSIS — F17.210 CIGARETTE NICOTINE DEPENDENCE WITHOUT COMPLICATION: ICD-10-CM

## 2021-12-16 DIAGNOSIS — K21.9 GASTROESOPHAGEAL REFLUX DISEASE WITHOUT ESOPHAGITIS: ICD-10-CM

## 2021-12-16 DIAGNOSIS — I10 ESSENTIAL HYPERTENSION: ICD-10-CM

## 2021-12-16 DIAGNOSIS — I70.0 ATHEROSCLEROSIS OF AORTA (HCC): ICD-10-CM

## 2021-12-16 DIAGNOSIS — E11.319 TYPE 2 DIABETES MELLITUS WITH RETINOPATHY WITHOUT MACULAR EDEMA, WITHOUT LONG-TERM CURRENT USE OF INSULIN, UNSPECIFIED LATERALITY, UNSPECIFIED RETINOPATHY SEVERITY (HCC): ICD-10-CM

## 2021-12-16 DIAGNOSIS — E78.2 MIXED HYPERLIPIDEMIA: ICD-10-CM

## 2021-12-16 DIAGNOSIS — E55.9 VITAMIN D DEFICIENCY: ICD-10-CM

## 2021-12-16 DIAGNOSIS — I65.23 ATHEROSCLEROSIS OF BOTH CAROTID ARTERIES: ICD-10-CM

## 2021-12-16 DIAGNOSIS — I63.9 CEREBROVASCULAR ACCIDENT (CVA), UNSPECIFIED MECHANISM (HCC): ICD-10-CM

## 2021-12-16 DIAGNOSIS — I25.10 CORONARY ARTERY DISEASE INVOLVING NATIVE CORONARY ARTERY OF NATIVE HEART WITHOUT ANGINA PECTORIS: ICD-10-CM

## 2021-12-16 DIAGNOSIS — I67.5 MOYAMOYA DISEASE: ICD-10-CM

## 2021-12-16 DIAGNOSIS — E03.9 ACQUIRED HYPOTHYROIDISM: ICD-10-CM

## 2021-12-16 DIAGNOSIS — E66.01 MORBID EXOGENOUS OBESITY (HCC): ICD-10-CM

## 2021-12-16 DIAGNOSIS — I77.9 PERIPHERAL ARTERIAL OCCLUSIVE DISEASE (HCC): ICD-10-CM

## 2021-12-16 RX ORDER — PANTOPRAZOLE SODIUM 40 MG/1
TABLET, DELAYED RELEASE ORAL
Qty: 90 TABLET | Refills: 0 | Status: SHIPPED | OUTPATIENT
Start: 2021-12-16 | End: 2022-03-15

## 2021-12-23 LAB
25(OH)D3+25(OH)D2 SERPL-MCNC: 19.2 NG/ML (ref 30–100)
ALBUMIN SERPL-MCNC: 4.1 G/DL (ref 3.8–4.8)
ALBUMIN/GLOB SERPL: 1.5 {RATIO} (ref 1.2–2.2)
ALP SERPL-CCNC: 124 IU/L (ref 44–121)
ALT SERPL-CCNC: 16 IU/L (ref 0–32)
AST SERPL-CCNC: 13 IU/L (ref 0–40)
BASOPHILS # BLD AUTO: 0 X10E3/UL (ref 0–0.2)
BASOPHILS NFR BLD AUTO: 1 %
BILIRUB SERPL-MCNC: 0.3 MG/DL (ref 0–1.2)
BUN SERPL-MCNC: 24 MG/DL (ref 8–27)
BUN/CREAT SERPL: 25 (ref 12–28)
CALCIUM SERPL-MCNC: 9.1 MG/DL (ref 8.7–10.3)
CHLORIDE SERPL-SCNC: 103 MMOL/L (ref 96–106)
CHOLEST SERPL-MCNC: 153 MG/DL (ref 100–199)
CO2 SERPL-SCNC: 23 MMOL/L (ref 20–29)
CREAT SERPL-MCNC: 0.97 MG/DL (ref 0.57–1)
EOSINOPHIL # BLD AUTO: 0.3 X10E3/UL (ref 0–0.4)
EOSINOPHIL NFR BLD AUTO: 3 %
ERYTHROCYTE [DISTWIDTH] IN BLOOD BY AUTOMATED COUNT: 17.7 % (ref 11.7–15.4)
GLOBULIN SER CALC-MCNC: 2.7 G/DL (ref 1.5–4.5)
GLUCOSE SERPL-MCNC: 106 MG/DL (ref 65–99)
HBA1C MFR BLD: 6.5 % (ref 4.8–5.6)
HCT VFR BLD AUTO: 33.6 % (ref 34–46.6)
HDLC SERPL-MCNC: 34 MG/DL
HGB BLD-MCNC: 10.4 G/DL (ref 11.1–15.9)
IMM GRANULOCYTES # BLD AUTO: 0 X10E3/UL (ref 0–0.1)
IMM GRANULOCYTES NFR BLD AUTO: 0 %
LDLC SERPL CALC-MCNC: 92 MG/DL (ref 0–99)
LYMPHOCYTES # BLD AUTO: 2.1 X10E3/UL (ref 0.7–3.1)
LYMPHOCYTES NFR BLD AUTO: 26 %
MCH RBC QN AUTO: 22.4 PG (ref 26.6–33)
MCHC RBC AUTO-ENTMCNC: 31 G/DL (ref 31.5–35.7)
MCV RBC AUTO: 72 FL (ref 79–97)
MONOCYTES # BLD AUTO: 0.6 X10E3/UL (ref 0.1–0.9)
MONOCYTES NFR BLD AUTO: 7 %
NEUTROPHILS # BLD AUTO: 4.9 X10E3/UL (ref 1.4–7)
NEUTROPHILS NFR BLD AUTO: 63 %
PLATELET # BLD AUTO: 334 X10E3/UL (ref 150–450)
POTASSIUM SERPL-SCNC: 4.4 MMOL/L (ref 3.5–5.2)
PROT SERPL-MCNC: 6.8 G/DL (ref 6–8.5)
RBC # BLD AUTO: 4.64 X10E6/UL (ref 3.77–5.28)
SODIUM SERPL-SCNC: 140 MMOL/L (ref 134–144)
TRIGL SERPL-MCNC: 152 MG/DL (ref 0–149)
TSH SERPL DL<=0.005 MIU/L-ACNC: 1.82 UIU/ML (ref 0.45–4.5)
VLDLC SERPL CALC-MCNC: 27 MG/DL (ref 5–40)
WBC # BLD AUTO: 7.8 X10E3/UL (ref 3.4–10.8)

## 2022-01-03 ENCOUNTER — OFFICE VISIT (OUTPATIENT)
Dept: FAMILY MEDICINE CLINIC | Facility: CLINIC | Age: 67
End: 2022-01-03

## 2022-01-03 VITALS
WEIGHT: 225 LBS | BODY MASS INDEX: 44.17 KG/M2 | HEIGHT: 60 IN | DIASTOLIC BLOOD PRESSURE: 80 MMHG | OXYGEN SATURATION: 98 % | TEMPERATURE: 96.8 F | HEART RATE: 80 BPM | SYSTOLIC BLOOD PRESSURE: 128 MMHG

## 2022-01-03 DIAGNOSIS — F41.9 ANXIETY: ICD-10-CM

## 2022-01-03 DIAGNOSIS — E11.319 TYPE 2 DIABETES MELLITUS WITH RETINOPATHY WITHOUT MACULAR EDEMA, WITHOUT LONG-TERM CURRENT USE OF INSULIN, UNSPECIFIED LATERALITY, UNSPECIFIED RETINOPATHY SEVERITY: Primary | ICD-10-CM

## 2022-01-03 DIAGNOSIS — Z12.31 ENCOUNTER FOR SCREENING MAMMOGRAM FOR MALIGNANT NEOPLASM OF BREAST: ICD-10-CM

## 2022-01-03 DIAGNOSIS — I25.10 ATHEROSCLEROTIC HEART DISEASE OF NATIVE CORONARY ARTERY WITHOUT ANGINA PECTORIS: ICD-10-CM

## 2022-01-03 DIAGNOSIS — E78.2 MIXED HYPERLIPIDEMIA: ICD-10-CM

## 2022-01-03 DIAGNOSIS — E66.01 MORBID EXOGENOUS OBESITY: ICD-10-CM

## 2022-01-03 DIAGNOSIS — I10 ESSENTIAL HYPERTENSION: ICD-10-CM

## 2022-01-03 DIAGNOSIS — E11.9 CONTROLLED TYPE 2 DIABETES MELLITUS WITHOUT COMPLICATION, WITHOUT LONG-TERM CURRENT USE OF INSULIN: ICD-10-CM

## 2022-01-03 DIAGNOSIS — F41.0 PANIC DISORDER: ICD-10-CM

## 2022-01-03 DIAGNOSIS — I77.9 DISORDER OF ARTERIES AND ARTERIOLES, UNSPECIFIED: ICD-10-CM

## 2022-01-03 DIAGNOSIS — E55.9 VITAMIN D DEFICIENCY: ICD-10-CM

## 2022-01-03 DIAGNOSIS — E03.9 ACQUIRED HYPOTHYROIDISM: ICD-10-CM

## 2022-01-03 DIAGNOSIS — I65.23 ATHEROSCLEROSIS OF BOTH CAROTID ARTERIES: ICD-10-CM

## 2022-01-03 DIAGNOSIS — Z78.0 POSTMENOPAUSAL: ICD-10-CM

## 2022-01-03 DIAGNOSIS — I25.10 CORONARY ARTERY DISEASE INVOLVING NATIVE CORONARY ARTERY OF NATIVE HEART WITHOUT ANGINA PECTORIS: ICD-10-CM

## 2022-01-03 PROCEDURE — 99214 OFFICE O/P EST MOD 30 MIN: CPT | Performed by: FAMILY MEDICINE

## 2022-01-03 RX ORDER — LISINOPRIL 40 MG/1
TABLET ORAL
COMMUNITY
Start: 2021-10-21 | End: 2022-01-20

## 2022-01-03 RX ORDER — GLIMEPIRIDE 2 MG/1
2 TABLET ORAL
Qty: 90 TABLET | Refills: 1 | Status: SHIPPED | OUTPATIENT
Start: 2022-01-03 | End: 2022-07-13

## 2022-01-03 RX ORDER — CLOPIDOGREL BISULFATE 75 MG/1
75 TABLET ORAL DAILY
Qty: 90 TABLET | Refills: 1 | Status: SHIPPED | OUTPATIENT
Start: 2022-01-03 | End: 2022-07-16 | Stop reason: SDUPTHER

## 2022-01-03 RX ORDER — CITALOPRAM 20 MG/1
20 TABLET ORAL DAILY
Qty: 90 TABLET | Refills: 1 | Status: SHIPPED | OUTPATIENT
Start: 2022-01-03 | End: 2022-07-13

## 2022-01-03 RX ORDER — LEVOTHYROXINE SODIUM 0.07 MG/1
75 TABLET ORAL DAILY
Qty: 90 TABLET | Refills: 1 | Status: SHIPPED | OUTPATIENT
Start: 2022-01-03 | End: 2022-07-13

## 2022-01-03 RX ORDER — ATORVASTATIN CALCIUM 40 MG/1
40 TABLET, FILM COATED ORAL DAILY
Qty: 90 TABLET | Refills: 1 | Status: SHIPPED | OUTPATIENT
Start: 2022-01-03 | End: 2022-07-16 | Stop reason: SDUPTHER

## 2022-01-03 RX ORDER — HYDROCHLOROTHIAZIDE 25 MG/1
25 TABLET ORAL DAILY
Qty: 90 TABLET | Refills: 1 | Status: SHIPPED | OUTPATIENT
Start: 2022-01-03 | End: 2022-07-13

## 2022-01-03 NOTE — PROGRESS NOTES
Subjective   Rosalee Hargrove is a 66 y.o. female with   Chief Complaint   Patient presents with   • Hypertension   • Diabetes   .    History of Present Illness   66-year-old white female with multiple medical issues here for further medical management. Patient with significant cardiovascular disease having been recently stented in the right carotid artery. She has history of hypertension, hyperlipidemia as well as atherosclerosis of the aorta and CAD. She also has moyamoya disease. There is a history of hypothyroidism, vitamin D deficiency as well as morbid exogenous obesity and type II non-insulin-dependent diabetes mellitus. She does have issues with generalized anxiety/panic disorder and is status post CVA. She is currently not smoking but has a long history of same. Medications are multiple and are as listed. She does see both vascular and cardiology. She has retired from running a hair dressing salon. She is now caring for her mother.  The following portions of the patient's history were reviewed and updated as appropriate: allergies, current medications, past family history, past medical history, past social history, past surgical history and problem list.    Review of Systems   Constitutional:        Morbid exogenous obesity   Cardiovascular: Positive for leg swelling.        Hypertension, hyperlipidemia, CAD, carotid atherosclerosis, aortic atherosclerosis, status post CVA   Endocrine:        Non-insulin-dependent diabetes mellitus, vitamin D deficiency, hypothyroidism.   Psychiatric/Behavioral: The patient is nervous/anxious.        Objective     Vitals:    01/03/22 0941   BP: 128/80   Pulse: 80   Temp: 96.8 °F (36 °C)   SpO2: 98%       Recent Results (from the past 672 hour(s))   CBC w AUTO Differential    Collection Time: 12/22/21  8:56 AM    Specimen: Blood   Result Value Ref Range    WBC 7.8 3.4 - 10.8 x10E3/uL    RBC 4.64 3.77 - 5.28 x10E6/uL    Hemoglobin 10.4 (L) 11.1 - 15.9 g/dL    Hematocrit 33.6  (L) 34.0 - 46.6 %    MCV 72 (L) 79 - 97 fL    MCH 22.4 (L) 26.6 - 33.0 pg    MCHC 31.0 (L) 31.5 - 35.7 g/dL    RDW 17.7 (H) 11.7 - 15.4 %    Platelets 334 150 - 450 x10E3/uL    Neutrophil Rel % 63 Not Estab. %    Lymphocyte Rel % 26 Not Estab. %    Monocyte Rel % 7 Not Estab. %    Eosinophil Rel % 3 Not Estab. %    Basophil Rel % 1 Not Estab. %    Neutrophils Absolute 4.9 1.4 - 7.0 x10E3/uL    Lymphocytes Absolute 2.1 0.7 - 3.1 x10E3/uL    Monocytes Absolute 0.6 0.1 - 0.9 x10E3/uL    Eosinophils Absolute 0.3 0.0 - 0.4 x10E3/uL    Basophils Absolute 0.0 0.0 - 0.2 x10E3/uL    Immature Granulocyte Rel % 0 Not Estab. %    Immature Grans Absolute 0.0 0.0 - 0.1 x10E3/uL   TSH    Collection Time: 12/22/21  8:56 AM    Specimen: Blood   Result Value Ref Range    TSH 1.820 0.450 - 4.500 uIU/mL   Vitamin D 25 hydroxy    Collection Time: 12/22/21  8:56 AM    Specimen: Blood   Result Value Ref Range    25 Hydroxy, Vitamin D 19.2 (L) 30.0 - 100.0 ng/mL   Comprehensive metabolic panel    Collection Time: 12/22/21  8:56 AM    Specimen: Blood   Result Value Ref Range    Glucose 106 (H) 65 - 99 mg/dL    BUN 24 8 - 27 mg/dL    Creatinine 0.97 0.57 - 1.00 mg/dL    eGFR Non African Am 60 >59 mL/min/1.73    eGFR African Am 69 >59 mL/min/1.73    BUN/Creatinine Ratio 25 12 - 28    Sodium 140 134 - 144 mmol/L    Potassium 4.4 3.5 - 5.2 mmol/L    Chloride 103 96 - 106 mmol/L    Total CO2 23 20 - 29 mmol/L    Calcium 9.1 8.7 - 10.3 mg/dL    Total Protein 6.8 6.0 - 8.5 g/dL    Albumin 4.1 3.8 - 4.8 g/dL    Globulin 2.7 1.5 - 4.5 g/dL    A/G Ratio 1.5 1.2 - 2.2    Total Bilirubin 0.3 0.0 - 1.2 mg/dL    Alkaline Phosphatase 124 (H) 44 - 121 IU/L    AST (SGOT) 13 0 - 40 IU/L    ALT (SGPT) 16 0 - 32 IU/L   Hemoglobin A1c    Collection Time: 12/22/21  8:56 AM    Specimen: Blood   Result Value Ref Range    Hemoglobin A1C 6.5 (H) 4.8 - 5.6 %   Lipid panel    Collection Time: 12/22/21  8:56 AM    Specimen: Blood   Result Value Ref Range    Total  Cholesterol 153 100 - 199 mg/dL    Triglycerides 152 (H) 0 - 149 mg/dL    HDL Cholesterol 34 (L) >39 mg/dL    VLDL Cholesterol [place] 27 5 - 40 mg/dL    LDL Chol Calc (NIH) 92 0 - 99 mg/dL       Physical Exam  Vitals and nursing note reviewed.   Constitutional:       Appearance: Normal appearance. She is well-developed and well-groomed. She is morbidly obese.      Comments: Morbid exogenous obesity with a BMI of 43.9   HENT:      Head: Normocephalic and atraumatic.   Neck:      Thyroid: No thyroid mass or thyromegaly.      Vascular: Normal carotid pulses. No carotid bruit.      Trachea: Trachea and phonation normal.   Cardiovascular:      Rate and Rhythm: Normal rate and regular rhythm.      Heart sounds: Normal heart sounds. No murmur heard.  No friction rub. No gallop.    Pulmonary:      Effort: Pulmonary effort is normal. No respiratory distress.      Breath sounds: Normal breath sounds. No decreased breath sounds, wheezing, rhonchi or rales.   Musculoskeletal:      Cervical back: Neck supple.   Lymphadenopathy:      Cervical: No cervical adenopathy.   Skin:     General: Skin is warm and dry.      Findings: No rash.   Neurological:      Mental Status: She is alert and oriented to person, place, and time.   Psychiatric:         Attention and Perception: Attention and perception normal.         Mood and Affect: Mood and affect normal.         Speech: Speech normal.         Behavior: Behavior normal. Behavior is cooperative.         Thought Content: Thought content normal.         Cognition and Memory: Cognition and memory normal.         Judgment: Judgment normal.         Assessment/Plan   Diagnoses and all orders for this visit:    1. 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 Acquired hypothyroidism  -     levothyroxine (SYNTHROID, LEVOTHROID) 75 MCG tablet; Take 1 tablet by mouth Daily.  Dispense: 90 tablet; Refill: 1  -     Lipid panel; Future  -     Hemoglobin A1c; Future  -     Comprehensive metabolic panel; Future  -     Vitamin D 25 hydroxy; Future  -     TSH; Future  -     CBC w AUTO Differential; Future    3. Morbid exogenous obesity (HCC)  -     Lipid panel; Future  -     Hemoglobin A1c; Future  -     Comprehensive metabolic panel; Future  -     Vitamin D 25 hydroxy; Future  -     TSH; Future  -     CBC w AUTO Differential; Future    4. Essential hypertension  -     hydroCHLOROthiazide (HYDRODIURIL) 25 MG tablet; Take 1 tablet by mouth Daily.  Dispense: 90 tablet; Refill: 1  -     Lipid panel; Future  -     Hemoglobin A1c; Future  -     Comprehensive metabolic panel; Future  -     Vitamin D 25 hydroxy; Future  -     TSH; Future  -     CBC w AUTO Differential; Future    5. Mixed hyperlipidemia  -     atorvastatin (LIPITOR) 40 MG tablet; Take 1 tablet by mouth Daily.  Dispense: 90 tablet; Refill: 1  -     Lipid panel; Future  -     Hemoglobin A1c; Future  -     Comprehensive metabolic panel; Future  -     Vitamin D 25 hydroxy; Future  -     TSH; Future  -     CBC w AUTO Differential; Future    6. Coronary artery disease involving native coronary artery of native heart without angina pectoris  -     Lipid panel; Future  -     Hemoglobin A1c; Future  -     Comprehensive metabolic panel; Future  -     Vitamin D 25 hydroxy; Future  -     TSH; Future  -     CBC w AUTO Differential; Future    7. Atherosclerosis of both carotid arteries  -     Lipid panel; Future  -     Hemoglobin A1c; Future  -     Comprehensive metabolic panel; Future  -     Vitamin D 25 hydroxy; Future  -     TSH; Future  -     CBC w AUTO Differential; Future    8. Panic disorder  -     citalopram (CeleXA) 20 MG tablet; Take  1 tablet by mouth Daily.  Dispense: 90 tablet; Refill: 1  -     Lipid panel; Future  -     Hemoglobin A1c; Future  -     Comprehensive metabolic panel; Future  -     Vitamin D 25 hydroxy; Future  -     TSH; Future  -     CBC w AUTO Differential; Future    9. Anxiety  -     citalopram (CeleXA) 20 MG tablet; Take 1 tablet by mouth Daily.  Dispense: 90 tablet; Refill: 1  -     Lipid panel; Future  -     Hemoglobin A1c; Future  -     Comprehensive metabolic panel; Future  -     Vitamin D 25 hydroxy; Future  -     TSH; Future  -     CBC w AUTO Differential; Future    10. Vitamin D deficiency  -     Lipid panel; Future  -     Hemoglobin A1c; Future  -     Comprehensive metabolic panel; Future  -     Vitamin D 25 hydroxy; Future  -     TSH; Future  -     CBC w AUTO Differential; Future    11. Postmenopausal  -     DEXA Bone Density Axial; Future  -     Lipid panel; Future  -     Hemoglobin A1c; Future  -     Comprehensive metabolic panel; Future  -     Vitamin D 25 hydroxy; Future  -     TSH; Future  -     CBC w AUTO Differential; Future    12. Encounter for screening mammogram for malignant neoplasm of breast  -     Mammo Screening Bilateral With CAD; Future  -     Lipid panel; Future  -     Hemoglobin A1c; Future  -     Comprehensive metabolic panel; Future  -     Vitamin D 25 hydroxy; Future  -     TSH; Future  -     CBC w AUTO Differential; Future    13. Disorder of arteries and arterioles, unspecified (HCC)  -     clopidogrel (PLAVIX) 75 MG tablet; Take 1 tablet by mouth Daily.  Dispense: 90 tablet; Refill: 1  -     Lipid panel; Future  -     Hemoglobin A1c; Future  -     Comprehensive metabolic panel; Future  -     Vitamin D 25 hydroxy; Future  -     TSH; Future  -     CBC w AUTO Differential; Future    14. Atherosclerotic heart disease of native coronary artery without angina pectoris  -     clopidogrel (PLAVIX) 75 MG tablet; Take 1 tablet by mouth Daily.  Dispense: 90 tablet; Refill: 1  -     Lipid panel; Future  -      Hemoglobin A1c; Future  -     Comprehensive metabolic panel; Future  -     Vitamin D 25 hydroxy; Future  -     TSH; Future  -     CBC w AUTO Differential; Future    15. Controlled type 2 diabetes mellitus without complication, without long-term current use of insulin (HCC)  -     glimepiride (AMARYL) 2 MG tablet; Take 1 tablet by mouth Every Morning Before Breakfast.  Dispense: 90 tablet; Refill: 1  -     metFORMIN (GLUCOPHAGE) 1000 MG tablet; Take 1 tablet by mouth 2 (Two) Times a Day With Meals.  Dispense: 180 tablet; Refill: 1  -     Lipid panel; Future  -     Hemoglobin A1c; Future  -     Comprehensive metabolic panel; Future  -     Vitamin D 25 hydroxy; Future  -     TSH; Future  -     CBC w AUTO Differential; Future    Patient has been asked to lose weight especially given the 8 pound weight gain she has incurred since August 2021.  She is also been asked to supplement vitamin D3 as well as consistently take her atorvastatin-she states that she has been missing the evening dose.  Anticipate fasting labs in 6 months with follow-up with me thereafter.    Return in about 6 months (around 7/3/2022) for Recheck.

## 2022-01-06 ENCOUNTER — TRANSCRIBE ORDERS (OUTPATIENT)
Dept: ADMINISTRATIVE | Facility: HOSPITAL | Age: 67
End: 2022-01-06

## 2022-01-06 DIAGNOSIS — Z12.31 SCREENING MAMMOGRAM, ENCOUNTER FOR: Primary | ICD-10-CM

## 2022-01-11 ENCOUNTER — HOSPITAL ENCOUNTER (OUTPATIENT)
Dept: MAMMOGRAPHY | Facility: HOSPITAL | Age: 67
Discharge: HOME | End: 2022-01-11

## 2022-01-11 ENCOUNTER — HOSPITAL ENCOUNTER (OUTPATIENT)
Dept: BONE DENSITY | Facility: HOSPITAL | Age: 67
Discharge: HOME | End: 2022-01-11

## 2022-01-11 DIAGNOSIS — Z12.31 SCREENING MAMMOGRAM, ENCOUNTER FOR: ICD-10-CM

## 2022-01-11 DIAGNOSIS — Z78.0 POSTMENOPAUSAL: ICD-10-CM

## 2022-01-11 PROCEDURE — 77067 SCR MAMMO BI INCL CAD: CPT

## 2022-01-11 PROCEDURE — 77063 BREAST TOMOSYNTHESIS BI: CPT

## 2022-01-11 PROCEDURE — 77080 DXA BONE DENSITY AXIAL: CPT

## 2022-01-12 DIAGNOSIS — R92.8 ABNORMALITY OF RIGHT BREAST ON SCREENING MAMMOGRAM: Primary | ICD-10-CM

## 2022-01-12 RX ORDER — FLUOCINOLONE ACETONIDE 0.11 MG/ML
OIL AURICULAR (OTIC) 2 TIMES DAILY
Qty: 20 ML | Refills: 0 | Status: SHIPPED | OUTPATIENT
Start: 2022-01-12 | End: 2022-08-08 | Stop reason: SDUPTHER

## 2022-01-20 DIAGNOSIS — I10 ESSENTIAL (PRIMARY) HYPERTENSION: ICD-10-CM

## 2022-01-20 RX ORDER — LISINOPRIL 40 MG/1
TABLET ORAL
Qty: 90 TABLET | Refills: 0 | Status: ON HOLD | OUTPATIENT
Start: 2022-01-20 | End: 2022-06-30

## 2022-02-11 ENCOUNTER — TELEPHONE (OUTPATIENT)
Dept: FAMILY MEDICINE CLINIC | Facility: CLINIC | Age: 67
End: 2022-02-11

## 2022-02-11 DIAGNOSIS — D50.9 MICROCYTIC ANEMIA: ICD-10-CM

## 2022-02-11 DIAGNOSIS — E55.9 VITAMIN D DEFICIENCY: Primary | ICD-10-CM

## 2022-02-11 NOTE — TELEPHONE ENCOUNTER
Pt is scheduled for labs to check her Iron on Monday.  There are no orders in chart.  She just had fasting labs 12/22/22.  I didn't see anything in office note from January.  What do you need

## 2022-02-14 DIAGNOSIS — E55.9 VITAMIN D DEFICIENCY: ICD-10-CM

## 2022-02-14 DIAGNOSIS — D50.9 MICROCYTIC ANEMIA: ICD-10-CM

## 2022-02-15 LAB
25(OH)D3+25(OH)D2 SERPL-MCNC: 23 NG/ML (ref 30–100)
BASOPHILS # BLD AUTO: 0 X10E3/UL (ref 0–0.2)
BASOPHILS NFR BLD AUTO: 1 %
EOSINOPHIL # BLD AUTO: 0.3 X10E3/UL (ref 0–0.4)
EOSINOPHIL NFR BLD AUTO: 3 %
ERYTHROCYTE [DISTWIDTH] IN BLOOD BY AUTOMATED COUNT: 17.4 % (ref 11.7–15.4)
FERRITIN SERPL-MCNC: 10 NG/ML (ref 15–150)
HCT VFR BLD AUTO: 35.2 % (ref 34–46.6)
HGB BLD-MCNC: 10.8 G/DL (ref 11.1–15.9)
IMM GRANULOCYTES # BLD AUTO: 0 X10E3/UL (ref 0–0.1)
IMM GRANULOCYTES NFR BLD AUTO: 1 %
IRON SATN MFR SERPL: 7 % (ref 15–55)
IRON SERPL-MCNC: 28 UG/DL (ref 27–139)
LYMPHOCYTES # BLD AUTO: 2.4 X10E3/UL (ref 0.7–3.1)
LYMPHOCYTES NFR BLD AUTO: 28 %
MCH RBC QN AUTO: 23.2 PG (ref 26.6–33)
MCHC RBC AUTO-ENTMCNC: 30.7 G/DL (ref 31.5–35.7)
MCV RBC AUTO: 76 FL (ref 79–97)
MONOCYTES # BLD AUTO: 0.5 X10E3/UL (ref 0.1–0.9)
MONOCYTES NFR BLD AUTO: 6 %
NEUTROPHILS # BLD AUTO: 5.1 X10E3/UL (ref 1.4–7)
NEUTROPHILS NFR BLD AUTO: 61 %
PLATELET # BLD AUTO: 315 X10E3/UL (ref 150–450)
RBC # BLD AUTO: 4.65 X10E6/UL (ref 3.77–5.28)
TIBC SERPL-MCNC: 394 UG/DL (ref 250–450)
UIBC SERPL-MCNC: 366 UG/DL (ref 118–369)
WBC # BLD AUTO: 8.3 X10E3/UL (ref 3.4–10.8)

## 2022-02-16 ENCOUNTER — HOSPITAL ENCOUNTER (OUTPATIENT)
Dept: MAMMOGRAPHY | Facility: HOSPITAL | Age: 67
Discharge: HOME | End: 2022-02-16

## 2022-02-16 ENCOUNTER — HOSPITAL ENCOUNTER (OUTPATIENT)
Dept: ULTRASOUND IMAGING | Facility: HOSPITAL | Age: 67
Discharge: HOME | End: 2022-02-16

## 2022-02-16 DIAGNOSIS — R92.8 ABNORMALITY OF RIGHT BREAST ON SCREENING MAMMOGRAM: ICD-10-CM

## 2022-02-16 PROCEDURE — 77065 DX MAMMO INCL CAD UNI: CPT

## 2022-02-16 PROCEDURE — G0279 TOMOSYNTHESIS, MAMMO: HCPCS

## 2022-02-16 PROCEDURE — 76642 ULTRASOUND BREAST LIMITED: CPT

## 2022-02-21 DIAGNOSIS — D50.8 IRON DEFICIENCY ANEMIA REFRACTORY TO IRON THERAPY: Primary | ICD-10-CM

## 2022-03-14 ENCOUNTER — OFFICE VISIT (OUTPATIENT)
Dept: CARDIOLOGY | Facility: CLINIC | Age: 67
End: 2022-03-14

## 2022-03-14 VITALS
BODY MASS INDEX: 43.78 KG/M2 | SYSTOLIC BLOOD PRESSURE: 132 MMHG | HEIGHT: 60 IN | DIASTOLIC BLOOD PRESSURE: 70 MMHG | HEART RATE: 70 BPM | WEIGHT: 223 LBS

## 2022-03-14 DIAGNOSIS — I48.0 PAROXYSMAL ATRIAL FIBRILLATION: Primary | ICD-10-CM

## 2022-03-14 PROCEDURE — 93000 ELECTROCARDIOGRAM COMPLETE: CPT | Performed by: INTERNAL MEDICINE

## 2022-03-14 PROCEDURE — 99213 OFFICE O/P EST LOW 20 MIN: CPT | Performed by: INTERNAL MEDICINE

## 2022-03-14 NOTE — PROGRESS NOTES
Subjective:     Encounter Date: 03/14/22      Patient ID: Rosalee Hargrove is a 66 y.o. female.    Chief Complaint: Nonobstructive CAD, peripheral vascular disease, A. Fib, CVA  HPI:     This is a 66-year-old woman with recent CVA, nonobstructive CAD, PAD, graham graham disease s/p r CEA, HTN, HLD.      Several years of ago she underwent cardiac catheterization which showed mild, nonobstructive coronary disease throughout her coronary arteries.  She has a history of peripheral arterial disease and is followed by Dr. Coats. 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 She has a right iliac artery stent which on last evaluation 2021 showed moderate in-stent restenosis.  She also has severe peripheral disease of the descending aorta with scattered ulcerations and plaque formation.    In 2017 she wore a cardiac event monitor due to complaints of presyncope.  She was noted to have an episode of atrial fibrillation lasting 2 minutes.  Despite her elevated chads Vascor she was not anticoagulated due to the short duration of A. Fib. In November 2020 I saw her and she wore a cardiac event monitor for 30 days without AF.     In April 2021 she had acute left sided weakness and facial droop. She was evaluated at [facility] and found to have a small CVA. Echo unremarkable. 48 hour holter showed no AF.  When I saw her in follow-up we plan to implant a Linq device to follow for occult atrial fibrillation.  She had a carotid Doppler also which showed an occluded right carotid artery.  She then presented to [facility] with recurrent stroke in Memorial Day of 2021.  She underwent carotid artery stenting with Dr. Castanon.  She also had a Linq implanted by the cardiology team there.    Since then she has been feeling okay.  She has pain over the Linq insertion site which is a light heaviness. Not very bothresome but she is aware of it. She has had no follow ups with the [facility] EP team so doesn't know what the Linq has shown thus far.  She has b/l LE pain with walking.     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  She continues to smoke.  She tried to use Wellbutrin but she felt it made her more jittery and more desirous of smoking.     There is a strong family history of coronary disease.  Her younger brother was treated by my father.      The following portions of the patient's history were reviewed and updated as appropriate: allergies, current medications, past family history, past medical history, past social history, past surgical history and problem list.     REVIEW OF SYSTEMS:   All systems reviewed.  Pertinent positives identified in HPI.  All other systems are negative.    Past Medical History:   Diagnosis Date   • Allergic    • Anemia    • Angina pectoris ([facility])    • Anxiety    • Arthritis    • ASCVD (arteriosclerotic cardiovascular disease)    • Bilateral leg edema    • CAD (coronary artery disease)    • Chest pain    • Depression    • Diabetes mellitus ([facility])    • Disease of thyroid gland    • Dizziness    • Headache    • Hyperlipidemia    • Hypertension    • Morbid obesity ([facility])    • Orthostatic hypotension    • PAD (peripheral artery disease) ([facility])    • Shortness of breath    • Sleep apnea    • Tobacco abuse    • Vitamin D deficiency        Family History   Problem Relation Age of Onset   • Heart disease Mother    • Hypertension Mother    • Hyperlipidemia Mother    • Breast cancer Mother    • Heart disease Father    • Heart attack Sister    • Heart disease Sister    • Breast cancer Sister    • Heart disease Brother    • Heart attack Brother    • Hyperlipidemia Brother    • Diabetes Brother    • Heart attack Maternal Grandmother    • Heart disease Maternal Grandmother    • Heart failure Maternal Grandmother    • Heart failure Maternal Grandfather    • Heart disease Maternal Grandfather    • Heart attack Maternal Grandfather    • Heart attack Paternal Grandmother    • Heart disease Paternal Grandmother    • Heart  failure Paternal Grandmother    • Hypertension Paternal Grandmother        Social History     Socioeconomic History   • Marital status:    Tobacco Use   • Smoking status: Current Every Day Smoker     Packs/day: 0.25     Types: Cigarettes     Start date:    • Smokeless tobacco: Never Used   Substance and Sexual Activity   • Alcohol use: Yes     Alcohol/week: 1.0 standard drink     Types: 1 Glasses of wine per week     Comment: rare   • Drug use: No   • Sexual activity: Defer       Allergies   Allergen Reactions   • Ciprofloxacin Hives and Swelling   • Lisinopril Swelling   • Ticagrelor Shortness Of Breath       Past Surgical History:   Procedure Laterality Date   • ANAL FISSURECTOMY     • BRAIN SURGERY     • CARDIAC CATHETERIZATION N/A 2016    Procedure: Coronary angiography;  Surgeon: Fredrick Kapoor MD;  Location:  [facility] CATH INVASIVE LOCATION;  Service:    • CARDIAC CATHETERIZATION N/A 2016    Procedure: Left heart cath;  Surgeon: Fredrick Kapoor MD;  Location:  [facility] CATH INVASIVE LOCATION;  Service:    • CARDIAC CATHETERIZATION N/A 2016    Procedure: Left ventriculography;  Surgeon: Fredrick Kapoor MD;  [facility];  Service:    • CARDIAC CATHETERIZATION N/A 2016    Procedure: Right heart cath;  Surgeon: Fredrick Kapoor MD;  Location:  [facility] CATH INVASIVE LOCATION;  Service:    •  SECTION     • CHOLECYSTECTOMY     • ERCP     • FRACTURE SURGERY      arm   • HYSTERECTOMY     • ILIAC ARTERY STENT           ECG 12 Lead    Date/Time: 3/14/2022 12:41 PM  Performed by: Nohemi Mei MD  Authorized by: Nohemi Mei MD   Comparison: compared with previous ECG from 2020  Similar to previous ECG  Rhythm: sinus rhythm  Rate: normal  Q waves: V1 and V2    ST Segments: ST segments normal  T Waves: T waves normal  QRS axis: normal  Other: no other findings    Clinical impression: non-specific ECG               Objective:         PHYSICAL EXAM:  GEN: VSS, no distress,    Eyes: normal sclera, normal lids and lashes  HENT: moist mucus membranes,   Respiratory: CTAB, no rales or wheezes  CV: RRR, no murmurs,  B/l carotid bruits  GI: NABS, soft,  Nontender, nondistended  MSK: no edema, no scoliosis or kyphosis  Skin: no rash, warm, dry  Heme/Lymph: no bruising or bleeding  Psych: organized thought, normal behavior and affect  Neuro: Cranial nerves grossly intact, Alert and Oriented x 3.         Assessment:          Diagnosis Plan   1. Paroxysmal atrial fibrillation (HCC)            Plan:       1.  Paroxysmal atrial fibrillation: CHADSVASC is 6. One episode documented several years ago for a few minutes and was not anticoagulated.  Linq was implanted at her most recent hospitalization at [facility] in May 2021 at the time of a stroke; she hasn't been seen follow up there. I will get her set up in our device clinic so I can monitor the results.   2.  Hyperlipidemia: LDL is 80 on Lipitor 40.  Continue.  3.  Coronary artery disease, nonobstructive: No real angina.  Continue to monitor.  4.  Peripheral arterial disease: Bilateral carotid bruits, as well as bilateral LE heaviness with walking, will see Dr. Coats next week. She  follows with Dr. Coats who implanted a right iliac stent and is s/p r CEA.  Recent right carotid endarterectomy with Dr. Castanon  5.  Hypertension: Controlled on amlodipine 5  6.  Smoking cessation: On Wellbutrin.  Still smoking.  Discussed in detail.  Wants to quit.  7.  CVA: Multiple CVAs in the last year.  Most recently May 2021.  Now status post right CEA and Linq implantation.  Continue aspirin and Plavix per neurology guidance.    Dr. Slater, thank you very much for referring this kind patient to me. Please call me with any questions or concerns. I will see the patient again in the office in 6 months.       Nohemi Mei MD  03/14/22  [facility]    Outpatient Encounter Medications as of 3/14/2022   Medication Sig Dispense Refill   • amLODIPine (NORVASC)  5 MG tablet Take 1 tablet by mouth Daily. 90 tablet 1   • aspirin 81 MG chewable tablet Chew 81 mg daily.     • atorvastatin (LIPITOR) 40 MG tablet Take 1 tablet by mouth Daily. 90 tablet 1   • citalopram (CeleXA) 20 MG tablet Take 1 tablet by mouth Daily. 90 tablet 1   • clopidogrel (PLAVIX) 75 MG tablet Take 1 tablet by mouth Daily. 90 tablet 1   • fluocinolone acetonide (DERMOTIC) 0.01 % oil otic oil Administer  into both ears 2 (Two) Times a Day. 20 mL 0   • fluticasone (Flonase) 50 MCG/ACT nasal spray 2 sprays into the nostril(s) as directed by provider Daily.     • glimepiride (AMARYL) 2 MG tablet Take 1 tablet by mouth Every Morning Before Breakfast. 90 tablet 1   • hydroCHLOROthiazide (HYDRODIURIL) 25 MG tablet Take 1 tablet by mouth Daily. 90 tablet 1   • levothyroxine (SYNTHROID, LEVOTHROID) 75 MCG tablet Take 1 tablet by mouth Daily. 90 tablet 1   • lisinopril (PRINIVIL,ZESTRIL) 40 MG tablet TAKE 1 TABLET BY MOUTH EVERY DAY 90 tablet 0   • metFORMIN (GLUCOPHAGE) 1000 MG tablet Take 1 tablet by mouth 2 (Two) Times a Day With Meals. 180 tablet 1   • pantoprazole (PROTONIX) 40 MG EC tablet TAKE 1 TABLET BY MOUTH EVERY DAY 90 tablet 0   • [DISCONTINUED] mupirocin (BACTROBAN) 2 % ointment APPLY PEA SIZE DROPLET TO QTIP AND APPLY TWICE DAILY FOR 7 DAYS       No facility-administered encounter medications on file as of 3/14/2022.

## 2022-03-15 ENCOUNTER — CONSULT (OUTPATIENT)
Dept: ONCOLOGY | Facility: CLINIC | Age: 67
End: 2022-03-15

## 2022-03-15 ENCOUNTER — LAB (OUTPATIENT)
Dept: LAB | Facility: HOSPITAL | Age: 67
End: 2022-03-15

## 2022-03-15 VITALS
HEIGHT: 60 IN | TEMPERATURE: 96.6 F | RESPIRATION RATE: 14 BRPM | BODY MASS INDEX: 43.78 KG/M2 | DIASTOLIC BLOOD PRESSURE: 66 MMHG | HEART RATE: 74 BPM | WEIGHT: 223 LBS | OXYGEN SATURATION: 98 % | SYSTOLIC BLOOD PRESSURE: 120 MMHG

## 2022-03-15 DIAGNOSIS — D50.0 IRON DEFICIENCY ANEMIA DUE TO CHRONIC BLOOD LOSS: Primary | ICD-10-CM

## 2022-03-15 DIAGNOSIS — D64.9 ANEMIA, UNSPECIFIED TYPE: Primary | ICD-10-CM

## 2022-03-15 DIAGNOSIS — K21.9 GASTROESOPHAGEAL REFLUX DISEASE WITHOUT ESOPHAGITIS: ICD-10-CM

## 2022-03-15 PROBLEM — D50.9 IRON DEFICIENCY ANEMIA: Status: ACTIVE | Noted: 2022-03-15

## 2022-03-15 LAB
BASOPHILS # BLD AUTO: 0.05 10*3/MM3 (ref 0–0.2)
BASOPHILS NFR BLD AUTO: 0.5 % (ref 0–1.5)
DEPRECATED RDW RBC AUTO: 51.8 FL (ref 37–54)
EOSINOPHIL # BLD AUTO: 0.32 10*3/MM3 (ref 0–0.4)
EOSINOPHIL NFR BLD AUTO: 3.3 % (ref 0.3–6.2)
ERYTHROCYTE [DISTWIDTH] IN BLOOD BY AUTOMATED COUNT: 18.6 % (ref 12.3–15.4)
FERRITIN SERPL-MCNC: 12 NG/ML (ref 13–150)
HCT VFR BLD AUTO: 37.6 % (ref 34–46.6)
HGB BLD-MCNC: 11.4 G/DL (ref 12–15.9)
IMM GRANULOCYTES # BLD AUTO: 0.03 10*3/MM3 (ref 0–0.05)
IMM GRANULOCYTES NFR BLD AUTO: 0.3 % (ref 0–0.5)
IRON 24H UR-MRATE: 24 MCG/DL (ref 37–145)
IRON SATN MFR SERPL: 6 % (ref 20–50)
LYMPHOCYTES # BLD AUTO: 3.07 10*3/MM3 (ref 0.7–3.1)
LYMPHOCYTES NFR BLD AUTO: 31.3 % (ref 19.6–45.3)
MCH RBC QN AUTO: 23.6 PG (ref 26.6–33)
MCHC RBC AUTO-ENTMCNC: 30.3 G/DL (ref 31.5–35.7)
MCV RBC AUTO: 77.8 FL (ref 79–97)
MONOCYTES # BLD AUTO: 0.58 10*3/MM3 (ref 0.1–0.9)
MONOCYTES NFR BLD AUTO: 5.9 % (ref 5–12)
NEUTROPHILS NFR BLD AUTO: 5.77 10*3/MM3 (ref 1.7–7)
NEUTROPHILS NFR BLD AUTO: 58.7 % (ref 42.7–76)
NRBC BLD AUTO-RTO: 0 /100 WBC (ref 0–0.2)
PLATELET # BLD AUTO: 316 10*3/MM3 (ref 140–450)
PMV BLD AUTO: 10.1 FL (ref 6–12)
RBC # BLD AUTO: 4.83 10*6/MM3 (ref 3.77–5.28)
TIBC SERPL-MCNC: 388 MCG/DL (ref 298–536)
UIBC SERPL-MCNC: 364 MCG/DL (ref 112–346)
WBC NRBC COR # BLD: 9.82 10*3/MM3 (ref 3.4–10.8)

## 2022-03-15 PROCEDURE — 82728 ASSAY OF FERRITIN: CPT | Performed by: INTERNAL MEDICINE

## 2022-03-15 PROCEDURE — 85025 COMPLETE CBC W/AUTO DIFF WBC: CPT

## 2022-03-15 PROCEDURE — 83550 IRON BINDING TEST: CPT | Performed by: INTERNAL MEDICINE

## 2022-03-15 PROCEDURE — 99204 OFFICE O/P NEW MOD 45 MIN: CPT | Performed by: INTERNAL MEDICINE

## 2022-03-15 PROCEDURE — 83540 ASSAY OF IRON: CPT | Performed by: INTERNAL MEDICINE

## 2022-03-15 PROCEDURE — 36415 COLL VENOUS BLD VENIPUNCTURE: CPT | Performed by: INTERNAL MEDICINE

## 2022-03-15 RX ORDER — PANTOPRAZOLE SODIUM 40 MG/1
TABLET, DELAYED RELEASE ORAL
Qty: 90 TABLET | Refills: 0 | Status: SHIPPED | OUTPATIENT
Start: 2022-03-15 | End: 2022-06-17

## 2022-03-15 RX ORDER — SODIUM CHLORIDE 9 MG/ML
250 INJECTION, SOLUTION INTRAVENOUS ONCE
Status: CANCELLED | OUTPATIENT
Start: 2022-03-23

## 2022-03-15 RX ORDER — SODIUM CHLORIDE 9 MG/ML
250 INJECTION, SOLUTION INTRAVENOUS ONCE
Status: CANCELLED | OUTPATIENT
Start: 2022-03-30

## 2022-03-15 RX ORDER — PROCHLORPERAZINE MALEATE 10 MG
10 TABLET ORAL ONCE
Status: CANCELLED | OUTPATIENT
Start: 2022-03-30 | End: 2022-03-29

## 2022-03-15 RX ORDER — PROCHLORPERAZINE MALEATE 10 MG
10 TABLET ORAL ONCE
Status: CANCELLED | OUTPATIENT
Start: 2022-03-23 | End: 2022-03-22

## 2022-03-15 NOTE — PROGRESS NOTES
Subjective     REASON FOR CONSULTATION: Iron deficiency anemia  Provide an opinion on any further workup or treatment                             REQUESTING PHYSICIAN: Dr. Slater    RECORDS OBTAINED:  Records of the patients history including those obtained from the referring provider were reviewed and summarized in detail.      History of Present Illness   This is a very pleasant 66-year-old woman with multiple medical comorbidities referred from her PCP for evaluation and treatment of iron deficiency anemia.  Reviewing the patient's records, she has had microcytic, hypochromic red blood cell indices since 2017/2018 and over the past 1 year or so been mildly anemic hemoglobin typically running around 11.0.  She has normal white blood cell and platelet counts.  Her ferritin has been low for at least 5 years most recently ten 1 month ago.  The patient has attempted to take oral iron on multiple occasions but difficulty tolerating secondary to either diarrhea or constipation.  She has not been able to improve her ferritin despite oral iron.  She complains of fatigue, pica and restless leg symptoms.  She thinks it has been about 10 years since her previous colonoscopy.  She does have occasional hematochezia.  She denies vaginal bleeding and denies donating blood.    Past Medical History:   Diagnosis Date   • Allergic    • Anemia    • Angina pectoris (HCC)    • Anxiety    • Arthritis    • ASCVD (arteriosclerotic cardiovascular disease)    • Bilateral leg edema    • CAD (coronary artery disease)    • Chest pain    • Depression    • Diabetes mellitus ([facility])    • Disease of thyroid gland    • Dizziness    • Headache    • Hyperlipidemia    • Hypertension    • Morbid obesity (HCC)    • Orthostatic hypotension    • PAD (peripheral artery disease) ([facility])    • Shortness of breath    • Sleep apnea    • Tobacco abuse    • Vitamin D deficiency         Past Surgical History:   Procedure Laterality Date   • ANAL FISSURECTOMY     •  BRAIN SURGERY     • CARDIAC CATHETERIZATION N/A 2016    Procedure: Coronary angiography;  Surgeon: Fredrick Kapoor MD;  Location:  [facility] CATH INVASIVE LOCATION;  Service:    • CARDIAC CATHETERIZATION N/A 2016    Procedure: Left heart cath;  Surgeon: Fredrick Kapoor MD;  Location:  [facility] CATH INVASIVE LOCATION;  Service:    • CARDIAC CATHETERIZATION N/A 2016    Procedure: Left ventriculography;  Surgeon: Fredrick Kapoor MD;  [facility];  Service:    • CARDIAC CATHETERIZATION N/A 2016    Procedure: Right heart cath;  Surgeon: Fredrick Kapoor MD;  Location:  [facility] CATH INVASIVE LOCATION;  Service:    •  SECTION     • CHOLECYSTECTOMY     • ERCP     • FRACTURE SURGERY      arm   • HYSTERECTOMY     • ILIAC ARTERY STENT          Current Outpatient Medications on File Prior to Visit   Medication Sig Dispense Refill   • amLODIPine (NORVASC) 5 MG tablet Take 1 tablet by mouth Daily. 90 tablet 1   • aspirin 81 MG chewable tablet Chew 81 mg daily.     • atorvastatin (LIPITOR) 40 MG tablet Take 1 tablet by mouth Daily. 90 tablet 1   • citalopram (CeleXA) 20 MG tablet Take 1 tablet by mouth Daily. 90 tablet 1   • clopidogrel (PLAVIX) 75 MG tablet Take 1 tablet by mouth Daily. 90 tablet 1   • fluocinolone acetonide (DERMOTIC) 0.01 % oil otic oil Administer  into both ears 2 (Two) Times a Day. 20 mL 0   • fluticasone (Flonase) 50 MCG/ACT nasal spray 2 sprays into the nostril(s) as directed by provider Daily.     • glimepiride (AMARYL) 2 MG tablet Take 1 tablet by mouth Every Morning Before Breakfast. 90 tablet 1   • hydroCHLOROthiazide (HYDRODIURIL) 25 MG tablet Take 1 tablet by mouth Daily. 90 tablet 1   • levothyroxine (SYNTHROID, LEVOTHROID) 75 MCG tablet Take 1 tablet by mouth Daily. 90 tablet 1   • lisinopril (PRINIVIL,ZESTRIL) 40 MG tablet TAKE 1 TABLET BY MOUTH EVERY DAY 90 tablet 0   • metFORMIN (GLUCOPHAGE) 1000 MG tablet Take 1 tablet by mouth 2 (Two) Times a Day With Meals. 180 tablet  1   • [DISCONTINUED] pantoprazole (PROTONIX) 40 MG EC tablet TAKE 1 TABLET BY MOUTH EVERY DAY 90 tablet 0     No current facility-administered medications on file prior to visit.        ALLERGIES:    Allergies   Allergen Reactions   • Ciprofloxacin Hives and Swelling        Social History     Socioeconomic History   • Marital status:    Tobacco Use   • Smoking status: Current Every Day Smoker     Packs/day: 0.25     Types: Cigarettes     Start date: 1970   • Smokeless tobacco: Never Used   Substance and Sexual Activity   • Alcohol use: Yes     Alcohol/week: 1.0 standard drink     Types: 1 Glasses of wine per week     Comment: rare   • Drug use: No   • Sexual activity: Defer        Family History   Problem Relation Age of Onset   • Heart disease Mother    • Hypertension Mother    • Hyperlipidemia Mother    • Breast cancer Mother    • Heart disease Father    • Heart attack Sister    • Heart disease Sister    • Breast cancer Sister    • Heart disease Brother    • Heart attack Brother    • Hyperlipidemia Brother    • Diabetes Brother    • Heart attack Maternal Grandmother    • Heart disease Maternal Grandmother    • Heart failure Maternal Grandmother    • Heart failure Maternal Grandfather    • Heart disease Maternal Grandfather    • Heart attack Maternal Grandfather    • Heart attack Paternal Grandmother    • Heart disease Paternal Grandmother    • Heart failure Paternal Grandmother    • Hypertension Paternal Grandmother         Review of Systems   Constitutional: Positive for fatigue. Negative for fever and unexpected weight change.   HENT: Negative.    Respiratory: Positive for shortness of breath (With exertion). Negative for cough.    Cardiovascular: Negative for chest pain, palpitations and leg swelling.   Gastrointestinal: Positive for blood in stool, constipation and diarrhea. Negative for nausea.   Genitourinary: Negative.    Musculoskeletal: Positive for arthralgias.   Skin: Negative.   "  Allergic/Immunologic: Negative.    Neurological: Positive for weakness and light-headedness. Negative for seizures.   Hematological: Negative.    Psychiatric/Behavioral: Negative.           Objective     Vitals:    03/15/22 1130   BP: 120/66   Pulse: 74   Resp: 14   Temp: 96.6 °F (35.9 °C)   TempSrc: Infrared   SpO2: 98%   Weight: 101 kg (223 lb)   Height: 152 cm (59.84\")  Comment: new ht   PainSc: 0-No pain     Current Status 3/15/2022   ECOG score 0       Physical Exam    CONSTITUTIONAL: pleasant well-developed adult woman  HEENT: no icterus, no thrush, moist membranes  NECK: no jvd  LYMPH: no cervical or supraclavicular lad  CV: RRR, S1S2, no murmur  RESP: cta bilat, no wheezing, no rales  GI: Obese, soft, non-tender, no splenomegaly (limited by habitus), +bs  MUSC: no edema, normal gait  NEURO: alert and oriented x3, normal strength  PSYCH: normal mood and affect    RECENT LABS:  Hematology WBC   Date Value Ref Range Status   03/15/2022 9.82 3.40 - 10.80 10*3/mm3 Final   02/14/2022 8.3 3.4 - 10.8 x10E3/uL Final   06/04/2021 7.52 4.5 - 11.0 10*3/uL Final     RBC   Date Value Ref Range Status   03/15/2022 4.83 3.77 - 5.28 10*6/mm3 Final   02/14/2022 4.65 3.77 - 5.28 x10E6/uL Final   06/04/2021 4.53 4.0 - 5.2 10*6/uL Final     Hemoglobin   Date Value Ref Range Status   03/15/2022 11.4 (L) 12.0 - 15.9 g/dL Final   06/04/2021 10.8 (L) 12.0 - 16.0 g/dL Final     Hematocrit   Date Value Ref Range Status   03/15/2022 37.6 34.0 - 46.6 % Final   06/04/2021 35.7 (L) 36.0 - 46.0 % Final     Platelets   Date Value Ref Range Status   03/15/2022 316 140 - 450 10*3/mm3 Final   06/04/2021 276 140 - 440 10*3/uL Final        Lab Results   Component Value Date    GLUCOSE 106 (H) 12/22/2021    BUN 24 12/22/2021    CREATININE 0.97 12/22/2021    EGFRIFNONA 60 12/22/2021    EGFRIFAFRI 69 12/22/2021    BCR 25 12/22/2021    K 4.4 12/22/2021    CO2 23 12/22/2021    CALCIUM 9.1 12/22/2021    PROTENTOTREF 6.8 12/22/2021    ALBUMIN 4.1 " 12/22/2021    LABIL2 1.5 12/22/2021    AST 13 12/22/2021    ALT 16 12/22/2021         Assessment/Plan     *Iron deficiency anemia  · The patient has had microcytic/hypochromic indices for 4 to 5 years, recently developed anemia with hemoglobin around 11  · The patient has attempted oral iron but cannot tolerate secondary to constipation/diarrhea and has been unable to improve iron stores despite oral iron  · She has occasional bright red blood per rectum, most recent colonoscopy she thinks was about 10 years ago.  She denies vaginal bleeding and denies blood donations.  She has never had gastric surgeries.  She is on chronic PPI therapy.    *Multiple comorbidities including peripheral vascular disease, coronary artery disease, diabetes mellitus, CVA, obesity, hyperlipidemia, hypertension, tobacco use etc.    Hematology plan/recommendations:  1. Given the patient's inability to tolerate oral iron I recommended intravenous iron replacement.  I discussed with her risk and side effects of IV iron and she was agreeable to proceed.  2. We will recheck her CBC ferritin iron profile in 3 months or so to make sure adequately replaced  3. GI referral for endoscopic evaluation of iron deficiency anemia and hematochezia-patient previously saw Dr. Handley but prefers to stay in [place] at this point    Thank you for allowing me to participate in the care of this pleasant patient.

## 2022-03-16 DIAGNOSIS — I10 ESSENTIAL HYPERTENSION: ICD-10-CM

## 2022-03-16 RX ORDER — AMLODIPINE BESYLATE 5 MG/1
TABLET ORAL
Qty: 90 TABLET | Refills: 1 | Status: SHIPPED | OUTPATIENT
Start: 2022-03-16 | End: 2022-09-22

## 2022-03-23 ENCOUNTER — INFUSION (OUTPATIENT)
Dept: ONCOLOGY | Facility: HOSPITAL | Age: 67
End: 2022-03-23

## 2022-03-23 VITALS
DIASTOLIC BLOOD PRESSURE: 77 MMHG | TEMPERATURE: 98.4 F | OXYGEN SATURATION: 95 % | SYSTOLIC BLOOD PRESSURE: 136 MMHG | HEART RATE: 74 BPM

## 2022-03-23 DIAGNOSIS — D50.0 IRON DEFICIENCY ANEMIA DUE TO CHRONIC BLOOD LOSS: Primary | ICD-10-CM

## 2022-03-23 PROCEDURE — 25010000002 FERRIC CARBOXYMALTOSE 750 MG/15ML SOLUTION 15 ML VIAL: Performed by: INTERNAL MEDICINE

## 2022-03-23 PROCEDURE — 63710000001 PROCHLORPERAZINE MALEATE PER 5 MG: Performed by: INTERNAL MEDICINE

## 2022-03-23 PROCEDURE — 96374 THER/PROPH/DIAG INJ IV PUSH: CPT

## 2022-03-23 RX ORDER — PROCHLORPERAZINE MALEATE 5 MG/1
10 TABLET ORAL ONCE
Status: COMPLETED | OUTPATIENT
Start: 2022-03-23 | End: 2022-03-23

## 2022-03-23 RX ORDER — SODIUM CHLORIDE 9 MG/ML
250 INJECTION, SOLUTION INTRAVENOUS ONCE
Status: COMPLETED | OUTPATIENT
Start: 2022-03-23 | End: 2022-03-23

## 2022-03-23 RX ADMIN — SODIUM CHLORIDE 250 ML: 9 INJECTION, SOLUTION INTRAVENOUS at 12:38

## 2022-03-23 RX ADMIN — PROCHLORPERAZINE MALEATE 10 MG: 5 TABLET ORAL at 12:43

## 2022-03-23 RX ADMIN — FERRIC CARBOXYMALTOSE INJECTION 750 MG: 50 INJECTION, SOLUTION INTRAVENOUS at 12:54

## 2022-03-24 ENCOUNTER — PREP FOR SURGERY (OUTPATIENT)
Dept: SURGERY | Facility: SURGERY CENTER | Age: 67
End: 2022-03-24

## 2022-03-24 ENCOUNTER — OFFICE VISIT (OUTPATIENT)
Dept: GASTROENTEROLOGY | Facility: CLINIC | Age: 67
End: 2022-03-24

## 2022-03-24 VITALS
SYSTOLIC BLOOD PRESSURE: 124 MMHG | HEIGHT: 60 IN | OXYGEN SATURATION: 100 % | DIASTOLIC BLOOD PRESSURE: 64 MMHG | WEIGHT: 224.6 LBS | BODY MASS INDEX: 44.1 KG/M2 | TEMPERATURE: 98 F | HEART RATE: 87 BPM

## 2022-03-24 DIAGNOSIS — K62.5 RECTAL BLEEDING: ICD-10-CM

## 2022-03-24 DIAGNOSIS — D50.9 IRON DEFICIENCY ANEMIA, UNSPECIFIED IRON DEFICIENCY ANEMIA TYPE: Primary | ICD-10-CM

## 2022-03-24 PROCEDURE — 99214 OFFICE O/P EST MOD 30 MIN: CPT | Performed by: NURSE PRACTITIONER

## 2022-03-24 RX ORDER — SODIUM CHLORIDE 0.9 % (FLUSH) 0.9 %
3 SYRINGE (ML) INJECTION EVERY 12 HOURS SCHEDULED
Status: CANCELLED | OUTPATIENT
Start: 2022-03-24

## 2022-03-24 RX ORDER — SODIUM CHLORIDE, SODIUM LACTATE, POTASSIUM CHLORIDE, CALCIUM CHLORIDE 600; 310; 30; 20 MG/100ML; MG/100ML; MG/100ML; MG/100ML
30 INJECTION, SOLUTION INTRAVENOUS CONTINUOUS PRN
Status: CANCELLED | OUTPATIENT
Start: 2022-03-24

## 2022-03-24 RX ORDER — SODIUM CHLORIDE 0.9 % (FLUSH) 0.9 %
10 SYRINGE (ML) INJECTION AS NEEDED
Status: CANCELLED | OUTPATIENT
Start: 2022-03-24

## 2022-03-24 NOTE — PATIENT INSTRUCTIONS
Continue pantoprazole daily.    2. We will schedule an EGD and colonoscopy for further evaluation and provide further recommendations at that time.

## 2022-03-24 NOTE — PROGRESS NOTES
Chief Complaint   Patient presents with   • Anemia     Rectal  bleeding           History of Present Illness  66-year-old female presents today for evaluation of iron deficiency anemia.  She takes an 81 mg aspirin daily as well as Plavix 75 mg daily.  She reports having 2 CVAs with two new stent placements recently.  Most recent CBC on 3/15/2022 demonstrated an H/H of 11.4/37.6%.  She reports an episode of rectal bleeding that occurred approximately 1 week ago.  Blood was noted on the tissue and on the stool and was bright red in color.  She has not noticed any blood per rectum since.  She reports having bowel movements daily to every other day.  She does not have a history of constipation and she denies straining.  She does take oral iron therapy as well as a chewable children's vitamin.  She has reported some dark stools, but is unsure if this is secondary to the iron or if it is from upper GI bleeding.  She does take Metformin and will also intermittently have loose stools.  Her weight is stable.    She also has a history of GERD and takes pantoprazole 40 mg once daily.  She has taken a PPI medication for greater than 20 years.  Nexium has worked the best for her in the past, but due to insurance coverage she is currently taking pantoprazole.  She reports intermittent heartburn reflux symptoms that occur approximately 3 days/week.  She does report some intermittent nausea without emesis.  She denies any dysphagia.    Review of Systems   Constitutional: Negative for fever and unexpected weight change.   HENT: Negative for trouble swallowing.    Cardiovascular: Negative for chest pain.   Gastrointestinal: Positive for abdominal pain, anal bleeding, diarrhea and nausea. Negative for abdominal distention, constipation, rectal pain and vomiting.      Result Review :       Iron Profile (03/15/2022 12:06)  Ferritin (03/15/2022 12:06)  DEXA Bone Density Axial (01/11/2022 13:50)  Progress Notes by Brian Kenny MD  "(03/15/2022 11:40)  CBC & Differential (03/15/2022 11:25)    Vital Signs:   /64   Pulse 87   Temp 98 °F (36.7 °C)   Ht 152.4 cm (60\")   Wt 102 kg (224 lb 9.6 oz)   SpO2 100%   BMI 43.86 kg/m²     Body mass index is 43.86 kg/m².     Physical Exam  Vitals reviewed.   Constitutional:       Appearance: Normal appearance. She is obese.   HENT:      Nose: Nose normal.      Mouth/Throat:      Mouth: Mucous membranes are moist.   Eyes:      General: No scleral icterus.     Extraocular Movements: Extraocular movements intact.   Cardiovascular:      Rate and Rhythm: Normal rate and regular rhythm.      Pulses: Normal pulses.      Heart sounds: Normal heart sounds.   Pulmonary:      Effort: Pulmonary effort is normal. No respiratory distress.      Breath sounds: Normal breath sounds.   Abdominal:      General: Abdomen is flat. Bowel sounds are normal. There is no distension.      Palpations: Abdomen is soft. There is no mass.      Tenderness: There is no abdominal tenderness. There is no guarding.   Musculoskeletal:         General: Normal range of motion.      Cervical back: Normal range of motion and neck supple.   Skin:     General: Skin is warm and dry.   Neurological:      General: No focal deficit present.      Mental Status: She is alert and oriented to person, place, and time.   Psychiatric:         Mood and Affect: Mood normal.         Thought Content: Thought content normal.         Judgment: Judgment normal.       Assessment and Plan    Diagnoses and all orders for this visit:    1. Iron deficiency anemia, unspecified iron deficiency anemia type (Primary)    2. Rectal bleeding           Patient Instructions   1. Continue pantoprazole daily.    2. We will schedule an EGD and colonoscopy for further evaluation and provide further recommendations at that time.      Discussion:    We will proceed with EGD and colonoscopy for further evaluation of iron deficiency anemia.  We will need to obtain cardiac " clearance first before proceeding as patient does take Plavix and aspirin due to her history of stent placement.  We will have her continue her daily PPI therapy for now.  Additional recommendations pending outcome of procedures.  Patient verbalized understand above plan of care and is in agreement.  All questions answered and support provided.     EMR Dragon/Transcription Disclaimer:  This document has been Dictated utilizing Dragon dictation.

## 2022-03-30 ENCOUNTER — INFUSION (OUTPATIENT)
Dept: ONCOLOGY | Facility: HOSPITAL | Age: 67
End: 2022-03-30

## 2022-03-30 VITALS
SYSTOLIC BLOOD PRESSURE: 132 MMHG | DIASTOLIC BLOOD PRESSURE: 75 MMHG | TEMPERATURE: 98.3 F | HEART RATE: 83 BPM | OXYGEN SATURATION: 96 %

## 2022-03-30 DIAGNOSIS — D50.0 IRON DEFICIENCY ANEMIA DUE TO CHRONIC BLOOD LOSS: Primary | ICD-10-CM

## 2022-03-30 PROCEDURE — 63710000001 PROCHLORPERAZINE MALEATE PER 5 MG: Performed by: INTERNAL MEDICINE

## 2022-03-30 PROCEDURE — 96374 THER/PROPH/DIAG INJ IV PUSH: CPT

## 2022-03-30 PROCEDURE — 25010000002 FERRIC CARBOXYMALTOSE 750 MG/15ML SOLUTION: Performed by: INTERNAL MEDICINE

## 2022-03-30 RX ORDER — PROCHLORPERAZINE MALEATE 5 MG/1
10 TABLET ORAL ONCE
Status: COMPLETED | OUTPATIENT
Start: 2022-03-30 | End: 2022-03-30

## 2022-03-30 RX ORDER — SODIUM CHLORIDE 9 MG/ML
250 INJECTION, SOLUTION INTRAVENOUS ONCE
Status: COMPLETED | OUTPATIENT
Start: 2022-03-30 | End: 2022-03-30

## 2022-03-30 RX ORDER — PROCHLORPERAZINE MALEATE 5 MG/1
TABLET ORAL
Status: DISCONTINUED
Start: 2022-03-30 | End: 2022-03-30 | Stop reason: HOSPADM

## 2022-03-30 RX ADMIN — FERRIC CARBOXYMALTOSE INJECTION 750 MG: 50 INJECTION, SOLUTION INTRAVENOUS at 13:35

## 2022-03-30 RX ADMIN — PROCHLORPERAZINE MALEATE 10 MG: 5 TABLET ORAL at 13:14

## 2022-03-30 RX ADMIN — SODIUM CHLORIDE 250 ML: 9 INJECTION, SOLUTION INTRAVENOUS at 13:05

## 2022-03-31 ENCOUNTER — TELEPHONE (OUTPATIENT)
Dept: CARDIOLOGY | Facility: CLINIC | Age: 67
End: 2022-03-31

## 2022-04-04 NOTE — TELEPHONE ENCOUNTER
Faxed cardiac clearance to ATTN Charlee keller/ ADRIEL -[phone]    [facility]  4/4/22

## 2022-05-16 ENCOUNTER — TELEPHONE (OUTPATIENT)
Dept: GASTROENTEROLOGY | Facility: CLINIC | Age: 67
End: 2022-05-16

## 2022-06-11 ENCOUNTER — HOSPITAL ENCOUNTER (EMERGENCY)
Facility: HOSPITAL | Age: 67
Discharge: HOME | End: 2022-06-11
Attending: EMERGENCY MEDICINE | Admitting: EMERGENCY MEDICINE
Payer: MEDICARE

## 2022-06-11 VITALS
TEMPERATURE: 97.3 F | SYSTOLIC BLOOD PRESSURE: 191 MMHG | BODY MASS INDEX: 44.57 KG/M2 | RESPIRATION RATE: 18 BRPM | HEIGHT: 60 IN | WEIGHT: 227 LBS | OXYGEN SATURATION: 98 % | HEART RATE: 88 BPM | DIASTOLIC BLOOD PRESSURE: 84 MMHG

## 2022-06-11 DIAGNOSIS — S61.213A LACERATION OF LEFT MIDDLE FINGER WITHOUT FOREIGN BODY WITHOUT DAMAGE TO NAIL, INITIAL ENCOUNTER: Primary | ICD-10-CM

## 2022-06-11 PROCEDURE — 12001 RPR S/N/AX/GEN/TRNK 2.5CM/<: CPT | Performed by: EMERGENCY MEDICINE

## 2022-06-11 PROCEDURE — 99282 EMERGENCY DEPT VISIT SF MDM: CPT

## 2022-06-11 RX ORDER — LIDOCAINE HYDROCHLORIDE 10 MG/ML
5 INJECTION, SOLUTION EPIDURAL; INFILTRATION; INTRACAUDAL; PERINEURAL ONCE
Status: COMPLETED | OUTPATIENT
Start: 2022-06-11 | End: 2022-06-11

## 2022-06-11 RX ORDER — LIDOCAINE HYDROCHLORIDE 10 MG/ML
INJECTION, SOLUTION EPIDURAL; INFILTRATION; INTRACAUDAL; PERINEURAL
Status: COMPLETED
Start: 2022-06-11 | End: 2022-06-11

## 2022-06-11 RX ORDER — BACITRACIN ZINC 500 [USP'U]/G
1 OINTMENT TOPICAL ONCE
Status: COMPLETED | OUTPATIENT
Start: 2022-06-11 | End: 2022-06-11

## 2022-06-11 RX ADMIN — BACITRACIN ZINC 1 APPLICATION: 500 OINTMENT TOPICAL at 13:48

## 2022-06-11 RX ADMIN — LIDOCAINE HYDROCHLORIDE 5 ML: 10 INJECTION, SOLUTION EPIDURAL; INFILTRATION; INTRACAUDAL; PERINEURAL at 13:31

## 2022-06-11 NOTE — DISCHARGE INSTRUCTIONS
Please keep wound dry for 48 hours.  After that you may wash hands but please do not soak hand in water until sutures have been removed.  Please follow-up with your primary care physician have sutures removed in 10 to 14 days.  Please see your primary care physician or return to the emergency room if you have concerns for infection including but not limited to redness, swelling, purulent discharge worsening pain or any other concerns.

## 2022-06-11 NOTE — ED PROVIDER NOTES
Subjective   History of Present Illness  66-year-old female presents emergency room complaining of cutting a chunk of flesh out of her left index finger on the pulp side with thinning ro.  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  She comes in today because it still bleeding.  Up-to-date on tetanus     Review of Systems   Skin: Positive for wound.       Past Medical History:   Diagnosis Date   • Allergic    • Anemia    • Angina pectoris ([facility])    • Anxiety    • Arthritis    • ASCVD (arteriosclerotic cardiovascular disease)    • Bilateral leg edema    • CAD (coronary artery disease)    • Chest pain    • Depression    • Diabetes mellitus ([facility])    • Disease of thyroid gland    • Dizziness    • Headache    • Hyperlipidemia    • Hypertension    • Morbid obesity ([facility])    • Orthostatic hypotension    • PAD (peripheral artery disease) ([facility])    • Shortness of breath    • Sleep apnea    • Tobacco abuse    • Vitamin D deficiency        Allergies   Allergen Reactions   • Ciprofloxacin Hives and Swelling       Past Surgical History:   Procedure Laterality Date   • ANAL FISSURECTOMY     • BRAIN SURGERY     • CARDIAC CATHETERIZATION N/A 2016    Procedure: Coronary angiography;  Surgeon: Fredrick Kapoor MD;  Location: [facility] INVASIVE LOCATION;  Service:    • CARDIAC CATHETERIZATION N/A 2016    Procedure: Left heart cath;  Surgeon: Fredrick Kapoor MD;  Location: [facility] CATH INVASIVE LOCATION;  Service:    • CARDIAC CATHETERIZATION N/A 2016    Procedure: Left ventriculography;  Surgeon: Fredrick Kapoor MD;  Location: [facility] CATH INVASIVE LOCATION;  Service:    • CARDIAC CATHETERIZATION N/A 2016    Procedure: Right heart cath;  Surgeon: Fredrick Kapoor MD;  Location: [facility] CATH INVASIVE LOCATION;  Service:    •  SECTION     • CHOLECYSTECTOMY     • ERCP     • FRACTURE SURGERY      arm   • HYSTERECTOMY     • ILIAC ARTERY STENT         Family History   Problem Relation Age of Onset   • Heart disease Mother     • Hypertension Mother    • Hyperlipidemia Mother    • Breast cancer Mother    • Heart disease Father    • Heart attack Sister    • Heart disease Sister    • Breast cancer Sister    • Heart disease Brother    • Heart attack Brother    • Hyperlipidemia Brother    • Diabetes Brother    • Heart attack Maternal Grandmother    • Heart disease Maternal Grandmother    • Heart failure Maternal Grandmother    • Heart failure Maternal Grandfather    • Heart disease Maternal Grandfather    • Heart attack Maternal Grandfather    • Heart attack Paternal Grandmother    • Heart disease Paternal Grandmother    • Heart failure Paternal Grandmother    • Hypertension Paternal Grandmother        Social History     Socioeconomic History   • Marital status:    Tobacco Use   • Smoking status: Current Every Day Smoker     Packs/day: 0.25     Types: Cigarettes     Start date: 1970   • Smokeless tobacco: Never Used   Substance and Sexual Activity   • Alcohol use: Yes     Alcohol/week: 1.0 standard drink     Types: 1 Glasses of wine per week     Comment: rare   • Drug use: No   • Sexual activity: Defer           Objective      Physical Exam  INITIAL VITAL SIGNS: Reviewed by me.  Pulse ox normal  GENERAL: Alert. Well developed and well nourished. No respiratory distress.  HEAD: Normocephalic.   EYES: No conjunctival injection.  ENT: Oral mucosa is moist.  NECK: Supple. Full range of motion.  RESPIRATORY: Non-labored respirations.  EXTREMITIES: 2 cm wound on the palmar surface of the distal left middle finger.  Slow bleeding.  SKIN: Warm and dry. No rashes. No diaphoresis.  NEUROLOGIC: Alert. Normal gait    Laceration Repair    Date/Time: 6/11/2022 1:44 PM  Performed by: Mekhi Watkins MD  Authorized by: Mekhi Watkins MD     Consent:     Consent obtained:  Verbal    Consent given by:  Patient    Risks discussed:  Pain, poor cosmetic result and need for additional repair    Alternatives discussed:  No treatment  Universal protocol:      Patient identity confirmed:  Verbally with patient  Anesthesia:     Anesthesia method:  Local infiltration    Local anesthetic:  Lidocaine 1% w/o epi  Laceration details:     Location:  Finger    Finger location:  L long finger    Length (cm):  2  Treatment:     Area cleansed with:  Saline    Amount of cleaning:  Standard    Irrigation solution:  Sterile saline    Visualized foreign bodies/material removed: no      Debridement:  None    Undermining:  None  Skin repair:     Repair method:  Sutures    Suture size:  6-0    Suture material:  Nylon    Suture technique:  Simple interrupted    Number of sutures:  4  Approximation:     Approximation:  Close  Repair type:     Repair type:  Simple  Post-procedure details:     Dressing:  Antibiotic ointment and non-adherent dressing               ED Course                                                 MDM    Final diagnoses:   Laceration of left middle finger without foreign body without damage to nail, initial encounter       ED Disposition  ED Disposition     ED Disposition   Discharge    Condition   Good    Comment   --             Eddie Slater DO  [address] [facility] [address] [phone]    Call   To schedule follow-up appointment         Medication List      No changes were made to your prescriptions during this visit.          Mekhi Watkins MD  06/11/22 1346       Mekhi Watkins MD  06/22/22 0659

## 2022-06-15 ENCOUNTER — LAB (OUTPATIENT)
Dept: LAB | Facility: HOSPITAL | Age: 67
End: 2022-06-15

## 2022-06-15 ENCOUNTER — OFFICE VISIT (OUTPATIENT)
Dept: ONCOLOGY | Facility: CLINIC | Age: 67
End: 2022-06-15

## 2022-06-15 VITALS
HEART RATE: 72 BPM | DIASTOLIC BLOOD PRESSURE: 80 MMHG | TEMPERATURE: 98.2 F | SYSTOLIC BLOOD PRESSURE: 143 MMHG | WEIGHT: 226.5 LBS | BODY MASS INDEX: 44.24 KG/M2 | OXYGEN SATURATION: 95 %

## 2022-06-15 DIAGNOSIS — D50.0 IRON DEFICIENCY ANEMIA DUE TO CHRONIC BLOOD LOSS: ICD-10-CM

## 2022-06-15 DIAGNOSIS — D50.0 IRON DEFICIENCY ANEMIA DUE TO CHRONIC BLOOD LOSS: Primary | ICD-10-CM

## 2022-06-15 LAB
BASOPHILS # BLD AUTO: 0.04 10*3/MM3 (ref 0–0.2)
BASOPHILS NFR BLD AUTO: 0.5 % (ref 0–1.5)
DEPRECATED RDW RBC AUTO: 64.7 FL (ref 37–54)
EOSINOPHIL # BLD AUTO: 0.3 10*3/MM3 (ref 0–0.4)
EOSINOPHIL NFR BLD AUTO: 3.5 % (ref 0.3–6.2)
ERYTHROCYTE [DISTWIDTH] IN BLOOD BY AUTOMATED COUNT: 19.8 % (ref 12.3–15.4)
FERRITIN SERPL-MCNC: 168 NG/ML (ref 13–150)
HCT VFR BLD AUTO: 44.5 % (ref 34–46.6)
HGB BLD-MCNC: 14.2 G/DL (ref 12–15.9)
IMM GRANULOCYTES # BLD AUTO: 0.04 10*3/MM3 (ref 0–0.05)
IMM GRANULOCYTES NFR BLD AUTO: 0.5 % (ref 0–0.5)
IRON 24H UR-MRATE: 54 MCG/DL (ref 37–145)
IRON SATN MFR SERPL: 19 % (ref 20–50)
LYMPHOCYTES # BLD AUTO: 2.48 10*3/MM3 (ref 0.7–3.1)
LYMPHOCYTES NFR BLD AUTO: 28.7 % (ref 19.6–45.3)
MCH RBC QN AUTO: 27.9 PG (ref 26.6–33)
MCHC RBC AUTO-ENTMCNC: 31.9 G/DL (ref 31.5–35.7)
MCV RBC AUTO: 87.4 FL (ref 79–97)
MONOCYTES # BLD AUTO: 0.46 10*3/MM3 (ref 0.1–0.9)
MONOCYTES NFR BLD AUTO: 5.3 % (ref 5–12)
NEUTROPHILS NFR BLD AUTO: 5.32 10*3/MM3 (ref 1.7–7)
NEUTROPHILS NFR BLD AUTO: 61.5 % (ref 42.7–76)
PLATELET # BLD AUTO: 255 10*3/MM3 (ref 140–450)
PMV BLD AUTO: 9.6 FL (ref 6–12)
RBC # BLD AUTO: 5.09 10*6/MM3 (ref 3.77–5.28)
TIBC SERPL-MCNC: 282 MCG/DL (ref 298–536)
UIBC SERPL-MCNC: 228 MCG/DL (ref 112–346)
WBC NRBC COR # BLD: 8.64 10*3/MM3 (ref 3.4–10.8)

## 2022-06-15 PROCEDURE — 82728 ASSAY OF FERRITIN: CPT

## 2022-06-15 PROCEDURE — 85025 COMPLETE CBC W/AUTO DIFF WBC: CPT

## 2022-06-15 PROCEDURE — 83540 ASSAY OF IRON: CPT

## 2022-06-15 PROCEDURE — 36415 COLL VENOUS BLD VENIPUNCTURE: CPT

## 2022-06-15 PROCEDURE — 83550 IRON BINDING TEST: CPT

## 2022-06-15 PROCEDURE — 99213 OFFICE O/P EST LOW 20 MIN: CPT | Performed by: NURSE PRACTITIONER

## 2022-06-15 NOTE — PROGRESS NOTES
Subjective     REASON FOR CONSULTATION: Iron deficiency anemia  Provide an opinion on any further workup or treatment                             REQUESTING PHYSICIAN: Dr. Slater    RECORDS OBTAINED:  Records of the patients history including those obtained from the referring provider were reviewed and summarized in detail.      History of Present Illness   This is a very pleasant 66-year-old woman with multiple medical comorbidities referred from her PCP for evaluation and treatment of iron deficiency anemia.  Reviewing the patient's records, she has had microcytic, hypochromic red blood cell indices since 2017/2018 and over the past 1 year or so been mildly anemic hemoglobin typically running around 11.0.  She has normal white blood cell and platelet counts.  Her ferritin has been low for at least 5 years most recently ten 1 month ago.  The patient has attempted to take oral iron on multiple occasions but difficulty tolerating secondary to either diarrhea or constipation.  She has not been able to improve her ferritin despite oral iron.  She complains of fatigue, pica and restless leg symptoms.  She thinks it has been about 10 years since her previous colonoscopy.  She does have occasional hematochezia.  She denies vaginal bleeding and denies donating blood.    Patient did go on to receive Injectafer x2 on 3/23/2022 and 3/30/2022.  She had her colonoscopy and EGD scheduled however had to cancel this as her mother recently passed away.  Fortunately has been rescheduled to be complete prior to the end of this month.  She denies any recurrent blood in her stool.  She is unsure if she had any improvement in her symptoms following the iron infusion.  She has however more depressed recently secondary to the passing of her mother as well as some family conflict with her sisters since her mother's death.  Patient reports she is more of a homebody and does not have a lot of social interaction except for working 1 day a  week.  She does have planned follow-up with her primary care provider to discuss her depression as well as some recent new onset dizziness.  She does think some of her ice cravings have slightly improved.    Past Medical History:   Diagnosis Date   • Allergic    • Anemia    • Angina pectoris (Formerly [facility])    • Anxiety    • Arthritis    • ASCVD (arteriosclerotic cardiovascular disease)    • Bilateral leg edema    • CAD (coronary artery disease)    • Chest pain    • Depression    • Diabetes mellitus (Formerly [facility])    • Disease of thyroid gland    • Dizziness    • Headache    • Hyperlipidemia    • Hypertension    • Morbid obesity (Formerly [facility])    • Orthostatic hypotension    • PAD (peripheral artery disease) (Formerly [facility])    • Shortness of breath    • Sleep apnea    • Tobacco abuse    • Vitamin D deficiency         Past Surgical History:   Procedure Laterality Date   • ANAL FISSURECTOMY     • BRAIN SURGERY     • CARDIAC CATHETERIZATION N/A 2016    Procedure: Coronary angiography;  Surgeon: Fredrick Kapoor MD;  Location: [facility] CATH INVASIVE LOCATION;  Service:    • CARDIAC CATHETERIZATION N/A 2016    Procedure: Left heart cath;  Surgeon: Fredrick Kapoor MD;  Location: [facility] CATH INVASIVE LOCATION;  Service:    • CARDIAC CATHETERIZATION N/A 2016    Procedure: Left ventriculography;  Surgeon: Fredrick Kapoor MD;  Location: [facility] CATH INVASIVE LOCATION;  Service:    • CARDIAC CATHETERIZATION N/A 2016    Procedure: Right heart cath;  Surgeon: Fredrick Kapoor MD;  Location: [facility] CATH INVASIVE LOCATION;  Service:    •  SECTION     • CHOLECYSTECTOMY     • ERCP     • FRACTURE SURGERY      arm   • HYSTERECTOMY     • ILIAC ARTERY STENT          Current Outpatient Medications on File Prior to Visit   Medication Sig Dispense Refill   • amLODIPine (NORVASC) 5 MG tablet TAKE 1 TABLET BY MOUTH EVERY DAY 90 tablet 1   • aspirin 81 MG chewable tablet Chew 81 mg daily.     • atorvastatin (LIPITOR) 40 MG tablet Take 1 tablet by mouth Daily. 90 tablet 1   •  citalopram (CeleXA) 20 MG tablet Take 1 tablet by mouth Daily. 90 tablet 1   • clopidogrel (PLAVIX) 75 MG tablet Take 1 tablet by mouth Daily. 90 tablet 1   • fluocinolone acetonide (DERMOTIC) 0.01 % oil otic oil Administer  into both ears 2 (Two) Times a Day. 20 mL 0   • fluticasone (Flonase) 50 MCG/ACT nasal spray 2 sprays into the nostril(s) as directed by provider Daily.     • glimepiride (AMARYL) 2 MG tablet Take 1 tablet by mouth Every Morning Before Breakfast. 90 tablet 1   • hydroCHLOROthiazide (HYDRODIURIL) 25 MG tablet Take 1 tablet by mouth Daily. 90 tablet 1   • levothyroxine (SYNTHROID, LEVOTHROID) 75 MCG tablet Take 1 tablet by mouth Daily. 90 tablet 1   • lisinopril (PRINIVIL,ZESTRIL) 40 MG tablet TAKE 1 TABLET BY MOUTH EVERY DAY 90 tablet 0   • metFORMIN (GLUCOPHAGE) 1000 MG tablet Take 1 tablet by mouth 2 (Two) Times a Day With Meals. 180 tablet 1   • pantoprazole (PROTONIX) 40 MG EC tablet TAKE 1 TABLET BY MOUTH EVERY DAY 90 tablet 0     No current facility-administered medications on file prior to visit.        ALLERGIES:    Allergies   Allergen Reactions   • Ciprofloxacin Hives and Swelling        Social History     Socioeconomic History   • Marital status:    Tobacco Use   • Smoking status: Current Every Day Smoker     Packs/day: 0.25     Types: Cigarettes     Start date: 1970   • Smokeless tobacco: Never Used   Substance and Sexual Activity   • Alcohol use: Yes     Alcohol/week: 1.0 standard drink     Types: 1 Glasses of wine per week     Comment: rare   • Drug use: No   • Sexual activity: Defer        Family History   Problem Relation Age of Onset   • Heart disease Mother    • Hypertension Mother    • Hyperlipidemia Mother    • Breast cancer Mother    • Heart disease Father    • Heart attack Sister    • Heart disease Sister    • Breast cancer Sister    • Heart disease Brother    • Heart attack Brother    • Hyperlipidemia Brother    • Diabetes Brother    • Heart attack Maternal  Grandmother    • Heart disease Maternal Grandmother    • Heart failure Maternal Grandmother    • Heart failure Maternal Grandfather    • Heart disease Maternal Grandfather    • Heart attack Maternal Grandfather    • Heart attack Paternal Grandmother    • Heart disease Paternal Grandmother    • Heart failure Paternal Grandmother    • Hypertension Paternal Grandmother         Review of Systems   Constitutional: Positive for fatigue. Negative for fever and unexpected weight change.   HENT: Negative.    Respiratory: Positive for shortness of breath (With exertion). Negative for cough.    Cardiovascular: Negative for chest pain, palpitations and leg swelling.   Gastrointestinal: Positive for constipation and diarrhea. Negative for blood in stool and nausea.   Genitourinary: Negative.    Musculoskeletal: Positive for arthralgias.   Skin: Negative.    Allergic/Immunologic: Negative.    Neurological: Positive for dizziness and weakness. Negative for seizures and light-headedness.   Hematological: Negative.    Psychiatric/Behavioral: Negative.           Objective     Vitals:    06/15/22 1102   BP: 143/80   Pulse: 72   Temp: 98.2 °F (36.8 °C)   SpO2: 95%   Weight: 103 kg (226 lb 8 oz)   PainSc: 0-No pain     Current Status 3/30/2022   ECOG score 0       Physical Exam    CONSTITUTIONAL: pleasant well-developed adult woman  HEENT: no icterus, hearing intact, mask in place  NECK: no jvd  LYMPH: no cervical or supraclavicular lad  CV: RRR, S1S2, no murmur  RESP: cta bilat, no wheezing, no rales  MUSC: no edema, normal gait  NEURO: alert and oriented x3, normal strength  PSYCH: Depressed mood and affect    RECENT LABS:  Hematology WBC   Date Value Ref Range Status   06/15/2022 8.64 3.40 - 10.80 10*3/mm3 Final   02/14/2022 8.3 3.4 - 10.8 x10E3/uL Final   06/04/2021 7.52 4.5 - 11.0 10*3/uL Final     RBC   Date Value Ref Range Status   06/15/2022 5.09 3.77 - 5.28 10*6/mm3 Final   02/14/2022 4.65 3.77 - 5.28 x10E6/uL Final    06/04/2021 4.53 4.0 - 5.2 10*6/uL Final     Hemoglobin   Date Value Ref Range Status   06/15/2022 14.2 12.0 - 15.9 g/dL Final   06/04/2021 10.8 (L) 12.0 - 16.0 g/dL Final     Hematocrit   Date Value Ref Range Status   06/15/2022 44.5 34.0 - 46.6 % Final   06/04/2021 35.7 (L) 36.0 - 46.0 % Final     Platelets   Date Value Ref Range Status   06/15/2022 255 140 - 450 10*3/mm3 Final   06/04/2021 276 140 - 440 10*3/uL Final        Lab Results   Component Value Date    GLUCOSE 106 (H) 12/22/2021    BUN 24 12/22/2021    CREATININE 0.97 12/22/2021    EGFRIFNONA 60 12/22/2021    EGFRIFAFRI 69 12/22/2021    BCR 25 12/22/2021    K 4.4 12/22/2021    CO2 23 12/22/2021    CALCIUM 9.1 12/22/2021    PROTENTOTREF 6.8 12/22/2021    ALBUMIN 4.1 12/22/2021    LABIL2 1.5 12/22/2021    AST 13 12/22/2021    ALT 16 12/22/2021         Assessment & Plan     *Iron deficiency anemia  · The patient has had microcytic/hypochromic indices for 4 to 5 years, recently developed anemia with hemoglobin around 11  · The patient has attempted oral iron but cannot tolerate secondary to constipation/diarrhea and has been unable to improve iron stores despite oral iron  · She has occasional bright red blood per rectum, most recent colonoscopy she thinks was about 10 years ago.  She denies vaginal bleeding and denies blood donations.  She has never had gastric surgeries.  She is on chronic PPI therapy.  · 3/15/2022 ferritin 12, iron saturation 6%, TIBC 388 patient scheduled for 2 doses of Injectafer  · Patient returns 6/15/2022 in follow-up.  Her hemoglobin is greatly improved to 14.2 from previous of 11.4 prior to Injectafer.  Iron sat is resulted after she left today showing iron saturation 19%, ferritin 168, TIBC 282.  Patient continues with fatigue and shortness of breath with exertion.  She also is had recent increase stress due to the passing of her mother as well as some family conflict.  She does follow-up with primary care within the next week or  2 to discuss this as well as some new onset dizziness with position changes.  Encouraged her to call their office to let them know of her symptoms.  She will return to our office in 2 months for repeat iron studies and review by Dr. Kenny.  In the interim she will have her colonoscopy and EGD.    *Multiple comorbidities including peripheral vascular disease, coronary artery disease, diabetes mellitus, CVA, obesity, hyperlipidemia, hypertension, tobacco use etc.    Hematology plan/recommendations:  1. Return to the office in 2 months for CBC, ferritin, iron profile and review with Dr. Kenny.  2. Patient will undergo EGD and colonoscopy prior to her next office visit.  3. Patient will follow up with primary care in the next 1 to 2 weeks to discuss new onset dizziness and increased depression since the passing of her mother recently.  4. Patient was encouraged to call our office for any concerns in the interim.

## 2022-06-17 DIAGNOSIS — K21.9 GASTROESOPHAGEAL REFLUX DISEASE WITHOUT ESOPHAGITIS: ICD-10-CM

## 2022-06-17 RX ORDER — PANTOPRAZOLE SODIUM 40 MG/1
TABLET, DELAYED RELEASE ORAL
Qty: 90 TABLET | Refills: 0 | Status: SHIPPED | OUTPATIENT
Start: 2022-06-17 | End: 2022-09-23

## 2022-06-28 ENCOUNTER — LAB (OUTPATIENT)
Dept: LAB | Facility: SURGERY CENTER | Age: 67
End: 2022-06-28

## 2022-06-28 DIAGNOSIS — E03.9 ACQUIRED HYPOTHYROIDISM: ICD-10-CM

## 2022-06-28 DIAGNOSIS — F41.0 PANIC DISORDER: ICD-10-CM

## 2022-06-28 DIAGNOSIS — F41.9 ANXIETY: ICD-10-CM

## 2022-06-28 DIAGNOSIS — E55.9 VITAMIN D DEFICIENCY: ICD-10-CM

## 2022-06-28 DIAGNOSIS — I10 ESSENTIAL HYPERTENSION: ICD-10-CM

## 2022-06-28 DIAGNOSIS — E11.9 CONTROLLED TYPE 2 DIABETES MELLITUS WITHOUT COMPLICATION, WITHOUT LONG-TERM CURRENT USE OF INSULIN: ICD-10-CM

## 2022-06-28 DIAGNOSIS — E11.319 TYPE 2 DIABETES MELLITUS WITH RETINOPATHY WITHOUT MACULAR EDEMA, WITHOUT LONG-TERM CURRENT USE OF INSULIN, UNSPECIFIED LATERALITY, UNSPECIFIED RETINOPATHY SEVERITY: ICD-10-CM

## 2022-06-28 DIAGNOSIS — I77.9 DISORDER OF ARTERIES AND ARTERIOLES, UNSPECIFIED: ICD-10-CM

## 2022-06-28 DIAGNOSIS — Z78.0 POSTMENOPAUSAL: ICD-10-CM

## 2022-06-28 DIAGNOSIS — E66.01 MORBID EXOGENOUS OBESITY: ICD-10-CM

## 2022-06-28 DIAGNOSIS — Z01.818 PREOP TESTING: Primary | ICD-10-CM

## 2022-06-28 DIAGNOSIS — I65.23 ATHEROSCLEROSIS OF BOTH CAROTID ARTERIES: ICD-10-CM

## 2022-06-28 DIAGNOSIS — I25.10 ATHEROSCLEROTIC HEART DISEASE OF NATIVE CORONARY ARTERY WITHOUT ANGINA PECTORIS: ICD-10-CM

## 2022-06-28 DIAGNOSIS — Z12.31 ENCOUNTER FOR SCREENING MAMMOGRAM FOR MALIGNANT NEOPLASM OF BREAST: ICD-10-CM

## 2022-06-28 DIAGNOSIS — I25.10 CORONARY ARTERY DISEASE INVOLVING NATIVE CORONARY ARTERY OF NATIVE HEART WITHOUT ANGINA PECTORIS: ICD-10-CM

## 2022-06-28 DIAGNOSIS — E78.2 MIXED HYPERLIPIDEMIA: ICD-10-CM

## 2022-06-28 LAB — SARS-COV-2 ORF1AB RESP QL NAA+PROBE: NOT DETECTED

## 2022-06-28 PROCEDURE — U0004 COV-19 TEST NON-CDC HGH THRU: HCPCS | Performed by: NURSE PRACTITIONER

## 2022-06-28 PROCEDURE — U0005 INFEC AGEN DETEC AMPLI PROBE: HCPCS | Performed by: NURSE PRACTITIONER

## 2022-06-30 ENCOUNTER — HOSPITAL ENCOUNTER (OUTPATIENT)
Facility: SURGERY CENTER | Age: 67
Setting detail: HOSPITAL OUTPATIENT SURGERY
Discharge: HOME | End: 2022-06-30
Attending: INTERNAL MEDICINE | Admitting: INTERNAL MEDICINE

## 2022-06-30 ENCOUNTER — ANESTHESIA (OUTPATIENT)
Dept: SURGERY | Facility: SURGERY CENTER | Age: 67
End: 2022-06-30

## 2022-06-30 ENCOUNTER — ANESTHESIA EVENT (OUTPATIENT)
Dept: SURGERY | Facility: SURGERY CENTER | Age: 67
End: 2022-06-30

## 2022-06-30 VITALS
OXYGEN SATURATION: 95 % | BODY MASS INDEX: 43.36 KG/M2 | TEMPERATURE: 97.2 F | WEIGHT: 222 LBS | DIASTOLIC BLOOD PRESSURE: 62 MMHG | HEART RATE: 62 BPM | SYSTOLIC BLOOD PRESSURE: 128 MMHG | RESPIRATION RATE: 16 BRPM

## 2022-06-30 DIAGNOSIS — D50.9 IRON DEFICIENCY ANEMIA, UNSPECIFIED IRON DEFICIENCY ANEMIA TYPE: ICD-10-CM

## 2022-06-30 DIAGNOSIS — K62.5 RECTAL BLEEDING: ICD-10-CM

## 2022-06-30 LAB
25(OH)D3+25(OH)D2 SERPL-MCNC: 22.8 NG/ML (ref 30–100)
ALBUMIN SERPL-MCNC: 4 G/DL (ref 3.8–4.8)
ALBUMIN/GLOB SERPL: 1.5 {RATIO} (ref 1.2–2.2)
ALP SERPL-CCNC: 112 IU/L (ref 44–121)
ALT SERPL-CCNC: 19 IU/L (ref 0–32)
AST SERPL-CCNC: 12 IU/L (ref 0–40)
BASOPHILS # BLD AUTO: 0 X10E3/UL (ref 0–0.2)
BASOPHILS NFR BLD AUTO: 0 %
BILIRUB SERPL-MCNC: 0.3 MG/DL (ref 0–1.2)
BUN SERPL-MCNC: 28 MG/DL (ref 8–27)
BUN/CREAT SERPL: 26 (ref 12–28)
CALCIUM SERPL-MCNC: 9.3 MG/DL (ref 8.7–10.3)
CHLORIDE SERPL-SCNC: 103 MMOL/L (ref 96–106)
CHOLEST SERPL-MCNC: 151 MG/DL (ref 100–199)
CO2 SERPL-SCNC: 25 MMOL/L (ref 20–29)
CREAT SERPL-MCNC: 1.08 MG/DL (ref 0.57–1)
EGFRCR SERPLBLD CKD-EPI 2021: 56 ML/MIN/1.73
EOSINOPHIL # BLD AUTO: 0.3 X10E3/UL (ref 0–0.4)
EOSINOPHIL NFR BLD AUTO: 3 %
ERYTHROCYTE [DISTWIDTH] IN BLOOD BY AUTOMATED COUNT: 18.6 % (ref 11.7–15.4)
GLOBULIN SER CALC-MCNC: 2.7 G/DL (ref 1.5–4.5)
GLUCOSE BLDC GLUCOMTR-MCNC: 134 MG/DL (ref 70–130)
GLUCOSE SERPL-MCNC: 115 MG/DL (ref 65–99)
HBA1C MFR BLD: 6.8 % (ref 4.8–5.6)
HCT VFR BLD AUTO: 44.1 % (ref 34–46.6)
HDLC SERPL-MCNC: 34 MG/DL
HGB BLD-MCNC: 14.7 G/DL (ref 11.1–15.9)
IMM GRANULOCYTES # BLD AUTO: 0 X10E3/UL (ref 0–0.1)
IMM GRANULOCYTES NFR BLD AUTO: 0 %
LDLC SERPL CALC-MCNC: 92 MG/DL (ref 0–99)
LYMPHOCYTES # BLD AUTO: 2.2 X10E3/UL (ref 0.7–3.1)
LYMPHOCYTES NFR BLD AUTO: 26 %
MCH RBC QN AUTO: 28.7 PG (ref 26.6–33)
MCHC RBC AUTO-ENTMCNC: 33.3 G/DL (ref 31.5–35.7)
MCV RBC AUTO: 86 FL (ref 79–97)
MONOCYTES # BLD AUTO: 0.6 X10E3/UL (ref 0.1–0.9)
MONOCYTES NFR BLD AUTO: 6 %
NEUTROPHILS # BLD AUTO: 5.6 X10E3/UL (ref 1.4–7)
NEUTROPHILS NFR BLD AUTO: 65 %
PLATELET # BLD AUTO: 258 X10E3/UL (ref 150–450)
POTASSIUM SERPL-SCNC: 4.9 MMOL/L (ref 3.5–5.2)
PROT SERPL-MCNC: 6.7 G/DL (ref 6–8.5)
RBC # BLD AUTO: 5.13 X10E6/UL (ref 3.77–5.28)
SODIUM SERPL-SCNC: 144 MMOL/L (ref 134–144)
TRIGL SERPL-MCNC: 138 MG/DL (ref 0–149)
TSH SERPL DL<=0.005 MIU/L-ACNC: 2.15 UIU/ML (ref 0.45–4.5)
VLDLC SERPL CALC-MCNC: 25 MG/DL (ref 5–40)
WBC # BLD AUTO: 8.6 X10E3/UL (ref 3.4–10.8)

## 2022-06-30 PROCEDURE — 43239 EGD BIOPSY SINGLE/MULTIPLE: CPT | Performed by: INTERNAL MEDICINE

## 2022-06-30 PROCEDURE — 45385 COLONOSCOPY W/LESION REMOVAL: CPT | Performed by: INTERNAL MEDICINE

## 2022-06-30 PROCEDURE — 88305 TISSUE EXAM BY PATHOLOGIST: CPT | Performed by: INTERNAL MEDICINE

## 2022-06-30 PROCEDURE — 25010000002 PROPOFOL 10 MG/ML EMULSION: Performed by: ANESTHESIOLOGY

## 2022-06-30 RX ORDER — SODIUM CHLORIDE 0.9 % (FLUSH) 0.9 %
10 SYRINGE (ML) INJECTION AS NEEDED
Status: DISCONTINUED | OUTPATIENT
Start: 2022-06-30 | End: 2022-06-30 | Stop reason: HOSPADM

## 2022-06-30 RX ORDER — LIDOCAINE HYDROCHLORIDE 20 MG/ML
INJECTION, SOLUTION INFILTRATION; PERINEURAL AS NEEDED
Status: DISCONTINUED | OUTPATIENT
Start: 2022-06-30 | End: 2022-06-30 | Stop reason: SURG

## 2022-06-30 RX ORDER — SODIUM CHLORIDE 0.9 % (FLUSH) 0.9 %
3 SYRINGE (ML) INJECTION EVERY 12 HOURS SCHEDULED
Status: DISCONTINUED | OUTPATIENT
Start: 2022-06-30 | End: 2022-06-30 | Stop reason: HOSPADM

## 2022-06-30 RX ORDER — SODIUM CHLORIDE, SODIUM LACTATE, POTASSIUM CHLORIDE, CALCIUM CHLORIDE 600; 310; 30; 20 MG/100ML; MG/100ML; MG/100ML; MG/100ML
30 INJECTION, SOLUTION INTRAVENOUS CONTINUOUS PRN
Status: DISCONTINUED | OUTPATIENT
Start: 2022-06-30 | End: 2022-06-30 | Stop reason: HOSPADM

## 2022-06-30 RX ORDER — PROPOFOL 10 MG/ML
VIAL (ML) INTRAVENOUS AS NEEDED
Status: DISCONTINUED | OUTPATIENT
Start: 2022-06-30 | End: 2022-06-30 | Stop reason: SURG

## 2022-06-30 RX ADMIN — SODIUM CHLORIDE, POTASSIUM CHLORIDE, SODIUM LACTATE AND CALCIUM CHLORIDE 30 ML/HR: 600; 310; 30; 20 INJECTION, SOLUTION INTRAVENOUS at 09:40

## 2022-06-30 RX ADMIN — PROPOFOL 30 MG: 10 INJECTION, EMULSION INTRAVENOUS at 10:30

## 2022-06-30 RX ADMIN — PROPOFOL 140 MCG/KG/MIN: 10 INJECTION, EMULSION INTRAVENOUS at 10:30

## 2022-06-30 RX ADMIN — SODIUM CHLORIDE, SODIUM LACTATE, POTASSIUM CHLORIDE, AND CALCIUM CHLORIDE: .6; .31; .03; .02 INJECTION, SOLUTION INTRAVENOUS at 10:25

## 2022-06-30 RX ADMIN — LIDOCAINE HYDROCHLORIDE 60 MG: 20 INJECTION, SOLUTION INFILTRATION; PERINEURAL at 10:28

## 2022-06-30 RX ADMIN — PROPOFOL 100 MG: 10 INJECTION, EMULSION INTRAVENOUS at 10:28

## 2022-06-30 NOTE — ANESTHESIA PREPROCEDURE EVALUATION
Anesthesia Evaluation     Patient summary reviewed and Nursing notes reviewed   NPO Solid Status: > 8 hours  NPO Liquid Status: > 2 hours           Airway   Mallampati: II  TM distance: >3 FB  Neck ROM: full  No difficulty expected  Dental - normal exam         Pulmonary - normal exam   (+) a smoker Current, sleep apnea on CPAP,   Cardiovascular - normal exam  Exercise tolerance: good (4-7 METS)    PT is on anticoagulation therapy  Patient on routine beta blocker    (+) hypertension, CAD, PVD, hyperlipidemia,  carotid artery disease      Neuro/Psych  (+) CVA, headaches, psychiatric history Anxiety and Depression,    GI/Hepatic/Renal/Endo    (+) morbid obesity, GERD,  diabetes mellitus type 2, thyroid problem hypothyroidism    Musculoskeletal     Abdominal    Substance History      OB/GYN          Other   arthritis,                      Anesthesia Plan    ASA 3     MAC     intravenous induction     Anesthetic plan, risks, benefits, and alternatives have been provided, discussed and informed consent has been obtained with: patient.        CODE STATUS:

## 2022-06-30 NOTE — ANESTHESIA POSTPROCEDURE EVALUATION
Patient: Rosalee Hargrove    Procedure Summary     Date: 06/30/22 Room / Location: [facility] / [facility]    Anesthesia Start: 1025 Anesthesia Stop: 1108    Procedures:       ESOPHAGOGASTRODUODENOSCOPY (N/A )      COLONOSCOPY with Polypectomy (N/A ) Diagnosis:       Iron deficiency anemia, unspecified iron deficiency anemia type      Rectal bleeding      (Iron deficiency anemia, unspecified iron deficiency anemia type [D50.9])      (Rectal bleeding [K62.5])    Surgeons: Sarwat Church MD Provider: Sujit Valladares MD    Anesthesia Type: MAC ASA Status: 3          Anesthesia Type: MAC    Vitals  No vitals data found for the desired time range.          Post Anesthesia Care and Evaluation    Patient location during evaluation: bedside  Patient participation: complete - patient participated  Level of consciousness: awake and alert  Pain management: adequate    Airway patency: patent  Anesthetic complications: No anesthetic complications  PONV Status: none  Cardiovascular status: acceptable  Respiratory status: acceptable  Hydration status: acceptable    Comments: /75 (BP Location: Left arm, Patient Position: Lying)   Pulse 79   Temp 36.2 °C (97.2 °F) (Temporal)   Resp 18   Wt 101 kg (222 lb)   SpO2 95%   BMI 43.36 kg/m²

## 2022-07-01 LAB
LAB AP CASE REPORT: NORMAL
PATH REPORT.FINAL DX SPEC: NORMAL
PATH REPORT.GROSS SPEC: NORMAL

## 2022-07-13 DIAGNOSIS — E11.9 CONTROLLED TYPE 2 DIABETES MELLITUS WITHOUT COMPLICATION, WITHOUT LONG-TERM CURRENT USE OF INSULIN: ICD-10-CM

## 2022-07-13 DIAGNOSIS — F41.0 PANIC DISORDER: ICD-10-CM

## 2022-07-13 DIAGNOSIS — I10 ESSENTIAL HYPERTENSION: ICD-10-CM

## 2022-07-13 DIAGNOSIS — F41.9 ANXIETY: ICD-10-CM

## 2022-07-13 DIAGNOSIS — E03.9 ACQUIRED HYPOTHYROIDISM: ICD-10-CM

## 2022-07-13 RX ORDER — LEVOTHYROXINE SODIUM 0.07 MG/1
TABLET ORAL
Qty: 90 TABLET | Refills: 0 | Status: SHIPPED | OUTPATIENT
Start: 2022-07-13 | End: 2022-10-10

## 2022-07-13 RX ORDER — GLIMEPIRIDE 2 MG/1
TABLET ORAL
Qty: 90 TABLET | Refills: 0 | Status: SHIPPED | OUTPATIENT
Start: 2022-07-13 | End: 2022-10-10

## 2022-07-13 RX ORDER — CITALOPRAM 20 MG/1
TABLET ORAL
Qty: 90 TABLET | Refills: 0 | Status: SHIPPED | OUTPATIENT
Start: 2022-07-13 | End: 2022-10-10

## 2022-07-13 RX ORDER — HYDROCHLOROTHIAZIDE 25 MG/1
TABLET ORAL
Qty: 90 TABLET | Refills: 0 | Status: SHIPPED | OUTPATIENT
Start: 2022-07-13 | End: 2022-10-10

## 2022-07-14 ENCOUNTER — OFFICE VISIT (OUTPATIENT)
Dept: FAMILY MEDICINE CLINIC | Facility: CLINIC | Age: 67
End: 2022-07-14

## 2022-07-14 VITALS
BODY MASS INDEX: 43.98 KG/M2 | HEART RATE: 78 BPM | TEMPERATURE: 97.1 F | SYSTOLIC BLOOD PRESSURE: 122 MMHG | HEIGHT: 60 IN | WEIGHT: 224 LBS | OXYGEN SATURATION: 95 % | DIASTOLIC BLOOD PRESSURE: 78 MMHG

## 2022-07-14 DIAGNOSIS — I25.10 CORONARY ARTERY DISEASE INVOLVING NATIVE CORONARY ARTERY OF NATIVE HEART WITHOUT ANGINA PECTORIS: ICD-10-CM

## 2022-07-14 DIAGNOSIS — E03.9 ACQUIRED HYPOTHYROIDISM: ICD-10-CM

## 2022-07-14 DIAGNOSIS — I77.9 DISORDER OF ARTERIES AND ARTERIOLES, UNSPECIFIED: ICD-10-CM

## 2022-07-14 DIAGNOSIS — E78.2 MIXED HYPERLIPIDEMIA: ICD-10-CM

## 2022-07-14 DIAGNOSIS — K21.9 GASTROESOPHAGEAL REFLUX DISEASE WITHOUT ESOPHAGITIS: ICD-10-CM

## 2022-07-14 DIAGNOSIS — E66.01 MORBID EXOGENOUS OBESITY: ICD-10-CM

## 2022-07-14 DIAGNOSIS — I25.10 ATHEROSCLEROTIC HEART DISEASE OF NATIVE CORONARY ARTERY WITHOUT ANGINA PECTORIS: ICD-10-CM

## 2022-07-14 DIAGNOSIS — I77.9 PERIPHERAL ARTERIAL OCCLUSIVE DISEASE: ICD-10-CM

## 2022-07-14 DIAGNOSIS — E55.9 VITAMIN D DEFICIENCY: ICD-10-CM

## 2022-07-14 DIAGNOSIS — I10 ESSENTIAL HYPERTENSION: Primary | ICD-10-CM

## 2022-07-14 DIAGNOSIS — I65.23 ATHEROSCLEROSIS OF BOTH CAROTID ARTERIES: ICD-10-CM

## 2022-07-14 DIAGNOSIS — E11.3293 TYPE 2 DIABETES MELLITUS WITH BOTH EYES AFFECTED BY MILD NONPROLIFERATIVE RETINOPATHY WITHOUT MACULAR EDEMA, WITHOUT LONG-TERM CURRENT USE OF INSULIN: ICD-10-CM

## 2022-07-14 PROCEDURE — 99214 OFFICE O/P EST MOD 30 MIN: CPT | Performed by: FAMILY MEDICINE

## 2022-07-16 PROBLEM — E11.3293 TYPE 2 DIABETES MELLITUS WITH BOTH EYES AFFECTED BY MILD NONPROLIFERATIVE RETINOPATHY WITHOUT MACULAR EDEMA, WITHOUT LONG-TERM CURRENT USE OF INSULIN: Status: ACTIVE | Noted: 2019-01-09

## 2022-07-16 RX ORDER — ATORVASTATIN CALCIUM 40 MG/1
40 TABLET, FILM COATED ORAL DAILY
Qty: 90 TABLET | Refills: 1 | Status: SHIPPED | OUTPATIENT
Start: 2022-07-16 | End: 2022-09-30 | Stop reason: SDUPTHER

## 2022-07-16 RX ORDER — EZETIMIBE 10 MG/1
10 TABLET ORAL DAILY
Qty: 90 TABLET | Refills: 1 | Status: SHIPPED | OUTPATIENT
Start: 2022-07-16 | End: 2022-08-08 | Stop reason: SDUPTHER

## 2022-07-16 RX ORDER — CLOPIDOGREL BISULFATE 75 MG/1
75 TABLET ORAL DAILY
Qty: 90 TABLET | Refills: 1 | Status: SHIPPED | OUTPATIENT
Start: 2022-07-16

## 2022-07-16 NOTE — PROGRESS NOTES
Subjective   Rosalee Hargrove is a 66 y.o. female with   Chief Complaint   Patient presents with   • Diabetes   • Hypertension   .    History of Present Illness   66-year-old white female with multiple medical problems here for further medical management.  Patient with significant cardiovascular disease as well as hypertension hyperlipidemia.  She also is a type II non-insulin-dependent diabetic.  She struggles with morbid exogenous obesity.  Medications are as listed and are used appropriately.  All medications are well-tolerated and there have been no side effects.  Patient also with history of GERD as well as hypothyroidism.  Fasting labs have been acquired prior to this visit.  In general patient is doing well and there are no acute complaints.  The following portions of the patient's history were reviewed and updated as appropriate: allergies, current medications, past family history, past medical history, past social history, past surgical history and problem list.    Review of Systems   Cardiovascular:        CAD, carotid artery atherosclerosis, peripheral arterial disease, hypertension, hyperlipidemia   Endocrine:        Morbid exogenous obesity, vitamin D deficiency, type II non-insulin-dependent diabetes mellitus, hypothyroidism       Objective     Vitals:    07/14/22 0801   BP: 122/78   Pulse: 78   Temp: 97.1 °F (36.2 °C)   SpO2: 95%       Recent Results (from the past 672 hour(s))   COVID-19,APTIMA PANTHER(BHARATI),[facility], NP/OP SWAB IN UTM/VTM/SALINE TRANSPORT MEDIA,24 HR TAT - Swab, Nasopharynx    Collection Time: 06/28/22  8:49 AM    Specimen: Nasopharynx; Swab   Result Value Ref Range    COVID19 Not Detected Not Detected - Ref. Range   CBC w AUTO Differential    Collection Time: 06/29/22  8:49 AM    Specimen: Blood   Result Value Ref Range    WBC 8.6 3.4 - 10.8 x10E3/uL    RBC 5.13 3.77 - 5.28 x10E6/uL    Hemoglobin 14.7 11.1 - 15.9 g/dL    Hematocrit 44.1 34.0 - 46.6 %    MCV 86 79 - 97 fL    MCH  28.7 26.6 - 33.0 pg    MCHC 33.3 31.5 - 35.7 g/dL    RDW 18.6 (H) 11.7 - 15.4 %    Platelets 258 150 - 450 x10E3/uL    Neutrophil Rel % 65 Not Estab. %    Lymphocyte Rel % 26 Not Estab. %    Monocyte Rel % 6 Not Estab. %    Eosinophil Rel % 3 Not Estab. %    Basophil Rel % 0 Not Estab. %    Neutrophils Absolute 5.6 1.4 - 7.0 x10E3/uL    Lymphocytes Absolute 2.2 0.7 - 3.1 x10E3/uL    Monocytes Absolute 0.6 0.1 - 0.9 x10E3/uL    Eosinophils Absolute 0.3 0.0 - 0.4 x10E3/uL    Basophils Absolute 0.0 0.0 - 0.2 x10E3/uL    Immature Granulocyte Rel % 0 Not Estab. %    Immature Grans Absolute 0.0 0.0 - 0.1 x10E3/uL   TSH    Collection Time: 06/29/22  8:49 AM    Specimen: Blood   Result Value Ref Range    TSH 2.150 0.450 - 4.500 uIU/mL   Vitamin D 25 hydroxy    Collection Time: 06/29/22  8:49 AM    Specimen: Blood   Result Value Ref Range    25 Hydroxy, Vitamin D 22.8 (L) 30.0 - 100.0 ng/mL   Comprehensive metabolic panel    Collection Time: 06/29/22  8:49 AM    Specimen: Blood   Result Value Ref Range    Glucose 115 (H) 65 - 99 mg/dL    BUN 28 (H) 8 - 27 mg/dL    Creatinine 1.08 (H) 0.57 - 1.00 mg/dL    EGFR Result 56 (L) >59 mL/min/1.73    BUN/Creatinine Ratio 26 12 - 28    Sodium 144 134 - 144 mmol/L    Potassium 4.9 3.5 - 5.2 mmol/L    Chloride 103 96 - 106 mmol/L    Total CO2 25 20 - 29 mmol/L    Calcium 9.3 8.7 - 10.3 mg/dL    Total Protein 6.7 6.0 - 8.5 g/dL    Albumin 4.0 3.8 - 4.8 g/dL    Globulin 2.7 1.5 - 4.5 g/dL    A/G Ratio 1.5 1.2 - 2.2    Total Bilirubin 0.3 0.0 - 1.2 mg/dL    Alkaline Phosphatase 112 44 - 121 IU/L    AST (SGOT) 12 0 - 40 IU/L    ALT (SGPT) 19 0 - 32 IU/L   Hemoglobin A1c    Collection Time: 06/29/22  8:49 AM    Specimen: Blood   Result Value Ref Range    Hemoglobin A1C 6.8 (H) 4.8 - 5.6 %   Lipid panel    Collection Time: 06/29/22  8:49 AM    Specimen: Blood   Result Value Ref Range    Total Cholesterol 151 100 - 199 mg/dL    Triglycerides 138 0 - 149 mg/dL    HDL Cholesterol 34 (L) >39  mg/dL    VLDL Cholesterol [place] 25 5 - 40 mg/dL    LDL Chol Calc (NIH) 92 0 - 99 mg/dL   POC Glucose Once    Collection Time: 06/30/22  9:39 AM    Specimen: Blood   Result Value Ref Range    Glucose 134 (A) 70 - 130 mg/dL   Tissue Pathology Exam    Collection Time: 06/30/22 10:35 AM    Specimen: A: Small Intestine; Tissue    B: Large Intestine, Rectum; Tissue   Result Value Ref Range    Case Report       Surgical Pathology Report                         Case: [id number]                                  Authorizing Provider:  Sarwat Church MD    Collected:           06/30/2022 10:35 AM          Ordering Location:     [facility]     Received:            06/30/2022 11:06 AM                                 [facility] MAIN OR                                                     Pathologist:           Mekhi Duenas MD                                                         Specimens:   1) - Small Intestine, small bowel r/o celiac                                                        2) - Large Intestine, Rectum, rectal polyp x3                                              Final Diagnosis       1. Small Bowel, Biopsy: Benign small bowel mucosa with    A. Normal intact villous surface.   B. No significant inflammation, no granulomas.   C. No viral inclusions or other organisms on routinely stained sections.     Comment: There are rare dilated lacteals.      2. Rectum, Biopsy:    A. Hyperplastic polyps.    don/jse       Gross Description       1. Small Intestine.  The specimen is received in formalin labeled with the patient's name and further designated 'small bowel biopsy' are multiple small fragments of gray-tan tissue. The specimen is submitted for embedding as received.        2. Large Intestine, Rectum.  The specimen is received in formalin labeled with the patient's name and further designated 'rectal polyp x 3 biopsy' are multiple small fragments of gray-tan tissue. The specimen is  submitted for embedding as received.               Physical Exam  Vitals and nursing note reviewed.   Constitutional:       Appearance: Normal appearance. She is well-developed and well-groomed. She is morbidly obese.      Comments: Morbid exogenous obesity with a BMI of 43.7   HENT:      Head: Normocephalic and atraumatic.   Neck:      Thyroid: No thyroid mass or thyromegaly.      Vascular: Normal carotid pulses. No carotid bruit.      Trachea: Trachea and phonation normal.   Cardiovascular:      Rate and Rhythm: Normal rate and regular rhythm.      Heart sounds: Normal heart sounds. No murmur heard.    No friction rub. No gallop.   Pulmonary:      Effort: Pulmonary effort is normal. No respiratory distress.      Breath sounds: Normal breath sounds. No decreased breath sounds, wheezing, rhonchi or rales.   Musculoskeletal:      Cervical back: Neck supple.   Lymphadenopathy:      Cervical: No cervical adenopathy.   Skin:     General: Skin is warm and dry.      Findings: No rash.   Neurological:      Mental Status: She is alert and oriented to person, place, and time.   Psychiatric:         Attention and Perception: Attention and perception normal.         Mood and Affect: Mood and affect normal.         Speech: Speech normal.         Behavior: Behavior normal. Behavior is cooperative.         Thought Content: Thought content normal.         Cognition and Memory: Cognition and memory normal.         Judgment: Judgment normal.         Assessment & Plan   Diagnoses and all orders for this visit:    1. Essential hypertension (Primary)  -     Comprehensive metabolic panel; Future  -     Vitamin D 25 hydroxy; Future  -     TSH; Future  -     Lipid panel; Future  -     Hemoglobin A1c; Future  -     CBC w AUTO Differential; Future    2. Mixed hyperlipidemia  -     atorvastatin (LIPITOR) 40 MG tablet; Take 1 tablet by mouth Daily.  Dispense: 90 tablet; Refill: 1  -     Comprehensive metabolic panel; Future  -     Vitamin D  25 hydroxy; Future  -     TSH; Future  -     Lipid panel; Future  -     Hemoglobin A1c; Future  -     CBC w AUTO Differential; Future  -     ezetimibe (Zetia) 10 MG tablet; Take 1 tablet by mouth Daily.  Dispense: 90 tablet; Refill: 1    3. Coronary artery disease involving native coronary artery of native heart without angina pectoris  -     Comprehensive metabolic panel; Future  -     Vitamin D 25 hydroxy; Future  -     TSH; Future  -     Lipid panel; Future  -     Hemoglobin A1c; Future  -     CBC w AUTO Differential; Future    4. Atherosclerosis of both carotid arteries  -     Comprehensive metabolic panel; Future  -     Vitamin D 25 hydroxy; Future  -     TSH; Future  -     Lipid panel; Future  -     Hemoglobin A1c; Future  -     CBC w AUTO Differential; Future    5. Peripheral arterial occlusive disease (HCC)  -     Comprehensive metabolic panel; Future  -     Vitamin D 25 hydroxy; Future  -     TSH; Future  -     Lipid panel; Future  -     Hemoglobin A1c; Future  -     CBC w AUTO Differential; Future    6. Acquired hypothyroidism  -     Comprehensive metabolic panel; Future  -     Vitamin D 25 hydroxy; Future  -     TSH; Future  -     Lipid panel; Future  -     Hemoglobin A1c; Future  -     CBC w AUTO Differential; Future    7. Morbid exogenous obesity (HCC)  -     Comprehensive metabolic panel; Future  -     Vitamin D 25 hydroxy; Future  -     TSH; Future  -     Lipid panel; Future  -     Hemoglobin A1c; Future  -     CBC w AUTO Differential; Future    8. Type 2 diabetes mellitus with both eyes affected by mild nonproliferative retinopathy without macular edema, without long-term current use of insulin (HCC)  -     Comprehensive metabolic panel; Future  -     Vitamin D 25 hydroxy; Future  -     TSH; Future  -     Lipid panel; Future  -     Hemoglobin A1c; Future  -     CBC w AUTO Differential; Future    9. Vitamin D deficiency  -     Comprehensive metabolic panel; Future  -     Vitamin D 25 hydroxy;  Future  -     TSH; Future  -     Lipid panel; Future  -     Hemoglobin A1c; Future  -     CBC w AUTO Differential; Future    10. Gastroesophageal reflux disease without esophagitis  -     Comprehensive metabolic panel; Future  -     Vitamin D 25 hydroxy; Future  -     TSH; Future  -     Lipid panel; Future  -     Hemoglobin A1c; Future  -     CBC w AUTO Differential; Future    11. Disorder of arteries and arterioles, unspecified (HCC)  -     clopidogrel (PLAVIX) 75 MG tablet; Take 1 tablet by mouth Daily.  Dispense: 90 tablet; Refill: 1  -     Comprehensive metabolic panel; Future  -     Vitamin D 25 hydroxy; Future  -     TSH; Future  -     Lipid panel; Future  -     Hemoglobin A1c; Future  -     CBC w AUTO Differential; Future    12. Atherosclerotic heart disease of native coronary artery without angina pectoris  -     clopidogrel (PLAVIX) 75 MG tablet; Take 1 tablet by mouth Daily.  Dispense: 90 tablet; Refill: 1  -     Comprehensive metabolic panel; Future  -     Vitamin D 25 hydroxy; Future  -     TSH; Future  -     Lipid panel; Future  -     Hemoglobin A1c; Future  -     CBC w AUTO Differential; Future    Weight loss is essential in regards to better sugar control.  With weight loss A1c will reduce.  Patient should also decrease cholesterol in her diet-Zetia will be added to assist this process.  All other medications will be continued without alteration.  Anticipate fasting labs in 6 months with follow-up with me thereafter.    Return in about 6 months (around 1/14/2023) for Recheck.

## 2022-08-08 DIAGNOSIS — E78.2 MIXED HYPERLIPIDEMIA: ICD-10-CM

## 2022-08-08 RX ORDER — FLUOCINOLONE ACETONIDE 0.11 MG/ML
OIL AURICULAR (OTIC) 2 TIMES DAILY
Qty: 20 ML | Refills: 0 | Status: SHIPPED | OUTPATIENT
Start: 2022-08-08 | End: 2022-08-09 | Stop reason: SDUPTHER

## 2022-08-08 RX ORDER — EZETIMIBE 10 MG/1
10 TABLET ORAL DAILY
Qty: 90 TABLET | Refills: 1 | Status: SHIPPED | OUTPATIENT
Start: 2022-08-08 | End: 2022-08-09 | Stop reason: SDUPTHER

## 2022-08-09 DIAGNOSIS — E78.2 MIXED HYPERLIPIDEMIA: ICD-10-CM

## 2022-08-09 RX ORDER — EZETIMIBE 10 MG/1
10 TABLET ORAL DAILY
Qty: 90 TABLET | Refills: 0 | Status: SHIPPED | OUTPATIENT
Start: 2022-08-09 | End: 2023-02-02 | Stop reason: SDUPTHER

## 2022-08-09 RX ORDER — FLUOCINOLONE ACETONIDE 0.11 MG/ML
OIL AURICULAR (OTIC) 2 TIMES DAILY
Qty: 20 ML | Refills: 0 | Status: SHIPPED | OUTPATIENT
Start: 2022-08-09

## 2022-08-16 DIAGNOSIS — D50.0 IRON DEFICIENCY ANEMIA DUE TO CHRONIC BLOOD LOSS: Primary | ICD-10-CM

## 2022-08-16 NOTE — PROGRESS NOTES
Subjective     REASON FOR CONSULTATION: Iron deficiency anemia  Provide an opinion on any further workup or treatment                             REQUESTING PHYSICIAN: Dr. Slater    RECORDS OBTAINED:  Records of the patients history including those obtained from the referring provider were reviewed and summarized in detail.      History of Present Illness   This is a very pleasant 66-year-old woman with multiple medical comorbidities referred from her PCP for evaluation and treatment of iron deficiency anemia.  Reviewing the patient's records, she has had microcytic, hypochromic red blood cell indices since 2017/2018 and over the past 1 year or so been mildly anemic hemoglobin typically running around 11.0.  She has normal white blood cell and platelet counts.  Her ferritin has been low for at least 5 years most recently ten 1 month ago.  The patient has attempted to take oral iron on multiple occasions but difficulty tolerating secondary to either diarrhea or constipation.  She has not been able to improve her ferritin despite oral iron.  She complains of fatigue, pica and restless leg symptoms.  She thinks it has been about 10 years since her previous colonoscopy.  She does have occasional hematochezia.  She denies vaginal bleeding and denies donating blood.    Patient received Injectafer x2 on 3/23/2022 and 3/30/2022.   EGD and colonoscopy were performed 6/30/2022 with no obvious source of GI blood loss.    The patient returned today feeling well with no significant fatigue, lightheadedness or dizziness.    Past Medical History:   Diagnosis Date   • Allergic    • Anemia    • Angina pectoris (HCC)    • Anxiety    • Arthritis    • ASCVD (arteriosclerotic cardiovascular disease)    • Bilateral leg edema    • CAD (coronary artery disease)    • Chest pain    • Depression    • Diabetes mellitus (HCC)    • Disease of thyroid gland    • Dizziness    • Headache    • Hyperlipidemia    • Hypertension    • Morbid obesity  ([facility])    • Orthostatic hypotension    • PAD (peripheral artery disease) ([facility])    • Shortness of breath    • Sleep apnea    • Tobacco abuse    • Vitamin D deficiency         Past Surgical History:   Procedure Laterality Date   • ANAL FISSURECTOMY     • BRAIN SURGERY     • CARDIAC CATHETERIZATION N/A 2016    Procedure: Coronary angiography;  Surgeon: Fredrick Kapoor MD;  Location:  [facility];  Service:    • CARDIAC CATHETERIZATION N/A 2016    Procedure: Left heart cath;  Surgeon: Fredrick Kapoor MD;  Location:  [facility];  Service:    • CARDIAC CATHETERIZATION N/A 2016    Procedure: Left ventriculography;  Surgeon: Fredrick Kapoor MD;  Location:  [facility];  Service:    • CARDIAC CATHETERIZATION N/A 2016    Procedure: Right heart cath;  Surgeon: Fredrick Kapoor MD;  Location:  [facility];  Service:    •  SECTION     • CHOLECYSTECTOMY     • COLONOSCOPY N/A 2022    Procedure: COLONOSCOPY with Polypectomy;  Surgeon: Sarwat Church MD;  Location: [facility] MA[facility] OR;  Service: Gastroenterology;  Laterality: N/A;  Rectal polyps x3 and Hemorrhoids   • ENDOSCOPY N/A 2022    Procedure: ESOPHAGOGASTRODUODENOSCOPY;  Surgeon: Sarwat Church MD;  Location: [facility];  Service: Gastroenterology;  Laterality: N/A;  Small Hiatal Hernia   • ERCP     • FRACTURE SURGERY      arm   • HYSTERECTOMY     • ILIAC ARTERY STENT          Current Outpatient Medications on File Prior to Visit   Medication Sig Dispense Refill   • amLODIPine (NORVASC) 5 MG tablet TAKE 1 TABLET BY MOUTH EVERY DAY 90 tablet 1   • aspirin 81 MG chewable tablet Chew 81 mg daily.     • atorvastatin (LIPITOR) 40 MG tablet Take 1 tablet by mouth Daily. 90 tablet 1   • citalopram (CeleXA) 20 MG tablet TAKE 1 TABLET BY MOUTH EVERY DAY 90 tablet 0   • clopidogrel (PLAVIX) 75 MG tablet Take 1 tablet by mouth Daily. 90 tablet 1   • ezetimibe (Zetia) 10 MG tablet Take 1 tablet  by mouth Daily. 90 tablet 0   • fluocinolone acetonide (DERMOTIC) 0.01 % oil otic oil Administer  into both ears 2 (Two) Times a Day. 20 mL 0   • fluticasone (Flonase) 50 MCG/ACT nasal spray 2 sprays into the nostril(s) as directed by provider Daily.     • glimepiride (AMARYL) 2 MG tablet TAKE 1 TABLET BY MOUTH EVERY DAY BEFORE BREAKFAST 90 tablet 0   • hydroCHLOROthiazide (HYDRODIURIL) 25 MG tablet TAKE 1 TABLET BY MOUTH EVERY DAY 90 tablet 0   • levothyroxine (SYNTHROID, LEVOTHROID) 75 MCG tablet TAKE 1 TABLET BY MOUTH EVERY DAY 90 tablet 0   • metFORMIN (GLUCOPHAGE) 1000 MG tablet TAKE 1 TABLET BY MOUTH TWICE A DAY WITH MEALS 180 tablet 0   • pantoprazole (PROTONIX) 40 MG EC tablet TAKE 1 TABLET BY MOUTH EVERY DAY 90 tablet 0   • trimethoprim-polymyxin b (POLYTRIM) 63891-5.1 UNIT/ML-% ophthalmic solution Apply 1 drop to eye(s) as directed by provider. Four days before and four days after eye injection.       No current facility-administered medications on file prior to visit.        ALLERGIES:    Allergies   Allergen Reactions   • Ciprofloxacin Hives and Swelling        Social History     Socioeconomic History   • Marital status:    Tobacco Use   • Smoking status: Current Every Day Smoker     Packs/day: 0.25     Years: 50.00     Pack years: 12.50     Types: Cigarettes     Start date: 1970   • Smokeless tobacco: Never Used   Vaping Use   • Vaping Use: Never used   Substance and Sexual Activity   • Alcohol use: Yes     Alcohol/week: 1.0 standard drink     Types: 1 Glasses of wine per week     Comment: rare   • Drug use: No   • Sexual activity: Defer        Family History   Problem Relation Age of Onset   • Heart disease Mother    • Hypertension Mother    • Hyperlipidemia Mother    • Breast cancer Mother    • Heart disease Father    • Heart attack Sister    • Heart disease Sister    • Breast cancer Sister    • Heart disease Brother    • Heart attack Brother    • Hyperlipidemia Brother    • Diabetes Brother   "  • Heart attack Maternal Grandmother    • Heart disease Maternal Grandmother    • Heart failure Maternal Grandmother    • Heart failure Maternal Grandfather    • Heart disease Maternal Grandfather    • Heart attack Maternal Grandfather    • Heart attack Paternal Grandmother    • Heart disease Paternal Grandmother    • Heart failure Paternal Grandmother    • Hypertension Paternal Grandmother         Review of Systems   Constitutional: Positive for fatigue. Negative for fever and unexpected weight change.   HENT: Negative.    Respiratory: Negative for cough and shortness of breath.    Cardiovascular: Negative for chest pain, palpitations and leg swelling.   Gastrointestinal: Negative for blood in stool, constipation, diarrhea and nausea.   Genitourinary: Negative.    Musculoskeletal: Positive for arthralgias.   Skin: Negative.    Allergic/Immunologic: Negative.    Neurological: Negative for dizziness, seizures, weakness and light-headedness.   Hematological: Negative.    Psychiatric/Behavioral: Negative.           Objective     Vitals:    08/17/22 1054   BP: 114/76   Pulse: 72   Resp: 16   Temp: 98.9 °F (37.2 °C)   TempSrc: Infrared   SpO2: 97%   Weight: 102 kg (224 lb)   Height: 152.5 cm (60.04\")  Comment: new ht   PainSc: 0-No pain     Current Status 8/17/2022   ECOG score 0       Physical Exam    CONSTITUTIONAL: pleasant well-developed adult woman  HEENT: no icterus, hearing intact, mask in place  LYMPH: no cervical or supraclavicular lad  CV: RRR, S1S2, no murmur  RESP: cta bilat, no wheezing, no rales  MUSC: no edema, normal gait  NEURO: alert and oriented x3, normal strength  PSYCH: Normal mood and affect    RECENT LABS:  Hematology WBC   Date Value Ref Range Status   08/17/2022 8.01 3.40 - 10.80 10*3/mm3 Final   06/29/2022 8.6 3.4 - 10.8 x10E3/uL Final   06/04/2021 7.52 4.5 - 11.0 10*3/uL Final     RBC   Date Value Ref Range Status   08/17/2022 4.90 3.77 - 5.28 10*6/mm3 Final   06/29/2022 5.13 3.77 - 5.28 " x10E6/uL Final   06/04/2021 4.53 4.0 - 5.2 10*6/uL Final     Hemoglobin   Date Value Ref Range Status   08/17/2022 14.4 12.0 - 15.9 g/dL Final   06/04/2021 10.8 (L) 12.0 - 16.0 g/dL Final     Hematocrit   Date Value Ref Range Status   08/17/2022 44.3 34.0 - 46.6 % Final   06/04/2021 35.7 (L) 36.0 - 46.0 % Final     Platelets   Date Value Ref Range Status   08/17/2022 242 140 - 450 10*3/mm3 Final   06/04/2021 276 140 - 440 10*3/uL Final        Lab Results   Component Value Date    GLUCOSE 115 (H) 06/29/2022    BUN 28 (H) 06/29/2022    CREATININE 1.08 (H) 06/29/2022    EGFRIFNONA 60 12/22/2021    EGFRIFAFRI 69 12/22/2021    BCR 26 06/29/2022    K 4.9 06/29/2022    CO2 25 06/29/2022    CALCIUM 9.3 06/29/2022    PROTENTOTREF 6.7 06/29/2022    ALBUMIN 4.0 06/29/2022    LABIL2 1.5 06/29/2022    AST 12 06/29/2022    ALT 19 06/29/2022     Ferritin 142/iron sat 16%    Assessment & Plan     *Iron deficiency anemia  · The patient has had microcytic/hypochromic indices for 4 to 5 years, recently developed anemia with hemoglobin around 11  · The patient has attempted oral iron but cannot tolerate secondary to constipation/diarrhea and has been unable to improve iron stores despite oral iron  · She has occasional bright red blood per rectum, most recent colonoscopy she thinks was about 10 years ago.  She denies vaginal bleeding and denies blood donations.  She has never had gastric surgeries.  She is on chronic PPI therapy.  · 3/15/2022 ferritin 12, iron saturation 6%, TIBC 388 received  2 doses of Injectafer  · EGD and colonoscopy June 2022 no obvious source of blood loss; polyps resected from the rectum benign  · 8/17/2022- hemoglobin 14.4, iron sat 16%, ferritin 142    *Multiple comorbidities including peripheral vascular disease, coronary artery disease, diabetes mellitus, CVA, obesity, hyperlipidemia, hypertension, tobacco use etc.    Hematology plan/recommendations:  Follow-up 6 months CBC ferritin iron profile

## 2022-08-17 ENCOUNTER — LAB (OUTPATIENT)
Dept: LAB | Facility: HOSPITAL | Age: 67
End: 2022-08-17

## 2022-08-17 ENCOUNTER — OFFICE VISIT (OUTPATIENT)
Dept: ONCOLOGY | Facility: CLINIC | Age: 67
End: 2022-08-17

## 2022-08-17 VITALS
BODY MASS INDEX: 43.98 KG/M2 | RESPIRATION RATE: 16 BRPM | OXYGEN SATURATION: 97 % | WEIGHT: 224 LBS | HEIGHT: 60 IN | HEART RATE: 72 BPM | TEMPERATURE: 98.9 F | DIASTOLIC BLOOD PRESSURE: 76 MMHG | SYSTOLIC BLOOD PRESSURE: 114 MMHG

## 2022-08-17 DIAGNOSIS — D50.0 IRON DEFICIENCY ANEMIA DUE TO CHRONIC BLOOD LOSS: ICD-10-CM

## 2022-08-17 DIAGNOSIS — D50.9 IRON DEFICIENCY ANEMIA, UNSPECIFIED IRON DEFICIENCY ANEMIA TYPE: Primary | ICD-10-CM

## 2022-08-17 LAB
BASOPHILS # BLD AUTO: 0.05 10*3/MM3 (ref 0–0.2)
BASOPHILS NFR BLD AUTO: 0.6 % (ref 0–1.5)
DEPRECATED RDW RBC AUTO: 49.9 FL (ref 37–54)
EOSINOPHIL # BLD AUTO: 0.34 10*3/MM3 (ref 0–0.4)
EOSINOPHIL NFR BLD AUTO: 4.2 % (ref 0.3–6.2)
ERYTHROCYTE [DISTWIDTH] IN BLOOD BY AUTOMATED COUNT: 15.4 % (ref 12.3–15.4)
FERRITIN SERPL-MCNC: 142 NG/ML (ref 13–150)
HCT VFR BLD AUTO: 44.3 % (ref 34–46.6)
HGB BLD-MCNC: 14.4 G/DL (ref 12–15.9)
IMM GRANULOCYTES # BLD AUTO: 0.05 10*3/MM3 (ref 0–0.05)
IMM GRANULOCYTES NFR BLD AUTO: 0.6 % (ref 0–0.5)
IRON 24H UR-MRATE: 47 MCG/DL (ref 37–145)
IRON SATN MFR SERPL: 16 % (ref 20–50)
LYMPHOCYTES # BLD AUTO: 2.44 10*3/MM3 (ref 0.7–3.1)
LYMPHOCYTES NFR BLD AUTO: 30.5 % (ref 19.6–45.3)
MCH RBC QN AUTO: 29.4 PG (ref 26.6–33)
MCHC RBC AUTO-ENTMCNC: 32.5 G/DL (ref 31.5–35.7)
MCV RBC AUTO: 90.4 FL (ref 79–97)
MONOCYTES # BLD AUTO: 0.47 10*3/MM3 (ref 0.1–0.9)
MONOCYTES NFR BLD AUTO: 5.9 % (ref 5–12)
NEUTROPHILS NFR BLD AUTO: 4.66 10*3/MM3 (ref 1.7–7)
NEUTROPHILS NFR BLD AUTO: 58.2 % (ref 42.7–76)
NRBC BLD AUTO-RTO: 0 /100 WBC (ref 0–0.2)
PLATELET # BLD AUTO: 242 10*3/MM3 (ref 140–450)
PMV BLD AUTO: 10.4 FL (ref 6–12)
RBC # BLD AUTO: 4.9 10*6/MM3 (ref 3.77–5.28)
TIBC SERPL-MCNC: 297 MCG/DL (ref 298–536)
UIBC SERPL-MCNC: 250 MCG/DL (ref 112–346)
WBC NRBC COR # BLD: 8.01 10*3/MM3 (ref 3.4–10.8)

## 2022-08-17 PROCEDURE — 85025 COMPLETE CBC W/AUTO DIFF WBC: CPT

## 2022-08-17 PROCEDURE — 83540 ASSAY OF IRON: CPT

## 2022-08-17 PROCEDURE — 82728 ASSAY OF FERRITIN: CPT

## 2022-08-17 PROCEDURE — 99213 OFFICE O/P EST LOW 20 MIN: CPT | Performed by: INTERNAL MEDICINE

## 2022-08-17 PROCEDURE — 36415 COLL VENOUS BLD VENIPUNCTURE: CPT

## 2022-08-17 PROCEDURE — 83550 IRON BINDING TEST: CPT

## 2022-08-17 RX ORDER — POLYMYXIN B SULFATE AND TRIMETHOPRIM 1; 10000 MG/ML; [USP'U]/ML
1 SOLUTION OPHTHALMIC
COMMUNITY
Start: 2022-05-24

## 2022-09-22 DIAGNOSIS — I10 ESSENTIAL HYPERTENSION: ICD-10-CM

## 2022-09-22 RX ORDER — AMLODIPINE BESYLATE 5 MG/1
TABLET ORAL
Qty: 90 TABLET | Refills: 1 | Status: SHIPPED | OUTPATIENT
Start: 2022-09-22 | End: 2022-09-28 | Stop reason: SDUPTHER

## 2022-09-23 DIAGNOSIS — K21.9 GASTROESOPHAGEAL REFLUX DISEASE WITHOUT ESOPHAGITIS: ICD-10-CM

## 2022-09-23 RX ORDER — PANTOPRAZOLE SODIUM 40 MG/1
TABLET, DELAYED RELEASE ORAL
Qty: 90 TABLET | Refills: 1 | Status: SHIPPED | OUTPATIENT
Start: 2022-09-23 | End: 2022-09-28 | Stop reason: SDUPTHER

## 2022-09-28 DIAGNOSIS — I10 ESSENTIAL HYPERTENSION: ICD-10-CM

## 2022-09-28 DIAGNOSIS — K21.9 GASTROESOPHAGEAL REFLUX DISEASE WITHOUT ESOPHAGITIS: ICD-10-CM

## 2022-09-28 RX ORDER — AMLODIPINE BESYLATE 5 MG/1
5 TABLET ORAL DAILY
Qty: 90 TABLET | Refills: 1 | Status: SHIPPED | OUTPATIENT
Start: 2022-09-28

## 2022-09-28 RX ORDER — PANTOPRAZOLE SODIUM 40 MG/1
40 TABLET, DELAYED RELEASE ORAL DAILY
Qty: 90 TABLET | Refills: 1 | Status: SHIPPED | OUTPATIENT
Start: 2022-09-28 | End: 2023-03-30

## 2022-09-28 NOTE — TELEPHONE ENCOUNTER
Caller: Rosalee Hargrove    Relationship: Self    Best call back number: [phone]    Requested Prescriptions:   Requested Prescriptions     Pending Prescriptions Disp Refills   • amLODIPine (NORVASC) 5 MG tablet 90 tablet 1     Sig: Take 1 tablet by mouth Daily.   • pantoprazole (PROTONIX) 40 MG EC tablet 90 tablet 1     Sig: Take 1 tablet by mouth Daily.        Pharmacy where request should be sent: [facility] [address] - [phone]  - [phone] FX     Additional details provided by patient: THE PATIENT WILL NEED THE ABOVE MEDICATIONS REFILLED. THE PATIENT HAS SWITCHED TO USING [facility] AND WOULD LIKE ALL FUTURE REFILLS TO GO THERE. PLEASE ADVISE.    Does the patient have less than a 3 day supply:  [x] Yes  [] No    Rubi Barillas Rep   09/28/22 15:03 EDT

## 2022-09-30 DIAGNOSIS — E78.2 MIXED HYPERLIPIDEMIA: ICD-10-CM

## 2022-09-30 RX ORDER — ATORVASTATIN CALCIUM 40 MG/1
40 TABLET, FILM COATED ORAL DAILY
Qty: 90 TABLET | Refills: 1 | Status: SHIPPED | OUTPATIENT
Start: 2022-09-30

## 2022-09-30 NOTE — TELEPHONE ENCOUNTER
Caller: [facility] [address] [place] [address] [address] - [phone] [facility] [phone] FX    Relationship: Pharmacy    Best call back number: [phone]      Requested Prescriptions:   Requested Prescriptions     Pending Prescriptions Disp Refills   • atorvastatin (LIPITOR) 40 MG tablet 90 tablet 1     Sig: Take 1 tablet by mouth Daily.        Pharmacy where request should be sent: [facility] [address] [place] [address] [address] - [phone] [facility] [phone] FX     Additional details provided by patient: PATIENT IS ASKING TO GET ALL HER MEDS SENT OFF TO [facility] FROM NOW ON.    Does the patient have less than a 3 day supply:  [x] Yes  [] No    Katherine Berger, PCT   09/30/22 16:28 EDT

## 2022-10-03 ENCOUNTER — OFFICE VISIT (OUTPATIENT)
Dept: CARDIOLOGY | Facility: CLINIC | Age: 67
End: 2022-10-03

## 2022-10-03 ENCOUNTER — CLINICAL SUPPORT NO REQUIREMENTS (OUTPATIENT)
Dept: CARDIOLOGY | Facility: CLINIC | Age: 67
End: 2022-10-03

## 2022-10-03 VITALS
SYSTOLIC BLOOD PRESSURE: 138 MMHG | HEART RATE: 72 BPM | DIASTOLIC BLOOD PRESSURE: 70 MMHG | BODY MASS INDEX: 43.59 KG/M2 | HEIGHT: 60 IN | WEIGHT: 222 LBS

## 2022-10-03 DIAGNOSIS — I48.0 PAROXYSMAL ATRIAL FIBRILLATION: Primary | ICD-10-CM

## 2022-10-03 DIAGNOSIS — I63.9 CRYPTOGENIC STROKE: Primary | ICD-10-CM

## 2022-10-03 PROCEDURE — 93291 INTERROG DEV EVAL SCRMS IP: CPT | Performed by: INTERNAL MEDICINE

## 2022-10-03 PROCEDURE — 99213 OFFICE O/P EST LOW 20 MIN: CPT | Performed by: INTERNAL MEDICINE

## 2022-10-03 RX ORDER — ALPRAZOLAM 0.5 MG/1
0.5 TABLET ORAL AS NEEDED
COMMUNITY

## 2022-10-03 NOTE — PROGRESS NOTES
Subjective:     Encounter Date: 10/03/22      Patient ID: Rosalee Hargrove is a 67 y.o. female.    Chief Complaint: Nonobstructive CAD, peripheral vascular disease, A. Fib, CVA  HPI:     This is a 67-year-old woman with recent CVA, nonobstructive CAD, PAD, graham graham disease s/p r CEA, HTN, HLD.      Several years of ago she underwent cardiac catheterization which showed mild, nonobstructive coronary disease throughout her coronary arteries.  She has a history of peripheral arterial disease and is followed by Dr. Coats. 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 She has a right iliac artery stent which on last evaluation 2021 showed moderate in-stent restenosis.  She also has severe peripheral disease of the descending aorta with scattered ulcerations and plaque formation.    In 2017 she wore a cardiac event monitor due to complaints of presyncope.  She was noted to have an episode of atrial fibrillation lasting 2 minutes.  Despite her elevated chads Vascor she was not anticoagulated due to the short duration of A. Fib. In November 2020 I saw her and she wore a cardiac event monitor for 30 days without AF.     In April 2021 she had acute left sided weakness and facial droop. She was evaluated at [facility] and found to have a small CVA. Echo unremarkable. 48 hour holter showed no AF.  When I saw her in follow-up we plan to implant a Linq device to follow for occult atrial fibrillation.  She had a carotid Doppler also which showed an occluded right carotid artery.  She then presented to [place] with recurrent stroke in Memorial Day of 2021.  She underwent carotid artery stenting with Dr. Castanon.  She also had a Linq implanted by the cardiology team there.     She is doing well from a cardiac standpoint. She had a head and neck CTA showing restenosis of the right internal carotid at 80%. They are checking plavix responsiveness and platelet aggregation and is therefore on plavix BID. She notes an improvement in  claudication on that dose. Lipids A1c at goal in June 2022.     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  She continues to smoke.  She tried to use Wellbutrin but she felt it made her more jittery and more desirous of smoking.    There is a strong family history of coronary disease.  Her younger brother was treated by my father.      The following portions of the patient's history were reviewed and updated as appropriate: allergies, current medications, past family history, past medical history, past social history, past surgical history and problem list.     REVIEW OF SYSTEMS:   All systems reviewed.  Pertinent positives identified in HPI.  All other systems are negative.    Past Medical History:   Diagnosis Date   • Allergic    • Anemia    • Angina pectoris ([facility])    • Anxiety    • Arthritis    • ASCVD (arteriosclerotic cardiovascular disease)    • Bilateral leg edema    • CAD (coronary artery disease)    • Chest pain    • Depression    • Diabetes mellitus ([facility])    • Disease of thyroid gland    • Dizziness    • Headache    • Hyperlipidemia    • Hypertension    • Morbid obesity ([facility])    • Orthostatic hypotension    • PAD (peripheral artery disease) ([facility])    • Shortness of breath    • Sleep apnea    • Tobacco abuse    • Vitamin D deficiency        Family History   Problem Relation Age of Onset   • Heart disease Mother    • Hypertension Mother    • Hyperlipidemia Mother    • Breast cancer Mother    • Heart disease Father    • Heart attack Sister    • Heart disease Sister    • Breast cancer Sister    • Heart disease Brother    • Heart attack Brother    • Hyperlipidemia Brother    • Diabetes Brother    • Heart attack Maternal Grandmother    • Heart disease Maternal Grandmother    • Heart failure Maternal Grandmother    • Heart failure Maternal Grandfather    • Heart disease Maternal Grandfather    • Heart attack Maternal Grandfather    • Heart attack Paternal Grandmother    • Heart disease Paternal  Grandmother    • Heart failure Paternal Grandmother    • Hypertension Paternal Grandmother        Social History     Socioeconomic History   • Marital status:    Tobacco Use   • Smoking status: Current Every Day Smoker     Packs/day: 0.25     Years: 50.00     Pack years: 12.50     Types: Cigarettes     Start date:    • Smokeless tobacco: Never Used   Vaping Use   • Vaping Use: Never used   Substance and Sexual Activity   • Alcohol use: Yes     Alcohol/week: 1.0 standard drink     Types: 1 Glasses of wine per week     Comment: rare   • Drug use: No   • Sexual activity: Defer       Allergies   Allergen Reactions   • Ciprofloxacin Hives and Swelling       Past Surgical History:   Procedure Laterality Date   • ANAL FISSURECTOMY     • BRAIN SURGERY     • CARDIAC CATHETERIZATION N/A 2016    Procedure: Coronary angiography;  Surgeon: Fredrick Kapoor MD;  [facility];  Service:    • CARDIAC CATHETERIZATION N/A 2016    Procedure: Left heart cath;  Surgeon: Fredrick Kapoor MD;  [facility];  Service:    • CARDIAC CATHETERIZATION N/A 2016    Procedure: Left ventriculography;  Surgeon: Fredrick Kapoor MD;  [facility];  Service:    • CARDIAC CATHETERIZATION N/A 2016    Procedure: Right heart cath;  Surgeon: Fredrick Kapoor MD;  [facility];  Service:    •  SECTION     • CHOLECYSTECTOMY     • COLONOSCOPY N/A 2022    Procedure: COLONOSCOPY with Polypectomy;  Surgeon: Sarwat Church MD;  [place] [facility] [place];  Service: Gastroenterology;  Laterality: N/A;  Rectal polyps x3 and Hemorrhoids   • ENDOSCOPY N/A 2022    Procedure: ESOPHAGOGASTRODUODENOSCOPY;  Surgeon: Sarwat Church MD;  [place] [facility] [place];  Service: Gastroenterology;  Laterality: N/A;  Small Hiatal Hernia   • ERCP     • FRACTURE SURGERY      arm   • HYSTERECTOMY     • ILIAC ARTERY STENT         Procedures        Objective:         PHYSICAL EXAM:  GEN: VSS, no distress,   Eyes: normal sclera, normal lids and lashes  HENT: moist mucus membranes,   Respiratory: CTAB, no rales or wheezes  CV: RRR, no murmurs,  B/l carotid bruits  GI: NABS, soft,  Nontender, nondistended  MSK: no edema, no scoliosis or kyphosis  Skin: no rash, warm, dry  Heme/Lymph: no bruising or bleeding  Psych: organized thought, normal behavior and affect  Neuro: Cranial nerves grossly intact, Alert and Oriented x 3.         Assessment:          Diagnosis Plan   1. Paroxysmal atrial fibrillation (HCC)          Plan:       1.  Paroxysmal atrial fibrillation: CHADSVASC is 6. One episode documented several years ago for a few minutes and was not anticoagulated. Linq reviewed today without AF.   2.  Hyperlipidemia: LDL is 80 on Lipitor 40.  Continue.  3.  Coronary artery disease, nonobstructive: No real angina.  Continue to monitor.  4.  Peripheral arterial disease, Graham graham disease: She  follows with Dr. Coats who implanted a right iliac stent and is s/p r CEA.  Recent right carotid endarterectomy with Dr. Castanon with restenosis at 80%.   5.  Hypertension: Controlled on amlodipine 5  6.  Smoking cessation: On Wellbutrin.  Still smoking.  Discussed in detail.  Wants to quit.  7.  CVA: Multiple CVAs in the last year.  Most recently May 2021.  Now status post right CEA and Linq implantation.     Dr. Slater, thank you very much for referring this kind patient to me. Please call me with any questions or concerns. I will see the patient again in the office in 6 months.       Nohemi Mei MD  10/03/22  [facility]    Outpatient Encounter Medications as of 10/3/2022   Medication Sig Dispense Refill   • ALPRAZolam (XANAX) 0.5 MG tablet Take 0.5 mg by mouth As Needed for Anxiety.     • amLODIPine (NORVASC) 5 MG tablet Take 1 tablet by mouth Daily. 90 tablet 1   • aspirin 81 MG chewable tablet Chew 81 mg daily.     • atorvastatin (LIPITOR) 40 MG tablet  Take 1 tablet by mouth Daily. 90 tablet 1   • citalopram (CeleXA) 20 MG tablet TAKE 1 TABLET BY MOUTH EVERY DAY 90 tablet 0   • clopidogrel (PLAVIX) 75 MG tablet Take 1 tablet by mouth Daily. (Patient taking differently: Take 75 mg by mouth Daily. Pt taking 1 tab bid) 90 tablet 1   • ezetimibe (Zetia) 10 MG tablet Take 1 tablet by mouth Daily. 90 tablet 0   • fluocinolone acetonide (DERMOTIC) 0.01 % oil otic oil Administer  into both ears 2 (Two) Times a Day. 20 mL 0   • fluticasone (Flonase) 50 MCG/ACT nasal spray 2 sprays into the nostril(s) as directed by provider Daily.     • glimepiride (AMARYL) 2 MG tablet TAKE 1 TABLET BY MOUTH EVERY DAY BEFORE BREAKFAST 90 tablet 0   • hydroCHLOROthiazide (HYDRODIURIL) 25 MG tablet TAKE 1 TABLET BY MOUTH EVERY DAY 90 tablet 0   • levothyroxine (SYNTHROID, LEVOTHROID) 75 MCG tablet TAKE 1 TABLET BY MOUTH EVERY DAY 90 tablet 0   • metFORMIN (GLUCOPHAGE) 1000 MG tablet TAKE 1 TABLET BY MOUTH TWICE A DAY WITH MEALS 180 tablet 0   • pantoprazole (PROTONIX) 40 MG EC tablet Take 1 tablet by mouth Daily. 90 tablet 1   • trimethoprim-polymyxin b (POLYTRIM) 43529-5.1 UNIT/ML-% ophthalmic solution Apply 1 drop to eye(s) as directed by provider. Four days before and four days after eye injection.       No facility-administered encounter medications on file as of 10/3/2022.

## 2022-10-08 DIAGNOSIS — F41.9 ANXIETY: ICD-10-CM

## 2022-10-08 DIAGNOSIS — I10 ESSENTIAL HYPERTENSION: ICD-10-CM

## 2022-10-08 DIAGNOSIS — E11.9 CONTROLLED TYPE 2 DIABETES MELLITUS WITHOUT COMPLICATION, WITHOUT LONG-TERM CURRENT USE OF INSULIN: ICD-10-CM

## 2022-10-08 DIAGNOSIS — E03.9 ACQUIRED HYPOTHYROIDISM: ICD-10-CM

## 2022-10-08 DIAGNOSIS — F41.0 PANIC DISORDER: ICD-10-CM

## 2022-10-10 RX ORDER — HYDROCHLOROTHIAZIDE 25 MG/1
TABLET ORAL
Qty: 90 TABLET | Refills: 1 | Status: SHIPPED | OUTPATIENT
Start: 2022-10-10 | End: 2022-12-29 | Stop reason: SDUPTHER

## 2022-10-10 RX ORDER — GLIMEPIRIDE 2 MG/1
TABLET ORAL
Qty: 90 TABLET | Refills: 1 | Status: SHIPPED | OUTPATIENT
Start: 2022-10-10 | End: 2022-11-07 | Stop reason: SDUPTHER

## 2022-10-10 RX ORDER — CITALOPRAM 20 MG/1
TABLET ORAL
Qty: 90 TABLET | Refills: 1 | Status: SHIPPED | OUTPATIENT
Start: 2022-10-10 | End: 2023-02-02 | Stop reason: SDUPTHER

## 2022-10-10 RX ORDER — LEVOTHYROXINE SODIUM 0.07 MG/1
TABLET ORAL
Qty: 90 TABLET | Refills: 1 | Status: SHIPPED | OUTPATIENT
Start: 2022-10-10 | End: 2023-01-24 | Stop reason: SDUPTHER

## 2022-10-24 ENCOUNTER — TELEPHONE (OUTPATIENT)
Dept: FAMILY MEDICINE CLINIC | Facility: CLINIC | Age: 67
End: 2022-10-24

## 2022-10-24 DIAGNOSIS — R05.9 COUGH, UNSPECIFIED TYPE: Primary | ICD-10-CM

## 2022-10-24 DIAGNOSIS — U07.1 POSITIVE SELF-ADMINISTERED ANTIGEN TEST FOR COVID-19: ICD-10-CM

## 2022-10-24 RX ORDER — DEXTROMETHORPHAN HYDROBROMIDE AND PROMETHAZINE HYDROCHLORIDE 15; 6.25 MG/5ML; MG/5ML
5 SYRUP ORAL 4 TIMES DAILY PRN
Qty: 118 ML | Refills: 0 | Status: SHIPPED | OUTPATIENT
Start: 2022-10-24 | End: 2022-11-16

## 2022-10-24 NOTE — TELEPHONE ENCOUNTER
I spoke with Rosalee on October 24, 2022 at 5:20 PM.  States she tested positive for COVID today.  Her symptoms started on Saturday, October 22, 2022.  She has been having a dry cough, fatigue, eyes burning, sinus pressure, ear pain, head and chest congestion and body aches.  Has had her COVID shots plus booster.      Currently takes Plavix and has a significant history of CAD and type 2 diabetes/obesity.  Unable to do the Paxlovid medication due to her Plavix medication.  Have placed orders for the monoclonal infusion for [facility].  This will be faxed on October 25, 2022 due to the late hour.  Have sent to pharmacy promethazine/dextromethorphan cough syrup.  She was instructed increase fluid intake.  May take Tylenol, vitamin C, vitamin D and zinc as well for her symptoms.  Will notify office if symptoms worsen or she will proceed through the nearest emergency room.

## 2022-10-25 ENCOUNTER — TRANSCRIBE ORDERS (OUTPATIENT)
Dept: ADMINISTRATIVE | Facility: HOSPITAL | Age: 67
End: 2022-10-25

## 2022-10-25 ENCOUNTER — HOSPITAL ENCOUNTER (OUTPATIENT)
Dept: INFUSION THERAPY | Facility: HOSPITAL | Age: 67
Discharge: HOME | End: 2022-10-25

## 2022-10-25 ENCOUNTER — HOSPITAL ENCOUNTER (OUTPATIENT)
Dept: INFUSION THERAPY | Facility: HOSPITAL | Age: 67
Setting detail: INFUSION SERIES
Discharge: HOME | End: 2022-10-25

## 2022-10-25 VITALS
HEART RATE: 85 BPM | DIASTOLIC BLOOD PRESSURE: 75 MMHG | BODY MASS INDEX: 44.17 KG/M2 | HEIGHT: 60 IN | WEIGHT: 225 LBS | SYSTOLIC BLOOD PRESSURE: 155 MMHG | OXYGEN SATURATION: 96 % | TEMPERATURE: 98.6 F | RESPIRATION RATE: 16 BRPM

## 2022-10-25 DIAGNOSIS — U07.1 CLINICAL DIAGNOSIS OF SEVERE ACUTE RESPIRATORY SYNDROME CORONAVIRUS 2 (SARS-COV-2) DISEASE: Primary | ICD-10-CM

## 2022-10-25 PROCEDURE — 25010000002 INJECTION, BEBTELOVIMAB, 175 MG: Performed by: PHYSICIAN ASSISTANT

## 2022-10-25 PROCEDURE — M0222 HC INJECTION BEBTELOVIMAB: HCPCS | Performed by: PHYSICIAN ASSISTANT

## 2022-10-25 RX ORDER — DIPHENHYDRAMINE HCL 25 MG
50 CAPSULE ORAL ONCE AS NEEDED
Status: CANCELLED | OUTPATIENT
Start: 2022-10-25

## 2022-10-25 RX ORDER — METHYLPREDNISOLONE SODIUM SUCCINATE 125 MG/2ML
125 INJECTION, POWDER, LYOPHILIZED, FOR SOLUTION INTRAMUSCULAR; INTRAVENOUS AS NEEDED
Status: CANCELLED | OUTPATIENT
Start: 2022-10-25

## 2022-10-25 RX ORDER — METHYLPREDNISOLONE SODIUM SUCCINATE 125 MG/2ML
125 INJECTION, POWDER, LYOPHILIZED, FOR SOLUTION INTRAMUSCULAR; INTRAVENOUS AS NEEDED
OUTPATIENT
Start: 2022-10-25

## 2022-10-25 RX ORDER — DIPHENHYDRAMINE HCL 50 MG
50 CAPSULE ORAL ONCE AS NEEDED
OUTPATIENT
Start: 2022-10-25

## 2022-10-25 RX ORDER — DIPHENHYDRAMINE HCL 50 MG
50 CAPSULE ORAL ONCE AS NEEDED
Status: DISCONTINUED | OUTPATIENT
Start: 2022-10-25 | End: 2022-10-27 | Stop reason: HOSPADM

## 2022-10-25 RX ORDER — SODIUM CHLORIDE 9 MG/ML
30 INJECTION, SOLUTION INTRAVENOUS ONCE
Status: CANCELLED | OUTPATIENT
Start: 2022-10-25 | End: 2022-10-25

## 2022-10-25 RX ORDER — BEBTELOVIMAB 87.5 MG/ML
175 INJECTION, SOLUTION INTRAVENOUS ONCE
Status: CANCELLED | OUTPATIENT
Start: 2022-10-25

## 2022-10-25 RX ORDER — DIPHENHYDRAMINE HYDROCHLORIDE 50 MG/ML
50 INJECTION INTRAMUSCULAR; INTRAVENOUS ONCE AS NEEDED
Status: DISCONTINUED | OUTPATIENT
Start: 2022-10-25 | End: 2022-10-27 | Stop reason: HOSPADM

## 2022-10-25 RX ORDER — DIPHENHYDRAMINE HYDROCHLORIDE 50 MG/ML
50 INJECTION INTRAMUSCULAR; INTRAVENOUS ONCE AS NEEDED
Status: CANCELLED | OUTPATIENT
Start: 2022-10-25

## 2022-10-25 RX ORDER — METHYLPREDNISOLONE SODIUM SUCCINATE 125 MG/2ML
125 INJECTION, POWDER, LYOPHILIZED, FOR SOLUTION INTRAMUSCULAR; INTRAVENOUS AS NEEDED
Status: DISCONTINUED | OUTPATIENT
Start: 2022-10-25 | End: 2022-10-27 | Stop reason: HOSPADM

## 2022-10-25 RX ORDER — BEBTELOVIMAB 87.5 MG/ML
175 INJECTION, SOLUTION INTRAVENOUS ONCE
Status: COMPLETED | OUTPATIENT
Start: 2022-10-25 | End: 2022-10-25

## 2022-10-25 RX ORDER — SODIUM CHLORIDE 9 MG/ML
30 INJECTION, SOLUTION INTRAVENOUS ONCE
Status: DISCONTINUED | OUTPATIENT
Start: 2022-10-25 | End: 2022-10-27 | Stop reason: HOSPADM

## 2022-10-25 RX ORDER — DIPHENHYDRAMINE HYDROCHLORIDE 50 MG/ML
50 INJECTION INTRAMUSCULAR; INTRAVENOUS ONCE AS NEEDED
OUTPATIENT
Start: 2022-10-25

## 2022-10-25 RX ORDER — EPINEPHRINE 1 MG/ML
0.3 INJECTION, SOLUTION INTRAMUSCULAR; SUBCUTANEOUS AS NEEDED
OUTPATIENT
Start: 2022-10-25

## 2022-10-25 RX ORDER — EPINEPHRINE 1 MG/ML
0.3 INJECTION, SOLUTION INTRAMUSCULAR; SUBCUTANEOUS AS NEEDED
Status: DISCONTINUED | OUTPATIENT
Start: 2022-10-25 | End: 2022-10-27 | Stop reason: HOSPADM

## 2022-10-25 RX ADMIN — BEBTELOVIMAB 175 MG: 87.5 INJECTION, SOLUTION INTRAVENOUS at 14:15

## 2022-10-25 NOTE — NURSING NOTE
NURSE PROGRESS NOTE    PT. ARRIVED @ [facility] FOR SCHEDULED INJECTION AT 1400 .  INJECTION WAS PERFORMED WITHOUT INCIDENT.  PT. DISCHARGED TO HOME AT 1520.PATIENT RECEIVED MONOCLONAL FOR COVID. FACT SHEET FOR EUA REVIEWED WITH PATIENT, VERBALIZES CONSENT PRIOR TO RECEIVING MEDICATION. AFTER MEDICATION GIVEN, PATIENT MONITORED CONTINUALLY WITH B/P EVERY 15 MINUTES, HEART RATE & CONTINUOUS OXYGEN SATURATION. 1520 DISCHARGED HOME WITH  DRIVING WITHOUT C/O.

## 2022-10-25 NOTE — PATIENT INSTRUCTIONS
"  Call Dr. Eddie Slater [facility] [place] at [phone] if you have any problems or concerns.    We know you have a Choice in healthcare and appreciate you using [facility].  Our purpose is to provide you \"Excellent Care\".  We hope that you will always choose us in the future and continue to recommend us to your family and friends.             "

## 2022-11-07 DIAGNOSIS — E11.9 CONTROLLED TYPE 2 DIABETES MELLITUS WITHOUT COMPLICATION, WITHOUT LONG-TERM CURRENT USE OF INSULIN: ICD-10-CM

## 2022-11-07 RX ORDER — GLIMEPIRIDE 2 MG/1
2 TABLET ORAL
Qty: 90 TABLET | Refills: 1 | Status: SHIPPED | OUTPATIENT
Start: 2022-11-07 | End: 2022-11-09 | Stop reason: SDUPTHER

## 2022-11-09 DIAGNOSIS — E11.9 CONTROLLED TYPE 2 DIABETES MELLITUS WITHOUT COMPLICATION, WITHOUT LONG-TERM CURRENT USE OF INSULIN: ICD-10-CM

## 2022-11-09 RX ORDER — GLIMEPIRIDE 2 MG/1
2 TABLET ORAL
Qty: 90 TABLET | Refills: 1 | Status: SHIPPED | OUTPATIENT
Start: 2022-11-09

## 2022-11-16 ENCOUNTER — OFFICE VISIT (OUTPATIENT)
Dept: FAMILY MEDICINE CLINIC | Facility: CLINIC | Age: 67
End: 2022-11-16

## 2022-11-16 VITALS
HEART RATE: 64 BPM | SYSTOLIC BLOOD PRESSURE: 128 MMHG | HEIGHT: 60 IN | BODY MASS INDEX: 43.59 KG/M2 | OXYGEN SATURATION: 97 % | DIASTOLIC BLOOD PRESSURE: 78 MMHG | TEMPERATURE: 97.6 F | WEIGHT: 222 LBS

## 2022-11-16 DIAGNOSIS — R14.0 BLOATED ABDOMEN: ICD-10-CM

## 2022-11-16 DIAGNOSIS — R14.2 BURPING: Primary | ICD-10-CM

## 2022-11-16 DIAGNOSIS — R10.13 EPIGASTRIC DISCOMFORT: ICD-10-CM

## 2022-11-16 PROCEDURE — 99213 OFFICE O/P EST LOW 20 MIN: CPT | Performed by: FAMILY MEDICINE

## 2022-11-16 RX ORDER — SUCRALFATE 1 G/1
1 TABLET ORAL 3 TIMES DAILY
Qty: 21 TABLET | Refills: 0 | Status: SHIPPED | OUTPATIENT
Start: 2022-11-16 | End: 2022-11-23

## 2022-11-16 NOTE — PROGRESS NOTES
Chief Complaint   Patient presents with   • Abdominal Pain     Center of abdomen       Subjective   Rosalee Hargrove is a 67 y.o. female.     History of Present Illness    67-year-old woman with recent CVA, nonobstructive CAD, PAD, moyamoya  disease s/p  CEA, HTN, HLD. Here for acute visit, new to this provider    Add-on same-day visit for epigastric discomfort wrapping around her stomach.  Intermittent for several weeks now.  States started after COVID.  She is on Protonix.  She had an EGD over the summer.  Small hiatal hernia.  Associated with more burping.  Some bloatedness of the abdomen.    Not having Chest pain symptoms, no SOA, no palpitations, no Blood pressure issues, she recently saw her Cardiologist in October, if this were to develop then would seek more urgent care at [facility]. She states this is always an underlying concern of hers     The following portions of the patient's history were reviewed and updated as appropriate: allergies, current medications, past family history, past medical history, past social history, past surgical history and problem list.      Past Medical History:   Diagnosis Date   • Allergic    • Anemia    • Angina pectoris (HCC)    • Anxiety    • Arthritis    • ASCVD (arteriosclerotic cardiovascular disease)    • Bilateral leg edema    • CAD (coronary artery disease)    • Chest pain    • Depression    • Diabetes mellitus (HCC)    • Disease of thyroid gland    • Dizziness    • Headache    • Hyperlipidemia    • Hypertension    • Morbid obesity (HCC)    • Orthostatic hypotension    • PAD (peripheral artery disease) ([facility])    • Shortness of breath    • Sleep apnea    • Tobacco abuse    • Vitamin D deficiency        Past Surgical History:   Procedure Laterality Date   • ANAL FISSURECTOMY     • BRAIN SURGERY     • CARDIAC CATHETERIZATION N/A 9/23/2016    Procedure: Coronary angiography;  Surgeon: Fredrick Kapoor MD;  Location: [facility] INVASIVE LOCATION;  Service:    • CARDIAC  CATHETERIZATION N/A 2016    Procedure: Left heart cath;  Surgeon: Fredrick Kapoor MD;  Location:  [facility] CATH INVASIVE LOCATION;  Service:    • CARDIAC CATHETERIZATION N/A 2016    Procedure: Left ventriculography;  Surgeon: Fredrick Kapoor MD;  Location:  [facility] CATH INVASIVE LOCATION;  Service:    • CARDIAC CATHETERIZATION N/A 2016    Procedure: Right heart cath;  Surgeon: Fredrick Kapoor MD;  Location:  [facility] CATH INVASIVE LOCATION;  Service:    •  SECTION     • CHOLECYSTECTOMY     • COLONOSCOPY N/A 2022    Procedure: COLONOSCOPY with Polypectomy;  Surgeon: Sarwat Church MD;  Location: [facility];  Service: Gastroenterology;  Laterality: N/A;  Rectal polyps x3 and Hemorrhoids   • ENDOSCOPY N/A 2022    Procedure: ESOPHAGOGASTRODUODENOSCOPY;  Surgeon: Sarwat Church MD;  Location: [facility];  Service: Gastroenterology;  Laterality: N/A;  Small Hiatal Hernia   • ERCP     • FRACTURE SURGERY      arm   • HYSTERECTOMY     • ILIAC ARTERY STENT         Family History   Problem Relation Age of Onset   • Heart disease Mother    • Hypertension Mother    • Hyperlipidemia Mother    • Breast cancer Mother    • Heart disease Father    • Heart attack Sister    • Heart disease Sister    • Breast cancer Sister    • Heart disease Brother    • Heart attack Brother    • Hyperlipidemia Brother    • Diabetes Brother    • Heart attack Maternal Grandmother    • Heart disease Maternal Grandmother    • Heart failure Maternal Grandmother    • Heart failure Maternal Grandfather    • Heart disease Maternal Grandfather    • Heart attack Maternal Grandfather    • Heart attack Paternal Grandmother    • Heart disease Paternal Grandmother    • Heart failure Paternal Grandmother    • Hypertension Paternal Grandmother        Social History     Socioeconomic History   • Marital status:    Tobacco Use   • Smoking status: Every Day     Packs/day: 0.25     Years: 50.00     Pack years: 12.50     Types: Cigarettes      Start date: 1970   • Smokeless tobacco: Never   Vaping Use   • Vaping Use: Never used   Substance and Sexual Activity   • Alcohol use: Yes     Alcohol/week: 1.0 standard drink     Types: 1 Glasses of wine per week     Comment: rare   • Drug use: No   • Sexual activity: Defer       Current Outpatient Medications on File Prior to Visit   Medication Sig Dispense Refill   • ALPRAZolam (XANAX) 0.5 MG tablet Take 0.5 mg by mouth As Needed for Anxiety.     • amLODIPine (NORVASC) 5 MG tablet Take 1 tablet by mouth Daily. 90 tablet 1   • aspirin 81 MG chewable tablet Chew 81 mg daily.     • atorvastatin (LIPITOR) 40 MG tablet Take 1 tablet by mouth Daily. 90 tablet 1   • citalopram (CeleXA) 20 MG tablet TAKE 1 TABLET BY MOUTH EVERY DAY 90 tablet 1   • clopidogrel (PLAVIX) 75 MG tablet Take 1 tablet by mouth Daily. (Patient taking differently: Take 1 tablet by mouth Daily. Pt taking 1 tab bid) 90 tablet 1   • ezetimibe (Zetia) 10 MG tablet Take 1 tablet by mouth Daily. 90 tablet 0   • fluocinolone acetonide (DERMOTIC) 0.01 % oil otic oil Administer  into both ears 2 (Two) Times a Day. 20 mL 0   • fluticasone (Flonase) 50 MCG/ACT nasal spray 2 sprays into the nostril(s) as directed by provider Daily.     • glimepiride (AMARYL) 2 MG tablet Take 1 tablet by mouth Every Morning Before Breakfast. 90 tablet 1   • hydroCHLOROthiazide (HYDRODIURIL) 25 MG tablet TAKE 1 TABLET BY MOUTH EVERY DAY 90 tablet 1   • levothyroxine (SYNTHROID, LEVOTHROID) 75 MCG tablet TAKE 1 TABLET BY MOUTH EVERY DAY 90 tablet 1   • metFORMIN (GLUCOPHAGE) 1000 MG tablet TAKE 1 TABLET BY MOUTH TWICE A DAY WITH MEALS 180 tablet 1   • pantoprazole (PROTONIX) 40 MG EC tablet Take 1 tablet by mouth Daily. 90 tablet 1   • trimethoprim-polymyxin b (POLYTRIM) 85744-5.1 UNIT/ML-% ophthalmic solution Apply 1 drop to eye(s) as directed by provider. Four days before and four days after eye injection.     • [DISCONTINUED] promethazine-dextromethorphan  (PROMETHAZINE-DM) 6.25-15 MG/5ML syrup Take 5 mL by mouth 4 (Four) Times a Day As Needed for Cough. 118 mL 0     No current facility-administered medications on file prior to visit.       Review of Systems   Constitutional: Negative for fever.   HENT: Negative for congestion.    Respiratory: Negative for cough.    Cardiovascular: Negative for chest pain and palpitations.   Gastrointestinal: Negative for abdominal pain.   Neurological: Negative for weakness.           Vitals:    11/16/22 1102   BP: 128/78   Pulse: 64   Temp: 97.6 °F (36.4 °C)   SpO2: 97%      Objective   Physical Exam  Vitals reviewed.   Cardiovascular:      Rate and Rhythm: Normal rate and regular rhythm.      Pulses: Normal pulses.   Pulmonary:      Effort: Pulmonary effort is normal.   Abdominal:      General: Abdomen is flat.   Skin:     Capillary Refill: Capillary refill takes less than 2 seconds.   Neurological:      General: No focal deficit present.      Mental Status: She is alert.   Psychiatric:         Mood and Affect: Mood normal.     mild epigastric discomfort  Afebrile, VSS       Assessment & Plan   Problems Addressed this Visit    None  Visit Diagnoses     Burping    -  Primary    Relevant Medications    sucralfate (Carafate) 1 g tablet    Bloated abdomen        Relevant Medications    sucralfate (Carafate) 1 g tablet    Epigastric discomfort        Relevant Medications    sucralfate (Carafate) 1 g tablet      Diagnoses       Codes Comments    Burping    -  Primary ICD-10-CM: R14.2  ICD-9-CM: 787.3     Bloated abdomen     ICD-10-CM: R14.0  ICD-9-CM: 787.3     Epigastric discomfort     ICD-10-CM: R10.13  ICD-9-CM: 789.06         Can trial BID Protonix and carafate for 1 week, if no improvement to notify us for further work up           Obdulia Pina MD

## 2022-11-30 PROCEDURE — 93298 REM INTERROG DEV EVAL SCRMS: CPT | Performed by: INTERNAL MEDICINE

## 2022-11-30 PROCEDURE — G2066 INTER DEVC REMOTE 30D: HCPCS | Performed by: INTERNAL MEDICINE

## 2022-12-29 ENCOUNTER — TELEPHONE (OUTPATIENT)
Dept: FAMILY MEDICINE CLINIC | Facility: CLINIC | Age: 67
End: 2022-12-29

## 2022-12-29 DIAGNOSIS — I10 ESSENTIAL HYPERTENSION: ICD-10-CM

## 2022-12-29 RX ORDER — HYDROCHLOROTHIAZIDE 25 MG/1
25 TABLET ORAL DAILY
Qty: 90 TABLET | Refills: 0 | Status: SHIPPED | OUTPATIENT
Start: 2022-12-29 | End: 2023-03-30

## 2022-12-29 NOTE — TELEPHONE ENCOUNTER
Caller: Rosalee Hargrove    Relationship: Self    Best call back number: [phone] (Mobile)    Requested Prescriptions:   Requested Prescriptions     Pending Prescriptions Disp Refills   • hydroCHLOROthiazide (HYDRODIURIL) 25 MG tablet 90 tablet 1     Sig: Take 1 tablet by mouth Daily.        Pharmacy where request should be sent: [facility] [address] [place] [address] PKY - [phone]  - [phone] FX     Additional details provided by patient: PATIENT NEEDS SENT TO PHARMACY ASAP    Does the patient have less than a 3 day supply:  [x] Yes  [] No    Would you like a call back once the refill request has been completed: [] Yes [x] No    If the office needs to give you a call back, can they leave a voicemail: [] Yes [x] No    Rubi Smith Rep   12/29/22 15:23 EST

## 2022-12-31 PROCEDURE — G2066 INTER DEVC REMOTE 30D: HCPCS | Performed by: INTERNAL MEDICINE

## 2022-12-31 PROCEDURE — 93298 REM INTERROG DEV EVAL SCRMS: CPT | Performed by: INTERNAL MEDICINE

## 2023-01-17 DIAGNOSIS — F41.0 PANIC DISORDER: ICD-10-CM

## 2023-01-17 DIAGNOSIS — E11.9 CONTROLLED TYPE 2 DIABETES MELLITUS WITHOUT COMPLICATION, WITHOUT LONG-TERM CURRENT USE OF INSULIN: ICD-10-CM

## 2023-01-17 DIAGNOSIS — F41.9 ANXIETY: ICD-10-CM

## 2023-01-17 DIAGNOSIS — E03.9 ACQUIRED HYPOTHYROIDISM: ICD-10-CM

## 2023-01-17 RX ORDER — LEVOTHYROXINE SODIUM 0.07 MG/1
75 TABLET ORAL DAILY
Qty: 90 TABLET | Refills: 1 | OUTPATIENT
Start: 2023-01-17

## 2023-01-17 RX ORDER — CITALOPRAM 20 MG/1
20 TABLET ORAL DAILY
Qty: 90 TABLET | Refills: 1 | OUTPATIENT
Start: 2023-01-17

## 2023-01-24 DIAGNOSIS — E11.9 CONTROLLED TYPE 2 DIABETES MELLITUS WITHOUT COMPLICATION, WITHOUT LONG-TERM CURRENT USE OF INSULIN: ICD-10-CM

## 2023-01-24 DIAGNOSIS — E03.9 ACQUIRED HYPOTHYROIDISM: ICD-10-CM

## 2023-01-24 RX ORDER — LEVOTHYROXINE SODIUM 0.07 MG/1
75 TABLET ORAL DAILY
Qty: 90 TABLET | Refills: 0 | Status: SHIPPED | OUTPATIENT
Start: 2023-01-24

## 2023-02-02 DIAGNOSIS — F41.0 PANIC DISORDER: ICD-10-CM

## 2023-02-02 DIAGNOSIS — E78.2 MIXED HYPERLIPIDEMIA: ICD-10-CM

## 2023-02-02 DIAGNOSIS — F41.9 ANXIETY: ICD-10-CM

## 2023-02-02 RX ORDER — CITALOPRAM 20 MG/1
20 TABLET ORAL DAILY
Qty: 90 TABLET | Refills: 0 | Status: SHIPPED | OUTPATIENT
Start: 2023-02-02

## 2023-02-02 RX ORDER — EZETIMIBE 10 MG/1
10 TABLET ORAL DAILY
Qty: 90 TABLET | Refills: 0 | Status: SHIPPED | OUTPATIENT
Start: 2023-02-02

## 2023-02-02 NOTE — PROGRESS NOTES
Subjective     REASON FOR CONSULTATION: Iron deficiency anemia  Provide an opinion on any further workup or treatment                             REQUESTING PHYSICIAN: Dr. Slater    RECORDS OBTAINED:  Records of the patients history including those obtained from the referring provider were reviewed and summarized in detail.      History of Present Illness   This is a very pleasant 66-year-old woman with multiple medical comorbidities referred from her PCP for evaluation and treatment of iron deficiency anemia.  Reviewing the patient's records, she has had microcytic, hypochromic red blood cell indices since 2017/2018 and over the past 1 year or so been mildly anemic hemoglobin typically running around 11.0.  She has normal white blood cell and platelet counts.  Her ferritin has been low for at least 5 years most recently ten 1 month ago.  The patient has attempted to take oral iron on multiple occasions but difficulty tolerating secondary to either diarrhea or constipation.  She has not been able to improve her ferritin despite oral iron.  She complains of fatigue, pica and restless leg symptoms.  She thinks it has been about 10 years since her previous colonoscopy.  She does have occasional hematochezia.  She denies vaginal bleeding and denies donating blood.    Patient received Injectafer x2 on 3/23/2022 and 3/30/2022.   EGD and colonoscopy were performed 6/30/2022 with no obvious source of GI blood loss.    The patient returned today feeling well with no significant fatigue, lightheadedness or dizziness.  She denies bright red blood per rectum or melanotic stool.    Past Medical History:   Diagnosis Date   • Allergic    • Anemia    • Angina pectoris (HCC)    • Anxiety    • Arthritis    • ASCVD (arteriosclerotic cardiovascular disease)    • Bilateral leg edema    • CAD (coronary artery disease)    • Chest pain    • Depression    • Diabetes mellitus (HCC)    • Disease of thyroid gland    • Dizziness    • Headache     • Hyperlipidemia    • Hypertension    • Morbid obesity (HCC)    • Orthostatic hypotension    • PAD (peripheral artery disease) ([facility])    • Shortness of breath    • Sleep apnea    • Tobacco abuse    • Vitamin D deficiency         Past Surgical History:   Procedure Laterality Date   • ANAL FISSURECTOMY     • BRAIN SURGERY     • CARDIAC CATHETERIZATION N/A 2016    Procedure: Coronary angiography;  Surgeon: Fredrick Kapoor MD;  Location:  [facility];  Service:    • CARDIAC CATHETERIZATION N/A 2016    Procedure: Left heart cath;  Surgeon: Fredrick Kapoor MD;  Location:  [facility];  Service:    • CARDIAC CATHETERIZATION N/A 2016    Procedure: Left ventriculography;  Surgeon: Fredrick Kapoor MD;  Location:  [facility];  Service:    • CARDIAC CATHETERIZATION N/A 2016    Procedure: Right heart cath;  Surgeon: Fredrick Kapoor MD;  Location:  [facility];  Service:    •  SECTION     • CHOLECYSTECTOMY     • COLONOSCOPY N/A 2022    Procedure: COLONOSCOPY with Polypectomy;  Surgeon: Sarwat Church MD;  Location: [facility] MAIN OR;  Service: Gastroenterology;  Laterality: N/A;  Rectal polyps x3 and Hemorrhoids   • ENDOSCOPY N/A 2022    Procedure: ESOPHAGOGASTRODUODENOSCOPY;  Surgeon: Sarwta Church MD;  Location: [facility] MAIN OR;  Service: Gastroenterology;  Laterality: N/A;  Small Hiatal Hernia   • ERCP     • FRACTURE SURGERY      arm   • HYSTERECTOMY     • ILIAC ARTERY STENT          Current Outpatient Medications on File Prior to Visit   Medication Sig Dispense Refill   • ALPRAZolam (XANAX) 0.5 MG tablet Take 0.5 mg by mouth As Needed for Anxiety.     • amLODIPine (NORVASC) 5 MG tablet Take 1 tablet by mouth Daily. 90 tablet 1   • aspirin 81 MG chewable tablet Chew 81 mg daily.     • atorvastatin (LIPITOR) 40 MG tablet Take 1 tablet by mouth Daily. 90 tablet 1   • citalopram (CeleXA) 20 MG tablet TAKE 1 TABLET BY MOUTH EVERY DAY 90  tablet 1   • clopidogrel (PLAVIX) 75 MG tablet Take 1 tablet by mouth Daily. (Patient taking differently: Take 1 tablet by mouth Daily. Pt taking 1 tab bid) 90 tablet 1   • ezetimibe (Zetia) 10 MG tablet Take 1 tablet by mouth Daily. 90 tablet 0   • fluocinolone acetonide (DERMOTIC) 0.01 % oil otic oil Administer  into both ears 2 (Two) Times a Day. 20 mL 0   • fluticasone (Flonase) 50 MCG/ACT nasal spray 2 sprays into the nostril(s) as directed by provider Daily.     • glimepiride (AMARYL) 2 MG tablet Take 1 tablet by mouth Every Morning Before Breakfast. 90 tablet 1   • hydroCHLOROthiazide (HYDRODIURIL) 25 MG tablet Take 1 tablet by mouth Daily. 90 tablet 0   • levothyroxine (SYNTHROID, LEVOTHROID) 75 MCG tablet Take 1 tablet by mouth Daily. 90 tablet 0   • metFORMIN (GLUCOPHAGE) 1000 MG tablet Take 1 tablet by mouth 2 (Two) Times a Day With Meals. 180 tablet 0   • pantoprazole (PROTONIX) 40 MG EC tablet Take 1 tablet by mouth Daily. 90 tablet 1   • trimethoprim-polymyxin b (POLYTRIM) 94623-2.1 UNIT/ML-% ophthalmic solution Apply 1 drop to eye(s) as directed by provider. Four days before and four days after eye injection.       No current facility-administered medications on file prior to visit.        ALLERGIES:    Allergies   Allergen Reactions   • Ciprofloxacin Hives and Swelling        Social History     Socioeconomic History   • Marital status:    Tobacco Use   • Smoking status: Every Day     Packs/day: 0.25     Years: 50.00     Pack years: 12.50     Types: Cigarettes     Start date: 1970   • Smokeless tobacco: Never   Vaping Use   • Vaping Use: Never used   Substance and Sexual Activity   • Alcohol use: Yes     Alcohol/week: 1.0 standard drink     Types: 1 Glasses of wine per week     Comment: rare   • Drug use: No   • Sexual activity: Defer        Family History   Problem Relation Age of Onset   • Heart disease Mother    • Hypertension Mother    • Hyperlipidemia Mother    • Breast cancer Mother    •  Heart disease Father    • Heart attack Sister    • Heart disease Sister    • Breast cancer Sister    • Heart disease Brother    • Heart attack Brother    • Hyperlipidemia Brother    • Diabetes Brother    • Heart attack Maternal Grandmother    • Heart disease Maternal Grandmother    • Heart failure Maternal Grandmother    • Heart failure Maternal Grandfather    • Heart disease Maternal Grandfather    • Heart attack Maternal Grandfather    • Heart attack Paternal Grandmother    • Heart disease Paternal Grandmother    • Heart failure Paternal Grandmother    • Hypertension Paternal Grandmother         Review of Systems   Constitutional: Positive for fatigue. Negative for fever and unexpected weight change.   HENT: Negative.    Respiratory: Negative for cough and shortness of breath.    Cardiovascular: Negative for chest pain, palpitations and leg swelling.   Gastrointestinal: Negative for blood in stool, constipation, diarrhea and nausea.   Genitourinary: Negative.    Musculoskeletal: Positive for arthralgias.   Skin: Negative.    Allergic/Immunologic: Negative.    Neurological: Negative for dizziness, seizures, weakness and light-headedness.   Hematological: Negative.    Psychiatric/Behavioral: Negative.    ROS unchanged- 2/8/2023      Objective     There were no vitals filed for this visit.  Current Status 8/17/2022   ECOG score 0       Physical Exam    CONSTITUTIONAL: pleasant well-developed adult woman  HEENT: no icterus, hearing intact, mask in place  LYMPH: no cervical or supraclavicular lad  CV: RRR, S1S2, no murmur  RESP: cta bilat, no wheezing, no rales  MUSC: no edema, normal gait  NEURO: alert and oriented x3, normal strength  PSYCH: Normal mood and affect  Exam unchanged-2/8/2023  RECENT LABS:  Hematology WBC   Date Value Ref Range Status   08/17/2022 8.01 3.40 - 10.80 10*3/mm3 Final   06/29/2022 8.6 3.4 - 10.8 x10E3/uL Final   06/04/2021 7.52 4.5 - 11.0 10*3/uL Final     RBC   Date Value Ref Range Status    08/17/2022 4.90 3.77 - 5.28 10*6/mm3 Final   06/29/2022 5.13 3.77 - 5.28 x10E6/uL Final   06/04/2021 4.53 4.0 - 5.2 10*6/uL Final     Hemoglobin   Date Value Ref Range Status   08/17/2022 14.4 12.0 - 15.9 g/dL Final   06/04/2021 10.8 (L) 12.0 - 16.0 g/dL Final     Hematocrit   Date Value Ref Range Status   08/17/2022 44.3 34.0 - 46.6 % Final   06/04/2021 35.7 (L) 36.0 - 46.0 % Final     Platelets   Date Value Ref Range Status   08/17/2022 242 140 - 450 10*3/mm3 Final   06/04/2021 276 140 - 440 10*3/uL Final        Lab Results   Component Value Date    GLUCOSE 115 (H) 06/29/2022    BUN 28 (H) 06/29/2022    CREATININE 1.08 (H) 06/29/2022    EGFRIFNONA 60 12/22/2021    EGFRIFAFRI 69 12/22/2021    BCR 26 06/29/2022    K 4.9 06/29/2022    CO2 25 06/29/2022    CALCIUM 9.3 06/29/2022    PROTENTOTREF 6.7 06/29/2022    ALBUMIN 4.0 06/29/2022    LABIL2 1.5 06/29/2022    AST 12 06/29/2022    ALT 19 06/29/2022     Ferritin ferritin 76/iron sat 16%    Assessment & Plan     *Iron deficiency anemia  · The patient has had microcytic/hypochromic indices for 4 to 5 years, recently developed anemia with hemoglobin around 11  · The patient has attempted oral iron but cannot tolerate secondary to constipation/diarrhea and has been unable to improve iron stores despite oral iron  · She has occasional bright red blood per rectum, most recent colonoscopy she thinks was about 10 years ago.  She denies vaginal bleeding and denies blood donations.  She has never had gastric surgeries.  She is on chronic PPI therapy.  · 3/15/2022 ferritin 12, iron saturation 6%, TIBC 388 received  2 doses of Injectafer  · EGD and colonoscopy June 2022 no obvious source of blood loss; polyps resected from the rectum benign  · 8/17/2022- hemoglobin 14.4, iron sat 16%, ferritin 142  · 2/8/2023-hemoglobin 14.4, iron sat 16%, ferritin 76    *Multiple comorbidities including peripheral vascular disease, coronary artery disease, diabetes mellitus, CVA, obesity,  hyperlipidemia, hypertension, tobacco use etc.    Hematology plan/recommendations:  Iron studies normal but lower than last visit; follow-up 6 months CBC ferritin iron profile

## 2023-02-08 ENCOUNTER — OFFICE VISIT (OUTPATIENT)
Dept: ONCOLOGY | Facility: CLINIC | Age: 68
End: 2023-02-08
Payer: MEDICARE

## 2023-02-08 ENCOUNTER — TELEPHONE (OUTPATIENT)
Dept: ONCOLOGY | Facility: CLINIC | Age: 68
End: 2023-02-08
Payer: MEDICARE

## 2023-02-08 ENCOUNTER — LAB (OUTPATIENT)
Dept: LAB | Facility: HOSPITAL | Age: 68
End: 2023-02-08
Payer: MEDICARE

## 2023-02-08 VITALS
HEART RATE: 74 BPM | WEIGHT: 224.6 LBS | TEMPERATURE: 98.2 F | BODY MASS INDEX: 44.1 KG/M2 | DIASTOLIC BLOOD PRESSURE: 86 MMHG | HEIGHT: 60 IN | OXYGEN SATURATION: 96 % | RESPIRATION RATE: 16 BRPM | SYSTOLIC BLOOD PRESSURE: 148 MMHG

## 2023-02-08 DIAGNOSIS — D50.9 IRON DEFICIENCY ANEMIA, UNSPECIFIED IRON DEFICIENCY ANEMIA TYPE: Primary | ICD-10-CM

## 2023-02-08 DIAGNOSIS — D50.9 IRON DEFICIENCY ANEMIA, UNSPECIFIED IRON DEFICIENCY ANEMIA TYPE: ICD-10-CM

## 2023-02-08 LAB
BASOPHILS # BLD AUTO: 0.03 10*3/MM3 (ref 0–0.2)
BASOPHILS NFR BLD AUTO: 0.4 % (ref 0–1.5)
DEPRECATED RDW RBC AUTO: 50.4 FL (ref 37–54)
EOSINOPHIL # BLD AUTO: 0.31 10*3/MM3 (ref 0–0.4)
EOSINOPHIL NFR BLD AUTO: 3.6 % (ref 0.3–6.2)
ERYTHROCYTE [DISTWIDTH] IN BLOOD BY AUTOMATED COUNT: 16 % (ref 12.3–15.4)
FERRITIN SERPL-MCNC: 76 NG/ML (ref 13–150)
HCT VFR BLD AUTO: 44.1 % (ref 34–46.6)
HGB BLD-MCNC: 14.2 G/DL (ref 12–15.9)
IMM GRANULOCYTES # BLD AUTO: 0.04 10*3/MM3 (ref 0–0.05)
IMM GRANULOCYTES NFR BLD AUTO: 0.5 % (ref 0–0.5)
IRON 24H UR-MRATE: 44 MCG/DL (ref 37–145)
IRON SATN MFR SERPL: 16 % (ref 20–50)
LYMPHOCYTES # BLD AUTO: 2.47 10*3/MM3 (ref 0.7–3.1)
LYMPHOCYTES NFR BLD AUTO: 29 % (ref 19.6–45.3)
MCH RBC QN AUTO: 27.9 PG (ref 26.6–33)
MCHC RBC AUTO-ENTMCNC: 32.2 G/DL (ref 31.5–35.7)
MCV RBC AUTO: 86.6 FL (ref 79–97)
MONOCYTES # BLD AUTO: 0.45 10*3/MM3 (ref 0.1–0.9)
MONOCYTES NFR BLD AUTO: 5.3 % (ref 5–12)
NEUTROPHILS NFR BLD AUTO: 5.22 10*3/MM3 (ref 1.7–7)
NEUTROPHILS NFR BLD AUTO: 61.2 % (ref 42.7–76)
NRBC BLD AUTO-RTO: 0 /100 WBC (ref 0–0.2)
PLATELET # BLD AUTO: 271 10*3/MM3 (ref 140–450)
PMV BLD AUTO: 10 FL (ref 6–12)
RBC # BLD AUTO: 5.09 10*6/MM3 (ref 3.77–5.28)
TIBC SERPL-MCNC: 281 MCG/DL (ref 298–536)
UIBC SERPL-MCNC: 237 MCG/DL (ref 112–346)
WBC NRBC COR # BLD: 8.52 10*3/MM3 (ref 3.4–10.8)

## 2023-02-08 PROCEDURE — 83540 ASSAY OF IRON: CPT | Performed by: INTERNAL MEDICINE

## 2023-02-08 PROCEDURE — 82728 ASSAY OF FERRITIN: CPT | Performed by: INTERNAL MEDICINE

## 2023-02-08 PROCEDURE — 36415 COLL VENOUS BLD VENIPUNCTURE: CPT

## 2023-02-08 PROCEDURE — 99213 OFFICE O/P EST LOW 20 MIN: CPT | Performed by: INTERNAL MEDICINE

## 2023-02-08 PROCEDURE — 85025 COMPLETE CBC W/AUTO DIFF WBC: CPT | Performed by: INTERNAL MEDICINE

## 2023-02-08 PROCEDURE — 83550 IRON BINDING TEST: CPT | Performed by: INTERNAL MEDICINE

## 2023-02-08 NOTE — TELEPHONE ENCOUNTER
----- Message from Brian Kenny MD sent at 2/8/2023  3:01 PM EST -----  PIP her iron level is still normal but lower than last visit so I changed my mind and would like her to f/u 6 months cbc ferritin iron prof md/np

## 2023-02-13 DIAGNOSIS — I10 ESSENTIAL HYPERTENSION: ICD-10-CM

## 2023-02-13 DIAGNOSIS — I77.9 PERIPHERAL ARTERIAL OCCLUSIVE DISEASE: ICD-10-CM

## 2023-02-13 DIAGNOSIS — I65.23 ATHEROSCLEROSIS OF BOTH CAROTID ARTERIES: ICD-10-CM

## 2023-02-13 DIAGNOSIS — I25.10 CORONARY ARTERY DISEASE INVOLVING NATIVE CORONARY ARTERY OF NATIVE HEART WITHOUT ANGINA PECTORIS: ICD-10-CM

## 2023-02-13 DIAGNOSIS — K21.9 GASTROESOPHAGEAL REFLUX DISEASE WITHOUT ESOPHAGITIS: ICD-10-CM

## 2023-02-13 DIAGNOSIS — I77.9 DISORDER OF ARTERIES AND ARTERIOLES, UNSPECIFIED: ICD-10-CM

## 2023-02-13 DIAGNOSIS — E78.2 MIXED HYPERLIPIDEMIA: ICD-10-CM

## 2023-02-13 DIAGNOSIS — E66.01 MORBID EXOGENOUS OBESITY: ICD-10-CM

## 2023-02-13 DIAGNOSIS — I25.10 ATHEROSCLEROTIC HEART DISEASE OF NATIVE CORONARY ARTERY WITHOUT ANGINA PECTORIS: ICD-10-CM

## 2023-02-13 DIAGNOSIS — E11.3293 TYPE 2 DIABETES MELLITUS WITH BOTH EYES AFFECTED BY MILD NONPROLIFERATIVE RETINOPATHY WITHOUT MACULAR EDEMA, WITHOUT LONG-TERM CURRENT USE OF INSULIN: ICD-10-CM

## 2023-02-13 DIAGNOSIS — E55.9 VITAMIN D DEFICIENCY: ICD-10-CM

## 2023-02-13 DIAGNOSIS — E03.9 ACQUIRED HYPOTHYROIDISM: ICD-10-CM

## 2023-02-15 LAB
25(OH)D3+25(OH)D2 SERPL-MCNC: 25 NG/ML (ref 30–100)
ALBUMIN SERPL-MCNC: 3.8 G/DL (ref 3.5–5.2)
ALBUMIN/GLOB SERPL: 1.4 G/DL
ALP SERPL-CCNC: 96 U/L (ref 39–117)
ALT SERPL-CCNC: 20 U/L (ref 1–33)
AST SERPL-CCNC: 15 U/L (ref 1–32)
BASOPHILS # BLD AUTO: 0.04 10*3/MM3 (ref 0–0.2)
BASOPHILS NFR BLD AUTO: 0.4 % (ref 0–1.5)
BILIRUB SERPL-MCNC: 0.3 MG/DL (ref 0–1.2)
BUN SERPL-MCNC: 25 MG/DL (ref 8–23)
BUN/CREAT SERPL: 28.7 (ref 7–25)
CALCIUM SERPL-MCNC: 8.7 MG/DL (ref 8.6–10.5)
CHLORIDE SERPL-SCNC: 102 MMOL/L (ref 98–107)
CHOLEST SERPL-MCNC: 135 MG/DL (ref 0–200)
CO2 SERPL-SCNC: 30.8 MMOL/L (ref 22–29)
CREAT SERPL-MCNC: 0.87 MG/DL (ref 0.57–1)
EGFRCR SERPLBLD CKD-EPI 2021: 72.2 ML/MIN/1.73
EOSINOPHIL # BLD AUTO: 0.4 10*3/MM3 (ref 0–0.4)
EOSINOPHIL NFR BLD AUTO: 4.5 % (ref 0.3–6.2)
ERYTHROCYTE [DISTWIDTH] IN BLOOD BY AUTOMATED COUNT: 15.7 % (ref 12.3–15.4)
GLOBULIN SER CALC-MCNC: 2.7 GM/DL
GLUCOSE SERPL-MCNC: 117 MG/DL (ref 65–99)
HBA1C MFR BLD: 7.1 % (ref 4.8–5.6)
HCT VFR BLD AUTO: 42.4 % (ref 34–46.6)
HDLC SERPL-MCNC: 33 MG/DL (ref 40–60)
HGB BLD-MCNC: 14.1 G/DL (ref 12–15.9)
IMM GRANULOCYTES # BLD AUTO: 0.04 10*3/MM3 (ref 0–0.05)
IMM GRANULOCYTES NFR BLD AUTO: 0.4 % (ref 0–0.5)
LDLC SERPL CALC-MCNC: 68 MG/DL (ref 0–100)
LYMPHOCYTES # BLD AUTO: 2.18 10*3/MM3 (ref 0.7–3.1)
LYMPHOCYTES NFR BLD AUTO: 24.5 % (ref 19.6–45.3)
MCH RBC QN AUTO: 28 PG (ref 26.6–33)
MCHC RBC AUTO-ENTMCNC: 33.3 G/DL (ref 31.5–35.7)
MCV RBC AUTO: 84.3 FL (ref 79–97)
MONOCYTES # BLD AUTO: 0.54 10*3/MM3 (ref 0.1–0.9)
MONOCYTES NFR BLD AUTO: 6.1 % (ref 5–12)
NEUTROPHILS # BLD AUTO: 5.7 10*3/MM3 (ref 1.7–7)
NEUTROPHILS NFR BLD AUTO: 64.1 % (ref 42.7–76)
NRBC BLD AUTO-RTO: 0 /100 WBC (ref 0–0.2)
PLATELET # BLD AUTO: 250 10*3/MM3 (ref 140–450)
POTASSIUM SERPL-SCNC: 3.9 MMOL/L (ref 3.5–5.2)
PROT SERPL-MCNC: 6.5 G/DL (ref 6–8.5)
RBC # BLD AUTO: 5.03 10*6/MM3 (ref 3.77–5.28)
SODIUM SERPL-SCNC: 142 MMOL/L (ref 136–145)
TRIGL SERPL-MCNC: 203 MG/DL (ref 0–150)
TSH SERPL DL<=0.005 MIU/L-ACNC: 2.02 UIU/ML (ref 0.27–4.2)
VLDLC SERPL CALC-MCNC: 34 MG/DL (ref 5–40)
WBC # BLD AUTO: 8.9 10*3/MM3 (ref 3.4–10.8)

## 2023-02-21 ENCOUNTER — OFFICE VISIT (OUTPATIENT)
Dept: FAMILY MEDICINE CLINIC | Facility: CLINIC | Age: 68
End: 2023-02-21
Payer: MEDICARE

## 2023-02-21 VITALS
BODY MASS INDEX: 43.98 KG/M2 | HEIGHT: 60 IN | SYSTOLIC BLOOD PRESSURE: 130 MMHG | HEART RATE: 75 BPM | DIASTOLIC BLOOD PRESSURE: 68 MMHG | OXYGEN SATURATION: 95 % | TEMPERATURE: 97.3 F | WEIGHT: 224 LBS

## 2023-02-21 DIAGNOSIS — I67.5 MOYAMOYA DISEASE: ICD-10-CM

## 2023-02-21 DIAGNOSIS — Z00.00 MEDICARE ANNUAL WELLNESS VISIT, SUBSEQUENT: Primary | ICD-10-CM

## 2023-02-21 DIAGNOSIS — F17.210 CIGARETTE NICOTINE DEPENDENCE WITHOUT COMPLICATION: ICD-10-CM

## 2023-02-21 DIAGNOSIS — I25.10 CORONARY ARTERY DISEASE INVOLVING NATIVE CORONARY ARTERY OF NATIVE HEART WITHOUT ANGINA PECTORIS: ICD-10-CM

## 2023-02-21 DIAGNOSIS — I10 ESSENTIAL HYPERTENSION: ICD-10-CM

## 2023-02-21 DIAGNOSIS — E03.9 ACQUIRED HYPOTHYROIDISM: ICD-10-CM

## 2023-02-21 DIAGNOSIS — F41.9 ANXIETY: ICD-10-CM

## 2023-02-21 DIAGNOSIS — E66.01 MORBID EXOGENOUS OBESITY: ICD-10-CM

## 2023-02-21 DIAGNOSIS — I65.23 ATHEROSCLEROSIS OF BOTH CAROTID ARTERIES: ICD-10-CM

## 2023-02-21 DIAGNOSIS — I63.9 CEREBROVASCULAR ACCIDENT (CVA), UNSPECIFIED MECHANISM: ICD-10-CM

## 2023-02-21 DIAGNOSIS — E11.3293 TYPE 2 DIABETES MELLITUS WITH BOTH EYES AFFECTED BY MILD NONPROLIFERATIVE RETINOPATHY WITHOUT MACULAR EDEMA, WITHOUT LONG-TERM CURRENT USE OF INSULIN: ICD-10-CM

## 2023-02-21 DIAGNOSIS — I77.9 PERIPHERAL ARTERIAL OCCLUSIVE DISEASE: ICD-10-CM

## 2023-02-21 DIAGNOSIS — E55.9 VITAMIN D DEFICIENCY: ICD-10-CM

## 2023-02-21 DIAGNOSIS — E78.2 MIXED HYPERLIPIDEMIA: ICD-10-CM

## 2023-02-21 DIAGNOSIS — K21.9 GASTROESOPHAGEAL REFLUX DISEASE WITHOUT ESOPHAGITIS: ICD-10-CM

## 2023-02-21 DIAGNOSIS — F41.0 PANIC DISORDER: ICD-10-CM

## 2023-02-21 PROCEDURE — 99213 OFFICE O/P EST LOW 20 MIN: CPT | Performed by: FAMILY MEDICINE

## 2023-02-21 PROCEDURE — G0439 PPPS, SUBSEQ VISIT: HCPCS | Performed by: FAMILY MEDICINE

## 2023-02-21 PROCEDURE — 1170F FXNL STATUS ASSESSED: CPT | Performed by: FAMILY MEDICINE

## 2023-02-21 PROCEDURE — 1159F MED LIST DOCD IN RCRD: CPT | Performed by: FAMILY MEDICINE

## 2023-02-21 NOTE — PROGRESS NOTES
The ABCs of the Annual Wellness Visit  Subsequent Medicare Wellness Visit    Subjective    Rosalee Hargrove is a 67 y.o. female who presents for a Subsequent Medicare Wellness Visit.    The following portions of the patient's history were reviewed and   updated as appropriate: allergies, current medications, past family history, past medical history, past social history, past surgical history and problem list.    Compared to one year ago, the patient feels her physical   health is the same.    Compared to one year ago, the patient feels her mental   health is the same.    Recent Hospitalizations:  She was not admitted to the hospital during the last year.       Current Medical Providers:  Patient Care Team:  Eddie Slater DO as PCP - General  Brian Kenny MD as Consulting Physician (Hematology and Oncology)  Isabelle Fuentes APRN as Nurse Practitioner (Gastroenterology)    Outpatient Medications Prior to Visit   Medication Sig Dispense Refill   • ALPRAZolam (XANAX) 0.5 MG tablet Take 0.5 mg by mouth As Needed for Anxiety.     • amLODIPine (NORVASC) 5 MG tablet Take 1 tablet by mouth Daily. 90 tablet 1   • aspirin 81 MG chewable tablet Chew 81 mg daily.     • atorvastatin (LIPITOR) 40 MG tablet Take 1 tablet by mouth Daily. 90 tablet 1   • citalopram (CeleXA) 20 MG tablet Take 1 tablet by mouth Daily. 90 tablet 0   • clopidogrel (PLAVIX) 75 MG tablet Take 1 tablet by mouth Daily. (Patient taking differently: Take 75 mg by mouth Daily. Pt taking 1 tab bid) 90 tablet 1   • ezetimibe (Zetia) 10 MG tablet Take 1 tablet by mouth Daily. 90 tablet 0   • fluocinolone acetonide (DERMOTIC) 0.01 % oil otic oil Administer  into both ears 2 (Two) Times a Day. 20 mL 0   • fluticasone (Flonase) 50 MCG/ACT nasal spray 2 sprays into the nostril(s) as directed by provider Daily.     • glimepiride (AMARYL) 2 MG tablet Take 1 tablet by mouth Every Morning Before Breakfast. 90 tablet 1   • hydroCHLOROthiazide (HYDRODIURIL) 25 MG  tablet Take 1 tablet by mouth Daily. 90 tablet 0   • levothyroxine (SYNTHROID, LEVOTHROID) 75 MCG tablet Take 1 tablet by mouth Daily. 90 tablet 0   • metFORMIN (GLUCOPHAGE) 1000 MG tablet Take 1 tablet by mouth 2 (Two) Times a Day With Meals. 180 tablet 0   • pantoprazole (PROTONIX) 40 MG EC tablet Take 1 tablet by mouth Daily. 90 tablet 1   • trimethoprim-polymyxin b (POLYTRIM) 06820-0.1 UNIT/ML-% ophthalmic solution Apply 1 drop to eye(s) as directed by provider. Four days before and four days after eye injection.       No facility-administered medications prior to visit.       No opioid medication identified on active medication list. I have reviewed chart for other potential  high risk medication/s and harmful drug interactions in the elderly.          Aspirin is on active medication list. Aspirin use is indicated based on review of current medical condition/s. Pros and cons of this therapy have been discussed today. Benefits of this medication outweigh potential harm.  Patient has been encouraged to continue taking this medication.  .      Patient Active Problem List   Diagnosis   • Mixed hyperlipidemia   • Essential hypertension   • Acquired hypothyroidism   • Vitamin D deficiency   • Morbid exogenous obesity (HCC)   • Nicotine dependence, uncomplicated   • Atopic rhinitis   • Anxiety   • Gastroesophageal reflux disease without esophagitis   • Panic disorder   • Peripheral arterial occlusive disease (HCC)   • Peripheral edema   • Calculus of kidney   • Coronary artery disease involving native coronary artery of native heart without angina pectoris   • Atherosclerosis of both carotid arteries   • Moyamoya disease   • Atherosclerosis of aorta ([facility])   • Type 2 diabetes mellitus with both eyes affected by mild nonproliferative retinopathy without macular edema, without long-term current use of insulin ([facility])   • Cerebrovascular accident (CVA) ([facility])   • Iron deficiency anemia   • Rectal bleeding   • Clinical  "diagnosis of severe acute respiratory syndrome coronavirus 2 (SARS-CoV-2) disease     Advance Care Planning  Advance Directive is not on file.  ACP discussion was held with the patient during this visit. Patient does not have an advance directive, information provided.     Objective    Vitals:    02/21/23 1035   BP: 130/68   Pulse: 75   Temp: 97.3 °F (36.3 °C)   SpO2: 95%   Weight: 102 kg (224 lb)   Height: 152.5 cm (60.04\")     Estimated body mass index is 43.69 kg/m² as calculated from the following:    Height as of this encounter: 152.5 cm (60.04\").    Weight as of this encounter: 102 kg (224 lb).    Class 3 Severe Obesity (BMI >=40). Obesity-related health conditions include the following: obstructive sleep apnea, hypertension, coronary heart disease, diabetes mellitus, dyslipidemias, GERD, peripheral vascular disease, idiopathic intracranial hypertension, osteoarthritis, lower extremity venous stasis disease, urinary stress incontinence and peripheral vascular disease. Obesity is unchanged. BMI is is above average; BMI management plan is completed. We discussed portion control and increasing exercise.      Does the patient have evidence of cognitive impairment? No    Lab Results   Component Value Date    CHLPL 135 02/14/2023    TRIG 203 (H) 02/14/2023    HDL 33 (L) 02/14/2023    LDL 68 02/14/2023    VLDL 34 02/14/2023    HGBA1C 7.10 (H) 02/14/2023        HEALTH RISK ASSESSMENT    Smoking Status:  Social History     Tobacco Use   Smoking Status Every Day   • Packs/day: 0.50   • Years: 50.00   • Pack years: 25.00   • Types: Cigarettes   • Start date: 1/1/1970   Smokeless Tobacco Never     Alcohol Consumption:  Social History     Substance and Sexual Activity   Alcohol Use Yes   • Alcohol/week: 1.0 standard drink   • Types: 1 Glasses of wine per week    Comment: Very occasional     Fall Risk Screen:    STEADI Fall Risk Assessment was completed, and patient is at LOW risk for falls.Assessment completed " on:2/21/2023    Depression Screening:  PHQ-2/PHQ-9 Depression Screening 2/21/2023   Little Interest or Pleasure in Doing Things 0-->not at all   Feeling Down, Depressed or Hopeless 0-->not at all   PHQ-9: Brief Depression Severity Measure Score 0       Health Habits and Functional and Cognitive Screening:  Functional & Cognitive Status 2/21/2023   Do you have difficulty preparing food and eating? No   Do you have difficulty bathing yourself, getting dressed or grooming yourself? No   Do you have difficulty using the toilet? No   Do you have difficulty moving around from place to place? No   Do you have trouble with steps or getting out of a bed or a chair? No   Current Diet Well Balanced Diet   Dental Exam Not up to date   Eye Exam Up to date   Exercise (times per week) 5 times per week   Current Exercises Include Walking   Do you need help using the phone?  No   Are you deaf or do you have serious difficulty hearing?  No   Do you need help with transportation? No   Do you need help shopping? No   Do you need help preparing meals?  No   Do you need help with housework?  No   Do you need help with laundry? No   Do you need help taking your medications? No   Do you need help managing money? No   Do you ever drive or ride in a car without wearing a seat belt? No   Have you felt unusual stress, anger or loneliness in the last month? -   Who do you live with? -   If you need help, do you have trouble finding someone available to you? -   Do you have difficulty concentrating, remembering or making decisions? -       Age-appropriate Screening Schedule:  Refer to the list below for future screening recommendations based on patient's age, sex and/or medical conditions. Orders for these recommended tests are listed in the plan section. The patient has been provided with a written plan.    Health Maintenance   Topic Date Due   • DIABETIC FOOT EXAM  10/29/2020   • URINE MICROALBUMIN  10/29/2020   • INFLUENZA VACCINE  08/01/2022    • HEMOGLOBIN A1C  08/14/2023   • DIABETIC EYE EXAM  11/01/2023   • DXA SCAN  01/11/2024   • LIPID PANEL  02/14/2024   • MAMMOGRAM  Discontinued   • PAP SMEAR  Discontinued   • TDAP/TD VACCINES  Discontinued   • ZOSTER VACCINE  Discontinued                CMS Preventative Services Quick Reference  Risk Factors Identified During Encounter  Chronic Pain: Natural history and expected course discussed. Questions answered.  Depression/Dysphoria: Current medication is effective, no change recommended  Fall Risk-High or Moderate: Discussed Fall Prevention in the home  Immunizations Discussed/Encouraged: Tdap, Influenza, Prevnar 20 (Pneumococcal 20-valent conjugate) and Shingrix  Inactivity/Sedentary: Patient was advised to exercise at least 150 minutes a week per CDC recommendations.  Polypharmacy: Medication List reviewed  Urinary Incontinence: Kegel exercises discussed  Dental Screening Recommended  Vision Screening Recommended  The above risks/problems have been discussed with the patient.  Pertinent information has been shared with the patient in the After Visit Summary.  An After Visit Summary and PPPS were made available to the patient.    Follow Up:   Next Medicare Wellness visit to be scheduled in 1 year.       Additional E&M Note during same encounter follows:  Patient has multiple medical problems which are significant and separately identifiable that require additional work above and beyond the Medicare Wellness Visit.      Chief Complaint  Medicare Wellness-subsequent    Subjective        HPI  Rosalee Hargrove is also being seen today for 67-year-old white female with multiple medical issues here for further medical management.  She has known history of essential hypertension, hyperlipidemia as well as PAD.  There is also a history of coronary artery disease, carotid artery disease as well as atherosclerosis of the aorta.  She also suffers from hypothyroidism, morbid exogenous obesity as well as vitamin D  "deficiency and type II non-insulin-dependent diabetes mellitus.  Diabetes has been associated with mild nonproliferative retinopathy without macular edema.  Medications include amlodipine, 81 mg aspirin, atorvastatin, citalopram as well as Plavix.  Patient is also using Zetia, glimepiride and hydrochlorothiazide.  Levothyroxine is also used as well as metformin, pantoprazole and a list of ophthalmic solutions.  Patient has been struggling using more than 1 metformin a day secondary to GI upset and diarrhea.  She has also had difficulty getting her medications refilled.  Current labs were using only 1 metformin daily.  She is going to try to increase to 1 twice a day with meals.  She does follow her sugars which many have been in the 1 30-1 35 range.  She states this morning it was 114.    Review of Systems   Cardiovascular:        CAD, PAD, carotid artery disease, hypertension, hyperlipidemia   Endocrine:        Vitamin D deficiency, morbid exogenous obesity, type II non-insulin-dependent diabetes mellitus, hypothyroidism   Allergic/Immunologic: Positive for environmental allergies.   Psychiatric/Behavioral: Positive for dysphoric mood. The patient is nervous/anxious.        Objective   Vital Signs:  /68   Pulse 75   Temp 97.3 °F (36.3 °C)   Ht 152.5 cm (60.04\")   Wt 102 kg (224 lb)   SpO2 95%   BMI 43.69 kg/m²     Physical Exam  Vitals and nursing note reviewed.   Constitutional:       Appearance: Normal appearance. She is well-developed and well-groomed. She is morbidly obese.      Comments: Morbid exogenous obesity with a BMI of 43.7   HENT:      Head: Normocephalic and atraumatic.   Neck:      Thyroid: No thyroid mass or thyromegaly.      Vascular: Normal carotid pulses. No carotid bruit.      Trachea: Trachea and phonation normal.   Cardiovascular:      Rate and Rhythm: Normal rate and regular rhythm.      Heart sounds: Normal heart sounds. No murmur heard.    No friction rub. No gallop. "   Pulmonary:      Effort: Pulmonary effort is normal. No respiratory distress.      Breath sounds: Normal breath sounds. No decreased breath sounds, wheezing, rhonchi or rales.   Musculoskeletal:      Cervical back: Neck supple.   Lymphadenopathy:      Cervical: No cervical adenopathy.   Skin:     General: Skin is warm and dry.      Findings: No rash.   Neurological:      Mental Status: She is alert and oriented to person, place, and time.   Psychiatric:         Attention and Perception: Attention and perception normal.         Mood and Affect: Mood and affect normal.         Speech: Speech normal.         Behavior: Behavior normal. Behavior is cooperative.         Thought Content: Thought content normal.         Cognition and Memory: Cognition and memory normal.         Judgment: Judgment normal.        Orders Only on 02/13/2023   Component Date Value Ref Range Status   • WBC 02/14/2023 8.90  3.40 - 10.80 10*3/mm3 Final   • RBC 02/14/2023 5.03  3.77 - 5.28 10*6/mm3 Final   • Hemoglobin 02/14/2023 14.1  12.0 - 15.9 g/dL Final   • Hematocrit 02/14/2023 42.4  34.0 - 46.6 % Final   • MCV 02/14/2023 84.3  79.0 - 97.0 fL Final   • MCH 02/14/2023 28.0  26.6 - 33.0 pg Final   • MCHC 02/14/2023 33.3  31.5 - 35.7 g/dL Final   • RDW 02/14/2023 15.7 (H)  12.3 - 15.4 % Final   • Platelets 02/14/2023 250  140 - 450 10*3/mm3 Final   • Neutrophil Rel % 02/14/2023 64.1  42.7 - 76.0 % Final   • Lymphocyte Rel % 02/14/2023 24.5  19.6 - 45.3 % Final   • Monocyte Rel % 02/14/2023 6.1  5.0 - 12.0 % Final   • Eosinophil Rel % 02/14/2023 4.5  0.3 - 6.2 % Final   • Basophil Rel % 02/14/2023 0.4  0.0 - 1.5 % Final   • Neutrophils Absolute 02/14/2023 5.70  1.70 - 7.00 10*3/mm3 Final   • Lymphocytes Absolute 02/14/2023 2.18  0.70 - 3.10 10*3/mm3 Final   • Monocytes Absolute 02/14/2023 0.54  0.10 - 0.90 10*3/mm3 Final   • Eosinophils Absolute 02/14/2023 0.40  0.00 - 0.40 10*3/mm3 Final   • Basophils Absolute 02/14/2023 0.04  0.00 - 0.20  10*3/mm3 Final   • Immature Granulocyte Rel % 02/14/2023 0.4  0.0 - 0.5 % Final   • Immature Grans Absolute 02/14/2023 0.04  0.00 - 0.05 10*3/mm3 Final   • nRBC 02/14/2023 0.0  0.0 - 0.2 /100 WBC Final   • Hemoglobin A1C 02/14/2023 7.10 (H)  4.80 - 5.60 % Final    Comment: Hemoglobin A1C Ranges:  Increased Risk for Diabetes  5.7% to 6.4%  Diabetes                     >= 6.5%  Diabetic Goal                < 7.0%     • Total Cholesterol 02/14/2023 135  0 - 200 mg/dL Final    Comment: Cholesterol Reference Ranges  (U.S. Department of Health and Human Services ATP III  Classifications)  Desirable          <200 mg/dL  Borderline High    200-239 mg/dL  High Risk          >240 mg/dL  Triglyceride Reference Ranges  (U.S. Department of Health and Human Services ATP III  Classifications)  Normal           <150 mg/dL  Borderline High  150-199 mg/dL  High             200-499 mg/dL  Very High        >500 mg/dL  HDL Reference Ranges  (U.S. Department of Health and Human Services ATP III  Classifications)  Low     <40 mg/dl (major risk factor for CHD)  High    >60 mg/dl ('negative' risk factor for CHD)  LDL Reference Ranges  (U.S. Department of Health and Human Services ATP III  Classifications)  Optimal          <100 mg/dL  Near Optimal     100-129 mg/dL  Borderline High  130-159 mg/dL  High             160-189 mg/dL  Very High        >189 mg/dL     • Triglycerides 02/14/2023 203 (H)  0 - 150 mg/dL Final   • HDL Cholesterol 02/14/2023 33 (L)  40 - 60 mg/dL Final   • VLDL Cholesterol [place] 02/14/2023 34  5 - 40 mg/dL Final   • LDL Chol Calc (NIH) 02/14/2023 68  0 - 100 mg/dL Final   • TSH 02/14/2023 2.020  0.270 - 4.200 uIU/mL Final   • 25 Hydroxy, Vitamin D 02/14/2023 25.0 (L)  30.0 - 100.0 ng/ml Final    Comment: Reference Range for Total Vitamin D 25(OH)  Deficiency <20.0 ng/mL  Insufficiency 21-29 ng/mL  Sufficiency  ng/mL  Toxicity >100 ng/ml     • Glucose 02/14/2023 117 (H)  65 - 99 mg/dL Final   • BUN 02/14/2023 25 (H)   8 - 23 mg/dL Final   • Creatinine 02/14/2023 0.87  0.57 - 1.00 mg/dL Final   • EGFR Result 02/14/2023 72.2  >60.0 mL/min/1.73 Final    Comment: GFR Normal >60  Chronic Kidney Disease <60  Kidney Failure <15     • BUN/Creatinine Ratio 02/14/2023 28.7 (H)  7.0 - 25.0 Final   • Sodium 02/14/2023 142  136 - 145 mmol/L Final   • Potassium 02/14/2023 3.9  3.5 - 5.2 mmol/L Final   • Chloride 02/14/2023 102  98 - 107 mmol/L Final   • Total CO2 02/14/2023 30.8 (H)  22.0 - 29.0 mmol/L Final   • Calcium 02/14/2023 8.7  8.6 - 10.5 mg/dL Final   • Total Protein 02/14/2023 6.5  6.0 - 8.5 g/dL Final   • Albumin 02/14/2023 3.8  3.5 - 5.2 g/dL Final   • Globulin 02/14/2023 2.7  gm/dL Final   • A/G Ratio 02/14/2023 1.4  g/dL Final   • Total Bilirubin 02/14/2023 0.3  0.0 - 1.2 mg/dL Final   • Alkaline Phosphatase 02/14/2023 96  39 - 117 U/L Final   • AST (SGOT) 02/14/2023 15  1 - 32 U/L Final   • ALT (SGPT) 02/14/2023 20  1 - 33 U/L Final   Lab on 02/08/2023   Component Date Value Ref Range Status   • Ferritin 02/08/2023 76.00  13.00 - 150.00 ng/mL Final   • Iron 02/08/2023 44  37 - 145 mcg/dL Final   • Iron Saturation 02/08/2023 16 (L)  20 - 50 % Final   • TIBC 02/08/2023 281 (L)  298 - 536 mcg/dL Final   • UIBC 02/08/2023 237  112 - 346 mcg/dL Final   • WBC 02/08/2023 8.52  3.40 - 10.80 10*3/mm3 Final   • RBC 02/08/2023 5.09  3.77 - 5.28 10*6/mm3 Final   • Hemoglobin 02/08/2023 14.2  12.0 - 15.9 g/dL Final   • Hematocrit 02/08/2023 44.1  34.0 - 46.6 % Final   • MCV 02/08/2023 86.6  79.0 - 97.0 fL Final   • MCH 02/08/2023 27.9  26.6 - 33.0 pg Final   • MCHC 02/08/2023 32.2  31.5 - 35.7 g/dL Final   • RDW 02/08/2023 16.0 (H)  12.3 - 15.4 % Final   • RDW-SD 02/08/2023 50.4  37.0 - 54.0 fl Final   • MPV 02/08/2023 10.0  6.0 - 12.0 fL Final   • Platelets 02/08/2023 271  140 - 450 10*3/mm3 Final   • Neutrophil % 02/08/2023 61.2  42.7 - 76.0 % Final   • Lymphocyte % 02/08/2023 29.0  19.6 - 45.3 % Final   • Monocyte % 02/08/2023 5.3  5.0 -  12.0 % Final   • Eosinophil % 02/08/2023 3.6  0.3 - 6.2 % Final   • Basophil % 02/08/2023 0.4  0.0 - 1.5 % Final   • Immature Grans % 02/08/2023 0.5  0.0 - 0.5 % Final   • Neutrophils, Absolute 02/08/2023 5.22  1.70 - 7.00 10*3/mm3 Final   • Lymphocytes, Absolute 02/08/2023 2.47  0.70 - 3.10 10*3/mm3 Final   • Monocytes, Absolute 02/08/2023 0.45  0.10 - 0.90 10*3/mm3 Final   • Eosinophils, Absolute 02/08/2023 0.31  0.00 - 0.40 10*3/mm3 Final   • Basophils, Absolute 02/08/2023 0.03  0.00 - 0.20 10*3/mm3 Final   • Immature Grans, Absolute 02/08/2023 0.04  0.00 - 0.05 10*3/mm3 Final   • nRBC 02/08/2023 0.0  0.0 - 0.2 /100 WBC Final         The following data was reviewed by: Eddie Slater DO on 02/21/2023:  Common labs    Common Labs 8/17/22 2/8/23 2/14/23 2/14/23 2/14/23 2/14/23      0815 0815 0815 0815   Glucose      117 (A)   BUN      25 (A)   Creatinine      0.87   Sodium      142   Potassium      3.9   Chloride      102   Calcium      8.7   Total Protein      6.5   Albumin      3.8   Total Bilirubin      0.3   Alkaline Phosphatase      96   AST (SGOT)      15   ALT (SGPT)      20   WBC 8.01 8.52 8.90      Hemoglobin 14.4 14.2 14.1      Hematocrit 44.3 44.1 42.4      Platelets 242 271 250      Total Cholesterol     135    Triglycerides     203 (A)    HDL Cholesterol     33 (A)    LDL Cholesterol      68    Hemoglobin A1C    7.10 (A)     (A) Abnormal value       Comments are available for some flowsheets but are not being displayed.           Data reviewed: Mammogram and DEXA scan from 2022           Assessment and Plan   Diagnoses and all orders for this visit:    1. Medicare annual wellness visit, subsequent (Primary)  -     Comprehensive metabolic panel; Future  -     Hemoglobin A1c; Future  -     TSH; Future  -     Vitamin D 25 hydroxy; Future  -     Lipid panel; Future  -     CBC w AUTO Differential; Future    2. Essential hypertension  -     Comprehensive metabolic panel; Future  -     Hemoglobin A1c;  Future  -     TSH; Future  -     Vitamin D 25 hydroxy; Future  -     Lipid panel; Future  -     CBC w AUTO Differential; Future    3. Mixed hyperlipidemia  -     Comprehensive metabolic panel; Future  -     Hemoglobin A1c; Future  -     TSH; Future  -     Vitamin D 25 hydroxy; Future  -     Lipid panel; Future  -     CBC w AUTO Differential; Future    4. Coronary artery disease involving native coronary artery of native heart without angina pectoris  -     Comprehensive metabolic panel; Future  -     Hemoglobin A1c; Future  -     TSH; Future  -     Vitamin D 25 hydroxy; Future  -     Lipid panel; Future  -     CBC w AUTO Differential; Future    5. Atherosclerosis of both carotid arteries  -     Comprehensive metabolic panel; Future  -     Hemoglobin A1c; Future  -     TSH; Future  -     Vitamin D 25 hydroxy; Future  -     Lipid panel; Future  -     CBC w AUTO Differential; Future    6. Peripheral arterial occlusive disease (HCC)  -     Comprehensive metabolic panel; Future  -     Hemoglobin A1c; Future  -     TSH; Future  -     Vitamin D 25 hydroxy; Future  -     Lipid panel; Future  -     CBC w AUTO Differential; Future    7. Acquired hypothyroidism  -     Comprehensive metabolic panel; Future  -     Hemoglobin A1c; Future  -     TSH; Future  -     Vitamin D 25 hydroxy; Future  -     Lipid panel; Future  -     CBC w AUTO Differential; Future    8. Morbid exogenous obesity (HCC)  -     Comprehensive metabolic panel; Future  -     Hemoglobin A1c; Future  -     TSH; Future  -     Vitamin D 25 hydroxy; Future  -     Lipid panel; Future  -     CBC w AUTO Differential; Future    9. Type 2 diabetes mellitus with both eyes affected by mild nonproliferative retinopathy without macular edema, without long-term current use of insulin (HCC)  -     Comprehensive metabolic panel; Future  -     Hemoglobin A1c; Future  -     TSH; Future  -     Vitamin D 25 hydroxy; Future  -     Lipid panel; Future  -     CBC w AUTO Differential;  Future    10. Vitamin D deficiency  -     Comprehensive metabolic panel; Future  -     Hemoglobin A1c; Future  -     TSH; Future  -     Vitamin D 25 hydroxy; Future  -     Lipid panel; Future  -     CBC w AUTO Differential; Future    11. Gastroesophageal reflux disease without esophagitis  -     Comprehensive metabolic panel; Future  -     Hemoglobin A1c; Future  -     TSH; Future  -     Vitamin D 25 hydroxy; Future  -     Lipid panel; Future  -     CBC w AUTO Differential; Future    12. Anxiety  -     Comprehensive metabolic panel; Future  -     Hemoglobin A1c; Future  -     TSH; Future  -     Vitamin D 25 hydroxy; Future  -     Lipid panel; Future  -     CBC w AUTO Differential; Future    13. Panic disorder  -     Comprehensive metabolic panel; Future  -     Hemoglobin A1c; Future  -     TSH; Future  -     Vitamin D 25 hydroxy; Future  -     Lipid panel; Future  -     CBC w AUTO Differential; Future    14. Cerebrovascular accident (CVA), unspecified mechanism (HCC)  -     Comprehensive metabolic panel; Future  -     Hemoglobin A1c; Future  -     TSH; Future  -     Vitamin D 25 hydroxy; Future  -     Lipid panel; Future  -     CBC w AUTO Differential; Future    15. Moyamoya disease  -     Comprehensive metabolic panel; Future  -     Hemoglobin A1c; Future  -     TSH; Future  -     Vitamin D 25 hydroxy; Future  -     Lipid panel; Future  -     CBC w AUTO Differential; Future    16. Cigarette nicotine dependence without complication  -     Comprehensive metabolic panel; Future  -     Hemoglobin A1c; Future  -     TSH; Future  -     Vitamin D 25 hydroxy; Future  -     Lipid panel; Future  -     CBC w AUTO Differential; Future      Patient must do better in regards to dietary discipline as well as increased activity.  Weight loss would be beneficial.  We are trying to get her hemoglobin A1c into the sixes again as it was in June of last year when it was 6.8.  All current medications will be continued without  alteration.  Patient will retry metformin on a twice daily basis and if she is unable to tolerate this she will reduce this to 1/day with meals and continue to follow her fasting blood sugars by glucometer.  She understands that we are looking for a fasting blood sugar of 110-120.  If she is unable to achieve this she will contact this office for further adjustment of medications with anticipation of repeat fasting labs in 6 months with follow-up with me thereafter.  She has been contacted in regards to next mammogram and will schedule this.  Next DEXA scan will not take place until next year.     I spent 30 minutes caring for Rosalee on this date of service. This time includes time spent by me in the following activities:preparing for the visit, reviewing tests, obtaining and/or reviewing a separately obtained history, performing a medically appropriate examination and/or evaluation , counseling and educating the patient/family/caregiver, ordering medications, tests, or procedures, referring and communicating with other health care professionals , documenting information in the medical record, independently interpreting results and communicating that information with the patient/family/caregiver and care coordination  Follow Up   Return in about 6 months (around 8/21/2023) for Recheck.  Patient was given instructions and counseling regarding her condition or for health maintenance advice. Please see specific information pulled into the AVS if appropriate.

## 2023-03-30 DIAGNOSIS — K21.9 GASTROESOPHAGEAL REFLUX DISEASE WITHOUT ESOPHAGITIS: ICD-10-CM

## 2023-03-30 DIAGNOSIS — I10 ESSENTIAL HYPERTENSION: ICD-10-CM

## 2023-03-30 RX ORDER — PANTOPRAZOLE SODIUM 40 MG/1
TABLET, DELAYED RELEASE ORAL
Qty: 90 TABLET | Refills: 1 | Status: SHIPPED | OUTPATIENT
Start: 2023-03-30

## 2023-03-30 RX ORDER — HYDROCHLOROTHIAZIDE 25 MG/1
TABLET ORAL
Qty: 90 TABLET | Refills: 0 | Status: SHIPPED | OUTPATIENT
Start: 2023-03-30

## 2023-04-03 ENCOUNTER — TELEPHONE (OUTPATIENT)
Dept: CARDIOLOGY | Facility: CLINIC | Age: 68
End: 2023-04-03

## 2023-04-03 ENCOUNTER — OFFICE VISIT (OUTPATIENT)
Dept: CARDIOLOGY | Facility: CLINIC | Age: 68
End: 2023-04-03
Payer: MEDICARE

## 2023-04-03 VITALS
SYSTOLIC BLOOD PRESSURE: 120 MMHG | WEIGHT: 225 LBS | DIASTOLIC BLOOD PRESSURE: 76 MMHG | BODY MASS INDEX: 44.17 KG/M2 | HEIGHT: 60 IN | HEART RATE: 74 BPM

## 2023-04-03 DIAGNOSIS — I10 HYPERTENSION, UNSPECIFIED TYPE: Primary | ICD-10-CM

## 2023-04-03 PROCEDURE — 1160F RVW MEDS BY RX/DR IN RCRD: CPT | Performed by: INTERNAL MEDICINE

## 2023-04-03 PROCEDURE — 99213 OFFICE O/P EST LOW 20 MIN: CPT | Performed by: INTERNAL MEDICINE

## 2023-04-03 PROCEDURE — 3078F DIAST BP <80 MM HG: CPT | Performed by: INTERNAL MEDICINE

## 2023-04-03 PROCEDURE — 3074F SYST BP LT 130 MM HG: CPT | Performed by: INTERNAL MEDICINE

## 2023-04-03 PROCEDURE — 1159F MED LIST DOCD IN RCRD: CPT | Performed by: INTERNAL MEDICINE

## 2023-04-03 RX ORDER — SACCHAROMYCES BOULARDII 250 MG
250 CAPSULE ORAL 2 TIMES DAILY
COMMUNITY

## 2023-04-03 NOTE — TELEPHONE ENCOUNTER
She should not be using a phone for her transmitter. She may need to call [employer] directly to see? The # is [phone]. Could be spam calls?

## 2023-04-03 NOTE — TELEPHONE ENCOUNTER
Pt is here to see Dr. Mei today.  She got a new phone on 3/7/23 and she thinks she is getting messages for her remote device checks? Pt's phone number didn't change!    Pt is asking if she needs to do something now that she has connected a new phone to the transmitter?    Please advise.    Thank you,    KYREE Marmolejo

## 2023-04-03 NOTE — PROGRESS NOTES
Subjective:     Encounter Date: 04/03/23      Patient ID: Rosalee Hargrove is a 67 y.o. female.    Chief Complaint: Nonobstructive CAD, peripheral vascular disease, A. Fib, CVA  HPI:     This is a 67-year-old woman with recent CVA, nonobstructive CAD, PAD, graham graham disease s/p r CEA, HTN, HLD.      Several years of ago she underwent cardiac catheterization which showed mild, nonobstructive coronary disease throughout her coronary arteries.  She has a history of peripheral arterial disease and is followed by Dr. Coats. 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 She has a right iliac artery stent which on last evaluation 2021 showed moderate in-stent restenosis.  She also has severe peripheral disease of the descending aorta with scattered ulcerations and plaque formation.    In April 2021 she had acute left sided weakness and facial droop. She was evaluated at [facility] and found to have a small CVA. Echo unremarkable. 48 hour holter showed no AF.  When I saw her in follow-up we plan to implant a Linq device to follow for occult atrial fibrillation.  She had a carotid Doppler also which showed an occluded right carotid artery.  She then presented to [place] with recurrent stroke in Memorial Day of 2021.  She underwent carotid artery stenting with Dr. Castanon.  She also had a Linq implanted by the cardiology team there.     She is doing well from a cardiac standpoint. Her BP is well controlled, she's trying to cut back on smoking but still struggles. Her carotid stent has an 80% stenosis and she plans to undergo angioplasty of that with Dr. Castanon later this month. Linq shows 0% AF.     She is a hairdresser working part time.  She continues to smoke.  She has failed Wellbutrin. Planning on a [place] trip for her 68th birthday     There is a strong family history of coronary disease and stroke    The following portions of the patient's history were reviewed and updated as appropriate: allergies, current  medications, past family history, past medical history, past social history, past surgical history and problem list.     REVIEW OF SYSTEMS:   All systems reviewed.  Pertinent positives identified in HPI.  All other systems are negative.    Past Medical History:   Diagnosis Date   • Allergic    • Anemia    • Angina pectoris    • Anxiety    • Arthritis    • ASCVD (arteriosclerotic cardiovascular disease)    • Bilateral leg edema    • CAD (coronary artery disease)    • Cataract Jan 2022   • Chest pain    • Cholelithiasis    • Colon polyp Feb 2023   • Depression    • Diabetes mellitus    • Disease of thyroid gland    • Dizziness    • GERD (gastroesophageal reflux disease)    • Glaucoma    • Headache    • HL (hearing loss)    • Hyperlipidemia    • Hypertension    • Kidney stone    • Morbid obesity    • Orthostatic hypotension    • PAD (peripheral artery disease)    • Pancreatitis    • Shortness of breath    • Sleep apnea    • Stroke    • Tobacco abuse    • Urinary tract infection    • Visual impairment     Macular degeneration   • Vitamin D deficiency        Family History   Problem Relation Age of Onset   • Heart disease Mother    • Hypertension Mother    • Hyperlipidemia Mother    • Breast cancer Mother    • Heart disease Father    • Heart attack Sister    • Heart disease Sister    • Breast cancer Sister    • Heart disease Brother    • Heart attack Brother    • Hyperlipidemia Brother    • Diabetes Brother    • Heart attack Maternal Grandmother    • Heart disease Maternal Grandmother    • Heart failure Maternal Grandmother    • Heart failure Maternal Grandfather    • Heart disease Maternal Grandfather    • Heart attack Maternal Grandfather    • Heart attack Paternal Grandmother    • Heart disease Paternal Grandmother    • Heart failure Paternal Grandmother    • Hypertension Paternal Grandmother        Social History     Socioeconomic History   • Marital status:    Tobacco Use   • Smoking status: Every Day      Packs/day: 0.50     Years: 50.00     Pack years: 25.00     Types: Cigarettes     Start date: 1970   • Smokeless tobacco: Never   Vaping Use   • Vaping Use: Never used   Substance and Sexual Activity   • Alcohol use: Yes     Alcohol/week: 1.0 standard drink     Types: 1 Glasses of wine per week     Comment: Very occasional   • Drug use: No   • Sexual activity: Not Currently     Partners: Male     Birth control/protection: None     Comment: N/A       Allergies   Allergen Reactions   • Ciprofloxacin Hives and Swelling   • Lisinopril Swelling and Other (See Comments)     Cough    • Seasonal Ic [Cholestatin] Other (See Comments)     Cough, congestion       Past Surgical History:   Procedure Laterality Date   • ANAL FISSURECTOMY     • BRAIN SURGERY     • CARDIAC CATHETERIZATION N/A 2016    Procedure: Coronary angiography;  Surgeon: Fredrick Kapoor MD;  Location: [facility] CATH INVASIVE LOCATION;  Service:    • CARDIAC CATHETERIZATION N/A 2016    Procedure: Left heart cath;  Surgeon: Fredrick Kapoor MD;  Location: [facility] CATH INVASIVE LOCATION;  Service:    • CARDIAC CATHETERIZATION N/A 2016    Procedure: Left ventriculography;  Surgeon: Fredrick Kapoor MD;  Location: [facility] CATH INVASIVE LOCATION;  Service:    • CARDIAC CATHETERIZATION N/A 2016    Procedure: Right heart cath;  Surgeon: Fredrick Kapoor MD;  Location: [facility] CATH INVASIVE LOCATION;  Service:    •  SECTION     • CHOLECYSTECTOMY     • COLONOSCOPY N/A 2022    Procedure: COLONOSCOPY with Polypectomy;  Surgeon: Sarwat Church MD;  Location: [facility] MAIN OR;  Service: Gastroenterology;  Laterality: N/A;  Rectal polyps x3 and Hemorrhoids   • ENDOSCOPY N/A 2022    Procedure: ESOPHAGOGASTRODUODENOSCOPY;  Surgeon: Sarwat Church MD;  Location: [facility] MAIN OR;  Service: Gastroenterology;  Laterality: N/A;  Small Hiatal Hernia   • ERCP     • FRACTURE SURGERY      arm   • HYSTERECTOMY     • ILIAC ARTERY STENT     • SUBTOTAL HYSTERECTOMY      • TONSILLECTOMY         Procedures       Objective:         PHYSICAL EXAM:  GEN: VSS, no distress,   Eyes: normal sclera, normal lids and lashes  HENT: moist mucus membranes,   Respiratory: CTAB, no rales or wheezes  CV: RRR, no murmurs,  B/l carotid bruits  GI: NABS, soft,  Nontender, nondistended  MSK: no edema, no scoliosis or kyphosis  Skin: no rash, warm, dry  Heme/Lymph: no bruising or bleeding  Psych: organized thought, normal behavior and affect  Neuro: Cranial nerves grossly intact, Alert and Oriented x 3.         Assessment:          Diagnosis Plan   1. Hypertension, unspecified type             Plan:       1.  Paroxysmal atrial fibrillation: CHADSVASC is 6. One episode documented several years ago for a few minutes and was not anticoagulated. Linq without recurrence. No AC.   2.  Hyperlipidemia: LDL is 80 on Lipitor 40.  Continue.  3.  Coronary artery disease, nonobstructive: statin plavix  4.  Peripheral arterial disease, Graham graham disease: She  follows with Dr. Coats who implanted a right iliac stent and is s/p r CEA, then status post R angioplasty now planning on repeat stenting due to high grade stenosis. Plavix 75 BID due to PRA abnl.   5.  Hypertension: Controlled on amlodipine 5 and HCTZ.   6.  Smoking cessation: attempt ongoing  7.  CVA: Multiple CVAs     Dr. Slater, thank you very much for referring this kind patient to me. Please call me with any questions or concerns. I will see the patient again in the office in 6 months.       Nohemi Mei MD  04/03/23  [facility]    Outpatient Encounter Medications as of 4/3/2023   Medication Sig Dispense Refill   • ALPRAZolam (XANAX) 0.5 MG tablet Take 1 tablet by mouth As Needed for Anxiety.     • amLODIPine (NORVASC) 5 MG tablet Take 1 tablet by mouth Daily. 90 tablet 1   • aspirin 81 MG chewable tablet Chew 1 tablet Daily.     • atorvastatin (LIPITOR) 40 MG tablet Take 1 tablet by mouth Daily. 90 tablet 1   • citalopram (CeleXA) 20  MG tablet Take 1 tablet by mouth Daily. 90 tablet 0   • clopidogrel (PLAVIX) 75 MG tablet Take 1 tablet by mouth Daily. (Patient taking differently: Take 1 tablet by mouth Daily. Pt taking 1 tab bid) 90 tablet 1   • ezetimibe (Zetia) 10 MG tablet Take 1 tablet by mouth Daily. 90 tablet 0   • fluocinolone acetonide (DERMOTIC) 0.01 % oil otic oil Administer  into both ears 2 (Two) Times a Day. 20 mL 0   • fluticasone (Flonase) 50 MCG/ACT nasal spray 2 sprays into the nostril(s) as directed by provider Daily.     • glimepiride (AMARYL) 2 MG tablet Take 1 tablet by mouth Every Morning Before Breakfast. 90 tablet 1   • hydroCHLOROthiazide (HYDRODIURIL) 25 MG tablet Take 1 tablet by mouth once daily 90 tablet 0   • levothyroxine (SYNTHROID, LEVOTHROID) 75 MCG tablet Take 1 tablet by mouth Daily. 90 tablet 0   • metFORMIN (GLUCOPHAGE) 1000 MG tablet Take 1 tablet by mouth 2 (Two) Times a Day With Meals. 180 tablet 0   • pantoprazole (PROTONIX) 40 MG EC tablet Take 1 tablet by mouth once daily 90 tablet 1   • saccharomyces boulardii (FLORASTOR) 250 MG capsule Take 1 capsule by mouth 2 (Two) Times a Day.     • trimethoprim-polymyxin b (POLYTRIM) 77290-4.1 UNIT/ML-% ophthalmic solution Apply 1 drop to eye(s) as directed by provider. Four days before and four days after eye injection.       No facility-administered encounter medications on file as of 4/3/2023.

## 2023-04-03 NOTE — TELEPHONE ENCOUNTER
I called pt told her she may need to call [employer]:    She should not be using a phone for her transmitter. She may need to call [employer] directly to see? The # is [phone]. Could be spam calls?    Sent message to pt via [employer]  4/3/23  [id number]

## 2023-04-22 DIAGNOSIS — E78.2 MIXED HYPERLIPIDEMIA: ICD-10-CM

## 2023-04-22 DIAGNOSIS — E03.9 ACQUIRED HYPOTHYROIDISM: ICD-10-CM

## 2023-04-22 DIAGNOSIS — F41.0 PANIC DISORDER: ICD-10-CM

## 2023-04-22 DIAGNOSIS — F41.9 ANXIETY: ICD-10-CM

## 2023-04-22 DIAGNOSIS — E11.9 CONTROLLED TYPE 2 DIABETES MELLITUS WITHOUT COMPLICATION, WITHOUT LONG-TERM CURRENT USE OF INSULIN: ICD-10-CM

## 2023-04-22 DIAGNOSIS — I10 ESSENTIAL HYPERTENSION: ICD-10-CM

## 2023-04-24 RX ORDER — CITALOPRAM 20 MG/1
TABLET ORAL
Qty: 90 TABLET | Refills: 0 | Status: SHIPPED | OUTPATIENT
Start: 2023-04-24

## 2023-04-24 RX ORDER — EZETIMIBE 10 MG/1
TABLET ORAL
Qty: 90 TABLET | Refills: 0 | Status: SHIPPED | OUTPATIENT
Start: 2023-04-24

## 2023-04-24 RX ORDER — LEVOTHYROXINE SODIUM 0.07 MG/1
TABLET ORAL
Qty: 90 TABLET | Refills: 0 | Status: SHIPPED | OUTPATIENT
Start: 2023-04-24

## 2023-04-24 RX ORDER — AMLODIPINE BESYLATE 5 MG/1
TABLET ORAL
Qty: 90 TABLET | Refills: 0 | Status: SHIPPED | OUTPATIENT
Start: 2023-04-24

## 2023-08-07 DIAGNOSIS — I10 ESSENTIAL HYPERTENSION: ICD-10-CM

## 2023-08-07 DIAGNOSIS — E11.3293 TYPE 2 DIABETES MELLITUS WITH BOTH EYES AFFECTED BY MILD NONPROLIFERATIVE RETINOPATHY WITHOUT MACULAR EDEMA, WITHOUT LONG-TERM CURRENT USE OF INSULIN: ICD-10-CM

## 2023-08-07 DIAGNOSIS — E55.9 VITAMIN D DEFICIENCY: ICD-10-CM

## 2023-08-07 DIAGNOSIS — E03.9 ACQUIRED HYPOTHYROIDISM: ICD-10-CM

## 2023-08-07 DIAGNOSIS — E78.2 MIXED HYPERLIPIDEMIA: Primary | ICD-10-CM

## 2023-08-09 ENCOUNTER — TELEPHONE (OUTPATIENT)
Dept: ONCOLOGY | Facility: CLINIC | Age: 68
End: 2023-08-09
Payer: MEDICARE

## 2023-08-09 ENCOUNTER — OFFICE VISIT (OUTPATIENT)
Dept: ONCOLOGY | Facility: CLINIC | Age: 68
End: 2023-08-09
Payer: MEDICARE

## 2023-08-09 ENCOUNTER — LAB (OUTPATIENT)
Dept: LAB | Facility: HOSPITAL | Age: 68
End: 2023-08-09
Payer: MEDICARE

## 2023-08-09 VITALS
OXYGEN SATURATION: 96 % | HEART RATE: 83 BPM | BODY MASS INDEX: 45.39 KG/M2 | SYSTOLIC BLOOD PRESSURE: 133 MMHG | DIASTOLIC BLOOD PRESSURE: 54 MMHG | HEIGHT: 60 IN | TEMPERATURE: 98.2 F | RESPIRATION RATE: 16 BRPM | WEIGHT: 231.2 LBS

## 2023-08-09 DIAGNOSIS — D50.9 IRON DEFICIENCY ANEMIA, UNSPECIFIED IRON DEFICIENCY ANEMIA TYPE: Primary | ICD-10-CM

## 2023-08-09 DIAGNOSIS — D50.9 IRON DEFICIENCY ANEMIA, UNSPECIFIED IRON DEFICIENCY ANEMIA TYPE: ICD-10-CM

## 2023-08-09 LAB
25(OH)D3+25(OH)D2 SERPL-MCNC: 20.9 NG/ML (ref 30–100)
ALBUMIN SERPL-MCNC: 4.1 G/DL (ref 3.5–5.2)
ALBUMIN/CREAT UR: 736 MG/G CREAT (ref 0–29)
ALBUMIN/GLOB SERPL: 1.6 G/DL
ALP SERPL-CCNC: 92 U/L (ref 39–117)
ALT SERPL-CCNC: 22 U/L (ref 1–33)
AST SERPL-CCNC: 15 U/L (ref 1–32)
BASOPHILS # BLD AUTO: 0.02 10*3/MM3 (ref 0–0.2)
BASOPHILS # BLD AUTO: 0.04 10*3/MM3 (ref 0–0.2)
BASOPHILS NFR BLD AUTO: 0.3 % (ref 0–1.5)
BASOPHILS NFR BLD AUTO: 0.5 % (ref 0–1.5)
BILIRUB SERPL-MCNC: 0.3 MG/DL (ref 0–1.2)
BUN SERPL-MCNC: 24 MG/DL (ref 8–23)
BUN/CREAT SERPL: 21.8 (ref 7–25)
CALCIUM SERPL-MCNC: 9.1 MG/DL (ref 8.6–10.5)
CHLORIDE SERPL-SCNC: 102 MMOL/L (ref 98–107)
CHOLEST SERPL-MCNC: 145 MG/DL (ref 0–200)
CO2 SERPL-SCNC: 29.1 MMOL/L (ref 22–29)
CREAT SERPL-MCNC: 1.1 MG/DL (ref 0.57–1)
CREAT UR-MCNC: 111.6 MG/DL
DEPRECATED RDW RBC AUTO: 49.5 FL (ref 37–54)
EGFRCR SERPLBLD CKD-EPI 2021: 54.5 ML/MIN/1.73
EOSINOPHIL # BLD AUTO: 0.33 10*3/MM3 (ref 0–0.4)
EOSINOPHIL # BLD AUTO: 0.38 10*3/MM3 (ref 0–0.4)
EOSINOPHIL NFR BLD AUTO: 4.4 % (ref 0.3–6.2)
EOSINOPHIL NFR BLD AUTO: 5.4 % (ref 0.3–6.2)
ERYTHROCYTE [DISTWIDTH] IN BLOOD BY AUTOMATED COUNT: 15.9 % (ref 12.3–15.4)
ERYTHROCYTE [DISTWIDTH] IN BLOOD BY AUTOMATED COUNT: 15.9 % (ref 12.3–15.4)
FERRITIN SERPL-MCNC: 73 NG/ML (ref 13–150)
GLOBULIN SER CALC-MCNC: 2.6 GM/DL
GLUCOSE SERPL-MCNC: 132 MG/DL (ref 65–99)
HBA1C MFR BLD: 7.8 % (ref 4.8–5.6)
HCT VFR BLD AUTO: 38.3 % (ref 34–46.6)
HCT VFR BLD AUTO: 39.4 % (ref 34–46.6)
HDLC SERPL-MCNC: 37 MG/DL (ref 40–60)
HGB BLD-MCNC: 12.2 G/DL (ref 12–15.9)
HGB BLD-MCNC: 12.3 G/DL (ref 12–15.9)
IMM GRANULOCYTES # BLD AUTO: 0.04 10*3/MM3 (ref 0–0.05)
IMM GRANULOCYTES # BLD AUTO: 0.05 10*3/MM3 (ref 0–0.05)
IMM GRANULOCYTES NFR BLD AUTO: 0.5 % (ref 0–0.5)
IMM GRANULOCYTES NFR BLD AUTO: 0.7 % (ref 0–0.5)
IRON 24H UR-MRATE: 37 MCG/DL (ref 37–145)
IRON SATN MFR SERPL: 13 % (ref 20–50)
LDLC SERPL CALC-MCNC: 79 MG/DL (ref 0–100)
LYMPHOCYTES # BLD AUTO: 1.68 10*3/MM3 (ref 0.7–3.1)
LYMPHOCYTES # BLD AUTO: 1.91 10*3/MM3 (ref 0.7–3.1)
LYMPHOCYTES NFR BLD AUTO: 22.6 % (ref 19.6–45.3)
LYMPHOCYTES NFR BLD AUTO: 26.9 % (ref 19.6–45.3)
MCH RBC QN AUTO: 25.9 PG (ref 26.6–33)
MCH RBC QN AUTO: 26.1 PG (ref 26.6–33)
MCHC RBC AUTO-ENTMCNC: 31 G/DL (ref 31.5–35.7)
MCHC RBC AUTO-ENTMCNC: 32.1 G/DL (ref 31.5–35.7)
MCV RBC AUTO: 80.6 FL (ref 79–97)
MCV RBC AUTO: 84.4 FL (ref 79–97)
MICROALBUMIN UR-MCNC: 820.9 UG/ML
MONOCYTES # BLD AUTO: 0.42 10*3/MM3 (ref 0.1–0.9)
MONOCYTES # BLD AUTO: 0.48 10*3/MM3 (ref 0.1–0.9)
MONOCYTES NFR BLD AUTO: 5.7 % (ref 5–12)
MONOCYTES NFR BLD AUTO: 6.8 % (ref 5–12)
NEUTROPHILS # BLD AUTO: 4.92 10*3/MM3 (ref 1.7–7)
NEUTROPHILS NFR BLD AUTO: 4.26 10*3/MM3 (ref 1.7–7)
NEUTROPHILS NFR BLD AUTO: 59.9 % (ref 42.7–76)
NEUTROPHILS NFR BLD AUTO: 66.3 % (ref 42.7–76)
NRBC BLD AUTO-RTO: 0 /100 WBC (ref 0–0.2)
PLATELET # BLD AUTO: 268 10*3/MM3 (ref 140–450)
PLATELET # BLD AUTO: 287 10*3/MM3 (ref 140–450)
PMV BLD AUTO: 9.5 FL (ref 6–12)
POTASSIUM SERPL-SCNC: 3.9 MMOL/L (ref 3.5–5.2)
PROT SERPL-MCNC: 6.7 G/DL (ref 6–8.5)
RBC # BLD AUTO: 4.67 10*6/MM3 (ref 3.77–5.28)
RBC # BLD AUTO: 4.75 10*6/MM3 (ref 3.77–5.28)
SODIUM SERPL-SCNC: 144 MMOL/L (ref 136–145)
TIBC SERPL-MCNC: 275 MCG/DL (ref 298–536)
TRIGL SERPL-MCNC: 167 MG/DL (ref 0–150)
TSH SERPL DL<=0.005 MIU/L-ACNC: 2.06 UIU/ML (ref 0.27–4.2)
UIBC SERPL-MCNC: 238 MCG/DL (ref 112–346)
VLDLC SERPL CALC-MCNC: 29 MG/DL (ref 5–40)
WBC # BLD AUTO: 7.43 10*3/MM3 (ref 3.4–10.8)
WBC NRBC COR # BLD: 7.1 10*3/MM3 (ref 3.4–10.8)

## 2023-08-09 PROCEDURE — 1126F AMNT PAIN NOTED NONE PRSNT: CPT | Performed by: INTERNAL MEDICINE

## 2023-08-09 PROCEDURE — 3078F DIAST BP <80 MM HG: CPT | Performed by: INTERNAL MEDICINE

## 2023-08-09 PROCEDURE — 83550 IRON BINDING TEST: CPT | Performed by: INTERNAL MEDICINE

## 2023-08-09 PROCEDURE — 82728 ASSAY OF FERRITIN: CPT | Performed by: INTERNAL MEDICINE

## 2023-08-09 PROCEDURE — 85025 COMPLETE CBC W/AUTO DIFF WBC: CPT | Performed by: INTERNAL MEDICINE

## 2023-08-09 PROCEDURE — 83540 ASSAY OF IRON: CPT | Performed by: INTERNAL MEDICINE

## 2023-08-09 PROCEDURE — 3075F SYST BP GE 130 - 139MM HG: CPT | Performed by: INTERNAL MEDICINE

## 2023-08-09 PROCEDURE — 99213 OFFICE O/P EST LOW 20 MIN: CPT | Performed by: INTERNAL MEDICINE

## 2023-08-09 PROCEDURE — 36415 COLL VENOUS BLD VENIPUNCTURE: CPT

## 2023-08-09 NOTE — TELEPHONE ENCOUNTER
----- Message from Brian Kenny MD sent at 8/9/2023  4:14 PM EDT -----  Pip iron studies pretty stable-4 month md/np [facility] ferrithilario mejia profile

## 2023-08-09 NOTE — PROGRESS NOTES
Subjective     REASON FOR CONSULTATION: Iron deficiency anemia  Provide an opinion on any further workup or treatment                             REQUESTING PHYSICIAN: Dr. Slater    RECORDS OBTAINED:  Records of the patients history including those obtained from the referring provider were reviewed and summarized in detail.      History of Present Illness   This is a very pleasant 66-year-old woman with multiple medical comorbidities referred from her PCP for evaluation and treatment of iron deficiency anemia.  Reviewing the patient's records, she has had microcytic, hypochromic red blood cell indices since 2017/2018 and over the past 1 year or so been mildly anemic hemoglobin typically running around 11.0.  She has normal white blood cell and platelet counts.  Her ferritin has been low for at least 5 years most recently ten 1 month ago.  The patient has attempted to take oral iron on multiple occasions but difficulty tolerating secondary to either diarrhea or constipation.  She has not been able to improve her ferritin despite oral iron.  She complains of fatigue, pica and restless leg symptoms.  She thinks it has been about 10 years since her previous colonoscopy.  She does have occasional hematochezia.  She denies vaginal bleeding and denies donating blood.    Patient received Injectafer x2 on 3/23/2022 and 3/30/2022.   EGD and colonoscopy were performed 6/30/2022 with no obvious source of GI blood loss.    The patient returned today feeling well with no significant fatigue, lightheadedness or dizziness.  She denies bright red blood per rectum or melanotic stool.  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    Past Medical History:   Diagnosis Date    Allergic     Anemia     Angina pectoris     Anxiety     Arthritis     ASCVD (arteriosclerotic cardiovascular disease)     Bilateral leg edema     CAD (coronary artery disease)     Cataract Jan 2022    Chest pain     Cholelithiasis      Colon polyp 2023    Depression     Diabetes mellitus     Disease of thyroid gland     Dizziness     GERD (gastroesophageal reflux disease)     Glaucoma     Headache     HL (hearing loss)     Hyperlipidemia     Hypertension     Kidney stone     Morbid obesity     Orthostatic hypotension     PAD (peripheral artery disease)     Pancreatitis     Shortness of breath     Sleep apnea     Stroke     Tobacco abuse     Urinary tract infection     Visual impairment     Macular degeneration    Vitamin D deficiency         Past Surgical History:   Procedure Laterality Date    ANAL FISSURECTOMY      BRAIN SURGERY      CARDIAC CATHETERIZATION N/A 2016    Procedure: Coronary angiography;  Surgeon: Fredrick Kapoor MD;  Location:  [facility] CATH INVASIVE LOCATION;  Service:     CARDIAC CATHETERIZATION N/A 2016    Procedure: Left heart cath;  Surgeon: Fredrick Kapoor MD;  Location:  [facility] CATH INVASIVE LOCATION;  Service:     CARDIAC CATHETERIZATION N/A 2016    Procedure: Left ventriculography;  Surgeon: Fredrick Kapoor MD;  Location:  [facility] CATH INVASIVE LOCATION;  Service:     CARDIAC CATHETERIZATION N/A 2016    Procedure: Right heart cath;  Surgeon: Fredrick Kapoor MD;  Location:  [facility] CATH INVASIVE LOCATION;  Service:      SECTION      CHOLECYSTECTOMY      COLONOSCOPY N/A 2022    Procedure: COLONOSCOPY with Polypectomy;  Surgeon: Sarwat Church MD;  [place] [facility] [place];  Service: Gastroenterology;  Laterality: N/A;  Rectal polyps x3 and Hemorrhoids    ENDOSCOPY N/A 2022    Procedure: ESOPHAGOGASTRODUODENOSCOPY;  Surgeon: Sarwat Church MD;  [place] [facility] [place];  Service: Gastroenterology;  Laterality: N/A;  Small Hiatal Hernia    ERCP      FRACTURE SURGERY      arm    HYSTERECTOMY      ILIAC ARTERY STENT      SUBTOTAL HYSTERECTOMY      TONSILLECTOMY          Current Outpatient Medications on File Prior to Visit   Medication Sig Dispense Refill    ALPRAZolam (XANAX) 0.5 MG tablet Take 1  tablet by mouth As Needed for Anxiety.      amLODIPine (NORVASC) 5 MG tablet Take 1 tablet by mouth once daily 90 tablet 0    aspirin 81 MG chewable tablet Chew 1 tablet Daily.      atorvastatin (LIPITOR) 40 MG tablet Take 1 tablet by mouth Daily. 90 tablet 1    citalopram (CeleXA) 20 MG tablet Take 1 tablet by mouth once daily 90 tablet 0    clopidogrel (PLAVIX) 75 MG tablet Take 1 tablet by mouth Daily. (Patient taking differently: Take 1 tablet by mouth Daily. Pt taking 1 tab bid) 90 tablet 1    ezetimibe (ZETIA) 10 MG tablet Take 1 tablet by mouth once daily 90 tablet 0    fluocinolone acetonide (DERMOTIC) 0.01 % oil otic oil Administer  into both ears 2 (Two) Times a Day. 20 mL 0    fluticasone (Flonase) 50 MCG/ACT nasal spray 2 sprays into the nostril(s) as directed by provider Daily.      glimepiride (AMARYL) 2 MG tablet Take 1 tablet by mouth Every Morning Before Breakfast. 90 tablet 1    hydroCHLOROthiazide (HYDRODIURIL) 25 MG tablet Take 1 tablet by mouth once daily 90 tablet 0    levothyroxine (SYNTHROID, LEVOTHROID) 75 MCG tablet Take 1 tablet by mouth once daily 90 tablet 0    metFORMIN (GLUCOPHAGE) 1000 MG tablet TAKE 1 TABLET BY MOUTH TWICE DAILY WITH MEALS 180 tablet 0    pantoprazole (PROTONIX) 40 MG EC tablet Take 1 tablet by mouth once daily 90 tablet 1    saccharomyces boulardii (FLORASTOR) 250 MG capsule Take 1 capsule by mouth 2 (Two) Times a Day.      trimethoprim-polymyxin b (POLYTRIM) 69409-7.1 UNIT/ML-% ophthalmic solution Apply 1 drop to eye(s) as directed by provider. Four days before and four days after eye injection.       No current facility-administered medications on file prior to visit.        ALLERGIES:    Allergies   Allergen Reactions    Ciprofloxacin Hives and Swelling    Lisinopril Swelling and Other (See Comments)     Cough     Seasonal Ic [Cholestatin] Other (See Comments)     Cough, congestion        Social History     Socioeconomic History    Marital status:     Tobacco Use    Smoking status: Every Day     Packs/day: 0.50     Years: 50.00     Pack years: 25.00     Types: Cigarettes     Start date: 1/1/1970    Smokeless tobacco: Never   Vaping Use    Vaping Use: Never used   Substance and Sexual Activity    Alcohol use: Yes     Alcohol/week: 1.0 standard drink     Types: 1 Glasses of wine per week     Comment: Very occasional    Drug use: No    Sexual activity: Not Currently     Partners: Male     Birth control/protection: None     Comment: N/A        Family History   Problem Relation Age of Onset    Heart disease Mother     Hypertension Mother     Hyperlipidemia Mother     Breast cancer Mother     Heart disease Father     Heart attack Sister     Heart disease Sister     Breast cancer Sister     Heart disease Brother     Heart attack Brother     Hyperlipidemia Brother     Diabetes Brother     Heart attack Maternal Grandmother     Heart disease Maternal Grandmother     Heart failure Maternal Grandmother     Heart failure Maternal Grandfather     Heart disease Maternal Grandfather     Heart attack Maternal Grandfather     Heart attack Paternal Grandmother     Heart disease Paternal Grandmother     Heart failure Paternal Grandmother     Hypertension Paternal Grandmother         Review of Systems   Constitutional:  Positive for fatigue. Negative for fever and unexpected weight change.   HENT: Negative.     Respiratory:  Negative for cough and shortness of breath.    Cardiovascular:  Negative for chest pain, palpitations and leg swelling.   Gastrointestinal:  Negative for blood in stool, constipation, diarrhea and nausea.   Genitourinary: Negative.    Musculoskeletal:  Positive for arthralgias.   Skin: Negative.    Allergic/Immunologic: Negative.    Neurological:  Negative for dizziness, seizures, weakness and light-headedness.   Hematological: Negative.    Psychiatric/Behavioral: Negative.   ROS unchanged-8/9/2023      Objective     Vitals:    08/09/23 1346   BP: 133/54  "  Pulse: 83   Resp: 16   Temp: 98.2 øF (36.8 øC)   TempSrc: Infrared   SpO2: 96%   Weight: 105 kg (231 lb 3.2 oz)   Height: 152.5 cm (60.04\")   PainSc: 0-No pain         8/9/2023     1:50 PM   Current Status   ECOG score 0       Physical Exam    CONSTITUTIONAL: pleasant well-developed adult woman  HEENT: no icterus, hearing intact, mask in place  LYMPH: no cervical or supraclavicular lad  CV: RRR, S1S2, no murmur  RESP: cta bilat, no wheezing, no rales  MUSC: no edema, normal gait  NEURO: alert and oriented x3, normal strength  PSYCH: Normal mood and affect  Exam unchanged-8/9/2023  RECENT LABS:  Hematology WBC   Date Value Ref Range Status   08/08/2023 7.43 3.40 - 10.80 10*3/mm3 Final   04/26/2023 9.68 4.5 - 11.0 10*3/uL Final     RBC   Date Value Ref Range Status   08/08/2023 4.75 3.77 - 5.28 10*6/mm3 Final   04/26/2023 4.38 4.0 - 5.2 10*6/uL Final     Hemoglobin   Date Value Ref Range Status   08/08/2023 12.3 12.0 - 15.9 g/dL Final   04/26/2023 11.7 (L) 12.0 - 16.0 g/dL Final     Hematocrit   Date Value Ref Range Status   08/08/2023 38.3 34.0 - 46.6 % Final   04/26/2023 36.9 36.0 - 46.0 % Final     Platelets   Date Value Ref Range Status   08/08/2023 287 140 - 450 10*3/mm3 Final   04/26/2023 267 140 - 440 10*3/uL Final        Lab Results   Component Value Date    GLUCOSE 132 (H) 08/08/2023    BUN 24 (H) 08/08/2023    CREATININE 1.10 (H) 08/08/2023    EGFRIFNONA 60 12/22/2021    EGFRIFAFRI 69 12/22/2021    BCR 21.8 08/08/2023    K 3.9 08/08/2023    CO2 29.1 (H) 08/08/2023    CALCIUM 9.1 08/08/2023    PROTENTOTREF 6.7 08/08/2023    ALBUMIN 4.1 08/08/2023    LABIL2 1.6 08/08/2023    AST 15 08/08/2023    ALT 22 08/08/2023     Ferritin 73/iron sat 13%    Assessment & Plan     *Iron deficiency anemia  The patient has had microcytic/hypochromic indices for 4 to 5 years, recently developed anemia with hemoglobin around 11  The patient has attempted oral iron but cannot tolerate secondary to constipation/diarrhea and has " been unable to improve iron stores despite oral iron  She has occasional bright red blood per rectum, most recent colonoscopy she thinks was about 10 years ago.  She denies vaginal bleeding and denies blood donations.  She has never had gastric surgeries.  She is on chronic PPI therapy.  3/15/2022 ferritin 12, iron saturation 6%, TIBC 388 received  2 doses of Injectafer  EGD and colonoscopy June 2022 no obvious source of blood loss; polyps resected from the rectum benign  8/17/2022- hemoglobin 14.4, iron sat 16%, ferritin 142  2/8/2023-hemoglobin 14.4, iron sat 16%, ferritin 76  8/9/2023-hemoglobin 12.2, iron sat 13%, ferritin 73    *Multiple comorbidities including peripheral vascular disease, coronary artery disease, diabetes mellitus, CVA, obesity, hyperlipidemia, hypertension, tobacco use etc.    Hematology plan/recommendations:  Iron studies normal but lower than last visit; follow-up 4months CBC ferritin iron profile

## 2023-08-15 ENCOUNTER — OFFICE VISIT (OUTPATIENT)
Dept: FAMILY MEDICINE CLINIC | Facility: CLINIC | Age: 68
End: 2023-08-15
Payer: MEDICARE

## 2023-08-15 VITALS
HEIGHT: 60 IN | HEART RATE: 80 BPM | WEIGHT: 231 LBS | BODY MASS INDEX: 45.35 KG/M2 | DIASTOLIC BLOOD PRESSURE: 72 MMHG | OXYGEN SATURATION: 98 % | TEMPERATURE: 97.3 F | SYSTOLIC BLOOD PRESSURE: 142 MMHG

## 2023-08-15 DIAGNOSIS — I25.10 CORONARY ARTERY DISEASE INVOLVING NATIVE CORONARY ARTERY OF NATIVE HEART WITHOUT ANGINA PECTORIS: ICD-10-CM

## 2023-08-15 DIAGNOSIS — E66.01 MORBID EXOGENOUS OBESITY: ICD-10-CM

## 2023-08-15 DIAGNOSIS — D50.9 IRON DEFICIENCY ANEMIA, UNSPECIFIED IRON DEFICIENCY ANEMIA TYPE: ICD-10-CM

## 2023-08-15 DIAGNOSIS — I67.5 MOYAMOYA DISEASE: ICD-10-CM

## 2023-08-15 DIAGNOSIS — I10 ESSENTIAL HYPERTENSION: Primary | ICD-10-CM

## 2023-08-15 DIAGNOSIS — I77.9 PERIPHERAL ARTERIAL OCCLUSIVE DISEASE: ICD-10-CM

## 2023-08-15 DIAGNOSIS — I63.9 CEREBROVASCULAR ACCIDENT (CVA), UNSPECIFIED MECHANISM: ICD-10-CM

## 2023-08-15 DIAGNOSIS — E11.3293 TYPE 2 DIABETES MELLITUS WITH BOTH EYES AFFECTED BY MILD NONPROLIFERATIVE RETINOPATHY WITHOUT MACULAR EDEMA, WITHOUT LONG-TERM CURRENT USE OF INSULIN: ICD-10-CM

## 2023-08-15 DIAGNOSIS — E55.9 VITAMIN D DEFICIENCY: ICD-10-CM

## 2023-08-15 DIAGNOSIS — E78.2 MIXED HYPERLIPIDEMIA: ICD-10-CM

## 2023-08-15 DIAGNOSIS — E11.9 CONTROLLED TYPE 2 DIABETES MELLITUS WITHOUT COMPLICATION, WITHOUT LONG-TERM CURRENT USE OF INSULIN: ICD-10-CM

## 2023-08-15 DIAGNOSIS — E03.9 ACQUIRED HYPOTHYROIDISM: ICD-10-CM

## 2023-08-15 DIAGNOSIS — K21.9 GASTROESOPHAGEAL REFLUX DISEASE WITHOUT ESOPHAGITIS: ICD-10-CM

## 2023-08-15 DIAGNOSIS — F41.9 ANXIETY: ICD-10-CM

## 2023-08-15 DIAGNOSIS — F41.0 PANIC DISORDER: ICD-10-CM

## 2023-08-15 RX ORDER — HYDROCHLOROTHIAZIDE 25 MG/1
25 TABLET ORAL DAILY
Qty: 90 TABLET | Refills: 1 | Status: SHIPPED | OUTPATIENT
Start: 2023-08-15

## 2023-08-15 RX ORDER — CITALOPRAM 20 MG/1
20 TABLET ORAL DAILY
Qty: 90 TABLET | Refills: 1 | Status: SHIPPED | OUTPATIENT
Start: 2023-08-15

## 2023-08-15 RX ORDER — GLIMEPIRIDE 2 MG/1
2 TABLET ORAL
Qty: 90 TABLET | Refills: 1 | Status: SHIPPED | OUTPATIENT
Start: 2023-08-15

## 2023-08-15 RX ORDER — ATORVASTATIN CALCIUM 40 MG/1
40 TABLET, FILM COATED ORAL DAILY
Qty: 90 TABLET | Refills: 1 | Status: SHIPPED | OUTPATIENT
Start: 2023-08-15

## 2023-08-15 RX ORDER — EZETIMIBE 10 MG/1
10 TABLET ORAL DAILY
Qty: 90 TABLET | Refills: 1 | Status: SHIPPED | OUTPATIENT
Start: 2023-08-15

## 2023-08-15 NOTE — PROGRESS NOTES
Spoke with daughter rusty's labs were clean  She asked for referral for urology so put in she will access through [facility]  Subjective   Rosalee Hargrove is a 67 y.o. female with   Chief Complaint   Patient presents with    Hypertension    Diabetes    Med Refill     All meds say no refill   .    Hypertension    Diabetes  Hypoglycemia symptoms include nervousness/anxiousness.    67-year-old white female with multiple medical issues here for further medical management.  Patient with known history of hypertension, hyperlipidemia, CAD as well as PAD.  There is also history of morbid exogenous obesity, vitamin D deficiency, hypothyroidism as well as type II non-insulin-dependent diabetes mellitus.  Patient was a smoker who quit approximately 4 months ago and continues to maintain a non-smoking status.  She also has history of GERD, renal lithiasis as well as anxiety/panic disorder.  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  Medications are multiple and are as listed.  All medications are used appropriately and are well-tolerated without side effects.  She does state that she had been eating M&Ms to replace the hand mouth situation with no longer smoking.  She quickly saw this was not helping her sugar status and has switched to chewing sugarless gum.  Fasting labs have been acquired prior to this visit.  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  She is able to control her schedule that way.  The following portions of the patient's history were reviewed and updated as appropriate: allergies, current medications, past family history, past medical history, past social history, past surgical history and problem list.    Review of Systems   Cardiovascular:         CAD, PAD, carotid artery disease, hypertension, hyperlipidemia-status post CVA   Gastrointestinal:         GERD   Endocrine:        Morbid exogenous obesity, vitamin D deficiency, type II non-insulin-dependent diabetes mellitus   Psychiatric/Behavioral:  Positive for dysphoric mood. The patient is nervous/anxious.       Objective     Vitals:    08/15/23 0754   BP: 142/72   Pulse: 80   Temp: 97.3 øF (36.3 øC)   SpO2: 98%       Recent Results (from the past 672 hour(s))   CBC & Differential    Collection Time: 08/08/23  9:34 AM    Specimen: Blood   Result Value Ref Range    WBC 7.43 3.40 - 10.80 10*3/mm3    RBC 4.75 3.77 - 5.28 10*6/mm3    Hemoglobin 12.3 12.0 - 15.9 g/dL    Hematocrit 38.3 34.0 - 46.6 %    MCV 80.6 79.0 - 97.0 fL    MCH 25.9 (L) 26.6 - 33.0 pg    MCHC 32.1 31.5 - 35.7 g/dL    RDW 15.9 (H) 12.3 - 15.4 %    Platelets 287 140 - 450 10*3/mm3    Neutrophil Rel % 66.3 42.7 - 76.0 %    Lymphocyte Rel % 22.6 19.6 - 45.3 %    Monocyte Rel % 5.7 5.0 - 12.0 %    Eosinophil Rel % 4.4 0.3 - 6.2 %    Basophil Rel % 0.5 0.0 - 1.5 %    Neutrophils Absolute 4.92 1.70 - 7.00 10*3/mm3    Lymphocytes Absolute 1.68 0.70 - 3.10 10*3/mm3    Monocytes Absolute 0.42 0.10 - 0.90 10*3/mm3    Eosinophils Absolute 0.33 0.00 - 0.40 10*3/mm3    Basophils Absolute 0.04 0.00 - 0.20 10*3/mm3    Immature Granulocyte Rel % 0.5 0.0 - 0.5 %    Immature Grans Absolute 0.04 0.00 - 0.05 10*3/mm3    nRBC 0.0 0.0 - 0.2 /100 WBC   Comprehensive Metabolic Panel    Collection Time: 08/08/23  9:34 AM    Specimen: Blood   Result Value Ref Range    Glucose 132 (H) 65 - 99 mg/dL    BUN 24 (H) 8 - 23 mg/dL    Creatinine 1.10 (H) 0.57 - 1.00 mg/dL    EGFR Result 54.5 (L) >60.0 mL/min/1.73    BUN/Creatinine Ratio 21.8 7.0 - 25.0    Sodium 144 136 - 145 mmol/L    Potassium 3.9 3.5 - 5.2 mmol/L    Chloride 102 98 - 107 mmol/L    Total CO2 29.1 (H) 22.0 - 29.0 mmol/L    Calcium 9.1 8.6 - 10.5 mg/dL    Total Protein 6.7 6.0 - 8.5 g/dL    Albumin 4.1 3.5 - 5.2 g/dL    Globulin 2.6 gm/dL    A/G Ratio 1.6 g/dL    Total Bilirubin 0.3 0.0 - 1.2 mg/dL    Alkaline Phosphatase 92 39 - 117 U/L    AST (SGOT) 15 1 - 32 U/L    ALT (SGPT) 22 1 - 33 U/L   Hemoglobin A1c    Collection Time: 08/08/23  9:34 AM    Specimen: Blood   Result Value Ref Range    Hemoglobin A1C 7.80 (H) 4.80  - 5.60 %   Vitamin D,25-Hydroxy    Collection Time: 08/08/23  9:34 AM    Specimen: Blood   Result Value Ref Range    25 Hydroxy, Vitamin D 20.9 (L) 30.0 - 100.0 ng/ml   Microalbumin / Creatinine Urine Ratio - Urine, Clean Catch    Collection Time: 08/08/23  9:34 AM    Specimen: Urine, Clean Catch   Result Value Ref Range    Creatinine, Urine 111.6 Not Estab. mg/dL    Microalbumin, Urine 820.9 Not Estab. ug/mL    Microalbumin/Creatinine Ratio 736 (H) 0 - 29 mg/g creat   TSH    Collection Time: 08/08/23  9:34 AM    Specimen: Blood   Result Value Ref Range    TSH 2.060 0.270 - 4.200 uIU/mL   Lipid Panel    Collection Time: 08/08/23  9:34 AM    Specimen: Blood   Result Value Ref Range    Total Cholesterol 145 0 - 200 mg/dL    Triglycerides 167 (H) 0 - 150 mg/dL    HDL Cholesterol 37 (L) 40 - 60 mg/dL    VLDL Cholesterol [place] 29 5 - 40 mg/dL    LDL Chol Calc (NIH) 79 0 - 100 mg/dL   Iron Profile    Collection Time: 08/09/23  1:45 PM    Specimen: Blood   Result Value Ref Range    Iron 37 37 - 145 mcg/dL    Iron Saturation (TSAT) 13 (L) 20 - 50 %    TIBC 275 (L) 298 - 536 mcg/dL    UIBC 238 112 - 346 mcg/dL   Ferritin    Collection Time: 08/09/23  1:45 PM    Specimen: Blood   Result Value Ref Range    Ferritin 73.00 13.00 - 150.00 ng/mL   CBC Auto Differential    Collection Time: 08/09/23  1:45 PM    Specimen: Blood   Result Value Ref Range    WBC 7.10 3.40 - 10.80 10*3/mm3    RBC 4.67 3.77 - 5.28 10*6/mm3    Hemoglobin 12.2 12.0 - 15.9 g/dL    Hematocrit 39.4 34.0 - 46.6 %    MCV 84.4 79.0 - 97.0 fL    MCH 26.1 (L) 26.6 - 33.0 pg    MCHC 31.0 (L) 31.5 - 35.7 g/dL    RDW 15.9 (H) 12.3 - 15.4 %    RDW-SD 49.5 37.0 - 54.0 fl    MPV 9.5 6.0 - 12.0 fL    Platelets 268 140 - 450 10*3/mm3    Neutrophil % 59.9 42.7 - 76.0 %    Lymphocyte % 26.9 19.6 - 45.3 %    Monocyte % 6.8 5.0 - 12.0 %    Eosinophil % 5.4 0.3 - 6.2 %    Basophil % 0.3 0.0 - 1.5 %    Immature Grans % 0.7 (H) 0.0 - 0.5 %    Neutrophils, Absolute 4.26 1.70 - 7.00  10*3/mm3    Lymphocytes, Absolute 1.91 0.70 - 3.10 10*3/mm3    Monocytes, Absolute 0.48 0.10 - 0.90 10*3/mm3    Eosinophils, Absolute 0.38 0.00 - 0.40 10*3/mm3    Basophils, Absolute 0.02 0.00 - 0.20 10*3/mm3    Immature Grans, Absolute 0.05 0.00 - 0.05 10*3/mm3       Physical Exam  Vitals and nursing note reviewed.   Constitutional:       Appearance: Normal appearance. She is well-developed and well-groomed. She is morbidly obese.      Comments: Morbid exogenous obesity with a BMI of 45.1   HENT:      Head: Normocephalic and atraumatic.   Neck:      Thyroid: No thyroid mass or thyromegaly.      Vascular: Normal carotid pulses. No carotid bruit.      Trachea: Trachea and phonation normal.   Cardiovascular:      Rate and Rhythm: Normal rate and regular rhythm.      Heart sounds: Normal heart sounds. No murmur heard.    No friction rub. No gallop.   Pulmonary:      Effort: Pulmonary effort is normal. No respiratory distress.      Breath sounds: Normal breath sounds. No decreased breath sounds, wheezing, rhonchi or rales.   Musculoskeletal:      Cervical back: Neck supple.   Lymphadenopathy:      Cervical: No cervical adenopathy.   Skin:     General: Skin is warm and dry.      Findings: No rash.   Neurological:      Mental Status: She is alert and oriented to person, place, and time.   Psychiatric:         Attention and Perception: Attention and perception normal.         Mood and Affect: Mood and affect normal.         Speech: Speech normal.         Behavior: Behavior normal. Behavior is cooperative.         Thought Content: Thought content normal.         Cognition and Memory: Cognition and memory normal.         Judgment: Judgment normal.       Assessment & Plan   Diagnoses and all orders for this visit:    1. Essential hypertension (Primary)  -     hydroCHLOROthiazide (HYDRODIURIL) 25 MG tablet; Take 1 tablet by mouth Daily.  Dispense: 90 tablet; Refill: 1  -     Comprehensive metabolic panel; Future  -     TSH;  Future  -     Vitamin D 25 hydroxy; Future  -     Ferritin; Future  -     Iron and TIBC; Future  -     Lipid panel; Future  -     Hemoglobin A1c; Future  -     CBC w AUTO Differential; Future    2. Coronary artery disease involving native coronary artery of native heart without angina pectoris  -     Comprehensive metabolic panel; Future  -     TSH; Future  -     Vitamin D 25 hydroxy; Future  -     Ferritin; Future  -     Iron and TIBC; Future  -     Lipid panel; Future  -     Hemoglobin A1c; Future  -     CBC w AUTO Differential; Future    3. Mixed hyperlipidemia  -     ezetimibe (ZETIA) 10 MG tablet; Take 1 tablet by mouth Daily.  Dispense: 90 tablet; Refill: 1  -     atorvastatin (LIPITOR) 40 MG tablet; Take 1 tablet by mouth Daily.  Dispense: 90 tablet; Refill: 1  -     Comprehensive metabolic panel; Future  -     TSH; Future  -     Vitamin D 25 hydroxy; Future  -     Ferritin; Future  -     Iron and TIBC; Future  -     Lipid panel; Future  -     Hemoglobin A1c; Future  -     CBC w AUTO Differential; Future    4. Peripheral arterial occlusive disease  -     Comprehensive metabolic panel; Future  -     TSH; Future  -     Vitamin D 25 hydroxy; Future  -     Ferritin; Future  -     Iron and TIBC; Future  -     Lipid panel; Future  -     Hemoglobin A1c; Future  -     CBC w AUTO Differential; Future    5. Acquired hypothyroidism  -     Comprehensive metabolic panel; Future  -     TSH; Future  -     Vitamin D 25 hydroxy; Future  -     Ferritin; Future  -     Iron and TIBC; Future  -     Lipid panel; Future  -     Hemoglobin A1c; Future  -     CBC w AUTO Differential; Future    6. Morbid exogenous obesity  -     Comprehensive metabolic panel; Future  -     TSH; Future  -     Vitamin D 25 hydroxy; Future  -     Ferritin; Future  -     Iron and TIBC; Future  -     Lipid panel; Future  -     Hemoglobin A1c; Future  -     CBC w AUTO Differential; Future    7. Type 2 diabetes mellitus with both eyes affected by mild  nonproliferative retinopathy without macular edema, without long-term current use of insulin  -     Comprehensive metabolic panel; Future  -     TSH; Future  -     Vitamin D 25 hydroxy; Future  -     Ferritin; Future  -     Iron and TIBC; Future  -     Lipid panel; Future  -     Hemoglobin A1c; Future  -     CBC w AUTO Differential; Future    8. Vitamin D deficiency  -     Comprehensive metabolic panel; Future  -     TSH; Future  -     Vitamin D 25 hydroxy; Future  -     Ferritin; Future  -     Iron and TIBC; Future  -     Lipid panel; Future  -     Hemoglobin A1c; Future  -     CBC w AUTO Differential; Future    9. Gastroesophageal reflux disease without esophagitis  -     Comprehensive metabolic panel; Future  -     TSH; Future  -     Vitamin D 25 hydroxy; Future  -     Ferritin; Future  -     Iron and TIBC; Future  -     Lipid panel; Future  -     Hemoglobin A1c; Future  -     CBC w AUTO Differential; Future    10. Iron deficiency anemia, unspecified iron deficiency anemia type  -     Comprehensive metabolic panel; Future  -     TSH; Future  -     Vitamin D 25 hydroxy; Future  -     Ferritin; Future  -     Iron and TIBC; Future  -     Lipid panel; Future  -     Hemoglobin A1c; Future  -     CBC w AUTO Differential; Future    11. Anxiety  -     citalopram (CeleXA) 20 MG tablet; Take 1 tablet by mouth Daily.  Dispense: 90 tablet; Refill: 1  -     Comprehensive metabolic panel; Future  -     TSH; Future  -     Vitamin D 25 hydroxy; Future  -     Ferritin; Future  -     Iron and TIBC; Future  -     Lipid panel; Future  -     Hemoglobin A1c; Future  -     CBC w AUTO Differential; Future    12. Panic disorder  -     citalopram (CeleXA) 20 MG tablet; Take 1 tablet by mouth Daily.  Dispense: 90 tablet; Refill: 1  -     Comprehensive metabolic panel; Future  -     TSH; Future  -     Vitamin D 25 hydroxy; Future  -     Ferritin; Future  -     Iron and TIBC; Future  -     Lipid panel; Future  -     Hemoglobin A1c; Future  -      CBC w AUTO Differential; Future    13. Moyamoya disease  -     Comprehensive metabolic panel; Future  -     TSH; Future  -     Vitamin D 25 hydroxy; Future  -     Ferritin; Future  -     Iron and TIBC; Future  -     Lipid panel; Future  -     Hemoglobin A1c; Future  -     CBC w AUTO Differential; Future    14. Cerebrovascular accident (CVA), unspecified mechanism  -     Comprehensive metabolic panel; Future  -     TSH; Future  -     Vitamin D 25 hydroxy; Future  -     Ferritin; Future  -     Iron and TIBC; Future  -     Lipid panel; Future  -     Hemoglobin A1c; Future  -     CBC w AUTO Differential; Future    15. Controlled type 2 diabetes mellitus without complication, without long-term current use of insulin  -     glimepiride (AMARYL) 2 MG tablet; Take 1 tablet by mouth Every Morning Before Breakfast.  Dispense: 90 tablet; Refill: 1  -     Comprehensive metabolic panel; Future  -     TSH; Future  -     Vitamin D 25 hydroxy; Future  -     Ferritin; Future  -     Iron and TIBC; Future  -     Lipid panel; Future  -     Hemoglobin A1c; Future  -     CBC w AUTO Differential; Future    Weight loss is essential in regards to sugar status.  She must also lower cholesterol in her diet with an LDL goal of 70 or less.  Current medication at this time will not change with anticipation of repeat fasting labs in 6 months with follow-up with me thereafter.    Return in about 6 months (around 2/15/2024) for Recheck.

## 2023-09-20 DIAGNOSIS — I77.9 DISORDER OF ARTERIES AND ARTERIOLES, UNSPECIFIED: ICD-10-CM

## 2023-09-20 DIAGNOSIS — I10 ESSENTIAL HYPERTENSION: ICD-10-CM

## 2023-09-20 DIAGNOSIS — I25.10 ATHEROSCLEROTIC HEART DISEASE OF NATIVE CORONARY ARTERY WITHOUT ANGINA PECTORIS: ICD-10-CM

## 2023-09-20 RX ORDER — HYDROCHLOROTHIAZIDE 25 MG/1
25 TABLET ORAL DAILY
Qty: 90 TABLET | Refills: 1 | Status: SHIPPED | OUTPATIENT
Start: 2023-09-20

## 2023-09-20 RX ORDER — CLOPIDOGREL BISULFATE 75 MG/1
75 TABLET ORAL DAILY
Qty: 90 TABLET | Refills: 1 | Status: SHIPPED | OUTPATIENT
Start: 2023-09-20

## 2023-09-27 DIAGNOSIS — K21.9 GASTROESOPHAGEAL REFLUX DISEASE WITHOUT ESOPHAGITIS: ICD-10-CM

## 2023-09-27 RX ORDER — PANTOPRAZOLE SODIUM 40 MG/1
TABLET, DELAYED RELEASE ORAL
Qty: 90 TABLET | Refills: 0 | Status: SHIPPED | OUTPATIENT
Start: 2023-09-27

## 2023-10-03 ENCOUNTER — TELEPHONE (OUTPATIENT)
Dept: FAMILY MEDICINE CLINIC | Facility: CLINIC | Age: 68
End: 2023-10-03

## 2023-10-03 NOTE — TELEPHONE ENCOUNTER
[facility] staff attempted to follow warm transfer process and was unsuccessful     Caller: Rosalee Hargrove    Relationship to patient: Self    Best call back number: [phone]    Patient is needing: PATIENT NEEDS TO BE SEEN FOR EAR AND NECK PAIN ON THE LEFT SIDE. NO AVAILABLE APPOINTMENTS FOR [facility] TO SCHEDULE. PLEASE REACH OUT TO PATIENT AND ADVISE.

## 2023-10-04 ENCOUNTER — OFFICE VISIT (OUTPATIENT)
Dept: FAMILY MEDICINE CLINIC | Facility: CLINIC | Age: 68
End: 2023-10-04
Payer: MEDICARE

## 2023-10-04 ENCOUNTER — HOSPITAL ENCOUNTER (OUTPATIENT)
Dept: GENERAL RADIOLOGY | Facility: HOSPITAL | Age: 68
Discharge: HOME | End: 2023-10-04
Admitting: FAMILY MEDICINE
Payer: MEDICARE

## 2023-10-04 ENCOUNTER — OFFICE VISIT (OUTPATIENT)
Age: 68
End: 2023-10-04
Payer: MEDICARE

## 2023-10-04 VITALS
SYSTOLIC BLOOD PRESSURE: 138 MMHG | HEIGHT: 60 IN | HEART RATE: 79 BPM | DIASTOLIC BLOOD PRESSURE: 84 MMHG | OXYGEN SATURATION: 94 % | WEIGHT: 232 LBS | BODY MASS INDEX: 45.55 KG/M2

## 2023-10-04 VITALS
OXYGEN SATURATION: 97 % | SYSTOLIC BLOOD PRESSURE: 140 MMHG | TEMPERATURE: 98.2 F | HEIGHT: 60 IN | WEIGHT: 233 LBS | BODY MASS INDEX: 45.75 KG/M2 | HEART RATE: 83 BPM | DIASTOLIC BLOOD PRESSURE: 76 MMHG

## 2023-10-04 DIAGNOSIS — R09.81 CHRONIC NASAL CONGESTION: ICD-10-CM

## 2023-10-04 DIAGNOSIS — M54.2 ONGOING CERVICAL PAIN: ICD-10-CM

## 2023-10-04 DIAGNOSIS — G89.29 ONGOING CERVICAL PAIN: Primary | ICD-10-CM

## 2023-10-04 DIAGNOSIS — Z87.891 EX-CIGARETTE SMOKER: ICD-10-CM

## 2023-10-04 DIAGNOSIS — I77.9 PERIPHERAL ARTERIAL OCCLUSIVE DISEASE: ICD-10-CM

## 2023-10-04 DIAGNOSIS — I25.10 CORONARY ARTERY DISEASE INVOLVING NATIVE CORONARY ARTERY OF NATIVE HEART WITHOUT ANGINA PECTORIS: ICD-10-CM

## 2023-10-04 DIAGNOSIS — I63.9 CEREBROVASCULAR ACCIDENT (CVA), UNSPECIFIED MECHANISM: ICD-10-CM

## 2023-10-04 DIAGNOSIS — G89.29 ONGOING CERVICAL PAIN: ICD-10-CM

## 2023-10-04 DIAGNOSIS — I67.5 MOYAMOYA DISEASE: ICD-10-CM

## 2023-10-04 DIAGNOSIS — M54.2 ONGOING CERVICAL PAIN: Primary | ICD-10-CM

## 2023-10-04 DIAGNOSIS — I10 ESSENTIAL HYPERTENSION: Primary | ICD-10-CM

## 2023-10-04 DIAGNOSIS — H60.8X3 CHRONIC ECZEMATOUS OTITIS EXTERNA OF BOTH EARS: ICD-10-CM

## 2023-10-04 DIAGNOSIS — E78.2 MIXED HYPERLIPIDEMIA: ICD-10-CM

## 2023-10-04 PROBLEM — K62.5 RECTAL BLEEDING: Status: RESOLVED | Noted: 2022-03-24 | Resolved: 2023-10-04

## 2023-10-04 PROBLEM — I65.21 STENOSIS OF RIGHT CAROTID ARTERY: Status: ACTIVE | Noted: 2022-08-01

## 2023-10-04 PROCEDURE — 3051F HG A1C>EQUAL 7.0%<8.0%: CPT | Performed by: FAMILY MEDICINE

## 2023-10-04 PROCEDURE — 3077F SYST BP >= 140 MM HG: CPT | Performed by: FAMILY MEDICINE

## 2023-10-04 PROCEDURE — 1159F MED LIST DOCD IN RCRD: CPT | Performed by: FAMILY MEDICINE

## 2023-10-04 PROCEDURE — 3078F DIAST BP <80 MM HG: CPT | Performed by: FAMILY MEDICINE

## 2023-10-04 PROCEDURE — 99214 OFFICE O/P EST MOD 30 MIN: CPT | Performed by: FAMILY MEDICINE

## 2023-10-04 PROCEDURE — 72050 X-RAY EXAM NECK SPINE 4/5VWS: CPT

## 2023-10-04 PROCEDURE — 1160F RVW MEDS BY RX/DR IN RCRD: CPT | Performed by: FAMILY MEDICINE

## 2023-10-04 RX ORDER — PIMECROLIMUS 10 MG/G
1 CREAM TOPICAL 2 TIMES DAILY PRN
Qty: 60 G | Refills: 0 | Status: SHIPPED | OUTPATIENT
Start: 2023-10-04

## 2023-10-04 RX ORDER — MOMETASONE FUROATE 50 UG/1
1 SPRAY, METERED NASAL 2 TIMES DAILY PRN
Qty: 17 G | Refills: 1 | Status: SHIPPED | OUTPATIENT
Start: 2023-10-04

## 2023-10-04 RX ORDER — FLUOCINOLONE ACETONIDE 0.11 MG/ML
OIL AURICULAR (OTIC) 2 TIMES DAILY
Qty: 20 ML | Refills: 1 | Status: SHIPPED | OUTPATIENT
Start: 2023-10-04

## 2023-10-04 NOTE — PROGRESS NOTES
"  Subjective   Rosalee Hargrove is a 68 y.o. female who is here for   Chief Complaint   Patient presents with    left side ear pain    Neck Pain    Ear Fullness   .     History of Present Illness     Longstanding eczema changes around the ear and in both ear canals.  Needs a refill of her steroid ear oil drops    Secondly having worsening neck pain.  Woke up with a crick in her neck a few days ago.  It also longstanding neck pain.  No radicular symptoms down the arm    The following portions of the patient's history were reviewed and updated as appropriate: allergies, current medications, past medical history, past social history, past surgical history, and problem list.    Review of Systems    Objective   Vitals:    10/04/23 1259   BP: 140/76   Pulse: 83   Temp: 98.2 °F (36.8 °C)   SpO2: 97%   Weight: 106 kg (233 lb)   Height: 152.4 cm (60\")      Physical Exam  HENT:      Right Ear: Tympanic membrane normal. Swelling present.      Left Ear: Tympanic membrane normal. Swelling present.   Musculoskeletal:      Cervical back: Rigidity and tenderness present.   Eczema changes behind the ear.  Both ear canals slightly swollen eczema changes on the canal entrance.      Assessment & Plan   Diagnoses and all orders for this visit:    1. Ongoing cervical pain (Primary)  -     XR Spine Cervical Complete 4 or 5 View; Future    2. Chronic eczematous otitis externa of both ears  -     fluocinolone acetonide (DERMOTIC) 0.01 % oil otic oil; Administer  into both ears 2 (Two) Times a Day. Indications: Chronic External Ear Eczema  Dispense: 20 mL; Refill: 1  -     pimecrolimus (Elidel) 1 % cream; Apply 1 application  topically to the appropriate area as directed 2 (Two) Times a Day As Needed (eczema). Indications: Atopic Dermatitis  Dispense: 60 g; Refill: 0    3. Chronic nasal congestion  -     mometasone (Nasonex) 50 MCG/ACT nasal spray; 1 spray into the nostril(s) as directed by provider 2 (Two) Times a Day As Needed (nasal itch).  " Dispense: 17 g; Refill: 1    Renew the steroid oil drops for ear canals  Also may try Elidel for eczema    Nasonex for her chronic nasal itching.    And sent for cervical spine x-rays  And in the meantime Tylenol and heating pad to the neck      There are no Patient Instructions on file for this visit.    Medications Discontinued During This Encounter   Medication Reason    saccharomyces boulardii (FLORASTOR) 250 MG capsule *Therapy completed    fluticasone (Flonase) 50 MCG/ACT nasal spray *Therapy completed    fluocinolone acetonide (DERMOTIC) 0.01 % oil otic oil Reorder        No follow-ups on file.    Dr. Javier Nur  [place], [place]

## 2023-10-04 NOTE — PROGRESS NOTES
Date of Office Visit: 10/04/2023  Encounter Provider: DELMY Suárez  Place of Service: [facility] CARDIOLOGY  Patient Name: Rosalee Hargrove  :1955    Chief Complaint   Patient presents with    Follow-up    Coronary Artery Disease   :     HPI: Rosalee Hargrove is a 68 y.o. female who is a patient of  Dr. Mei and is new to me today.  She has a history of a stroke, nonobstructive coronary disease, peripheral arterial disease, more more disease status post right carotid endarterectomy, hypertension and hyperlipidemia.  She had a cardiac cath several years ago showing mild nonobstructive disease throughout her coronaries.  She is followed by Dr. Coats for peripheral vascular disease and is status post right carotid endarterectomy.  She got it iliac stent in the past last evaluation was in  that showed some moderate in-stent stenosis.  She has severe peripheral disease of the descending aorta with scattered ulcerations and plaque.      In 2021 she had acute left-sided weakness and facial droop she was evaluated at [facility] and found to have a small CVA.  Echocardiogram was unremarkable Holter monitor showed no atrial fibrillation we implanted a Linq device to follow for A-fib she also had a carotid Doppler that showed an occluded right carotid artery.  On Memorial Day 2021 she presented to [facility] with stroke.    She was last in the office in April of this year she continues to smoke she has a strong family history of coronary disease and stroke.  Last device interrogation was in September on the fifth and there were no events.    Earlier this year she had another stroke.  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 Castanon at [facility] did a cerebral angiogram and did an angioplasty to her right carotid.  She is followed up with vascular surgery she tells me they said her lower extremities were stable she does get some claudication symptoms with  walking.  She has had some sharp pain in her heel.  She denies any chest pain, pressure or tightness.    Previous testing and notes have been reviewed by me.   Past Medical History:   Diagnosis Date    Allergic     Anemia     Angina pectoris     Anxiety     Arthritis     ASCVD (arteriosclerotic cardiovascular disease)     Bilateral leg edema     CAD (coronary artery disease)     Cataract 2022    Chest pain     Cholelithiasis     Colon polyp 2023    Depression     Diabetes mellitus     Disease of thyroid gland     Dizziness     GERD (gastroesophageal reflux disease)     Glaucoma     Headache     HL (hearing loss)     Hyperlipidemia     Hypertension     Kidney stone     Morbid obesity     Orthostatic hypotension     PAD (peripheral artery disease)     Pancreatitis     Shortness of breath     Sleep apnea     Stroke     Tobacco abuse     Urinary tract infection     Visual impairment     Macular degeneration    Vitamin D deficiency        Past Surgical History:   Procedure Laterality Date    ANAL FISSURECTOMY      BRAIN SURGERY      CARDIAC CATHETERIZATION N/A 2016    Procedure: Coronary angiography;  Surgeon: Fredrick Kapoor MD;  [facility];  Service:     CARDIAC CATHETERIZATION N/A 2016    Procedure: Left heart cath;  Surgeon: Fredrick Kapoor MD;  [facility] CATH INVASIVE LOC[facility];  Service:     CARDIAC CATHETERIZATION N/A 2016    Procedure: Left ventriculography;  Surgeon: Fredrick Kapoor MD;  [facility];  Service:     CARDIAC CATHETERIZATION N/A 2016    Procedure: Right heart cath;  Surgeon: Fredrick Kapoor MD;  [facility];  Service:      SECTION      CHOLECYSTECTOMY      COLONOSCOPY N/A 2022    Procedure: COLONOSCOPY with Polypectomy;  Surgeon: Sarwat Church MD;  Location: [facility] MAIN OR;  Service: Gastroenterology;  Laterality: N/A;  Rectal polyps x3 and Hemorrhoids    ENDOSCOPY N/A  2022    Procedure: ESOPHAGOGASTRODUODENOSCOPY;  Surgeon: Sarwat Church MD;  Location: [facility] MAIN OR;  Service: Gastroenterology;  Laterality: N/A;  Small Hiatal Hernia    ERCP      FRACTURE SURGERY      arm    HYSTERECTOMY      ILIAC ARTERY STENT      SUBTOTAL HYSTERECTOMY      TONSILLECTOMY         Social History     Socioeconomic History    Marital status:    Tobacco Use    Smoking status: Former     Packs/day: 0.50     Years: 50.00     Pack years: 25.00     Types: Cigarettes     Quit date: 2023     Years since quittin.4    Smokeless tobacco: Never   Vaping Use    Vaping Use: Never used   Substance and Sexual Activity    Alcohol use: Yes     Alcohol/week: 1.0 standard drink     Types: 1 Glasses of wine per week     Comment: Very occasional    Drug use: No    Sexual activity: Not Currently     Partners: Male     Birth control/protection: None     Comment: N/A       Family History   Problem Relation Age of Onset    Heart disease Mother     Hypertension Mother     Hyperlipidemia Mother     Breast cancer Mother     Heart disease Father     Heart attack Sister     Heart disease Sister     Breast cancer Sister     Heart disease Brother     Heart attack Brother     Hyperlipidemia Brother     Diabetes Brother     Heart attack Maternal Grandmother     Heart disease Maternal Grandmother     Heart failure Maternal Grandmother     Heart failure Maternal Grandfather     Heart disease Maternal Grandfather     Heart attack Maternal Grandfather     Heart attack Paternal Grandmother     Heart disease Paternal Grandmother     Heart failure Paternal Grandmother     Hypertension Paternal Grandmother        Review of Systems   Constitutional: Negative for diaphoresis and malaise/fatigue.   Cardiovascular:  Positive for claudication. Negative for chest pain, dyspnea on exertion, irregular heartbeat, leg swelling, near-syncope, orthopnea, palpitations, paroxysmal nocturnal dyspnea and syncope.   Respiratory:   Negative for cough, shortness of breath and sleep disturbances due to breathing.    Musculoskeletal:  Negative for falls.   Neurological:  Negative for dizziness and weakness.   Psychiatric/Behavioral:  Negative for altered mental status and substance abuse.      Allergies   Allergen Reactions    Ciprofloxacin Hives and Swelling    Lisinopril Swelling and Other (See Comments)     Cough     Seasonal Ic [Cholestatin] Other (See Comments)     Cough, congestion         Current Outpatient Medications:     ALPRAZolam (XANAX) 0.5 MG tablet, Take 1 tablet by mouth As Needed for Anxiety., Disp: , Rfl:     amLODIPine (NORVASC) 5 MG tablet, Take 1 tablet by mouth once daily, Disp: 90 tablet, Rfl: 0    aspirin 81 MG chewable tablet, Chew 1 tablet Daily., Disp: , Rfl:     atorvastatin (LIPITOR) 40 MG tablet, Take 1 tablet by mouth Daily., Disp: 90 tablet, Rfl: 1    citalopram (CeleXA) 20 MG tablet, Take 1 tablet by mouth Daily., Disp: 90 tablet, Rfl: 1    clopidogrel (PLAVIX) 75 MG tablet, Take 1 tablet by mouth Daily., Disp: 90 tablet, Rfl: 1    ezetimibe (ZETIA) 10 MG tablet, Take 1 tablet by mouth Daily., Disp: 90 tablet, Rfl: 1    fluocinolone acetonide (DERMOTIC) 0.01 % oil otic oil, Administer  into both ears 2 (Two) Times a Day., Disp: 20 mL, Rfl: 0    fluticasone (Flonase) 50 MCG/ACT nasal spray, 2 sprays into the nostril(s) as directed by provider Daily., Disp:  , Rfl:     glimepiride (AMARYL) 2 MG tablet, Take 1 tablet by mouth Every Morning Before Breakfast., Disp: 90 tablet, Rfl: 1    hydroCHLOROthiazide (HYDRODIURIL) 25 MG tablet, Take 1 tablet by mouth Daily., Disp: 90 tablet, Rfl: 1    levothyroxine (SYNTHROID, LEVOTHROID) 75 MCG tablet, Take 1 tablet by mouth once daily, Disp: 90 tablet, Rfl: 0    metFORMIN (GLUCOPHAGE) 1000 MG tablet, TAKE 1 TABLET BY MOUTH TWICE DAILY WITH MEALS, Disp: 180 tablet, Rfl: 0    pantoprazole (PROTONIX) 40 MG EC tablet, Take 1 tablet by mouth once daily, Disp: 90 tablet, Rfl: 0     "trimethoprim-polymyxin b (POLYTRIM) 02894-7.1 UNIT/ML-% ophthalmic solution, Apply 1 drop to eye(s) as directed by provider. Four days before and four days after eye injection., Disp: , Rfl:       Objective:     Vitals:    10/04/23 1034   BP: 138/84   Pulse: 79   SpO2: 94%   Weight: 105 kg (232 lb)   Height: 152.4 cm (60\")     Body mass index is 45.31 kg/m².    PHYSICAL EXAM:    Constitutional:       General: Not in acute distress.     Appearance: Normal appearance. Well-developed.   Eyes:      Pupils: Pupils are equal, round, and reactive to light.   HENT:      Head: Normocephalic.   Neck:      Vascular: Carotid bruit (bilateral) present. No JVD.   Pulmonary:      Effort: Pulmonary effort is normal. No tachypnea.      Breath sounds: Normal breath sounds. No wheezing. No rales.   Cardiovascular:      Normal rate. Regular rhythm.      No gallop.    Pulses:     Intact distal pulses.   Edema:     Peripheral edema absent.   Abdominal:      General: Bowel sounds are normal.      Palpations: Abdomen is soft.      Tenderness: There is no abdominal tenderness.   Musculoskeletal: Normal range of motion.      Cervical back: Normal range of motion and neck supple. No edema. Skin:     General: Skin is warm and dry.        Neurological:      Mental Status: Alert and oriented to person, place, and time.         ECG 12 Lead    Date/Time: 10/4/2023 10:46 AM  Performed by: Jackelin Earl APRN  Authorized by: Jackelin Earl APRN   Comparison: compared with previous ECG from 3/14/2022  Similar to previous ECG  Rhythm: sinus rhythm  Rate: normal  QRS axis: normal    Clinical impression: normal ECG          Assessment:       Diagnosis Plan   1. Essential hypertension     Controlled on current regimen encouraged exercise and diet   2. Mixed hyperlipidemia     LDL at goal recommend better control of blood glucose as triglycerides were little high hemoglobin A1c was 7.8.   3. Peripheral arterial occlusive disease     Follows with " vascular surgery on statin, aspirin and Plavix   4. Coronary artery disease involving native coronary artery of native heart without angina pectoris        5. Cerebrovascular accident (CVA), unspecified mechanism     Follows with neurosurgery for moyamoya   6. Ex-cigarette smoker     Stop smoking in April 7. Moyamoya disease          Orders Placed This Encounter   Procedures    ECG 12 Lead     This order was created via procedure documentation     Order Specific Question:   Release to patient     Answer:   Routine Release [[id number]]          Plan:       Follow up with Dr. Mei in 6 months         Your medication list            Accurate as of October 4, 2023 11:39 AM. If you have any questions, ask your nurse or doctor.                CONTINUE taking these medications        Instructions Last Dose Given Next Dose Due   ALPRAZolam 0.5 MG tablet  Commonly known as: XANAX      Take 1 tablet by mouth As Needed for Anxiety.       amLODIPine 5 MG tablet  Commonly known as: NORVASC      Take 1 tablet by mouth once daily       aspirin 81 MG chewable tablet      Chew 1 tablet Daily.       atorvastatin 40 MG tablet  Commonly known as: LIPITOR      Take 1 tablet by mouth Daily.       citalopram 20 MG tablet  Commonly known as: CeleXA      Take 1 tablet by mouth Daily.       clopidogrel 75 MG tablet  Commonly known as: PLAVIX      Take 1 tablet by mouth Daily.       ezetimibe 10 MG tablet  Commonly known as: ZETIA      Take 1 tablet by mouth Daily.       fluocinolone acetonide 0.01 % oil otic oil  Commonly known as: DERMOTIC      Administer  into both ears 2 (Two) Times a Day.       fluticasone 50 MCG/ACT nasal spray  Commonly known as: Flonase      2 sprays into the nostril(s) as directed by provider Daily.       glimepiride 2 MG tablet  Commonly known as: AMARYL      Take 1 tablet by mouth Every Morning Before Breakfast.       hydroCHLOROthiazide 25 MG tablet  Commonly known as: HYDRODIURIL      Take 1 tablet by mouth  Daily.       levothyroxine 75 MCG tablet  Commonly known as: SYNTHROID, LEVOTHROID      Take 1 tablet by mouth once daily       metFORMIN 1000 MG tablet  Commonly known as: GLUCOPHAGE      TAKE 1 TABLET BY MOUTH TWICE DAILY WITH MEALS       pantoprazole 40 MG EC tablet  Commonly known as: PROTONIX      Take 1 tablet by mouth once daily       trimethoprim-polymyxin b 46206-6.1 UNIT/ML-% ophthalmic solution  Commonly known as: POLYTRIM      Apply 1 drop to eye(s) as directed by provider. Four days before and four days after eye injection.                  As always, it has been a pleasure to participate in your patient's care.      Sincerely,     Jackelin BROCK

## 2023-10-06 ENCOUNTER — TELEPHONE (OUTPATIENT)
Dept: FAMILY MEDICINE CLINIC | Facility: CLINIC | Age: 68
End: 2023-10-06
Payer: MEDICARE

## 2023-10-06 NOTE — TELEPHONE ENCOUNTER
Zulema, pharmacist at [facility] pharmacy notified and given orders for patient to place two drops in ear per .

## 2023-10-06 NOTE — TELEPHONE ENCOUNTER
Zulema from [facility] pharmacy called and needs clarification of Rosalee Pantoja Dermotic ear drops. They need to know how many drops in each ear. Please Advise

## 2023-10-15 ENCOUNTER — HOSPITAL ENCOUNTER (EMERGENCY)
Facility: HOSPITAL | Age: 68
Discharge: HOME | End: 2023-10-15
Attending: STUDENT IN AN ORGANIZED HEALTH CARE EDUCATION/TRAINING PROGRAM | Admitting: STUDENT IN AN ORGANIZED HEALTH CARE EDUCATION/TRAINING PROGRAM
Payer: MEDICARE

## 2023-10-15 ENCOUNTER — APPOINTMENT (OUTPATIENT)
Dept: GENERAL RADIOLOGY | Facility: HOSPITAL | Age: 68
End: 2023-10-15
Payer: MEDICARE

## 2023-10-15 VITALS
HEART RATE: 75 BPM | OXYGEN SATURATION: 95 % | WEIGHT: 225 LBS | RESPIRATION RATE: 16 BRPM | TEMPERATURE: 98 F | HEIGHT: 60 IN | BODY MASS INDEX: 44.17 KG/M2 | DIASTOLIC BLOOD PRESSURE: 73 MMHG | SYSTOLIC BLOOD PRESSURE: 157 MMHG

## 2023-10-15 DIAGNOSIS — J44.1 COPD EXACERBATION: Primary | ICD-10-CM

## 2023-10-15 LAB
ALBUMIN SERPL-MCNC: 4.2 G/DL (ref 3.5–5.2)
ALBUMIN/GLOB SERPL: 1.2 G/DL
ALP SERPL-CCNC: 101 U/L (ref 39–117)
ALT SERPL W P-5'-P-CCNC: 19 U/L (ref 1–33)
ANION GAP SERPL CALCULATED.3IONS-SCNC: 12.1 MMOL/L (ref 5–15)
AST SERPL-CCNC: 15 U/L (ref 1–32)
BASOPHILS # BLD AUTO: 0.03 10*3/MM3 (ref 0–0.2)
BASOPHILS NFR BLD AUTO: 0.3 % (ref 0–1.5)
BILIRUB SERPL-MCNC: 0.3 MG/DL (ref 0–1.2)
BUN SERPL-MCNC: 22 MG/DL (ref 8–23)
BUN/CREAT SERPL: 19.8 (ref 7–25)
CALCIUM SPEC-SCNC: 8.7 MG/DL (ref 8.6–10.5)
CHLORIDE SERPL-SCNC: 95 MMOL/L (ref 98–107)
CO2 SERPL-SCNC: 26.9 MMOL/L (ref 22–29)
CREAT SERPL-MCNC: 1.11 MG/DL (ref 0.57–1)
DEPRECATED RDW RBC AUTO: 51.6 FL (ref 37–54)
EGFRCR SERPLBLD CKD-EPI 2021: 53.6 ML/MIN/1.73
EOSINOPHIL # BLD AUTO: 0.38 10*3/MM3 (ref 0–0.4)
EOSINOPHIL NFR BLD AUTO: 4 % (ref 0.3–6.2)
ERYTHROCYTE [DISTWIDTH] IN BLOOD BY AUTOMATED COUNT: 17 % (ref 12.3–15.4)
GLOBULIN UR ELPH-MCNC: 3.4 GM/DL
GLUCOSE SERPL-MCNC: 208 MG/DL (ref 65–99)
HCT VFR BLD AUTO: 39.1 % (ref 34–46.6)
HGB BLD-MCNC: 12.2 G/DL (ref 12–15.9)
IMM GRANULOCYTES # BLD AUTO: 0.08 10*3/MM3 (ref 0–0.05)
IMM GRANULOCYTES NFR BLD AUTO: 0.9 % (ref 0–0.5)
LYMPHOCYTES # BLD AUTO: 2.05 10*3/MM3 (ref 0.7–3.1)
LYMPHOCYTES NFR BLD AUTO: 21.8 % (ref 19.6–45.3)
MCH RBC QN AUTO: 26.2 PG (ref 26.6–33)
MCHC RBC AUTO-ENTMCNC: 31.2 G/DL (ref 31.5–35.7)
MCV RBC AUTO: 84.1 FL (ref 79–97)
MONOCYTES # BLD AUTO: 0.51 10*3/MM3 (ref 0.1–0.9)
MONOCYTES NFR BLD AUTO: 5.4 % (ref 5–12)
NEUTROPHILS NFR BLD AUTO: 6.34 10*3/MM3 (ref 1.7–7)
NEUTROPHILS NFR BLD AUTO: 67.6 % (ref 42.7–76)
NRBC BLD AUTO-RTO: 0 /100 WBC (ref 0–0.2)
NT-PROBNP SERPL-MCNC: 324.7 PG/ML (ref 0–900)
PLATELET # BLD AUTO: 289 10*3/MM3 (ref 140–450)
PMV BLD AUTO: 10 FL (ref 6–12)
POTASSIUM SERPL-SCNC: 3.9 MMOL/L (ref 3.5–5.2)
PROCALCITONIN SERPL-MCNC: 0.06 NG/ML (ref 0–0.25)
PROT SERPL-MCNC: 7.6 G/DL (ref 6–8.5)
QT INTERVAL: 410 MS
QTC INTERVAL: 436 MS
RBC # BLD AUTO: 4.65 10*6/MM3 (ref 3.77–5.28)
SODIUM SERPL-SCNC: 134 MMOL/L (ref 136–145)
TROPONIN T SERPL HS-MCNC: 9 NG/L
WBC NRBC COR # BLD: 9.39 10*3/MM3 (ref 3.4–10.8)

## 2023-10-15 PROCEDURE — 94799 UNLISTED PULMONARY SVC/PX: CPT

## 2023-10-15 PROCEDURE — 25010000002 METHYLPREDNISOLONE PER 125 MG: Performed by: STUDENT IN AN ORGANIZED HEALTH CARE EDUCATION/TRAINING PROGRAM

## 2023-10-15 PROCEDURE — 85025 COMPLETE CBC W/AUTO DIFF WBC: CPT | Performed by: STUDENT IN AN ORGANIZED HEALTH CARE EDUCATION/TRAINING PROGRAM

## 2023-10-15 PROCEDURE — 84145 PROCALCITONIN (PCT): CPT | Performed by: STUDENT IN AN ORGANIZED HEALTH CARE EDUCATION/TRAINING PROGRAM

## 2023-10-15 PROCEDURE — 96374 THER/PROPH/DIAG INJ IV PUSH: CPT

## 2023-10-15 PROCEDURE — 94640 AIRWAY INHALATION TREATMENT: CPT

## 2023-10-15 PROCEDURE — 83880 ASSAY OF NATRIURETIC PEPTIDE: CPT | Performed by: STUDENT IN AN ORGANIZED HEALTH CARE EDUCATION/TRAINING PROGRAM

## 2023-10-15 PROCEDURE — 71046 X-RAY EXAM CHEST 2 VIEWS: CPT

## 2023-10-15 PROCEDURE — 99284 EMERGENCY DEPT VISIT MOD MDM: CPT

## 2023-10-15 PROCEDURE — 93005 ELECTROCARDIOGRAM TRACING: CPT | Performed by: STUDENT IN AN ORGANIZED HEALTH CARE EDUCATION/TRAINING PROGRAM

## 2023-10-15 PROCEDURE — 84484 ASSAY OF TROPONIN QUANT: CPT | Performed by: STUDENT IN AN ORGANIZED HEALTH CARE EDUCATION/TRAINING PROGRAM

## 2023-10-15 PROCEDURE — 80053 COMPREHEN METABOLIC PANEL: CPT | Performed by: STUDENT IN AN ORGANIZED HEALTH CARE EDUCATION/TRAINING PROGRAM

## 2023-10-15 RX ORDER — ALBUTEROL SULFATE 2.5 MG/3ML
2.5 SOLUTION RESPIRATORY (INHALATION) ONCE
Status: COMPLETED | OUTPATIENT
Start: 2023-10-15 | End: 2023-10-15

## 2023-10-15 RX ORDER — ALBUTEROL SULFATE 1.25 MG/3ML
1 SOLUTION RESPIRATORY (INHALATION) EVERY 6 HOURS PRN
Qty: 40 EACH | Refills: 0 | Status: SHIPPED | OUTPATIENT
Start: 2023-10-15 | End: 2023-10-25

## 2023-10-15 RX ORDER — METHYLPREDNISOLONE 4 MG/1
TABLET ORAL
Qty: 21 TABLET | Refills: 0 | Status: SHIPPED | OUTPATIENT
Start: 2023-10-15 | End: 2023-10-23

## 2023-10-15 RX ORDER — IPRATROPIUM BROMIDE AND ALBUTEROL SULFATE 2.5; .5 MG/3ML; MG/3ML
3 SOLUTION RESPIRATORY (INHALATION) ONCE
Status: COMPLETED | OUTPATIENT
Start: 2023-10-15 | End: 2023-10-15

## 2023-10-15 RX ORDER — METHYLPREDNISOLONE SODIUM SUCCINATE 125 MG/2ML
125 INJECTION, POWDER, LYOPHILIZED, FOR SOLUTION INTRAMUSCULAR; INTRAVENOUS ONCE
Status: COMPLETED | OUTPATIENT
Start: 2023-10-15 | End: 2023-10-15

## 2023-10-15 RX ORDER — AZITHROMYCIN 250 MG/1
TABLET, FILM COATED ORAL
Qty: 4 TABLET | Refills: 0 | Status: SHIPPED | OUTPATIENT
Start: 2023-10-15 | End: 2023-10-18

## 2023-10-15 RX ADMIN — IPRATROPIUM BROMIDE AND ALBUTEROL SULFATE 3 ML: .5; 3 SOLUTION RESPIRATORY (INHALATION) at 12:12

## 2023-10-15 RX ADMIN — ALBUTEROL SULFATE 2.5 MG: 2.5 SOLUTION RESPIRATORY (INHALATION) at 13:58

## 2023-10-15 RX ADMIN — METHYLPREDNISOLONE SODIUM SUCCINATE 125 MG: 125 INJECTION, POWDER, FOR SOLUTION INTRAMUSCULAR; INTRAVENOUS at 12:15

## 2023-10-23 ENCOUNTER — OFFICE VISIT (OUTPATIENT)
Dept: FAMILY MEDICINE CLINIC | Facility: CLINIC | Age: 68
End: 2023-10-23
Payer: MEDICARE

## 2023-10-23 VITALS
TEMPERATURE: 97.7 F | BODY MASS INDEX: 44.37 KG/M2 | HEART RATE: 82 BPM | WEIGHT: 226 LBS | SYSTOLIC BLOOD PRESSURE: 154 MMHG | OXYGEN SATURATION: 94 % | DIASTOLIC BLOOD PRESSURE: 80 MMHG | HEIGHT: 60 IN

## 2023-10-23 DIAGNOSIS — M50.30 DDD (DEGENERATIVE DISC DISEASE), CERVICAL: ICD-10-CM

## 2023-10-23 DIAGNOSIS — J44.1 COPD WITH EXACERBATION: Primary | ICD-10-CM

## 2023-10-23 DIAGNOSIS — I50.31 ACUTE DIASTOLIC CHF (CONGESTIVE HEART FAILURE): ICD-10-CM

## 2023-10-23 PROCEDURE — 3079F DIAST BP 80-89 MM HG: CPT | Performed by: FAMILY MEDICINE

## 2023-10-23 PROCEDURE — 3077F SYST BP >= 140 MM HG: CPT | Performed by: FAMILY MEDICINE

## 2023-10-23 PROCEDURE — 3051F HG A1C>EQUAL 7.0%<8.0%: CPT | Performed by: FAMILY MEDICINE

## 2023-10-23 PROCEDURE — 99214 OFFICE O/P EST MOD 30 MIN: CPT | Performed by: FAMILY MEDICINE

## 2023-10-23 PROCEDURE — 1159F MED LIST DOCD IN RCRD: CPT | Performed by: FAMILY MEDICINE

## 2023-10-23 PROCEDURE — 1160F RVW MEDS BY RX/DR IN RCRD: CPT | Performed by: FAMILY MEDICINE

## 2023-10-23 RX ORDER — FUROSEMIDE 40 MG/1
40 TABLET ORAL 2 TIMES DAILY
Qty: 30 TABLET | Refills: 1 | Status: SHIPPED | OUTPATIENT
Start: 2023-10-23 | End: 2023-10-26 | Stop reason: SDUPTHER

## 2023-10-23 RX ORDER — FLUTICASONE FUROATE, UMECLIDINIUM BROMIDE AND VILANTEROL TRIFENATATE 200; 62.5; 25 UG/1; UG/1; UG/1
1 POWDER RESPIRATORY (INHALATION) DAILY
Qty: 1 EACH | Refills: 0 | COMMUNITY
Start: 2023-10-23

## 2023-10-24 NOTE — PROGRESS NOTES
Subjective   Rosalee Hargrove is a 68 y.o. female with   Chief Complaint   Patient presents with    Hospital Follow Up Visit    COPD   .    COPD  She complains of shortness of breath and wheezing. Pertinent negatives include no chest pain or fever.      68-year-old white female with multiple issues here for further medical management.  She is a longtime smoker who has quit in the last 2 years.  She however has had recent increased shortness of air and seems to be benefited using albuterol by nebulizer.  She has significant cardiovascular disease including moyamoya as well as carotid artery and peripheral arterial disease.  She was seen in the emergency room for the shortness of air with chest x-ray obtained-did not show signs of effusion or hyperinflation but did show evidence of revascularization consistent with CHF.  There is also cardiomegaly.  She has had neck pain that has been so severe that she has not been able to sleep at night.  She was seen by another provider in this office with neck x-rays obtained.  She has been given oral steroids with little benefit.  X-rays reveal degenerative disc disease at multiple levels although worse at C5-6.  She does experience some radiation of neck pain into the left shoulder and scapular region as well as around to the upper portion of the left chest.  She does see a cardiologist with last appointment in the earlier portion of this month.  Current medications are multiple and are as listed.  All medications are used appropriately and are well-tolerated without side effects.  Last full set of fasting labs took place in August of this year.  The following portions of the patient's history were reviewed and updated as appropriate: allergies, current medications, past family history, past medical history, past social history, past surgical history and problem list.    Review of Systems   Constitutional:  Positive for fatigue. Negative for fever.   HENT:  Negative for  congestion.    Respiratory:  Positive for shortness of breath and wheezing.    Cardiovascular:  Positive for leg swelling. Negative for chest pain.   Musculoskeletal:  Positive for neck pain and neck stiffness.   Neurological:         Cervical radiculopathy       Objective     Vitals:    10/23/23 1327   BP: 154/80   Pulse: 82   Temp: 97.7 °F (36.5 °C)   SpO2: 94%       Recent Results (from the past 672 hour(s))   ECG 12 Lead Dyspnea    Collection Time: 10/15/23 12:08 PM   Result Value Ref Range    QT Interval 410 ms    QTC Interval 436 ms   Comprehensive Metabolic Panel    Collection Time: 10/15/23 12:10 PM    Specimen: Blood   Result Value Ref Range    Glucose 208 (H) 65 - 99 mg/dL    BUN 22 8 - 23 mg/dL    Creatinine 1.11 (H) 0.57 - 1.00 mg/dL    Sodium 134 (L) 136 - 145 mmol/L    Potassium 3.9 3.5 - 5.2 mmol/L    Chloride 95 (L) 98 - 107 mmol/L    CO2 26.9 22.0 - 29.0 mmol/L    Calcium 8.7 8.6 - 10.5 mg/dL    Total Protein 7.6 6.0 - 8.5 g/dL    Albumin 4.2 3.5 - 5.2 g/dL    ALT (SGPT) 19 1 - 33 U/L    AST (SGOT) 15 1 - 32 U/L    Alkaline Phosphatase 101 39 - 117 U/L    Total Bilirubin 0.3 0.0 - 1.2 mg/dL    Globulin 3.4 gm/dL    A/G Ratio 1.2 g/dL    BUN/Creatinine Ratio 19.8 7.0 - 25.0    Anion Gap 12.1 5.0 - 15.0 mmol/L    eGFR 53.6 (L) >60.0 mL/min/1.73   High Sensitivity Troponin T    Collection Time: 10/15/23 12:10 PM    Specimen: Blood   Result Value Ref Range    HS Troponin T 9 <10 ng/L   Procalcitonin    Collection Time: 10/15/23 12:10 PM    Specimen: Blood   Result Value Ref Range    Procalcitonin 0.06 0.00 - 0.25 ng/mL   CBC Auto Differential    Collection Time: 10/15/23 12:10 PM    Specimen: Blood   Result Value Ref Range    WBC 9.39 3.40 - 10.80 10*3/mm3    RBC 4.65 3.77 - 5.28 10*6/mm3    Hemoglobin 12.2 12.0 - 15.9 g/dL    Hematocrit 39.1 34.0 - 46.6 %    MCV 84.1 79.0 - 97.0 fL    MCH 26.2 (L) 26.6 - 33.0 pg    MCHC 31.2 (L) 31.5 - 35.7 g/dL    RDW 17.0 (H) 12.3 - 15.4 %    RDW-SD 51.6 37.0 - 54.0  fl    MPV 10.0 6.0 - 12.0 fL    Platelets 289 140 - 450 10*3/mm3    Neutrophil % 67.6 42.7 - 76.0 %    Lymphocyte % 21.8 19.6 - 45.3 %    Monocyte % 5.4 5.0 - 12.0 %    Eosinophil % 4.0 0.3 - 6.2 %    Basophil % 0.3 0.0 - 1.5 %    Immature Grans % 0.9 (H) 0.0 - 0.5 %    Neutrophils, Absolute 6.34 1.70 - 7.00 10*3/mm3    Lymphocytes, Absolute 2.05 0.70 - 3.10 10*3/mm3    Monocytes, Absolute 0.51 0.10 - 0.90 10*3/mm3    Eosinophils, Absolute 0.38 0.00 - 0.40 10*3/mm3    Basophils, Absolute 0.03 0.00 - 0.20 10*3/mm3    Immature Grans, Absolute 0.08 (H) 0.00 - 0.05 10*3/mm3    nRBC 0.0 0.0 - 0.2 /100 WBC   BNP    Collection Time: 10/15/23 12:10 PM    Specimen: Blood   Result Value Ref Range    proBNP 324.7 0.0 - 900.0 pg/mL       Physical Exam  Vitals and nursing note reviewed.   Constitutional:       Appearance: Normal appearance. She is well-developed and well-groomed. She is morbidly obese.      Comments: Morbid exogenous obesity with a BMI of 44.1   HENT:      Head: Normocephalic and atraumatic.   Neck:      Thyroid: No thyroid mass or thyromegaly.      Vascular: Normal carotid pulses. No carotid bruit.      Trachea: Trachea and phonation normal.   Cardiovascular:      Rate and Rhythm: Normal rate and regular rhythm.      Heart sounds: Normal heart sounds. No murmur heard.     No friction rub. No gallop.   Pulmonary:      Effort: Pulmonary effort is normal. No respiratory distress.      Breath sounds: Normal breath sounds. No decreased breath sounds, wheezing, rhonchi or rales.   Musculoskeletal:      Cervical back: Neck supple.   Lymphadenopathy:      Cervical: No cervical adenopathy.   Skin:     General: Skin is warm and dry.      Findings: No rash.   Neurological:      Mental Status: She is alert and oriented to person, place, and time.   Psychiatric:         Attention and Perception: Attention and perception normal.         Mood and Affect: Mood and affect normal.         Speech: Speech normal.         Behavior:  Behavior normal. Behavior is cooperative.         Thought Content: Thought content normal.         Cognition and Memory: Cognition and memory normal.         Judgment: Judgment normal.         Assessment & Plan   Diagnoses and all orders for this visit:    1. COPD with exacerbation (Primary)  -     Fluticasone-Umeclidin-Vilant (Trelegy Ellipta) 200-62.5-25 MCG/ACT aerosol powder ; Inhale 1 Inhalation Daily.  Dispense: 1 each; Refill: 0    2. Acute diastolic CHF (congestive heart failure)  -     furosemide (Lasix) 40 MG tablet; Take 1 tablet by mouth 2 (Two) Times a Day.  Dispense: 30 tablet; Refill: 1    3. DDD (degenerative disc disease), cervical  -     Ambulatory Referral to Physical Therapy Evaluate and treat        Return in about 1 week (around 10/30/2023) for Recheck.

## 2023-10-25 ENCOUNTER — TELEPHONE (OUTPATIENT)
Dept: FAMILY MEDICINE CLINIC | Facility: CLINIC | Age: 68
End: 2023-10-25
Payer: MEDICARE

## 2023-10-25 ENCOUNTER — TREATMENT (OUTPATIENT)
Dept: PHYSICAL THERAPY | Facility: CLINIC | Age: 68
End: 2023-10-25
Payer: MEDICARE

## 2023-10-25 ENCOUNTER — TELEPHONE (OUTPATIENT)
Dept: CARDIOLOGY | Facility: CLINIC | Age: 68
End: 2023-10-25
Payer: MEDICARE

## 2023-10-25 DIAGNOSIS — M50.30 DDD (DEGENERATIVE DISC DISEASE), CERVICAL: Primary | ICD-10-CM

## 2023-10-25 DIAGNOSIS — S13.9XXD NECK SPRAIN, SUBSEQUENT ENCOUNTER: ICD-10-CM

## 2023-10-25 PROCEDURE — 97110 THERAPEUTIC EXERCISES: CPT | Performed by: PHYSICAL THERAPIST

## 2023-10-25 PROCEDURE — 97140 MANUAL THERAPY 1/> REGIONS: CPT | Performed by: PHYSICAL THERAPIST

## 2023-10-25 PROCEDURE — 97535 SELF CARE MNGMENT TRAINING: CPT | Performed by: PHYSICAL THERAPIST

## 2023-10-25 PROCEDURE — 97162 PT EVAL MOD COMPLEX 30 MIN: CPT | Performed by: PHYSICAL THERAPIST

## 2023-10-25 NOTE — TELEPHONE ENCOUNTER
Pt came into the office today, she was given     albuterol (ACCUNEB) 1.25 MG/3ML nebulizer solution when she was in the ER, she wants to know if she is supposed to continue this along with the     Fluticasone-Umeclidin-Vilant (Trelegy Ellipta) 200-62.5-25 MCG/ACT aerosol powder that she was given at her visit with Dr. Slater?

## 2023-10-25 NOTE — PATIENT INSTRUCTIONS
Patient was educated on findings of evaluation, purpose of treatment, and goals for therapy.  Patient was educated on exercises/self treatment/pain relief techniques.Patient educated on anatomy, healing process, restrictions reviewed, posture, sleeping position, purpose of therapy, pain control, and plan of care discussed.  All questions answered.      Access Code: [email or web address]  URL: [email or web address]  Date: 10/25/2023  Prepared by: Amy Harrington    Exercises  - Supine Cervical Rotation AROM on Pillow  - 1 x daily - 7 x weekly - 1 sets - 10 reps  - Supine Cervical Retraction with Towel  - 2 x daily - 7 x weekly - 1 sets - 10 reps  - Seated Gentle Upper Trapezius Stretch (Mirrored)  - 2 x daily - 7 x weekly - 1 sets - 4 reps - 15 hold

## 2023-10-25 NOTE — TELEPHONE ENCOUNTER
Patient is calling to make you aware that she went to the ER on 10/15 d/t SOA. They told her that this was COPD exacerbation and sent her home on azithromycin, a steroid back, and nebulizer treatments and told her to FU with her PCP. She saw her PCP on 10/23 and he noted that her chest xray from the ER showed vascular congestion, so he called in Lasix 40mg daily for the patient. She took a dose yesterday and had a significant amount of urine output. I asked how her breathing was and she said it is about the same. She said she gets SOA just standing at the sink washing dishes. She also is audibly hoarse on the phone too. She denies any visible swelling but states that she can only eat a few bites of food and then she has to stop because she is full. In regards to her weight she has already lost 4lbs since starting Lasix. She is unsure of her HR/BP. Below are her medications:    Lasix 40mg daily  Hctz 25mg daily (she did not take this yesterday because she is unsure if she should take this along with her Lasix)  Amlodipine 5mg daily    She is also dealing with a pinched nerve in her neck and PT is considering doing dry needling tomorrow. They asked her to reach out to her cardiologist to see if we had any objections to this.    Domenica Garcia RN  Triage [facility]

## 2023-10-25 NOTE — TELEPHONE ENCOUNTER
Called patient and went over symptoms and chest xray findings. She is going to increase lasix to 40mg Bid and come in and see me tomorrow.

## 2023-10-25 NOTE — PROGRESS NOTES
[facility] [place]  [address] [place] [address]  Phone [phone]  Fax [phone]      Physical Therapy Initial Evaluation and Plan of Care    Patient: Rosalee Hargrove   : 1955  Diagnosis/ICD-10 Code:  DDD (degenerative disc disease), cervical [M50.30]  Referring practitioner: Eddie Slater DO  Date of Initial Visit: 10/25/2023  Today's Date: 10/25/2023  Patient seen for 1 session         Visit Diagnoses:    ICD-10-CM ICD-9-CM   1. DDD (degenerative disc disease), cervical  M50.30 722.4         Subjective Questionnaire: NDI:    Cervical Xray: 10-4-23 There is a long right carotid stent with numerous surgical clips in the right side of the neck. There is very mild grade 1  anterolisthesis of C4 on C5. Alignment is otherwise normal. There is  disc base narrowing and endplate osteophyte formation at C5-6. This has  progressed. There is multilevel facet arthropathy. No gross evidence of  fracture. Prevertebral soft tissues appear normal  Chest Xray: Cardiomegaly with mild pulmonary vascular congestion could  reflect early CHF.    Past Medical History:   Diagnosis Date    Allergic     Anemia     Angina pectoris     Anxiety     Arthritis     ASCVD (arteriosclerotic cardiovascular disease)     Bilateral leg edema     CAD (coronary artery disease)     Cataract 2022    Chest pain     Cholelithiasis     Colon polyp 2023    Depression     Diabetes mellitus     Disease of thyroid gland     Dizziness     GERD (gastroesophageal reflux disease)     Glaucoma     Headache     HL (hearing loss)     Hyperlipidemia     Hypertension     Kidney stone     Morbid obesity     Orthostatic hypotension     PAD (peripheral artery disease)     Pancreatitis     Rectal bleeding 2022    Added automatically from request for surgery [id number]    Shortness of breath     Sleep apnea     Stroke     Tobacco abuse     Urinary tract infection     Visual impairment     Macular  degeneration    Vitamin D deficiency      Past Surgical History:   Procedure Laterality Date    ANAL FISSURECTOMY      BRAIN SURGERY      CARDIAC CATHETERIZATION N/A 2016    Procedure: Coronary angiography;  Surgeon: Fredrick Kapoor MD;  [facility];  Service:     CARDIAC CATHETERIZATION N/A 2016    Procedure: Left heart cath;  Surgeon: Fredrick Kapoor MD;  Location:  [facility] CATH INVASIVE LOCATION;  Service:     CARDIAC CATHETERIZATION N/A 2016    Procedure: Left ventriculography;  Surgeon: Fredrick Kapoor MD;  [facility];  Service:     CARDIAC CATHETERIZATION N/A 2016    Procedure: Right heart cath;  Surgeon: Fredrick Kapoor MD;  Location: [facility];  Service:      SECTION      CHOLECYSTECTOMY      COLONOSCOPY N/A 2022    Procedure: COLONOSCOPY with Polypectomy;  Surgeon: Sarwat Church MD;  [place] [facility] [place];  Service: Gastroenterology;  Laterality: N/A;  Rectal polyps x3 and Hemorrhoids    ENDOSCOPY N/A 2022    Procedure: ESOPHAGOGASTRODUODENOSCOPY;  Surgeon: Sarwat Church MD;  [place] [facility] [place];  Service: Gastroenterology;  Laterality: N/A;  Small Hiatal Hernia    ERCP      FRACTURE SURGERY      arm    HYSTERECTOMY      ILIAC ARTERY STENT      SUBTOTAL HYSTERECTOMY      TONSILLECTOMY          Subjective Evaluation    History of Present Illness  Onset date: October.  Mechanism of injury: Woke up ~ 3 weeks ago with severe neck pain and 'crick' in neck.  Progressed to radiation of neck pain into the left shoulder and upper shoulder blade, inside of biceps, as well as around to the upper portion of the left chest.  She does see a cardiologist with last appointment in the earlier portion of this month and was fine.  Given steroid pack without relief.  No prior injury.  After eating a meal, had to go the the ER  due to difficulty breathing and wondered if connected. Chest xray unremarkable  and told possible COPD.  This was an immediate onset of SOB. Notice she has lost voice after ER and not sure why.     Subjective comment: (+) headaches now from neck. Sensation on left of 'static electricity' not true numb.Had h/o 2 strokes so already weak in left, no significant change.  Dr. Slater this week and given  Lasix and steriod inhaler.  Has helped breathing.  He recommended dry needling. Blockage in aortic artery. No arrythmias, sees vacular dr, cardiology. (+) valsava,  no dizziness or vision changes. PLOF:  working, piddling around house, does hair. PMH: graham graham syndrome, carotid stent  Patient Occupation: Hairdresser retired   Precautions and Work Restrictions: No restrictionsPain  Current pain ratin  At worst pain ratin  Location: Left neck,  shoulder, upper arm. Constant  Quality: sharp and radiating  Exacerbated by: left rotation, driving, flexion, ok to move arm.  Progression: no change    Hand dominance: left    Treatments  Previous treatment: physical therapy ([facility] in past)  Patient Goals  Patient goals for therapy: decreased pain and increased motion             Objective        Special Questions      Additional Special Questions  No acute onset of dizziness, vision changes.  (+) cervical headaches.      Postural Observations    Additional Postural Observation Details  Guarded with movement, elevated shoulders.    Palpation   Left   Hypertonic in the levator scapulae, pectoralis major, suboccipitals and upper trapezius.   Tenderness of the latissimus, levator scapulae, pectoralis major, suboccipitals and upper trapezius.     Right Tenderness of the upper trapezius.     Tenderness   Cervical Spine   Tenderness in the facet joint.     Additional Tenderness Details  Left C5-7    Neurological Testing     Sensation   Cervical/Thoracic   Left   Intact: light touch  Hypersensation: light touch    Right   Intact: light touch    Active Range of Motion   Cervical/Thoracic Spine    Cervical    Flexion: 50 degrees with pain  Extension: 50 (sharp) degrees with pain  Left lateral flexion: 20 (sharp) degrees with pain  Right lateral flexion: 35 degrees   Left rotation: 40 degrees with pain  Right rotation: 42 (sharp) degrees with pain    Additional Active Range of Motion Details  Functional left shoulder AROM.    Passive Range of Motion   Cervical/Thoracic Spine   Cervical   Flexion: WFL  Extension: 20 degrees     Additional Passive Range of Motion Details  Limited left SB, extension.  Decreased sideglides to left lower cervical.  OA WFL, AA WFL.    Strength/Myotome Testing     Left Shoulder     Planes of Motion   Flexion: 4-   Abduction: 4-     Right Shoulder     Planes of Motion   Flexion: 5   Abduction: 5     Left Elbow   Flexion: 4+  Extension: 4+    Right Elbow   Flexion: 5    Left Wrist/Hand   Wrist extension: 5  Wrist flexion: 5    Right Wrist/Hand   Wrist extension: 5  Wrist flexion: 5    Tests   Cervical     Left   Negative alar ligament integrity, active compression ([place]) and cervical distraction.     Right   Negative alar ligament integrity, active compression ([place]) and cervical distraction.     Additional Tests Details  Mild relief with distraction after treatment. (-)modified vertebral artery test.          Assessment & Plan       Assessment  Impairments: abnormal muscle tone, abnormal or restricted ROM, activity intolerance, impaired physical strength, lacks appropriate home exercise program and pain with function   Functional limitations: carrying objects, lifting, sleeping, pulling, pushing, uncomfortable because of pain, sitting, standing and reaching overhead   Assessment details: Patient is a 69 yo female with ~ 3week h/o severe left neck and shoulder pain that began after waking   She reports her pain started after sleeping but no prior history or injury. Tenderness is moderate at the left lower cervical spine, cervical and scapular muscles, and medial left arm and  chest. She does have shoulder joint tenderness.The patient does has weakness in the left shoulder but has a h/o two strokes in the past and does not note any increased weakness.  She has (+) cervical headaches but denies any dizziness.  She does not appear to have any myotomal or dermatomal involvement.   She has sharp pain centralized to left lower cervical facet with certain movement, but without radiation. Testing for disc and nerve was unremarkable. She presents with an evolving clinical presentation. She has multiple comorbidities  including cardiac, prior stroke, and shortness of breath and personal factors of being a  with frequent use of her UE affecting her care.  We discussed following up with her cardiologist due to chest pain and shortness of breath.  Signs and symptoms are consistent with physical therapy diagnosis of left acute cervical strain/sprain. Patient is appropriate for skilled physical therapy in order to reduce pain, increase ease with daily mobility and return to I level of function.  Prognosis: good    Goals  Plan Goals: STG 4 weeks  1.  Patient to tolerate initial exercises without increase in pain.  2.  Increase cervical rotation ROM by 10 degrees.  3.  Decrease cervical and radiating symptoms by 25%.  4. Reach overhead with tolerable symptoms to perform household tasks  LTG 8 weeks  1.  Patient to be independent with HEP  2.  Increase cervical sidebending and rotation ROM by 15 degrees to perform driving and working with tolerable symptoms.  3.  Sleep without pain.  4.  Functional testing improved 10%.  5. Resume all household activities with tolerable symptoms.        Plan  Therapy options: will be seen for skilled therapy services  Planned modality interventions: cryotherapy, electrical stimulation/Russian stimulation, dry needling, ultrasound, traction and thermotherapy (hydrocollator packs)  Planned therapy interventions: manual therapy, balance/weight-bearing training,  neuromuscular re-education, stretching, strengthening, functional ROM exercises, gait training, home exercise program, body mechanics training, prosthetic fitting/training and therapeutic activities  Frequency: 2x week  Duration in weeks: 8  Treatment plan discussed with: patient        History # of Personal Factors and/or Comorbidities: HIGH (3+)  Examination of Body System(s): # of elements: LOW (1-2)  Clinical Presentation: EVOLVING  Clinical Decision Making: MODERATE      Timed:         Manual Therapy:    8     mins  78921;     Therapeutic Exercise:    8     mins  77964;     Neuromuscular Hilary:    -    mins  99489;    Therapeutic Activity:     -     mins  40031;     Gait Training:      -     mins  04221;     Ultrasound:     -     mins  26725;    Ionto                               -    mins   87174  Self Care                       10     mins   02643  Orthotic fit/train              -   mins  35141    Un-Timed:  Electrical Stimulation:    -     mins  87350 ( );  Dry Needling     -     mins self-pay  Traction     -     mins 35473  Low Eval     -     Mins  07510  Mod Eval     30     Mins  27123  High Eval                       -     Mins  18514        Timed Treatment:   26   mins   Total Treatment:     75   mins          PT: Amy Harrington PT, [facility]     License Number: [id number]  Electronically signed by Amy Harrington PT, 10/25/23, 2:07 PM EDT    Certification Period: 10/25/2023 thru 1/22/2024  I certify that the therapy services are furnished while this patient is under my care.  The services outlined above are required by this patient, and will be reviewed every 90 days.         Physician Signature:__________________________________________________    PHYSICIAN: Eddie Slater, DO      DATE:     Please sign and return via fax to [phone]. Thank you, [facility].

## 2023-10-26 ENCOUNTER — TELEPHONE (OUTPATIENT)
Dept: CARDIOLOGY | Facility: CLINIC | Age: 68
End: 2023-10-26

## 2023-10-26 ENCOUNTER — OFFICE VISIT (OUTPATIENT)
Dept: CARDIOLOGY | Facility: CLINIC | Age: 68
End: 2023-10-26
Payer: MEDICARE

## 2023-10-26 ENCOUNTER — TREATMENT (OUTPATIENT)
Dept: PHYSICAL THERAPY | Facility: CLINIC | Age: 68
End: 2023-10-26
Payer: MEDICARE

## 2023-10-26 ENCOUNTER — HOSPITAL ENCOUNTER (OUTPATIENT)
Dept: CARDIOLOGY | Facility: HOSPITAL | Age: 68
Discharge: HOME | End: 2023-10-26
Admitting: NURSE PRACTITIONER
Payer: MEDICARE

## 2023-10-26 VITALS
OXYGEN SATURATION: 95 % | HEIGHT: 60 IN | HEART RATE: 93 BPM | DIASTOLIC BLOOD PRESSURE: 80 MMHG | BODY MASS INDEX: 43.39 KG/M2 | SYSTOLIC BLOOD PRESSURE: 162 MMHG | WEIGHT: 221 LBS

## 2023-10-26 DIAGNOSIS — M50.30 DDD (DEGENERATIVE DISC DISEASE), CERVICAL: Primary | ICD-10-CM

## 2023-10-26 DIAGNOSIS — I10 ESSENTIAL HYPERTENSION: ICD-10-CM

## 2023-10-26 DIAGNOSIS — S13.9XXD NECK SPRAIN, SUBSEQUENT ENCOUNTER: ICD-10-CM

## 2023-10-26 DIAGNOSIS — I50.31 ACUTE DIASTOLIC CHF (CONGESTIVE HEART FAILURE): ICD-10-CM

## 2023-10-26 LAB
ANION GAP SERPL CALCULATED.3IONS-SCNC: 17.2 MMOL/L (ref 5–15)
BUN SERPL-MCNC: 40 MG/DL (ref 8–23)
BUN/CREAT SERPL: 26.1 (ref 7–25)
CALCIUM SPEC-SCNC: 9 MG/DL (ref 8.6–10.5)
CHLORIDE SERPL-SCNC: 92 MMOL/L (ref 98–107)
CO2 SERPL-SCNC: 28.8 MMOL/L (ref 22–29)
CREAT SERPL-MCNC: 1.53 MG/DL (ref 0.57–1)
EGFRCR SERPLBLD CKD-EPI 2021: 36.5 ML/MIN/1.73
GLUCOSE SERPL-MCNC: 232 MG/DL (ref 65–99)
NT-PROBNP SERPL-MCNC: 131 PG/ML (ref 0–900)
POTASSIUM SERPL-SCNC: 3.3 MMOL/L (ref 3.5–5.2)
SODIUM SERPL-SCNC: 138 MMOL/L (ref 136–145)

## 2023-10-26 PROCEDURE — 36415 COLL VENOUS BLD VENIPUNCTURE: CPT

## 2023-10-26 PROCEDURE — 80048 BASIC METABOLIC PNL TOTAL CA: CPT | Performed by: NURSE PRACTITIONER

## 2023-10-26 PROCEDURE — 83880 ASSAY OF NATRIURETIC PEPTIDE: CPT | Performed by: NURSE PRACTITIONER

## 2023-10-26 RX ORDER — FUROSEMIDE 40 MG/1
40 TABLET ORAL DAILY
Qty: 30 TABLET | Refills: 11 | Status: SHIPPED | OUTPATIENT
Start: 2023-10-26

## 2023-10-26 RX ORDER — AMLODIPINE BESYLATE 10 MG/1
10 TABLET ORAL DAILY
Qty: 30 TABLET | Refills: 11 | Status: SHIPPED | OUTPATIENT
Start: 2023-10-26

## 2023-10-26 NOTE — TELEPHONE ENCOUNTER
Left patient voicemail that potassium is low.  I want her to hold her Lasix for 2 days and start back at 40 mg once a day.  We will need to repeat labs next week we will try to get her again tomorrow.

## 2023-10-26 NOTE — PROGRESS NOTES
[facility] [place]  [address] [place] [address]  Phone [phone]  Fax [phone]      Physical Therapy Daily Treatment Note      Patient: Rosalee Hargrove   : 1955  Referring practitioner: Eddie Slater DO  Date of Initial Visit: Type: THERAPY  Noted: 10/25/2023  Today's Date: 10/26/2023  Patient seen for 2 sessions       Visit Diagnoses:    ICD-10-CM ICD-9-CM   1. DDD (degenerative disc disease), cervical  M50.30 722.4   2. Neck sprain, subsequent encounter  S13.9XXD V58.89     847.0       Rosalee Hargrove reports: saw cardiologist, lost 12lbs, changed meds to help cramping in legs and trunk. Lungs clear, no restrictions for exercise or needling.   Her neck is better today, less sharpness.  She would like to try the dry needling.    History:  pt denies local lymphedema in area to be treated, active infection, blood bourne illness, needle phobia, abnormal bleeding tendency, DOES use of blood thinners, any compromised immune system, recent surgery, or pregnancy.     Subjective     Objective          Palpation   Left   Hypertonic in the cervical paraspinals, levator scapulae, suboccipitals and upper trapezius.   Tenderness of the cervical paraspinals, levator scapulae, suboccipitals and upper trapezius.     Right   Hypertonic in the cervical paraspinals, levator scapulae, suboccipitals and upper trapezius. Tenderness of the cervical paraspinals, levator scapulae, suboccipitals and upper trapezius.     Tenderness   Cervical Spine   Tenderness in the facet joint.     Additional Tenderness Details  Left lower cervical      See Exercise, Manual, and Modality Logs for complete treatment.  Patient was instructed in indications for dry needling treatment,  conditions treated, procedure to be performed, possible side effects, contraindications, and risks of the procedure.Chart reviewed for any contraindications to treatment.  All questions were answered and patient  given information sheet.  Patient agreed to have dry needling treatment performed and signed the consent.  MD hernandez PCP and cardiologist     Improved B cervical rotation, mild soreness to left but significantly improved.  Cervical SB B without pain unless right holding arm then sharp    Assessment/Plan  Sharp pain at facet much improved.  She still has some stiffness but it appeared more muscle guarding today.   Based on the above findings and review of patient's medical history, she is a good candidate for dry needling.  Using threading and direct techniques, treatment well tolerated without adverse response. Clean needle technique and gloves used at all times. Precautions utilized for lung fields and neurovascular structures.   No adverse response noted. Manual palpation and assessment performed before, during and after needling.  Plan to continue needling 1x per week and instructed to apply heat to sore areas tonight.      Timed:         Manual Therapy:    -     mins  75008;     Therapeutic Exercise:       8  mins  96014;     Neuromuscular Hilary:    -    mins  83678;    Therapeutic Activity:     8     mins  14318;     Gait Training:      -     mins  27972;     Ultrasound:     -     mins  01376;    Ionto                               -    mins   99625  Self Care                       -     mins   95950  Orthotic training    -     mins 61140      Un-Timed:  Electrical Stimulation:    -     mins  86978 ( );  Dry Needling     trial     mins self-pay  Traction     -     mins 01200      Timed Treatment:   16+needling   mins   Total Treatment:     50   mins    Amy Harrington PT, [facility]  [place] License: [id number]

## 2023-10-26 NOTE — PROGRESS NOTES
Date of Office Visit: 10/26/2023  Encounter Provider: DELMY Suárez  Place of Service: [facility] CARDIOLOGY  Patient Name: Rosalee Hargrove  :1955    Chief Complaint   Patient presents with    Follow-up   :     HPI: Rosalee Hargrove is a 68 y.o. female who is a patient of  Dr. Mei and is new to me today.  She has a history of a stroke, nonobstructive coronary disease, peripheral arterial disease, more more disease status post right carotid endarterectomy, hypertension and hyperlipidemia.  She had a cardiac cath several years ago showing mild nonobstructive disease throughout her coronaries.  She is followed by Dr. Coats for peripheral vascular disease and is status post right carotid endarterectomy.  She got it iliac stent in the past last evaluation was in  that showed some moderate in-stent stenosis.  She has severe peripheral disease of the descending aorta with scattered ulcerations and plaque.     In 2021 she had acute left-sided weakness and facial droop she was evaluated at [facility] and found to have a small CVA.  Echocardiogram was unremarkable Holter monitor showed no atrial fibrillation we implanted a Linq device to follow for A-fib she also had a carotid Doppler that showed an occluded right carotid artery.  On Memorial Day 2021 she presented to [facility] with stroke.     She was last in the office in April of this year she continues to smoke she has a strong family history of coronary disease and stroke.  Last device interrogation was in September on the fifth and there were no events.     Earlier this year she had another stroke.  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 Castanon at [facility] did a cerebral angiogram and did an angioplasty to her right carotid.  She is followed up with vascular surgery she tells me they said her lower extremities were stable she does get some claudication symptoms with walking.  She has had some  sharp pain in her heel.     I saw her in the office earlier this month.  Blood pressure was controlled encourage diet and exercise.  A1c was a little high and recommended better control of blood sugars which would also likely help her triglycerides.  However about a week ago she went to the emergency room because she was short of breath.  They told her she had COPD however her BNP was normal but she did have some pulmonary congestion on her chest x-ray.  Her primary care doctor put her on Lasix 20 mg a day.  She called the office yesterday stating that she was still pretty short of breath and initially had gained some weight but then lost it.  But she was still short of breath and I could hear her rattling on the phone.    Bring her in today her voice is more hoarse hoarse.  She said that she is not having any edema she does not seem to have any orthopnea but is short of breath when she walks around however yesterday I had her take an extra Lasix giving a total of 80 mg for the day and it seems to have improved.  Previous testing and notes have been reviewed by me.   Past Medical History:   Diagnosis Date    Allergic     Anemia     Angina pectoris     Anxiety     Arthritis     ASCVD (arteriosclerotic cardiovascular disease)     Bilateral leg edema     CAD (coronary artery disease)     Cataract Jan 2022    Chest pain     Cholelithiasis     Colon polyp Feb 2023    Depression     Diabetes mellitus     Disease of thyroid gland     Dizziness     GERD (gastroesophageal reflux disease)     Glaucoma     Headache     HL (hearing loss)     Hyperlipidemia     Hypertension     Kidney stone     Morbid obesity     Orthostatic hypotension     PAD (peripheral artery disease)     Pancreatitis     Rectal bleeding 03/24/2022    Added automatically from request for surgery [id number]    Shortness of breath     Sleep apnea     Stroke     Tobacco abuse     Urinary tract infection     Visual impairment     Macular degeneration    Vitamin D  deficiency        Past Surgical History:   Procedure Laterality Date    ANAL FISSURECTOMY      BRAIN SURGERY      CARDIAC CATHETERIZATION N/A 2016    Procedure: Coronary angiography;  Surgeon: Fredrick Kapoor MD;  Location:  [facility];  Service:     CARDIAC CATHETERIZATION N/A 2016    Procedure: Left heart cath;  Surgeon: Fredrick Kapoor MD;  Location:  [facility];  Service:     CARDIAC CATHETERIZATION N/A 2016    Procedure: Left ventriculography;  Surgeon: Fredrick Kapoor MD;  Location:  [facility];  Service:     CARDIAC CATHETERIZATION N/A 2016    Procedure: Right heart cath;  Surgeon: Fredrick Kapoor MD;  Location:  [facility];  Service:      SECTION      CHOLECYSTECTOMY      COLONOSCOPY N/A 2022    Procedure: COLONOSCOPY with Polypectomy;  Surgeon: Sarwat Church MD;  [place] [facility] [place];  Service: Gastroenterology;  Laterality: N/A;  Rectal polyps x3 and Hemorrhoids    ENDOSCOPY N/A 2022    Procedure: ESOPHAGOGASTRODUODENOSCOPY;  Surgeon: Sarwat Church MD;  [place] [facility] [place];  Service: Gastroenterology;  Laterality: N/A;  Small Hiatal Hernia    ERCP      FRACTURE SURGERY      arm    HYSTERECTOMY      ILIAC ARTERY STENT      SUBTOTAL HYSTERECTOMY      TONSILLECTOMY         Social History     Socioeconomic History    Marital status:    Tobacco Use    Smoking status: Former     Packs/day: 0.50     Years: 50.00     Additional pack years: 0.00     Total pack years: 25.00     Types: Cigarettes     Quit date: 2023     Years since quittin.5    Smokeless tobacco: Never   Vaping Use    Vaping Use: Never used   Substance and Sexual Activity    Alcohol use: Yes     Alcohol/week: 1.0 standard drink of alcohol     Types: 1 Glasses of wine per week     Comment: Very occasional    Drug use: No    Sexual activity: Not Currently     Partners: Male     Birth control/protection: None     Comment:  N/A       Family History   Problem Relation Age of Onset    Heart disease Mother     Hypertension Mother     Hyperlipidemia Mother     Breast cancer Mother     Heart disease Father     Heart attack Sister     Heart disease Sister     Breast cancer Sister     Heart disease Brother     Heart attack Brother     Hyperlipidemia Brother     Diabetes Brother     Heart attack Maternal Grandmother     Heart disease Maternal Grandmother     Heart failure Maternal Grandmother     Heart failure Maternal Grandfather     Heart disease Maternal Grandfather     Heart attack Maternal Grandfather     Heart attack Paternal Grandmother     Heart disease Paternal Grandmother     Heart failure Paternal Grandmother     Hypertension Paternal Grandmother        Review of Systems   Constitutional: Negative for diaphoresis and malaise/fatigue.   Cardiovascular:  Positive for dyspnea on exertion and leg swelling. Negative for chest pain, claudication, irregular heartbeat, near-syncope, orthopnea, palpitations, paroxysmal nocturnal dyspnea and syncope.   Respiratory:  Positive for cough. Negative for shortness of breath and sleep disturbances due to breathing.    Musculoskeletal:  Negative for falls.   Neurological:  Negative for dizziness and weakness.   Psychiatric/Behavioral:  Negative for altered mental status and substance abuse.        Allergies   Allergen Reactions    Ciprofloxacin Hives and Swelling    Lisinopril Swelling and Other (See Comments)     Cough     Seasonal Ic [Cholestatin] Other (See Comments)     Cough, congestion         Current Outpatient Medications:     ALPRAZolam (XANAX) 0.5 MG tablet, Take 1 tablet by mouth As Needed for Anxiety., Disp: , Rfl:     amLODIPine (NORVASC) 10 MG tablet, Take 1 tablet by mouth Daily., Disp: 30 tablet, Rfl: 11    aspirin 81 MG chewable tablet, Chew 1 tablet Daily., Disp: , Rfl:     atorvastatin (LIPITOR) 40 MG tablet, Take 1 tablet by mouth Daily., Disp: 90 tablet, Rfl: 1    citalopram  "(CeleXA) 20 MG tablet, Take 1 tablet by mouth Daily., Disp: 90 tablet, Rfl: 1    clopidogrel (PLAVIX) 75 MG tablet, Take 1 tablet by mouth Daily., Disp: 90 tablet, Rfl: 1    ezetimibe (ZETIA) 10 MG tablet, Take 1 tablet by mouth Daily., Disp: 90 tablet, Rfl: 1    fluocinolone acetonide (DERMOTIC) 0.01 % oil otic oil, Administer  into both ears 2 (Two) Times a Day. Indications: Chronic External Ear Eczema, Disp: 20 mL, Rfl: 1    Fluticasone-Umeclidin-Vilant (Trelegy Ellipta) 200-62.5-25 MCG/ACT aerosol powder , Inhale 1 Inhalation Daily., Disp: 1 each, Rfl: 0    furosemide (Lasix) 40 MG tablet, Take 1 tablet by mouth Daily., Disp: 30 tablet, Rfl: 11    glimepiride (AMARYL) 2 MG tablet, Take 1 tablet by mouth Every Morning Before Breakfast., Disp: 90 tablet, Rfl: 1    hydroCHLOROthiazide (HYDRODIURIL) 25 MG tablet, Take 1 tablet by mouth Daily., Disp: 90 tablet, Rfl: 1    levothyroxine (SYNTHROID, LEVOTHROID) 75 MCG tablet, Take 1 tablet by mouth once daily, Disp: 90 tablet, Rfl: 0    metFORMIN (GLUCOPHAGE) 1000 MG tablet, TAKE 1 TABLET BY MOUTH TWICE DAILY WITH MEALS, Disp: 180 tablet, Rfl: 0    mometasone (Nasonex) 50 MCG/ACT nasal spray, 1 spray into the nostril(s) as directed by provider 2 (Two) Times a Day As Needed (nasal itch)., Disp: 17 g, Rfl: 1    pantoprazole (PROTONIX) 40 MG EC tablet, Take 1 tablet by mouth once daily, Disp: 90 tablet, Rfl: 0    pimecrolimus (Elidel) 1 % cream, Apply 1 application  topically to the appropriate area as directed 2 (Two) Times a Day As Needed (eczema). Indications: Atopic Dermatitis, Disp: 60 g, Rfl: 0    trimethoprim-polymyxin b (POLYTRIM) 84182-8.1 UNIT/ML-% ophthalmic solution, Apply 1 drop to eye(s) as directed by provider. Four days before and four days after eye injection., Disp: , Rfl:       Objective:     Vitals:    10/26/23 0942   BP: 162/80   Pulse: 93   SpO2: 95%   Weight: 100 kg (221 lb)   Height: 152.4 cm (60\")     Body mass index is 43.16 kg/m².    PHYSICAL " EXAM:    Constitutional:       General: Not in acute distress.     Appearance: Normal appearance. Well-developed.   Eyes:      Pupils: Pupils are equal, round, and reactive to light.   HENT:      Head: Normocephalic.   Neck:      Vascular: No carotid bruit or JVD.   Pulmonary:      Effort: Pulmonary effort is normal. No tachypnea.      Breath sounds: Normal breath sounds. No wheezing. No rales.   Cardiovascular:      Normal rate. Regular rhythm.      No gallop.    Pulses:     Intact distal pulses.   Edema:     Peripheral edema absent.   Abdominal:      General: Bowel sounds are normal.      Palpations: Abdomen is soft.      Tenderness: There is no abdominal tenderness.   Musculoskeletal: Normal range of motion.      Cervical back: Normal range of motion and neck supple. No edema. Skin:     General: Skin is warm and dry.   Neurological:      Mental Status: Alert and oriented to person, place, and time.         Procedures      Assessment:       Diagnosis Plan   1. Essential hypertension  amLODIPine (NORVASC) 10 MG tablet   Blood pressure is elevated-that it could also be contributing to her shortness of breath.  I also think she has a respiratory infection as her voice is hoarse.  We will make amlodipine 10 mg a day   2. Acute diastolic CHF (congestive heart failure)  furosemide (Lasix) 40 MG tablet   Really appears euvolemic today Trina cut her Lasix back to once a day Labs show an increase in creatinine.  Would like to repeat again next week Basic Metabolic Panel    BNP        Orders Placed This Encounter   Procedures    Basic Metabolic Panel     Standing Status:   Future     Number of Occurrences:   1     Standing Expiration Date:   10/26/2024     Order Specific Question:   Release to patient     Answer:   Routine Release [[id number]]    BNP     Standing Status:   Future     Number of Occurrences:   1     Standing Expiration Date:   10/26/2024     Order Specific Question:   Release to patient     Answer:   Routine  Release [[id number]]          Plan:       Follow-up in 1 month, instructed her to make appointment with primary.         Your medication list            Accurate as of October 26, 2023  3:16 PM. If you have any questions, ask your nurse or doctor.                CHANGE how you take these medications        Instructions Last Dose Given Next Dose Due   amLODIPine 10 MG tablet  Commonly known as: NORVASC  What changed:   medication strength  how much to take  Changed by: DELMY Suárez      Take 1 tablet by mouth Daily.       furosemide 40 MG tablet  Commonly known as: Lasix  What changed: when to take this  Changed by: DELMY Suárez      Take 1 tablet by mouth Daily.              CONTINUE taking these medications        Instructions Last Dose Given Next Dose Due   ALPRAZolam 0.5 MG tablet  Commonly known as: XANAX      Take 1 tablet by mouth As Needed for Anxiety.       aspirin 81 MG chewable tablet      Chew 1 tablet Daily.       atorvastatin 40 MG tablet  Commonly known as: LIPITOR      Take 1 tablet by mouth Daily.       citalopram 20 MG tablet  Commonly known as: CeleXA      Take 1 tablet by mouth Daily.       clopidogrel 75 MG tablet  Commonly known as: PLAVIX      Take 1 tablet by mouth Daily.       ezetimibe 10 MG tablet  Commonly known as: ZETIA      Take 1 tablet by mouth Daily.       fluocinolone acetonide 0.01 % oil otic oil  Commonly known as: DERMOTIC      Administer  into both ears 2 (Two) Times a Day. Indications: Chronic External Ear Eczema       glimepiride 2 MG tablet  Commonly known as: AMARYL      Take 1 tablet by mouth Every Morning Before Breakfast.       hydroCHLOROthiazide 25 MG tablet  Commonly known as: HYDRODIURIL      Take 1 tablet by mouth Daily.       levothyroxine 75 MCG tablet  Commonly known as: SYNTHROID, LEVOTHROID      Take 1 tablet by mouth once daily       metFORMIN 1000 MG tablet  Commonly known as: GLUCOPHAGE      TAKE 1 TABLET BY MOUTH TWICE DAILY WITH MEALS        mometasone 50 MCG/ACT nasal spray  Commonly known as: Nasonex      1 spray into the nostril(s) as directed by provider 2 (Two) Times a Day As Needed (nasal itch).       pantoprazole 40 MG EC tablet  Commonly known as: PROTONIX      Take 1 tablet by mouth once daily       pimecrolimus 1 % cream  Commonly known as: Elidel      Apply 1 application  topically to the appropriate area as directed 2 (Two) Times a Day As Needed (eczema). Indications: Atopic Dermatitis       Trelegy Ellipta 200-62.5-25 MCG/ACT aerosol powder   Generic drug: Fluticasone-Umeclidin-Vilant      Inhale 1 Inhalation Daily.       trimethoprim-polymyxin b 35813-0.1 UNIT/ML-% ophthalmic solution  Commonly known as: POLYTRIM      Apply 1 drop to eye(s) as directed by provider. Four days before and four days after eye injection.                 Where to Get Your Medications        These medications were sent to [facility] [address] [place] [address] [address] [facility] [phone] [facility] [phone] [address] [address]      Phone: [phone]   amLODIPine 10 MG tablet  furosemide 40 MG tablet           As always, it has been a pleasure to participate in your patient's care.      Sincerely,     Jackelin BROCK

## 2023-10-26 NOTE — TELEPHONE ENCOUNTER
Albuterol in the nebulizer can be used every 4 hours but as needed.  Trelegy is a maintenance inhaler and used on a daily basis

## 2023-10-27 ENCOUNTER — TELEPHONE (OUTPATIENT)
Dept: CARDIOLOGY | Facility: CLINIC | Age: 68
End: 2023-10-27
Payer: MEDICARE

## 2023-10-27 NOTE — TELEPHONE ENCOUNTER
----- Message from DELMY Suárez sent at 10/26/2023  3:30 PM EDT -----  Let patient know that her labs show that she is actually now dehydrated.  I want her to hold her Lasix for 2 days and then she can go back on Lasix once a day 40 mg starting Sunday.  Also she needs to get her labs redrawn next week.  I will put an order in the computer and she can come to the outpatient lab.

## 2023-11-01 ENCOUNTER — LAB (OUTPATIENT)
Dept: LAB | Facility: HOSPITAL | Age: 68
End: 2023-11-01
Payer: MEDICARE

## 2023-11-01 ENCOUNTER — TREATMENT (OUTPATIENT)
Dept: PHYSICAL THERAPY | Facility: CLINIC | Age: 68
End: 2023-11-01
Payer: MEDICARE

## 2023-11-01 ENCOUNTER — OFFICE VISIT (OUTPATIENT)
Dept: FAMILY MEDICINE CLINIC | Facility: CLINIC | Age: 68
End: 2023-11-01
Payer: MEDICARE

## 2023-11-01 VITALS
BODY MASS INDEX: 44.96 KG/M2 | WEIGHT: 229 LBS | HEART RATE: 73 BPM | TEMPERATURE: 97.9 F | DIASTOLIC BLOOD PRESSURE: 68 MMHG | HEIGHT: 60 IN | SYSTOLIC BLOOD PRESSURE: 118 MMHG | OXYGEN SATURATION: 94 %

## 2023-11-01 DIAGNOSIS — I50.31 ACUTE DIASTOLIC CHF (CONGESTIVE HEART FAILURE): ICD-10-CM

## 2023-11-01 DIAGNOSIS — I10 ESSENTIAL HYPERTENSION: ICD-10-CM

## 2023-11-01 DIAGNOSIS — E87.6 HYPOKALEMIA: ICD-10-CM

## 2023-11-01 DIAGNOSIS — M50.30 DDD (DEGENERATIVE DISC DISEASE), CERVICAL: Primary | ICD-10-CM

## 2023-11-01 DIAGNOSIS — S13.9XXD NECK SPRAIN, SUBSEQUENT ENCOUNTER: ICD-10-CM

## 2023-11-01 DIAGNOSIS — J44.1 COPD WITH EXACERBATION: Primary | ICD-10-CM

## 2023-11-01 DIAGNOSIS — I25.10 CORONARY ARTERY DISEASE INVOLVING NATIVE CORONARY ARTERY OF NATIVE HEART WITHOUT ANGINA PECTORIS: ICD-10-CM

## 2023-11-01 LAB
ANION GAP SERPL CALCULATED.3IONS-SCNC: 12.5 MMOL/L (ref 5–15)
BUN SERPL-MCNC: 25 MG/DL (ref 8–23)
BUN/CREAT SERPL: 21.9 (ref 7–25)
CALCIUM SPEC-SCNC: 8.6 MG/DL (ref 8.6–10.5)
CHLORIDE SERPL-SCNC: 94 MMOL/L (ref 98–107)
CO2 SERPL-SCNC: 30.5 MMOL/L (ref 22–29)
CREAT SERPL-MCNC: 1.14 MG/DL (ref 0.57–1)
EGFRCR SERPLBLD CKD-EPI 2021: 51.9 ML/MIN/1.73
GLUCOSE SERPL-MCNC: 256 MG/DL (ref 65–99)
POTASSIUM SERPL-SCNC: 3.2 MMOL/L (ref 3.5–5.2)
SODIUM SERPL-SCNC: 137 MMOL/L (ref 136–145)

## 2023-11-01 PROCEDURE — 80048 BASIC METABOLIC PNL TOTAL CA: CPT

## 2023-11-01 PROCEDURE — 36415 COLL VENOUS BLD VENIPUNCTURE: CPT

## 2023-11-01 RX ORDER — POTASSIUM CHLORIDE 750 MG/1
10 TABLET, FILM COATED, EXTENDED RELEASE ORAL 2 TIMES DAILY
Qty: 90 TABLET | Refills: 1 | Status: SHIPPED | OUTPATIENT
Start: 2023-11-01

## 2023-11-01 NOTE — LETTER
Physical Therapy Progress Note      Patient: Rosalee Hargrove   : 1955  Referring practitioner: Eddie Slater DO  Date of Initial Visit: Type: THERAPY  Noted: 10/25/2023  Today's Date: 2023  Patient seen for 3 sessions       Visit Diagnoses:    ICD-10-CM ICD-9-CM   1. DDD (degenerative disc disease), cervical  M50.30 722.4   2. Neck sprain, subsequent encounter  S13.9XXD V58.89     847.0   Treatment has consisted of dry needling, manual mobilization, gentle exercise and heat.      Rosalee Hargrove reports: much better.  No longer sharp pain with turning the neck.      Objective          Palpation   Left   Tenderness of the cervical paraspinals, levator scapulae, suboccipitals and upper trapezius.     Tenderness   Cervical Spine   Tenderness in the facet joint. (Left C5,6)    Neurological Testing     Sensation   Cervical/Thoracic   Left   Intact: light touch    Right   Intact: light touch    Active Range of Motion   Cervical/Thoracic Spine   Cervical    Flexion: 40 degrees   Extension: 42 degrees   Left lateral flexion: 35 degrees   Right lateral flexion: 40 degrees   Left rotation: 60 degrees   Right rotation: 65 degrees     Passive Range of Motion  Full     Strength/Myotome Testing     Left Shoulder     Planes of Motion   Flexion: 5   Abduction: 5     Left Elbow   Flexion: 5  Extension: 5    Left Wrist/Hand   Wrist extension: 5  Wrist flexion: 5      See Exercise, Manual, and Modality Logs for complete treatment.        Assessment/Plan  Patient presents with significant improved cervical ROM, decreased sharp pain, and increased mobility.  Her left UE strength has returned to normal.  She has mild tenderness on the lower cervical facet and mild pain with end range extension, left sidebending and left rotation.  Continued therapy to increase strengthening and stability would be beneficial.    Amy Harrington, PT, CIDN  [place] License: [id number]

## 2023-11-01 NOTE — PROGRESS NOTES
[facility] [place]  [address] [place] [address]  Phone [phone]  Fax [phone]      Physical Therapy Daily Treatment Note      Patient: Rosalee Hargrove   : 1955  Referring practitioner: Eddie Slater DO  Date of Initial Visit: Type: THERAPY  Noted: 10/25/2023  Today's Date: 2023  Patient seen for 3 sessions       Visit Diagnoses:    ICD-10-CM ICD-9-CM   1. DDD (degenerative disc disease), cervical  M50.30 722.4   2. Neck sprain, subsequent encounter  S13.9XXD V58.89     847.0       Rosalee Hargrove reports: much better.  No longer sharp pain with turning my neck.    Subjective     Objective          Palpation   Left   Tenderness of the cervical paraspinals, levator scapulae, suboccipitals and upper trapezius.     Tenderness   Cervical Spine   Tenderness in the facet joint.     Neurological Testing     Sensation   Cervical/Thoracic   Left   Intact: light touch    Right   Intact: light touch    Active Range of Motion   Cervical/Thoracic Spine   Cervical    Flexion: 40 degrees   Extension: 42 degrees   Left lateral flexion: 35 degrees   Right lateral flexion: 40 degrees   Left rotation: 60 degrees   Right rotation: 65 degrees     Strength/Myotome Testing     Left Shoulder     Planes of Motion   Flexion: 5   Abduction: 5     Left Elbow   Flexion: 5  Extension: 5    Left Wrist/Hand   Wrist extension: 5  Wrist flexion: 5      See Exercise, Manual, and Modality Logs for complete treatment.        Assessment/Plan  Patient presents with significant improved cervical ROM, decreased sharp pain, and increased mobility.  Her left UE strength has returned to normal.  She has mild tenderness on the lower cervical facet and mild pain with end range extension, left sidebending and left rotation.  Continued therapy to increase strengthening and stability would be beneficial.    Timed:         Manual Therapy:    10     mins  98910;     Therapeutic Exercise:    10      mins  02994;     Neuromuscular Hilary:    -    mins  20161;    Therapeutic Activity:     10     mins  94472;     Gait Training:      -     mins  03326;     Ultrasound:     -     mins  25390;    Ionto                               -    mins   56405  Self Care                       -     mins   36381  Orthotic training    -     mins 80213      Un-Timed:  Electrical Stimulation:    -     mins  54305 ( );  Dry Needling     -     mins self-pay  Traction     -     mins 92448      Timed Treatment:   30   mins   Total Treatment:     40   mins    Amy Harrington PT, AWA  [place] License: [id number]

## 2023-11-02 ENCOUNTER — TELEPHONE (OUTPATIENT)
Dept: CARDIOLOGY | Facility: CLINIC | Age: 68
End: 2023-11-02
Payer: MEDICARE

## 2023-11-02 NOTE — PROGRESS NOTES
Subjective   Rosalee Hargrove is a 68 y.o. female with   Chief Complaint   Patient presents with    COPD     Follow-up     .    COPD       68-year-old white female with multiple medical issues here in follow-up for her COPD.  Patient was seen 1 week ago with shortness of breath and improved with nebulized albuterol.  She was given albuterol HFA and a sample of Trelegy was given.  She did well in the ensuing several days and over the last 2 days stop the Trelegy and continues to do well.  In the interim she has seen the cardiologist who states that she does not appear to be in failure.  She was diuresed rather aggressively and actually reduced weight by 5 pounds.  She states this does not seem to have any benefit on her breathing but did start to cause cramping in her legs.  She did reduce her diuretic use back to what had been established previously.  Currently she is doing well and is without acute complaint.  The following portions of the patient's history were reviewed and updated as appropriate: allergies, current medications, past family history, past medical history, past social history, past surgical history and problem list.    Review of Systems   Respiratory:          COPD   Cardiovascular:         CAD, hypertension, hyperlipidemia, CHF       Objective     Vitals:    11/01/23 1603   BP: 118/68   Pulse: 73   Temp: 97.9 °F (36.6 °C)   SpO2: 94%       Recent Results (from the past 672 hour(s))   ECG 12 Lead Dyspnea    Collection Time: 10/15/23 12:08 PM   Result Value Ref Range    QT Interval 410 ms    QTC Interval 436 ms   Comprehensive Metabolic Panel    Collection Time: 10/15/23 12:10 PM    Specimen: Blood   Result Value Ref Range    Glucose 208 (H) 65 - 99 mg/dL    BUN 22 8 - 23 mg/dL    Creatinine 1.11 (H) 0.57 - 1.00 mg/dL    Sodium 134 (L) 136 - 145 mmol/L    Potassium 3.9 3.5 - 5.2 mmol/L    Chloride 95 (L) 98 - 107 mmol/L    CO2 26.9 22.0 - 29.0 mmol/L    Calcium 8.7 8.6 - 10.5 mg/dL    Total Protein 7.6  6.0 - 8.5 g/dL    Albumin 4.2 3.5 - 5.2 g/dL    ALT (SGPT) 19 1 - 33 U/L    AST (SGOT) 15 1 - 32 U/L    Alkaline Phosphatase 101 39 - 117 U/L    Total Bilirubin 0.3 0.0 - 1.2 mg/dL    Globulin 3.4 gm/dL    A/G Ratio 1.2 g/dL    BUN/Creatinine Ratio 19.8 7.0 - 25.0    Anion Gap 12.1 5.0 - 15.0 mmol/L    eGFR 53.6 (L) >60.0 mL/min/1.73   High Sensitivity Troponin T    Collection Time: 10/15/23 12:10 PM    Specimen: Blood   Result Value Ref Range    HS Troponin T 9 <10 ng/L   Procalcitonin    Collection Time: 10/15/23 12:10 PM    Specimen: Blood   Result Value Ref Range    Procalcitonin 0.06 0.00 - 0.25 ng/mL   CBC Auto Differential    Collection Time: 10/15/23 12:10 PM    Specimen: Blood   Result Value Ref Range    WBC 9.39 3.40 - 10.80 10*3/mm3    RBC 4.65 3.77 - 5.28 10*6/mm3    Hemoglobin 12.2 12.0 - 15.9 g/dL    Hematocrit 39.1 34.0 - 46.6 %    MCV 84.1 79.0 - 97.0 fL    MCH 26.2 (L) 26.6 - 33.0 pg    MCHC 31.2 (L) 31.5 - 35.7 g/dL    RDW 17.0 (H) 12.3 - 15.4 %    RDW-SD 51.6 37.0 - 54.0 fl    MPV 10.0 6.0 - 12.0 fL    Platelets 289 140 - 450 10*3/mm3    Neutrophil % 67.6 42.7 - 76.0 %    Lymphocyte % 21.8 19.6 - 45.3 %    Monocyte % 5.4 5.0 - 12.0 %    Eosinophil % 4.0 0.3 - 6.2 %    Basophil % 0.3 0.0 - 1.5 %    Immature Grans % 0.9 (H) 0.0 - 0.5 %    Neutrophils, Absolute 6.34 1.70 - 7.00 10*3/mm3    Lymphocytes, Absolute 2.05 0.70 - 3.10 10*3/mm3    Monocytes, Absolute 0.51 0.10 - 0.90 10*3/mm3    Eosinophils, Absolute 0.38 0.00 - 0.40 10*3/mm3    Basophils, Absolute 0.03 0.00 - 0.20 10*3/mm3    Immature Grans, Absolute 0.08 (H) 0.00 - 0.05 10*3/mm3    nRBC 0.0 0.0 - 0.2 /100 WBC   BNP    Collection Time: 10/15/23 12:10 PM    Specimen: Blood   Result Value Ref Range    proBNP 324.7 0.0 - 900.0 pg/mL   Basic Metabolic Panel    Collection Time: 10/26/23 10:07 AM    Specimen: Blood   Result Value Ref Range    Glucose 232 (H) 65 - 99 mg/dL    BUN 40 (H) 8 - 23 mg/dL    Creatinine 1.53 (H) 0.57 - 1.00 mg/dL     Sodium 138 136 - 145 mmol/L    Potassium 3.3 (L) 3.5 - 5.2 mmol/L    Chloride 92 (L) 98 - 107 mmol/L    CO2 28.8 22.0 - 29.0 mmol/L    Calcium 9.0 8.6 - 10.5 mg/dL    BUN/Creatinine Ratio 26.1 (H) 7.0 - 25.0    Anion Gap 17.2 (H) 5.0 - 15.0 mmol/L    eGFR 36.5 (L) >60.0 mL/min/1.73   BNP    Collection Time: 10/26/23 10:07 AM    Specimen: Blood   Result Value Ref Range    proBNP 131.0 0.0 - 900.0 pg/mL   Basic Metabolic Panel    Collection Time: 11/01/23 11:50 AM    Specimen: Blood   Result Value Ref Range    Glucose 256 (H) 65 - 99 mg/dL    BUN 25 (H) 8 - 23 mg/dL    Creatinine 1.14 (H) 0.57 - 1.00 mg/dL    Sodium 137 136 - 145 mmol/L    Potassium 3.2 (L) 3.5 - 5.2 mmol/L    Chloride 94 (L) 98 - 107 mmol/L    CO2 30.5 (H) 22.0 - 29.0 mmol/L    Calcium 8.6 8.6 - 10.5 mg/dL    BUN/Creatinine Ratio 21.9 7.0 - 25.0    Anion Gap 12.5 5.0 - 15.0 mmol/L    eGFR 51.9 (L) >60.0 mL/min/1.73       Physical Exam  Vitals and nursing note reviewed.   Constitutional:       Appearance: Normal appearance. She is well-developed and well-groomed.   HENT:      Head: Normocephalic and atraumatic.   Neck:      Thyroid: No thyroid mass or thyromegaly.      Vascular: Normal carotid pulses. No carotid bruit.      Trachea: Trachea and phonation normal.   Cardiovascular:      Rate and Rhythm: Normal rate and regular rhythm.      Heart sounds: Normal heart sounds. No murmur heard.     No friction rub. No gallop.   Pulmonary:      Effort: Pulmonary effort is normal. No respiratory distress.      Breath sounds: Normal breath sounds. No decreased breath sounds, wheezing, rhonchi or rales.   Musculoskeletal:      Cervical back: Neck supple.   Lymphadenopathy:      Cervical: No cervical adenopathy.   Skin:     General: Skin is warm and dry.      Findings: No rash.   Neurological:      Mental Status: She is alert and oriented to person, place, and time.   Psychiatric:         Attention and Perception: Attention and perception normal.         Mood  and Affect: Mood and affect normal.         Speech: Speech normal.         Behavior: Behavior normal. Behavior is cooperative.         Thought Content: Thought content normal.         Cognition and Memory: Cognition and memory normal.         Judgment: Judgment normal.         Assessment & Plan   Diagnoses and all orders for this visit:    1. COPD with exacerbation (Primary)    2. Essential hypertension    3. Coronary artery disease involving native coronary artery of native heart without angina pectoris    4. Acute diastolic CHF (congestive heart failure)    5. Hypokalemia  -     potassium chloride 10 MEQ CR tablet; Take 1 tablet by mouth 2 (Two) Times a Day.  Dispense: 90 tablet; Refill: 1  -     Basic metabolic panel; Future        Return for Next scheduled follow up.

## 2023-11-07 DIAGNOSIS — E03.9 ACQUIRED HYPOTHYROIDISM: ICD-10-CM

## 2023-11-07 DIAGNOSIS — E11.9 CONTROLLED TYPE 2 DIABETES MELLITUS WITHOUT COMPLICATION, WITHOUT LONG-TERM CURRENT USE OF INSULIN: ICD-10-CM

## 2023-11-07 RX ORDER — LEVOTHYROXINE SODIUM 0.07 MG/1
TABLET ORAL
Qty: 90 TABLET | Refills: 0 | Status: SHIPPED | OUTPATIENT
Start: 2023-11-07

## 2023-11-10 ENCOUNTER — TREATMENT (OUTPATIENT)
Dept: PHYSICAL THERAPY | Facility: CLINIC | Age: 68
End: 2023-11-10
Payer: MEDICARE

## 2023-11-10 DIAGNOSIS — S13.9XXD NECK SPRAIN, SUBSEQUENT ENCOUNTER: ICD-10-CM

## 2023-11-10 DIAGNOSIS — M50.30 DDD (DEGENERATIVE DISC DISEASE), CERVICAL: Primary | ICD-10-CM

## 2023-11-10 NOTE — PROGRESS NOTES
[facility] [place]  [address] [place] [address]  Phone [phone]  Fax [phone]      Physical Therapy Daily Treatment Note      Patient: Rosalee Hargrove   : 1955  Referring practitioner: Eddie Slater DO  Date of Initial Visit: Type: THERAPY  Noted: 10/25/2023  Today's Date: 11/10/2023  Patient seen for 4 sessions       Visit Diagnoses:    ICD-10-CM ICD-9-CM   1. DDD (degenerative disc disease), cervical  M50.30 722.4   2. Neck sprain, subsequent encounter  S13.9XXD V58.89     847.0       Rosalee Hargrove reports: still sore with left rotation.  Very sore after last session. [place] really good at the time but very sore after.  Today not as sharp but still there at the end of my motion.    Subjective     Objective   See Exercise, Manual, and Modality Logs for complete treatment.   Decreased left rotation sidebending but no longer sharp.  No pain with extension, just sore.  Less pain with left rotation when in flexion.  Education pillow with towel roll  TTP C6,7    Assessment/Plan  Added ultrasound and mechanical traction to improve facet mobility with relaxation as manually increased her soreness. Tolerated traction well with improved mobility post treatment.  Continue ex, gentle ROM. Assess modalities.      Timed:         Manual Therapy:    -     mins  47588;     Therapeutic Exercise:    15     mins  63095;     Neuromuscular Hilary:    -    mins  38988;    Therapeutic Activity:    5    mins  94436;     Gait Training:      -     mins  24242;     Ultrasound:     8     mins  27124;    Ionto                               -    mins   61879  Self Care                       -     mins   17440  Orthotic training    -     mins 40433      Un-Timed:  Electrical Stimulation:    -     mins  83018 ( );  Dry Needling     -     mins self-pay  Traction     15     mins 16951      Timed Treatment:   28   mins   Total Treatment:     50   mins    Amy Harrington, PT,  AWA WHITMAN License: [id number]     18

## 2023-11-15 ENCOUNTER — TELEPHONE (OUTPATIENT)
Dept: PHYSICAL THERAPY | Facility: CLINIC | Age: 68
End: 2023-11-15

## 2023-11-15 DIAGNOSIS — E66.01 MORBID EXOGENOUS OBESITY: ICD-10-CM

## 2023-11-15 DIAGNOSIS — E11.3293 TYPE 2 DIABETES MELLITUS WITH BOTH EYES AFFECTED BY MILD NONPROLIFERATIVE RETINOPATHY WITHOUT MACULAR EDEMA, WITHOUT LONG-TERM CURRENT USE OF INSULIN: ICD-10-CM

## 2023-11-15 DIAGNOSIS — E78.2 MIXED HYPERLIPIDEMIA: ICD-10-CM

## 2023-11-15 DIAGNOSIS — I67.5 MOYAMOYA DISEASE: ICD-10-CM

## 2023-11-15 DIAGNOSIS — I25.10 CORONARY ARTERY DISEASE INVOLVING NATIVE CORONARY ARTERY OF NATIVE HEART WITHOUT ANGINA PECTORIS: ICD-10-CM

## 2023-11-15 DIAGNOSIS — K21.9 GASTROESOPHAGEAL REFLUX DISEASE WITHOUT ESOPHAGITIS: ICD-10-CM

## 2023-11-15 DIAGNOSIS — I10 ESSENTIAL HYPERTENSION: ICD-10-CM

## 2023-11-15 DIAGNOSIS — D50.9 IRON DEFICIENCY ANEMIA, UNSPECIFIED IRON DEFICIENCY ANEMIA TYPE: ICD-10-CM

## 2023-11-15 DIAGNOSIS — I63.9 CEREBROVASCULAR ACCIDENT (CVA), UNSPECIFIED MECHANISM: ICD-10-CM

## 2023-11-15 DIAGNOSIS — F41.9 ANXIETY: ICD-10-CM

## 2023-11-15 DIAGNOSIS — I77.9 PERIPHERAL ARTERIAL OCCLUSIVE DISEASE: ICD-10-CM

## 2023-11-15 DIAGNOSIS — E03.9 ACQUIRED HYPOTHYROIDISM: ICD-10-CM

## 2023-11-15 DIAGNOSIS — E11.65 CONTROLLED TYPE 2 DIABETES MELLITUS WITH HYPERGLYCEMIA, WITHOUT LONG-TERM CURRENT USE OF INSULIN: ICD-10-CM

## 2023-11-15 DIAGNOSIS — E55.9 VITAMIN D DEFICIENCY: ICD-10-CM

## 2023-11-15 DIAGNOSIS — F41.0 PANIC DISORDER: ICD-10-CM

## 2023-11-15 NOTE — TELEPHONE ENCOUNTER
Caller: Rosalee Hargrove    Relationship: Self    What was the call regarding: PATIENT WILL NOT BE AT TODAY'S APPT. SHE IS NOT ABLE TO MAKE IT.

## 2023-11-16 ENCOUNTER — FLU SHOT (OUTPATIENT)
Dept: FAMILY MEDICINE CLINIC | Facility: CLINIC | Age: 68
End: 2023-11-16
Payer: MEDICARE

## 2023-11-16 ENCOUNTER — TELEPHONE (OUTPATIENT)
Dept: FAMILY MEDICINE CLINIC | Facility: CLINIC | Age: 68
End: 2023-11-16

## 2023-11-16 DIAGNOSIS — Z23 NEED FOR INFLUENZA VACCINATION: Primary | ICD-10-CM

## 2023-11-16 PROCEDURE — G0008 ADMIN INFLUENZA VIRUS VAC: HCPCS | Performed by: FAMILY MEDICINE

## 2023-11-16 PROCEDURE — 90662 IIV NO PRSV INCREASED AG IM: CPT | Performed by: FAMILY MEDICINE

## 2023-11-16 NOTE — TELEPHONE ENCOUNTER
Pt was seen 11/1/2023 and was given a sample of Treglegy. She is requesting a prescription sent to [facility] in [place]

## 2023-11-17 ENCOUNTER — TREATMENT (OUTPATIENT)
Dept: PHYSICAL THERAPY | Facility: CLINIC | Age: 68
End: 2023-11-17
Payer: MEDICARE

## 2023-11-17 DIAGNOSIS — S13.9XXD NECK SPRAIN, SUBSEQUENT ENCOUNTER: ICD-10-CM

## 2023-11-17 DIAGNOSIS — M50.30 DDD (DEGENERATIVE DISC DISEASE), CERVICAL: Primary | ICD-10-CM

## 2023-11-17 LAB
25(OH)D3+25(OH)D2 SERPL-MCNC: 20.3 NG/ML (ref 30–100)
ALBUMIN SERPL-MCNC: 4.4 G/DL (ref 3.5–5.2)
ALBUMIN/GLOB SERPL: 1.9 G/DL
ALP SERPL-CCNC: 92 U/L (ref 39–117)
ALT SERPL-CCNC: 23 U/L (ref 1–33)
AST SERPL-CCNC: 19 U/L (ref 1–32)
BASOPHILS # BLD AUTO: 0.04 10*3/MM3 (ref 0–0.2)
BASOPHILS NFR BLD AUTO: 0.5 % (ref 0–1.5)
BILIRUB SERPL-MCNC: 0.2 MG/DL (ref 0–1.2)
BUN SERPL-MCNC: 28 MG/DL (ref 8–23)
BUN/CREAT SERPL: 20.9 (ref 7–25)
CALCIUM SERPL-MCNC: 9 MG/DL (ref 8.6–10.5)
CHLORIDE SERPL-SCNC: 97 MMOL/L (ref 98–107)
CHOLEST SERPL-MCNC: 152 MG/DL (ref 0–200)
CO2 SERPL-SCNC: 32.1 MMOL/L (ref 22–29)
CREAT SERPL-MCNC: 1.34 MG/DL (ref 0.57–1)
EGFRCR SERPLBLD CKD-EPI 2021: 42.7 ML/MIN/1.73
EOSINOPHIL # BLD AUTO: 0.27 10*3/MM3 (ref 0–0.4)
EOSINOPHIL NFR BLD AUTO: 3.6 % (ref 0.3–6.2)
ERYTHROCYTE [DISTWIDTH] IN BLOOD BY AUTOMATED COUNT: 16.4 % (ref 12.3–15.4)
FERRITIN SERPL-MCNC: 53.8 NG/ML (ref 13–150)
GLOBULIN SER CALC-MCNC: 2.3 GM/DL
GLUCOSE SERPL-MCNC: 178 MG/DL (ref 65–99)
HBA1C MFR BLD: 8.5 % (ref 4.8–5.6)
HCT VFR BLD AUTO: 36.3 % (ref 34–46.6)
HDLC SERPL-MCNC: 37 MG/DL (ref 40–60)
HGB BLD-MCNC: 11.4 G/DL (ref 12–15.9)
IMM GRANULOCYTES # BLD AUTO: 0.07 10*3/MM3 (ref 0–0.05)
IMM GRANULOCYTES NFR BLD AUTO: 0.9 % (ref 0–0.5)
IRON SATN MFR SERPL: 11 % (ref 20–50)
IRON SERPL-MCNC: 43 MCG/DL (ref 37–145)
LDLC SERPL CALC-MCNC: 85 MG/DL (ref 0–100)
LYMPHOCYTES # BLD AUTO: 2.17 10*3/MM3 (ref 0.7–3.1)
LYMPHOCYTES NFR BLD AUTO: 28.7 % (ref 19.6–45.3)
MCH RBC QN AUTO: 25.4 PG (ref 26.6–33)
MCHC RBC AUTO-ENTMCNC: 31.4 G/DL (ref 31.5–35.7)
MCV RBC AUTO: 81 FL (ref 79–97)
MONOCYTES # BLD AUTO: 0.43 10*3/MM3 (ref 0.1–0.9)
MONOCYTES NFR BLD AUTO: 5.7 % (ref 5–12)
NEUTROPHILS # BLD AUTO: 4.59 10*3/MM3 (ref 1.7–7)
NEUTROPHILS NFR BLD AUTO: 60.6 % (ref 42.7–76)
NRBC BLD AUTO-RTO: 0 /100 WBC (ref 0–0.2)
PLATELET # BLD AUTO: 304 10*3/MM3 (ref 140–450)
POTASSIUM SERPL-SCNC: 4 MMOL/L (ref 3.5–5.2)
PROT SERPL-MCNC: 6.7 G/DL (ref 6–8.5)
RBC # BLD AUTO: 4.48 10*6/MM3 (ref 3.77–5.28)
SODIUM SERPL-SCNC: 141 MMOL/L (ref 136–145)
TIBC SERPL-MCNC: 408 MCG/DL
TRIGL SERPL-MCNC: 177 MG/DL (ref 0–150)
TSH SERPL DL<=0.005 MIU/L-ACNC: 2.02 UIU/ML (ref 0.27–4.2)
UIBC SERPL-MCNC: 365 MCG/DL (ref 112–346)
VLDLC SERPL CALC-MCNC: 30 MG/DL (ref 5–40)
WBC # BLD AUTO: 7.57 10*3/MM3 (ref 3.4–10.8)

## 2023-11-17 RX ORDER — FLUTICASONE FUROATE, UMECLIDINIUM BROMIDE AND VILANTEROL TRIFENATATE 100; 62.5; 25 UG/1; UG/1; UG/1
1 POWDER RESPIRATORY (INHALATION)
Qty: 1 EACH | Refills: 5 | Status: SHIPPED | OUTPATIENT
Start: 2023-11-17

## 2023-11-17 NOTE — PROGRESS NOTES
Physical Therapy Daily Treatment Note    [facility]  [address] [place] [address]  [phone] (phone)  [phone] (fax)    Patient: Rosalee Hargrove   : 1955  Diagnosis/ICD-10 Code:  DDD (degenerative disc disease), cervical [M50.30]  Referring practitioner: Eddie Slater DO  Date of Initial Visit: Type: THERAPY  Noted: 10/25/2023  Today's Date: 2023  Patient seen for 5 sessions           Subjective   Patient reports the traction seemed to help last visit.     Objective     See Exercise, Manual, and Modality Logs for complete treatment.     Assessment/Plan  Patient responded well to trial of traction. Increased weight used today since she wasn't feeling much pull at 14 lbs. She is still having a lot of pain in the morning which she attributes to her pillow/sleeping position but she has been trying to add some cervical spine support to her pillowcase with a towel roll.          Timed:    Manual Therapy:         mins  29577;  Therapeutic Exercise:    15     mins  95617;     Neuromuscular Hilary:        mins  81120;    Therapeutic Activity:    10      mins  03262;     Gait Training:          mins  15040;     Ultrasound:     8     mins  99681;    Electrical Stimulation:         mins  08023 ( );  Iontophoresis         mins 38740;  Aquatic Therapy         mins 32307;    Untimed:  Electrical Stimulation:         mins  50188 ( );  Traction:    15     mins  35504;   Dry Needling   (1-2 muscles)       mins  (Self-pay)  Dry Needling (3-4 muscles)        mins  (Self-pay)  Dry Needling Trial          mins DRYNDLTRIAL  (No Charge)    Timed Treatment:   33   mins   Total Treatment:     50   mins    Chelita Shabazz PT  Physical Therapist    [place] License:[id number]

## 2023-11-27 ENCOUNTER — TREATMENT (OUTPATIENT)
Dept: PHYSICAL THERAPY | Facility: CLINIC | Age: 68
End: 2023-11-27
Payer: MEDICARE

## 2023-11-27 DIAGNOSIS — M50.30 DDD (DEGENERATIVE DISC DISEASE), CERVICAL: Primary | ICD-10-CM

## 2023-11-27 DIAGNOSIS — S13.9XXD NECK SPRAIN, SUBSEQUENT ENCOUNTER: ICD-10-CM

## 2023-11-27 NOTE — LETTER
[facility] Physical Therapy [place]  [address] [place] [address]  Phone [phone]  Fax [phone]      Physical Therapy Re Certification Of Plan of Care  Patient: Roaslee Hargrove   : 1955  Diagnosis/ICD-10 Code:  DDD (degenerative disc disease), cervical [M50.30]  Referring practitioner: Eddie Slater DO  Date of Initial Visit: 2023  Today's Date: 2023  Patient seen for 6 sessions         Visit Diagnoses:    ICD-10-CM ICD-9-CM   1. DDD (degenerative disc disease), cervical  M50.30 722.4   2. Neck sprain, subsequent encounter  S13.9XXD V58.89     847.0         Rosalee Hargrove reports: much better.  No longer sharp, just 1 or 2/10 when I turn to the left.  No radiating pain.  Still headaches but prior to this injury they were there.  Subjective Questionnaire: NDI:  Clinical Progress: improved  Home Program Compliance: Yes  Treatment has included: therapeutic exercise, neuromuscular re-education, manual therapy, ultrasound, traction, dry needling, moist heat, and cryotherapy            Objective        Special Questions    No acute onset of dizziness, vision changes.  (+) cervical headaches.             Palpation   Left   Hypertonic in the levator scapulae, suboccipitals and upper trapezius.   Tenderness of the latissimus, levator scapulae, suboccipitals and upper trapezius.     Tenderness   Cervical Spine   No tenderness in the facet joint.          Neurological Testing     Sensation   Cervical/Thoracic   Left   Intact: light touch    Right   Intact: light touch    Active Range of Motion   Cervical/Thoracic Spine   Cervical    Flexion: 50 degrees   Extension: 60 degrees   Left lateral flexion: 45 (hesitation but no pain) degrees   Right lateral flexion: 50 degrees   Left rotation: 68 degrees   Right rotation: 72 degrees          Strength/Myotome Testing     Left Shoulder     Planes of Motion   Flexion: 4+   Abduction: 4+     Right Shoulder     Planes of  Motion   Flexion: 5   Abduction: 5     Left Elbow   Flexion: 4+  Extension: 4+    Right Elbow   Flexion: 5    Left Wrist/Hand   Wrist extension: 5  Wrist flexion: 5    Right Wrist/Hand   Wrist extension: 5  Wrist flexion: 5    Tests   Cervical     Left   Negative alar ligament integrity, active compression ([place]) and cervical distraction.     Right   Negative alar ligament integrity, active compression ([place]) and cervical distraction.     Additional Tests Details  Mild relief with distraction after treatment. (-)modified vertebral artery test.          Assessment/Plan  Patient demonstrates improvement in cervical ROM, tenderness, joint mobility, and postural strength.  They are progressing well towards the goals set during the initial evaluation. Continued therapy is needed to address soreness and weakness.    Goal progress:  Plan Goals: STG 4 weeks All MET  1.  Patient to tolerate initial exercises without increase in pain.  2.  Increase cervical rotation ROM by 10 degrees.  3.  Decrease cervical and radiating symptoms by 25%.  4. Reach overhead with tolerable symptoms to perform household tasks  LTG 8 weeks  1.  Patient to be independent with HEP Progressing  2.  Increase cervical sidebending and rotation ROM by 15 degrees to perform driving and working with tolerable symptoms. MET  3.  Sleep without pain. Progressing  4.  Functional testing improved 10%. MET  5. Resume all household activities with tolerable symptoms Progressing     Recommendations: Continue with recommendations for a few more sessions  Timeframe: 1 month  Prognosis to achieve goals: good            PT: Amy Harrington PT, AWA     [place] License:  [id number]    Electronically signed by Amy Harrington PT, 11/27/23, 1:32 PM EST    Certification Period: 11/27/2023 thru 2/24/2024  I certify that the therapy services are furnished while this patient is under my care.  The services outlined above are required by this patient, and will be reviewed every 90  days.         Physician Signature:__________________________________________________    PHYSICIAN: Eddie Slater,       DATE:     Please sign and return via fax to [phone] Thank you, [facility]

## 2023-11-27 NOTE — PROGRESS NOTES
[facility] Therapy [place]  [address] [place] [address]  Phone [phone]  Fax [phone]      Physical Therapy Re Certification Of Plan of Care  Patient: Rosalee Hargrove   : 1955  Diagnosis/ICD-10 Code:  DDD (degenerative disc disease), cervical [M50.30]  Referring practitioner: Eddie Slater DO  Date of Initial Visit: 2023  Today's Date: 2023  Patient seen for 6 sessions         Visit Diagnoses:    ICD-10-CM ICD-9-CM   1. DDD (degenerative disc disease), cervical  M50.30 722.4   2. Neck sprain, subsequent encounter  S13.9XXD V58.89     847.0         Rosalee Hargrove reports: much better.  No longer sharp, just 1 or 2/10 when I turn to the left.  Traction really helped.  Subjective Questionnaire: NDI:  Clinical Progress: improved  Home Program Compliance: Yes  Treatment has included: therapeutic exercise, neuromuscular re-education, manual therapy, ultrasound, traction, dry needling, moist heat, and cryotherapy      Subjective       Objective        Special Questions      Additional Special Questions  No acute onset of dizziness, vision changes.  (+) cervical headaches.      Postural Observations    Additional Postural Observation Details       Palpation   Left   Hypertonic in the levator scapulae, suboccipitals and upper trapezius.   Tenderness of the latissimus, levator scapulae, suboccipitals and upper trapezius.     Tenderness   Cervical Spine   No tenderness in the facet joint.     Additional Tenderness Details       Neurological Testing     Sensation   Cervical/Thoracic   Left   Intact: light touch    Right   Intact: light touch    Active Range of Motion   Cervical/Thoracic Spine   Cervical    Flexion: 50 degrees   Extension: 60 degrees   Left lateral flexion: 45 (hesistation but no pain) degrees   Right lateral flexion: 50 degrees   Left rotation: 68 degrees   Right rotation: 72 degrees     Additional Active Range of Motion Details        Passive Range of Motion   Cervical/Thoracic Spine   Cervical   Flexion: WFL  Extension: 20 degrees     Additional Passive Range of Motion Details  Limited left SB, extension.  Decreased sideglides to left lower cervical.  OA WFL, AA WFL.    Strength/Myotome Testing     Left Shoulder     Planes of Motion   Flexion: 4+   Abduction: 4+     Right Shoulder     Planes of Motion   Flexion: 5   Abduction: 5     Left Elbow   Flexion: 4+  Extension: 4+    Right Elbow   Flexion: 5    Left Wrist/Hand   Wrist extension: 5  Wrist flexion: 5    Right Wrist/Hand   Wrist extension: 5  Wrist flexion: 5    Tests   Cervical     Left   Negative alar ligament integrity, active compression ([place]) and cervical distraction.     Right   Negative alar ligament integrity, active compression ([place]) and cervical distraction.     Additional Tests Details  Mild relief with distraction after treatment. (-)modified vertebral artery test.          Assessment/Plan  Patient demonstrates improvement in cervical ROM, tenderness, joint mobility, and postural strength.  They are progressing well towards the goals set during the initial evaluation. Continued therapy is needed to address soreness and weakness.    Goal progress:  Plan Goals: STG 4 weeks All MET  1.  Patient to tolerate initial exercises without increase in pain.  2.  Increase cervical rotation ROM by 10 degrees.  3.  Decrease cervical and radiating symptoms by 25%.  4. Reach overhead with tolerable symptoms to perform household tasks  LTG 8 weeks  1.  Patient to be independent with HEP Progressing  2.  Increase cervical sidebending and rotation ROM by 15 degrees to perform driving and working with tolerable symptoms. MET  3.  Sleep without pain. Progressing  4.  Functional testing improved 10%. MET  5. Resume all household activities with tolerable symptoms Progressing     Recommendations: Continue with recommendations for a few more sessions  Timeframe: 1 month  Prognosis to  achieve goals: good      Timed:         Manual Therapy:    -     mins  86766;     Therapeutic Exercise:    8     mins  47395;     Neuromuscular Hilary:    -    mins  07385;    Therapeutic Activity:     28     mins  14798;     Gait Training:      -     mins  05625;     Ultrasound:     8     mins  88625;    Ionto                               -    mins   40820  Self Care                       -     mins   40740  Canalith Repos    -     mins 39852      Un-Timed:  Electrical Stimulation:    -     mins  58023 ( );  Dry Needling     -     mins self-pay  Traction     15     mins 16420  Re-Eval                           -    mins  04799      Timed Treatment:   45   mins   Total Treatment:     65   mins          PT: Amy Harrington PT, CIDN     [place] License:  [id number]    Electronically signed by Amy Harrington PT, 11/27/23, 1:32 PM EST    Certification Period: 11/27/2023 thru 2/24/2024  I certify that the therapy services are furnished while this patient is under my care.  The services outlined above are required by this patient, and will be reviewed every 90 days.         Physician Signature:__________________________________________________    PHYSICIAN: Eddie Slater,       DATE:     Please sign and return via fax to [phone] Thank you, [facility] Therapy

## 2023-11-28 ENCOUNTER — OFFICE VISIT (OUTPATIENT)
Dept: CARDIOLOGY | Facility: CLINIC | Age: 68
End: 2023-11-28
Payer: MEDICARE

## 2023-11-28 VITALS
DIASTOLIC BLOOD PRESSURE: 86 MMHG | HEART RATE: 78 BPM | SYSTOLIC BLOOD PRESSURE: 172 MMHG | BODY MASS INDEX: 45.12 KG/M2 | WEIGHT: 229.8 LBS | HEIGHT: 60 IN

## 2023-11-28 DIAGNOSIS — I25.10 CORONARY ARTERY DISEASE INVOLVING NATIVE CORONARY ARTERY OF NATIVE HEART WITHOUT ANGINA PECTORIS: ICD-10-CM

## 2023-11-28 DIAGNOSIS — E11.3293 TYPE 2 DIABETES MELLITUS WITH BOTH EYES AFFECTED BY MILD NONPROLIFERATIVE RETINOPATHY WITHOUT MACULAR EDEMA, WITHOUT LONG-TERM CURRENT USE OF INSULIN: ICD-10-CM

## 2023-11-28 DIAGNOSIS — E78.2 MIXED HYPERLIPIDEMIA: Primary | ICD-10-CM

## 2023-11-28 DIAGNOSIS — I65.23 ATHEROSCLEROSIS OF BOTH CAROTID ARTERIES: ICD-10-CM

## 2023-11-28 DIAGNOSIS — I10 ESSENTIAL HYPERTENSION: ICD-10-CM

## 2023-11-28 DIAGNOSIS — I50.31 ACUTE DIASTOLIC CHF (CONGESTIVE HEART FAILURE): ICD-10-CM

## 2023-11-28 PROCEDURE — 1160F RVW MEDS BY RX/DR IN RCRD: CPT | Performed by: NURSE PRACTITIONER

## 2023-11-28 PROCEDURE — 1159F MED LIST DOCD IN RCRD: CPT | Performed by: NURSE PRACTITIONER

## 2023-11-28 PROCEDURE — 3077F SYST BP >= 140 MM HG: CPT | Performed by: NURSE PRACTITIONER

## 2023-11-28 PROCEDURE — 93000 ELECTROCARDIOGRAM COMPLETE: CPT | Performed by: NURSE PRACTITIONER

## 2023-11-28 PROCEDURE — 3079F DIAST BP 80-89 MM HG: CPT | Performed by: NURSE PRACTITIONER

## 2023-11-28 PROCEDURE — 99214 OFFICE O/P EST MOD 30 MIN: CPT | Performed by: NURSE PRACTITIONER

## 2023-11-28 RX ORDER — LOSARTAN POTASSIUM 25 MG/1
25 TABLET ORAL DAILY
Qty: 30 TABLET | Refills: 11 | Status: SHIPPED | OUTPATIENT
Start: 2023-11-28

## 2023-11-28 RX ORDER — SPIRONOLACTONE 25 MG/1
25 TABLET ORAL DAILY
Qty: 30 TABLET | Refills: 3 | Status: SHIPPED | OUTPATIENT
Start: 2023-11-28

## 2023-11-28 NOTE — PROGRESS NOTES
Date of Office Visit: 2023  Encounter Provider: DELMY Suárez  Place of Service: [facility] CARDIOLOGY  Patient Name: Rosalee Hargrove  :1955    Chief Complaint   Patient presents with    Coronary Artery Disease   :     HPI: Rosalee Hargrove is a 68 y.o. female who is a patient of  Dr. Mei and is new to me today. She has a history of a stroke, nonobstructive coronary disease, peripheral arterial disease, more more disease status post right carotid endarterectomy, hypertension and hyperlipidemia.  She had a cardiac cath several years ago showing mild nonobstructive disease throughout her coronaries.  She is followed by Dr. Coats for peripheral vascular disease and is status post right carotid endarterectomy.  She got it iliac stent in the past last evaluation was in  that showed some moderate in-stent stenosis.  She has severe peripheral disease of the descending aorta with scattered ulcerations and plaque.      In 2021 she had acute left-sided weakness and facial droop she was evaluated at [facility] and found to have a small CVA.  Echocardiogram was unremarkable Holter monitor showed no atrial fibrillation we implanted a Linq device to follow for A-fib she also had a carotid Doppler that showed an occluded right carotid artery.  On Memorial Day 2021 she presented to [facility] [place] with stroke.      She was last in the office in April of this year she continues to smoke she has a strong family history of coronary disease and stroke.  Last device interrogation was in September on the fifth and there were no events.     Earlier this year she had another stroke.  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 Castanon at [facility] [place] did a cerebral angiogram and did an angioplasty to her right carotid.  She is followed up with vascular surgery she tells me they said her lower extremities were stable she does get some claudication symptoms with walking.  She  has had some sharp pain in her heel.      Also in the office in October blood pressure was a little high but she is also having some hoarseness in her voice she was short of breath had she took some extra Lasix which seemed to improve her symptoms.  I increased her amlodipine to 10 mg a day and she is here for follow-up.    She comes in today.  Blood pressure is elevated.  She is miserable on the diuretics that she is taking.  She is very thirsty all the time.  Her potassium was low and was put on potassium by her primary care.  She is upset with the number of pills that she is taking.  She continues to be short of breath she also has not had her CPAP machine evaluated in several years.  Denies any chest pain.  Previous testing and notes have been reviewed by me.   Past Medical History:   Diagnosis Date    Allergic     Anemia     Angina pectoris     Anxiety     Arthritis     ASCVD (arteriosclerotic cardiovascular disease)     Bilateral leg edema     CAD (coronary artery disease)     Cataract Jan 2022    Chest pain     Cholelithiasis     Colon polyp Feb 2023    Depression     Diabetes mellitus     Disease of thyroid gland     Dizziness     GERD (gastroesophageal reflux disease)     Glaucoma     Headache     HL (hearing loss)     Hyperlipidemia     Hypertension     Kidney stone     Morbid obesity     Orthostatic hypotension     PAD (peripheral artery disease)     Pancreatitis     Rectal bleeding 03/24/2022    Added automatically from request for surgery [id number]    Shortness of breath     Sleep apnea     Stroke     Tobacco abuse     Urinary tract infection     Visual impairment     Macular degeneration    Vitamin D deficiency        Past Surgical History:   Procedure Laterality Date    ANAL FISSURECTOMY      BRAIN SURGERY      CARDIAC CATHETERIZATION N/A 09/23/2016    Procedure: Coronary angiography;  Surgeon: Fredrick Kapoor MD;  Location: [facility] INVASIVE LOCATION;  Service:     CARDIAC CATHETERIZATION N/A 09/23/2016     Procedure: Left heart cath;  Surgeon: Fredrick Kapoor MD;  Location:  [facility] CATH INVASIVE LOCATION;  Service:     CARDIAC CATHETERIZATION N/A 2016    Procedure: Left ventriculography;  Surgeon: Fredrick Kapoor MD;  Location:  [facility]ATH INVASIVE LOCATION;  Service:     CARDIAC CATHETERIZATION N/A 2016    Procedure: Right heart cath;  Surgeon: Fredrick Kapoor MD;  Location:  [facility]ATH INVASIVE LOCATION;  Service:      SECTION      CHOLECYSTECTOMY      COLONOSCOPY N/A 2022    Procedure: COLONOSCOPY with Polypectomy;  Surgeon: Sarwat Church MD;  [place] [facility] [place];  Service: Gastroenterology;  Laterality: N/A;  Rectal polyps x3 and Hemorrhoids    ENDOSCOPY N/A 2022    Procedure: ESOPHAGOGASTRODUODENOSCOPY;  Surgeon: Sarwat Church MD;  [place] [facility] [place];  Service: Gastroenterology;  Laterality: N/A;  Small Hiatal Hernia    ERCP      FRACTURE SURGERY      arm    HYSTERECTOMY      ILIAC ARTERY STENT      SUBTOTAL HYSTERECTOMY      TONSILLECTOMY         Social History     Socioeconomic History    Marital status:    Tobacco Use    Smoking status: Former     Packs/day: 0.50     Years: 50.00     Additional pack years: 0.00     Total pack years: 25.00     Types: Cigarettes     Quit date: 2023     Years since quittin.5    Smokeless tobacco: Never   Vaping Use    Vaping Use: Never used   Substance and Sexual Activity    Alcohol use: Yes     Alcohol/week: 1.0 standard drink of alcohol     Types: 1 Glasses of wine per week     Comment: Very occasional    Drug use: No    Sexual activity: Not Currently     Partners: Male     Birth control/protection: None     Comment: N/A       Family History   Problem Relation Age of Onset    Heart disease Mother     Hypertension Mother     Hyperlipidemia Mother     Breast cancer Mother     Heart disease Father     Heart attack Sister     Heart disease Sister     Breast cancer Sister     Heart disease Brother     Heart attack Brother      Hyperlipidemia Brother     Diabetes Brother     Heart attack Maternal Grandmother     Heart disease Maternal Grandmother     Heart failure Maternal Grandmother     Heart failure Maternal Grandfather     Heart disease Maternal Grandfather     Heart attack Maternal Grandfather     Heart attack Paternal Grandmother     Heart disease Paternal Grandmother     Heart failure Paternal Grandmother     Hypertension Paternal Grandmother        Review of Systems   Constitutional: Negative for diaphoresis and malaise/fatigue.   Cardiovascular:  Positive for dyspnea on exertion. Negative for chest pain, claudication, irregular heartbeat, leg swelling, near-syncope, orthopnea, palpitations, paroxysmal nocturnal dyspnea and syncope.   Respiratory:  Negative for cough, shortness of breath and sleep disturbances due to breathing.    Musculoskeletal:  Negative for falls.   Neurological:  Negative for dizziness and weakness.   Psychiatric/Behavioral:  Negative for altered mental status and substance abuse.        Allergies   Allergen Reactions    Ciprofloxacin Hives and Swelling    Lisinopril Swelling and Other (See Comments)     Cough     Seasonal Ic [Cholestatin] Other (See Comments)     Cough, congestion         Current Outpatient Medications:     ALPRAZolam (XANAX) 0.5 MG tablet, Take 1 tablet by mouth As Needed for Anxiety., Disp: , Rfl:     amLODIPine (NORVASC) 10 MG tablet, Take 1 tablet by mouth Daily., Disp: 30 tablet, Rfl: 11    aspirin 81 MG chewable tablet, Chew 1 tablet Daily., Disp: , Rfl:     atorvastatin (LIPITOR) 40 MG tablet, Take 1 tablet by mouth Daily., Disp: 90 tablet, Rfl: 1    citalopram (CeleXA) 20 MG tablet, Take 1 tablet by mouth Daily., Disp: 90 tablet, Rfl: 1    clopidogrel (PLAVIX) 75 MG tablet, Take 1 tablet by mouth Daily., Disp: 90 tablet, Rfl: 1    ezetimibe (ZETIA) 10 MG tablet, Take 1 tablet by mouth Daily., Disp: 90 tablet, Rfl: 1    fluocinolone acetonide (DERMOTIC) 0.01 % oil otic oil, Administer   "into both ears 2 (Two) Times a Day. Indications: Chronic External Ear Eczema, Disp: 20 mL, Rfl: 1    Fluticasone-Umeclidin-Vilant (Trelegy Ellipta) 100-62.5-25 MCG/ACT inhaler, Inhale 1 puff Daily., Disp: 1 each, Rfl: 5    furosemide (Lasix) 40 MG tablet, Take 1 tablet by mouth Daily., Disp: 30 tablet, Rfl: 11    glimepiride (AMARYL) 2 MG tablet, Take 1 tablet by mouth Every Morning Before Breakfast., Disp: 90 tablet, Rfl: 1    hydroCHLOROthiazide (HYDRODIURIL) 25 MG tablet, Take 1 tablet by mouth Daily., Disp: 90 tablet, Rfl: 1    levothyroxine (SYNTHROID, LEVOTHROID) 75 MCG tablet, Take 1 tablet by mouth once daily, Disp: 90 tablet, Rfl: 0    metFORMIN (GLUCOPHAGE) 1000 MG tablet, TAKE 1 TABLET BY MOUTH TWICE DAILY WITH MEALS, Disp: 180 tablet, Rfl: 0    mometasone (Nasonex) 50 MCG/ACT nasal spray, 1 spray into the nostril(s) as directed by provider 2 (Two) Times a Day As Needed (nasal itch)., Disp: 17 g, Rfl: 1    pantoprazole (PROTONIX) 40 MG EC tablet, Take 1 tablet by mouth once daily, Disp: 90 tablet, Rfl: 0    pimecrolimus (Elidel) 1 % cream, Apply 1 application  topically to the appropriate area as directed 2 (Two) Times a Day As Needed (eczema). Indications: Atopic Dermatitis, Disp: 60 g, Rfl: 0    potassium chloride 10 MEQ CR tablet, Take 1 tablet by mouth 2 (Two) Times a Day., Disp: 90 tablet, Rfl: 1    trimethoprim-polymyxin b (POLYTRIM) 33173-9.1 UNIT/ML-% ophthalmic solution, Apply 1 drop to eye(s) as directed by provider. Four days before and four days after eye injection., Disp: , Rfl:       Objective:     Vitals:    11/28/23 0953   BP: 172/86   Pulse: 78   Weight: 104 kg (229 lb 12.8 oz)   Height: 152.4 cm (60\")     Body mass index is 44.88 kg/m².    PHYSICAL EXAM:    Constitutional:       General: Not in acute distress.     Appearance: Normal appearance. Well-developed.   Eyes:      Pupils: Pupils are equal, round, and reactive to light.   HENT:      Head: Normocephalic.   Neck:      Vascular: No " carotid bruit or JVD.   Pulmonary:      Effort: Pulmonary effort is normal. No tachypnea.      Breath sounds: Normal breath sounds. No wheezing. No rales.   Cardiovascular:      Normal rate. Regular rhythm.      No gallop.    Pulses:     Intact distal pulses.   Edema:     Peripheral edema absent.   Abdominal:      General: Bowel sounds are normal.      Palpations: Abdomen is soft.      Tenderness: There is no abdominal tenderness.   Musculoskeletal: Normal range of motion.      Cervical back: Normal range of motion and neck supple. No edema. Skin:     General: Skin is warm and dry.   Neurological:      Mental Status: Alert and oriented to person, place, and time.           ECG 12 Lead    Date/Time: 11/28/2023 11:15 AM  Performed by: Jackelin Earl APRN    Authorized by: Jackelin Earl APRN  Comparison: compared with previous ECG from 10/15/2023  Similar to previous ECG  Rhythm: sinus rhythm  Ectopy: infrequent PVCs  Rate: normal  QRS axis: normal    Clinical impression: non-specific ECG            Assessment:       Diagnosis Plan   1. Chronic diastolic heart failure    Going to repeat an echo to reevaluate her LV function.  I think she also might have a component of right-sided heart failure due to sleep apnea and history of smoking as well as some diastolic dysfunction.  We will switch her diuretics to spironolactone 25 mg daily recheck labs next week.  Stop hydrochlorothiazide and furosemide.   2. Essential hypertension     We will add losartan and spironolactone check labs in a week   3. Coronary artery disease involving native coronary artery of native heart without angina pectoris     No angina symptoms   4. Atherosclerosis of both carotid arteries     On statin therapy   5. Type 2 diabetes mellitus with both eyes affected by mild nonproliferative retinopathy without macular edema, without long-term current use of insulin          No orders of the defined types were placed in this encounter.          Plan:       Follow-up in a month.  Sees primary care tomorrow.         Your medication list            Accurate as of November 28, 2023 10:12 AM. If you have any questions, ask your nurse or doctor.                CONTINUE taking these medications        Instructions Last Dose Given Next Dose Due   ALPRAZolam 0.5 MG tablet  Commonly known as: XANAX      Take 1 tablet by mouth As Needed for Anxiety.       amLODIPine 10 MG tablet  Commonly known as: NORVASC      Take 1 tablet by mouth Daily.       aspirin 81 MG chewable tablet      Chew 1 tablet Daily.       atorvastatin 40 MG tablet  Commonly known as: LIPITOR      Take 1 tablet by mouth Daily.       citalopram 20 MG tablet  Commonly known as: CeleXA      Take 1 tablet by mouth Daily.       clopidogrel 75 MG tablet  Commonly known as: PLAVIX      Take 1 tablet by mouth Daily.       ezetimibe 10 MG tablet  Commonly known as: ZETIA      Take 1 tablet by mouth Daily.       fluocinolone acetonide 0.01 % oil otic oil  Commonly known as: DERMOTIC      Administer  into both ears 2 (Two) Times a Day. Indications: Chronic External Ear Eczema       furosemide 40 MG tablet  Commonly known as: Lasix      Take 1 tablet by mouth Daily.       glimepiride 2 MG tablet  Commonly known as: AMARYL      Take 1 tablet by mouth Every Morning Before Breakfast.       hydroCHLOROthiazide 25 MG tablet  Commonly known as: HYDRODIURIL      Take 1 tablet by mouth Daily.       levothyroxine 75 MCG tablet  Commonly known as: SYNTHROID, LEVOTHROID      Take 1 tablet by mouth once daily       metFORMIN 1000 MG tablet  Commonly known as: GLUCOPHAGE      TAKE 1 TABLET BY MOUTH TWICE DAILY WITH MEALS       mometasone 50 MCG/ACT nasal spray  Commonly known as: Nasonex      1 spray into the nostril(s) as directed by provider 2 (Two) Times a Day As Needed (nasal itch).       pantoprazole 40 MG EC tablet  Commonly known as: PROTONIX      Take 1 tablet by mouth once daily       pimecrolimus 1 %  cream  Commonly known as: Elidel      Apply 1 application  topically to the appropriate area as directed 2 (Two) Times a Day As Needed (eczema). Indications: Atopic Dermatitis       potassium chloride 10 MEQ CR tablet      Take 1 tablet by mouth 2 (Two) Times a Day.       Trelegy Ellipta 100-62.5-25 MCG/ACT inhaler  Generic drug: Fluticasone-Umeclidin-Vilant      Inhale 1 puff Daily.       trimethoprim-polymyxin b 72056-9.1 UNIT/ML-% ophthalmic solution  Commonly known as: POLYTRIM      Apply 1 drop to eye(s) as directed by provider. Four days before and four days after eye injection.                  As always, it has been a pleasure to participate in your patient's care.      Sincerely,     Jackelin BROCK

## 2023-11-29 ENCOUNTER — OFFICE VISIT (OUTPATIENT)
Dept: FAMILY MEDICINE CLINIC | Facility: CLINIC | Age: 68
End: 2023-11-29
Payer: MEDICARE

## 2023-11-29 VITALS
DIASTOLIC BLOOD PRESSURE: 70 MMHG | OXYGEN SATURATION: 94 % | TEMPERATURE: 97.5 F | HEIGHT: 60 IN | SYSTOLIC BLOOD PRESSURE: 130 MMHG | BODY MASS INDEX: 45.75 KG/M2 | WEIGHT: 233 LBS | HEART RATE: 79 BPM

## 2023-11-29 DIAGNOSIS — F41.0 PANIC DISORDER: ICD-10-CM

## 2023-11-29 DIAGNOSIS — E03.9 ACQUIRED HYPOTHYROIDISM: ICD-10-CM

## 2023-11-29 DIAGNOSIS — F41.1 GENERALIZED ANXIETY DISORDER: ICD-10-CM

## 2023-11-29 DIAGNOSIS — I10 ESSENTIAL HYPERTENSION: Primary | ICD-10-CM

## 2023-11-29 DIAGNOSIS — I25.10 CORONARY ARTERY DISEASE INVOLVING NATIVE CORONARY ARTERY OF NATIVE HEART WITHOUT ANGINA PECTORIS: ICD-10-CM

## 2023-11-29 DIAGNOSIS — K21.9 GASTROESOPHAGEAL REFLUX DISEASE WITHOUT ESOPHAGITIS: ICD-10-CM

## 2023-11-29 DIAGNOSIS — E11.3293 TYPE 2 DIABETES MELLITUS WITH BOTH EYES AFFECTED BY MILD NONPROLIFERATIVE RETINOPATHY WITHOUT MACULAR EDEMA, WITHOUT LONG-TERM CURRENT USE OF INSULIN: ICD-10-CM

## 2023-11-29 DIAGNOSIS — E55.9 VITAMIN D DEFICIENCY: ICD-10-CM

## 2023-11-29 DIAGNOSIS — E78.2 MIXED HYPERLIPIDEMIA: ICD-10-CM

## 2023-11-29 DIAGNOSIS — D50.8 OTHER IRON DEFICIENCY ANEMIA: ICD-10-CM

## 2023-11-29 DIAGNOSIS — I65.23 ATHEROSCLEROSIS OF BOTH CAROTID ARTERIES: ICD-10-CM

## 2023-11-29 DIAGNOSIS — R29.898 WEAKNESS OF BOTH LOWER EXTREMITIES: ICD-10-CM

## 2023-11-29 DIAGNOSIS — Z86.73 HISTORY OF CARDIOEMBOLIC CEREBROVASCULAR ACCIDENT (CVA): ICD-10-CM

## 2023-11-29 DIAGNOSIS — E66.01 MORBID EXOGENOUS OBESITY: ICD-10-CM

## 2023-11-29 RX ORDER — SEMAGLUTIDE 0.68 MG/ML
0.25 INJECTION, SOLUTION SUBCUTANEOUS WEEKLY
Qty: 3 ML | Refills: 0 | Status: SHIPPED | OUTPATIENT
Start: 2023-11-29

## 2023-11-30 PROBLEM — Z86.73 HISTORY OF CARDIOEMBOLIC CEREBROVASCULAR ACCIDENT (CVA): Status: ACTIVE | Noted: 2021-05-23

## 2023-11-30 NOTE — PROGRESS NOTES
Subjective   Rosalee Hargrove is a 68 y.o. female with   Chief Complaint   Patient presents with    potassium      Discuss labs   .    History of Present Illness   68-year-old white female here in follow-up for hypertension, hypokalemia as well as multiple other vascular issues.  Current medications include amlodipine, atorvastatin, citalopram as well as Zetia.  She is also using glimepiride, levothyroxine, losartan and metformin.  Pantoprazole is also used as well as spironolactone.  All medications are used appropriately and are well-tolerated without side effects.  Fasting labs have been acquired prior to this visit.  Patient has had difficulty losing weight and keeping sugar under control.  Patient does complain of weakness in her legs bilaterally.  This is been progressive over a period of time and therefore she does not ambulate is much as she used to.  She sits most of her day.  She is willing however to attend physical therapy for the above.  The following portions of the patient's history were reviewed and updated as appropriate: allergies, current medications, past family history, past medical history, past social history, past surgical history and problem list.    Review of Systems   Cardiovascular:         Hypertension, hyperlipidemia, CAD, carotid artery disease, PAD   Endocrine:        Morbid exogenous obesity, vitamin D deficiency, type II non-insulin-dependent diabetes mellitus   Neurological:  Positive for weakness.   Psychiatric/Behavioral:  Positive for dysphoric mood. The patient is nervous/anxious.        Objective     Vitals:    11/29/23 1136   BP: 130/70   Pulse: 79   Temp: 97.5 °F (36.4 °C)   SpO2: 94%       Recent Results (from the past 672 hour(s))   CBC w AUTO Differential    Collection Time: 11/16/23  1:06 PM    Specimen: Blood   Result Value Ref Range    WBC 7.57 3.40 - 10.80 10*3/mm3    RBC 4.48 3.77 - 5.28 10*6/mm3    Hemoglobin 11.4 (L) 12.0 - 15.9 g/dL    Hematocrit 36.3 34.0 - 46.6 %     MCV 81.0 79.0 - 97.0 fL    MCH 25.4 (L) 26.6 - 33.0 pg    MCHC 31.4 (L) 31.5 - 35.7 g/dL    RDW 16.4 (H) 12.3 - 15.4 %    Platelets 304 140 - 450 10*3/mm3    Neutrophil Rel % 60.6 42.7 - 76.0 %    Lymphocyte Rel % 28.7 19.6 - 45.3 %    Monocyte Rel % 5.7 5.0 - 12.0 %    Eosinophil Rel % 3.6 0.3 - 6.2 %    Basophil Rel % 0.5 0.0 - 1.5 %    Neutrophils Absolute 4.59 1.70 - 7.00 10*3/mm3    Lymphocytes Absolute 2.17 0.70 - 3.10 10*3/mm3    Monocytes Absolute 0.43 0.10 - 0.90 10*3/mm3    Eosinophils Absolute 0.27 0.00 - 0.40 10*3/mm3    Basophils Absolute 0.04 0.00 - 0.20 10*3/mm3    Immature Granulocyte Rel % 0.9 (H) 0.0 - 0.5 %    Immature Grans Absolute 0.07 (H) 0.00 - 0.05 10*3/mm3    nRBC 0.0 0.0 - 0.2 /100 WBC   Hemoglobin A1c    Collection Time: 11/16/23  1:06 PM    Specimen: Blood   Result Value Ref Range    Hemoglobin A1C 8.50 (H) 4.80 - 5.60 %   Lipid panel    Collection Time: 11/16/23  1:06 PM    Specimen: Blood   Result Value Ref Range    Total Cholesterol 152 0 - 200 mg/dL    Triglycerides 177 (H) 0 - 150 mg/dL    HDL Cholesterol 37 (L) 40 - 60 mg/dL    VLDL Cholesterol [place] 30 5 - 40 mg/dL    LDL Chol Calc (NIH) 85 0 - 100 mg/dL   Iron and TIBC    Collection Time: 11/16/23  1:06 PM    Specimen: Blood   Result Value Ref Range    TIBC 408 mcg/dL    UIBC 365 (H) 112 - 346 mcg/dL    Iron 43 37 - 145 mcg/dL    Iron Saturation 11 (L) 20 - 50 %   Ferritin    Collection Time: 11/16/23  1:06 PM    Specimen: Blood   Result Value Ref Range    Ferritin 53.80 13.00 - 150.00 ng/mL   Vitamin D 25 hydroxy    Collection Time: 11/16/23  1:06 PM    Specimen: Blood   Result Value Ref Range    25 Hydroxy, Vitamin D 20.3 (L) 30.0 - 100.0 ng/ml   TSH    Collection Time: 11/16/23  1:06 PM    Specimen: Blood   Result Value Ref Range    TSH 2.020 0.270 - 4.200 uIU/mL   Comprehensive metabolic panel    Collection Time: 11/16/23  1:06 PM    Specimen: Blood   Result Value Ref Range    Glucose 178 (H) 65 - 99 mg/dL    BUN 28 (H) 8 -  23 mg/dL    Creatinine 1.34 (H) 0.57 - 1.00 mg/dL    EGFR Result 42.7 (L) >60.0 mL/min/1.73    BUN/Creatinine Ratio 20.9 7.0 - 25.0    Sodium 141 136 - 145 mmol/L    Potassium 4.0 3.5 - 5.2 mmol/L    Chloride 97 (L) 98 - 107 mmol/L    Total CO2 32.1 (H) 22.0 - 29.0 mmol/L    Calcium 9.0 8.6 - 10.5 mg/dL    Total Protein 6.7 6.0 - 8.5 g/dL    Albumin 4.4 3.5 - 5.2 g/dL    Globulin 2.3 gm/dL    A/G Ratio 1.9 g/dL    Total Bilirubin 0.2 0.0 - 1.2 mg/dL    Alkaline Phosphatase 92 39 - 117 U/L    AST (SGOT) 19 1 - 32 U/L    ALT (SGPT) 23 1 - 33 U/L       Physical Exam  Vitals and nursing note reviewed.   Constitutional:       Appearance: Normal appearance. She is well-developed and well-groomed. She is morbidly obese.      Comments: Morbid exogenous obesity with a BMI of 45.5   HENT:      Head: Normocephalic and atraumatic.   Neck:      Thyroid: No thyroid mass or thyromegaly.      Vascular: Normal carotid pulses. No carotid bruit.      Trachea: Trachea and phonation normal.   Cardiovascular:      Rate and Rhythm: Normal rate and regular rhythm.      Heart sounds: Normal heart sounds. No murmur heard.     No friction rub. No gallop.   Pulmonary:      Effort: Pulmonary effort is normal. No respiratory distress.      Breath sounds: Normal breath sounds. No decreased breath sounds, wheezing, rhonchi or rales.   Musculoskeletal:      Cervical back: Neck supple.   Lymphadenopathy:      Cervical: No cervical adenopathy.   Skin:     General: Skin is warm and dry.      Findings: No rash.   Neurological:      Mental Status: She is alert and oriented to person, place, and time.   Psychiatric:         Attention and Perception: Attention and perception normal.         Mood and Affect: Mood and affect normal.         Speech: Speech normal.         Behavior: Behavior normal. Behavior is cooperative.         Thought Content: Thought content normal.         Cognition and Memory: Cognition and memory normal.         Judgment: Judgment  normal.         Assessment & Plan   Diagnoses and all orders for this visit:    1. Essential hypertension (Primary)    2. Type 2 diabetes mellitus with both eyes affected by mild nonproliferative retinopathy without macular edema, without long-term current use of insulin  -     Semaglutide,0.25 or 0.5MG/DOS, (Ozempic, 0.25 or 0.5 MG/DOSE,) 2 MG/3ML solution pen-injector; Inject 0.25 mg under the skin into the appropriate area as directed 1 (One) Time Per Week.  Dispense: 3 mL; Refill: 0    3. Weakness of both lower extremities  -     Ambulatory Referral to Physical Therapy Evaluate and treat    4. Coronary artery disease involving native coronary artery of native heart without angina pectoris    5. Mixed hyperlipidemia    6. Atherosclerosis of both carotid arteries    7. Acquired hypothyroidism    8. Morbid exogenous obesity    9. Vitamin D deficiency    10. Gastroesophageal reflux disease without esophagitis    11. Other iron deficiency anemia    12. Generalized anxiety disorder    13. Panic disorder    14. History of cardioembolic cerebrovascular accident (CVA)        Return in about 2 months (around 1/29/2024) for Recheck.

## 2023-12-03 NOTE — PROGRESS NOTES
[facility]  [address] [place] [id number]  Phone [phone]  Fax [phone]      Physical Therapy Initial Evaluation and Plan of Care    Patient: Rosalee Hargrove   : 1955  Diagnosis/ICD-10 Code:  Weakness of both lower extremities [R29.898]  Referring practitioner: Eddie Slater DO  Date of Initial Visit: 2023  Today's Date: 2023  Patient seen for 7 session         Visit Diagnoses:    ICD-10-CM ICD-9-CM   1. Weakness of both lower extremities  R29.898 729.89   2. Bilateral leg pain  M79.604 729.5    M79.605    3. DDD (degenerative disc disease), cervical  M50.30 722.4   4. Neck sprain, subsequent encounter  S13.9XXD V58.89     847.0         Subjective Questionnaire: LEFS: 39/80  Past Medical History:   Diagnosis Date    Allergic     Anemia     Angina pectoris     Anxiety     Arthritis     ASCVD (arteriosclerotic cardiovascular disease)     Bilateral leg edema     CAD (coronary artery disease)     Cataract 2022    Chest pain     Cholelithiasis     Colon polyp 2023    Depression     Diabetes mellitus     Disease of thyroid gland     Dizziness     GERD (gastroesophageal reflux disease)     Glaucoma     Headache     HL (hearing loss)     Hyperlipidemia     Hypertension     Kidney stone     Morbid obesity     Orthostatic hypotension     PAD (peripheral artery disease)     Pancreatitis     Rectal bleeding 2022    Added automatically from request for surgery [id number]    Shortness of breath     Sleep apnea     Stroke     Tobacco abuse     Urinary tract infection     Visual impairment     Macular degeneration    Vitamin D deficiency      Past Surgical History:   Procedure Laterality Date    ANAL FISSURECTOMY      BRAIN SURGERY      CARDIAC CATHETERIZATION N/A 2016    Procedure: Coronary angiography;  Surgeon: Fredrick Kapoor MD;  Location: [facility] INVASIVE LOCATION;  Service:     CARDIAC CATHETERIZATION N/A 2016    Procedure:  Left heart cath;  Surgeon: Fredrick Kapoor MD;  Location:  [facility] INVASIVE LOCATION;  Service:     CARDIAC CATHETERIZATION N/A 2016    Procedure: Left ventriculography;  Surgeon: Fredrick Kapoor MD;  Location:  [facility] INVASIVE LOCATION;  Service:     CARDIAC CATHETERIZATION N/A 2016    Procedure: Right heart cath;  Surgeon: Fredrick Kapoor MD;  Location:  [facility] INVASIVE LOCATION;  Service:      SECTION      CHOLECYSTECTOMY      COLONOSCOPY N/A 2022    Procedure: COLONOSCOPY with Polypectomy;  Surgeon: Sarwat Church MD;  [place] [facility];  Service: Gastroenterology;  Laterality: N/A;  Rectal polyps x3 and Hemorrhoids    ENDOSCOPY N/A 2022    Procedure: ESOPHAGOGASTRODUODENOSCOPY;  Surgeon: Sarwat Church MD;  [place] [facility];  Service: Gastroenterology;  Laterality: N/A;  Small Hiatal Hernia    ERCP      FRACTURE SURGERY      arm    HYSTERECTOMY      ILIAC ARTERY STENT      SUBTOTAL HYSTERECTOMY      TONSILLECTOMY          Subjective Evaluation    History of Present Illness  Mechanism of injury: Patient is currrently being seen in physical therapy for neck pain which has gradually been improving.  H/o standing for years but no injury to her knees or legs.  Her bigger concern that brings her to the office today is bilateral leg weakness.  Knee pain with stairs.    When I walk too long, my legs tighten up or if I first get up they are tight.    Better with rest.  She has had two strokes and one TIA in the past as well as a h/o pvd and cardiac issues.   Numbness and weakness in arm, but just leg the day of the stroke.    Subjective comment: I can walk longer if I saunter. I can stand forever. I can't make it to the store and check out without a break.  She reports she has had no falls.  If I sit too long I have numbness but not consistently. Did have medicine changes last week, no restrictions from cardio.  No longer having cramps like I was.  Patient Occupation:  Retired hairdreser, still has a few clients   Precautions and Work Restrictions: NonePain  Current pain ratin  At worst pain ratin  Location: B lower legs and knees.  Quality: dull ache, radiating, squeezing, tight and discomfort  Relieving factors: rest (stand)  Aggravating factors: ambulation, squatting and stairs  Progression: worsening    Social Support  Lives with: spouse             Objective          Observations   Left Knee   Negative for edema.     Right Knee   Negative for edema.     Additional Knee Observation Details  B veins prominence    Palpation     Additional Palpation Details  Tenderness along varicose veins B.    Tenderness     Right Knee   Tenderness in the medial joint line.     Neurological Testing     Sensation     Knee   Left Knee   Intact: Light touch    Right Knee   Intact: light touch     Active Range of Motion   Left Knee   Flexion: 105 degrees   Extension: 0 degrees     Right Knee   Flexion: 100 degrees   Extension: 0 degrees     Strength/Myotome Testing     Left Knee   Flexion: 4  Extension: 4+    Right Knee   Flexion: 4+  Extension: 4+    Additional Strength Details  Seated hip flexion 4-/5 B  Ankles PF and DF 5/5 MMT in seated.    Tests     Left Knee   Negative anterior Lachman, posterior drawer, valgus stress test at 0 degrees, valgus stress test at 30 degrees, varus stress test at 0 degrees and varus stress test at 30 degrees.     Right Knee   Negative anterior Lachman, posterior drawer, valgus stress test at 0 degrees, valgus stress test at 30 degrees, varus stress test at 0 degrees and varus stress test at 30 degrees.     Swelling     Left Knee Girth Measurement (cm)   Joint line: 38.8.  10 cm above joint line: 38.8.    Right Knee Girth Measurement (cm)   Joint line: 41.0.  10 cm above joint line: 38.6.    Ambulation   Weight-Bearing Status   Weight-Bearing Status (Left): full weight bearing   Weight-Bearing Status (Right): full weight-bearing    Assistive device used:  none    Ambulation: Level Surfaces   Ambulation without assistive device: independent    Observational Gait   Decreased walking speed and stride length.       Before exercise   During exercise  After exercises    HR bpm 68   80 , 80, 79   79 60    O2 saturation% 99  98, 99    99    Blood pressure mmHG 162/78 142/10,175/80  160/78    Perceived exertion/dypsnea (1-10) 0  2,4  0    Functional testing:  Sit to stand test: 12  in 50 seconds.  Leg pain limited completion of test.  Balance tandem: R and left 5 seconds  Narrow base: 30 seconds with EO and EC  6 ,minute walk test:  onset on pain (2 on claudication scale) at 1 minute, 3 at 1.5 minutes, and 4 at 2.07 minutes and testing had to be stopped.      Immediately following walk test: patient reported  4 dyspnea scale, HR 80 bpm, blood pressure  175/80mmHG, and O2 saturation 98%       Assessment & Plan       Assessment  Impairments: abnormal or restricted ROM, activity intolerance, impaired balance, impaired physical strength, lacks appropriate home exercise program, pain with function and safety issue   Functional limitations: carrying objects, lifting, walking, pulling, pushing, uncomfortable because of pain and stooping   Assessment details: Patient is a 67 yo female that has had gradual increase in weakness and pain in her lower legs.  She denies any injury but has always stood for her occupation.  Her pain is increased with stairs, squatting and walking and relieved with standing or sitting. She has had two strokes and a TIA which she thinks gave her some weakness in the left LE but mostly involved her UE.  On exam she presents with decreased knee flexion B, normal joint/ligamentous testing, weakness in the bilateral knees and hips, and no significant edema or tenderness.  Functionally, her bilateral leg pain started within less than one minute with the sit to stand test and in 2 minutes on the 6 minute walk test.  Her symptoms resolve with rest.  She presents  with an evolving clinical presentation.  She has multiple comorbidities and  a personal factor of standing for work that may affect progression of care.  Signs and symptoms are consistent with physical therapy diagnosis of bilateral leg weakness, pain, and decreased endurance. Patient is appropriate for skilled physical therapy in order to reduce pain, increase ease with daily mobility and maintain I level of function.   Prognosis: good    Goals  Plan Goals: 6 weeks  1. Pt to tolerate initial HEP  2. Pt to exhibit increased B knee AROM to 110 to allow for improved ability to navigate curbs and stairs  3. Pt to report decreased pain to minimum with walking up to 4 minutes.  4. Pt to exhibit increased functional strength by performing 15 squats in one minute to allow for prolonged ADL's and walking  12 weeks  1.  Patient will demonstrate bilateral  knee ROM to 120 in open chain.  2.  Patient I with HEP and ready to continue with gym membership for long term maintenance   3.  Functionally patient will be able to ascend/descend stairs X 1 and improve tandem balance to 13 seconds with tolerable symptoms.  4.  Patient will be able to return to walking in the store to retrieve an item and checkout without a rest break.      Plan  Therapy options: will be seen for skilled therapy services  Planned modality interventions: cryotherapy, ultrasound and thermotherapy (hydrocollator packs)  Planned therapy interventions: manual therapy, strengthening, joint mobilization, functional ROM exercises, gait training, home exercise program, balance/weight-bearing training, postural training, neuromuscular re-education, therapeutic activities, transfer training and ADL retraining  Frequency: 2x week  Duration in weeks: 12  Treatment plan discussed with: patient        History # of Personal Factors and/or Comorbidities: HIGH (3+)  Examination of Body System(s): # of elements: MODERATE (3)  Clinical Presentation: EVOLVING  Clinical  Decision Making: MODERATE      Timed:         Manual Therapy:    -     mins  08330;     Therapeutic Exercise:    -     mins  47154;     Neuromuscular Hilary:    -    mins  73339;    Therapeutic Activity:     10     mins  79920;     Gait Trainin     mins  15500;     Ultrasound:     -     mins  82142;    Ionto                               -    mins   73964  Self Care                       5     mins   43979  Orthotic fit/train              -   mins  42196    Un-Timed:  Electrical Stimulation:    -     mins  57951 ( );  Dry Needling     -     mins self-pay  Traction     15     mins 80668  Low Eval     -     Mins  96114  Mod Eval     30     Mins  87653  High Eval                       -     Mins  55136        Timed Treatment:   23   mins   Total Treatment:     75   mins          PT: Amy Harrington PT, CID[facility]     License Number: [id number]  Electronically signed by Amy Harrington PT, 23, 1:46 PM EST    Certification Period: 2023 thru 3/2/2024  I certify that the therapy services are furnished while this patient is under my care.  The services outlined above are required by this patient, and will be reviewed every 90 days.         Physician Signature:__________________________________________________    PHYSICIAN: Eddie Slater,       DATE:     Please sign and return via fax to [phone]. Thank you, [facility].

## 2023-12-04 ENCOUNTER — TREATMENT (OUTPATIENT)
Dept: PHYSICAL THERAPY | Facility: CLINIC | Age: 68
End: 2023-12-04
Payer: MEDICARE

## 2023-12-04 DIAGNOSIS — M79.604 BILATERAL LEG PAIN: ICD-10-CM

## 2023-12-04 DIAGNOSIS — R29.898 WEAKNESS OF BOTH LOWER EXTREMITIES: Primary | ICD-10-CM

## 2023-12-04 DIAGNOSIS — M79.605 BILATERAL LEG PAIN: ICD-10-CM

## 2023-12-04 DIAGNOSIS — S13.9XXD NECK SPRAIN, SUBSEQUENT ENCOUNTER: ICD-10-CM

## 2023-12-04 DIAGNOSIS — M50.30 DDD (DEGENERATIVE DISC DISEASE), CERVICAL: ICD-10-CM

## 2023-12-04 PROCEDURE — 97530 THERAPEUTIC ACTIVITIES: CPT | Performed by: PHYSICAL THERAPIST

## 2023-12-04 PROCEDURE — 97162 PT EVAL MOD COMPLEX 30 MIN: CPT | Performed by: PHYSICAL THERAPIST

## 2023-12-04 PROCEDURE — 97012 MECHANICAL TRACTION THERAPY: CPT | Performed by: PHYSICAL THERAPIST

## 2023-12-04 PROCEDURE — 97116 GAIT TRAINING THERAPY: CPT | Performed by: PHYSICAL THERAPIST

## 2023-12-05 NOTE — PATIENT INSTRUCTIONS
Patient was educated on findings of evaluation, purpose of treatment, and goals for therapy.  Patient was educated on exercises/self treatment/pain relief techniques.Patient educated on anatomy, healing process, restrictions reviewed,  purpose of therapy, pain control, and plan of care discussed.  All questions answered.

## 2023-12-06 ENCOUNTER — TREATMENT (OUTPATIENT)
Dept: PHYSICAL THERAPY | Facility: CLINIC | Age: 68
End: 2023-12-06
Payer: MEDICARE

## 2023-12-06 DIAGNOSIS — S13.9XXD NECK SPRAIN, SUBSEQUENT ENCOUNTER: ICD-10-CM

## 2023-12-06 DIAGNOSIS — M79.605 BILATERAL LEG PAIN: ICD-10-CM

## 2023-12-06 DIAGNOSIS — M79.604 BILATERAL LEG PAIN: ICD-10-CM

## 2023-12-06 DIAGNOSIS — M50.30 DDD (DEGENERATIVE DISC DISEASE), CERVICAL: ICD-10-CM

## 2023-12-06 DIAGNOSIS — R29.898 WEAKNESS OF BOTH LOWER EXTREMITIES: Primary | ICD-10-CM

## 2023-12-06 NOTE — PROGRESS NOTES
[facility]  [address] [place] [address] [place] [address]  Phone [phone]  Fax [phone]      Physical Therapy Daily Treatment Note      Patient: Rosalee Hargrove   : 1955  Referring practitioner: Eddie Slater DO  Date of Initial Visit: Type: THERAPY  Noted: 2023  Today's Date: 2023  Patient seen for 2 sessions       Visit Diagnoses:    ICD-10-CM ICD-9-CM   1. Weakness of both lower extremities  R29.898 729.89   2. Bilateral leg pain  M79.604 729.5    M79.605    3. DDD (degenerative disc disease), cervical  M50.30 722.4   4. Neck sprain, subsequent encounter  S13.9XXD V58.89     847.0       Rosalee Hargrove reports: no pain but I was tired after last time and felt it in my muscles.    Subjective     Objective   See Exercise, Manual, and Modality Logs for complete treatment.   Pre exercise vitals:  BP: 125/82  O2: 98% HR 73  97% 100bpm after 1.5 min of walking  99,97% 80/84 with exercises  120/80 99% 80bpm after exercises.     Assessment/Plan  Patient able to tolerate increased walking in smaller sessions without vascular pain.  Muscle fatigue with new strengthening exercises but no pain.  Still improving neck pain with relief 2-3 days from traction.    Timed:         Manual Therapy:    -     mins  04230;     Therapeutic Exercise:    15     mins  69685;     Neuromuscular Hilary:    -    mins  55940;    Therapeutic Activity:     15     mins  96293;     Gait Training:      -     mins  28440;     Ultrasound:     -     mins  63002;    Ionto                               -    mins   74241  Self Care                       -     mins   38457  Orthotic training    -     mins 71693      Un-Timed:  Electrical Stimulation:    -     mins  77754 ( );  Dry Needling     -     mins self-pay  Traction     10     mins 16777      Timed Treatment:   40   mins   Total Treatment:     58   mins    Amy Harrington PT, CIDN  [place] License: [id number]

## 2023-12-07 ENCOUNTER — HOSPITAL ENCOUNTER (OUTPATIENT)
Dept: CARDIOLOGY | Facility: HOSPITAL | Age: 68
Discharge: HOME | End: 2023-12-07
Admitting: NURSE PRACTITIONER
Payer: MEDICARE

## 2023-12-07 VITALS
HEIGHT: 60 IN | DIASTOLIC BLOOD PRESSURE: 70 MMHG | WEIGHT: 233 LBS | HEART RATE: 75 BPM | BODY MASS INDEX: 45.75 KG/M2 | SYSTOLIC BLOOD PRESSURE: 140 MMHG

## 2023-12-07 DIAGNOSIS — I50.31 ACUTE DIASTOLIC CHF (CONGESTIVE HEART FAILURE): ICD-10-CM

## 2023-12-07 LAB
AORTIC ARCH: 2.2 CM
ASCENDING AORTA: 3.2 CM
BH CV ECHO LEFT VENTRICLE GLOBAL LONGITUDINAL STRAIN: -25.1 %
BH CV ECHO MEAS - ACS: 1.93 CM
BH CV ECHO MEAS - AO MAX PG: 7.6 MMHG
BH CV ECHO MEAS - AO MEAN PG: 4 MMHG
BH CV ECHO MEAS - AO ROOT DIAM: 3 CM
BH CV ECHO MEAS - AO V2 MAX: 138 CM/SEC
BH CV ECHO MEAS - AO V2 VTI: 32.6 CM
BH CV ECHO MEAS - AVA(I,D): 2.1 CM2
BH CV ECHO MEAS - EDV(CUBED): 97.3 ML
BH CV ECHO MEAS - EDV(MOD-SP2): 45 ML
BH CV ECHO MEAS - EDV(MOD-SP4): 57 ML
BH CV ECHO MEAS - EF(MOD-BP): 68 %
BH CV ECHO MEAS - EF(MOD-SP2): 71.1 %
BH CV ECHO MEAS - EF(MOD-SP4): 68.4 %
BH CV ECHO MEAS - ESV(CUBED): 11.6 ML
BH CV ECHO MEAS - ESV(MOD-SP2): 13 ML
BH CV ECHO MEAS - ESV(MOD-SP4): 18 ML
BH CV ECHO MEAS - FS: 50.7 %
BH CV ECHO MEAS - IVS/LVPW: 1.08 CM
BH CV ECHO MEAS - IVSD: 1.4 CM
BH CV ECHO MEAS - LAT PEAK E' VEL: 7.3 CM/SEC
BH CV ECHO MEAS - LV DIASTOLIC VOL/BSA (35-75): 28.6 CM2
BH CV ECHO MEAS - LV MASS(C)D: 243.3 GRAMS
BH CV ECHO MEAS - LV MAX PG: 3.2 MMHG
BH CV ECHO MEAS - LV MEAN PG: 2 MMHG
BH CV ECHO MEAS - LV SYSTOLIC VOL/BSA (12-30): 9 CM2
BH CV ECHO MEAS - LV V1 MAX: 89.5 CM/SEC
BH CV ECHO MEAS - LV V1 VTI: 22.4 CM
BH CV ECHO MEAS - LVIDD: 4.6 CM
BH CV ECHO MEAS - LVIDS: 2.27 CM
BH CV ECHO MEAS - LVOT AREA: 3.1 CM2
BH CV ECHO MEAS - LVOT DIAM: 1.97 CM
BH CV ECHO MEAS - LVPWD: 1.3 CM
BH CV ECHO MEAS - MED PEAK E' VEL: 7.6 CM/SEC
BH CV ECHO MEAS - MV A DUR: 0.11 SEC
BH CV ECHO MEAS - MV A MAX VEL: 121 CM/SEC
BH CV ECHO MEAS - MV DEC SLOPE: 374.8 CM/SEC2
BH CV ECHO MEAS - MV DEC TIME: 0.17 SEC
BH CV ECHO MEAS - MV E MAX VEL: 96 CM/SEC
BH CV ECHO MEAS - MV E/A: 0.79
BH CV ECHO MEAS - MV MAX PG: 7.7 MMHG
BH CV ECHO MEAS - MV MEAN PG: 3.6 MMHG
BH CV ECHO MEAS - MV P1/2T: 74.8 MSEC
BH CV ECHO MEAS - MV V2 VTI: 27.7 CM
BH CV ECHO MEAS - MVA(P1/2T): 2.9 CM2
BH CV ECHO MEAS - MVA(VTI): 2.48 CM2
BH CV ECHO MEAS - PA ACC TIME: 0.15 SEC
BH CV ECHO MEAS - PA V2 MAX: 107.6 CM/SEC
BH CV ECHO MEAS - PULM A REVS DUR: 0.1 SEC
BH CV ECHO MEAS - PULM A REVS VEL: 40.3 CM/SEC
BH CV ECHO MEAS - PULM DIAS VEL: 59.6 CM/SEC
BH CV ECHO MEAS - PULM S/D: 0.85
BH CV ECHO MEAS - PULM SYS VEL: 50.6 CM/SEC
BH CV ECHO MEAS - QP/QS: 0.99
BH CV ECHO MEAS - RV MAX PG: 2.07 MMHG
BH CV ECHO MEAS - RV V1 MAX: 72 CM/SEC
BH CV ECHO MEAS - RV V1 VTI: 16.7 CM
BH CV ECHO MEAS - RVOT DIAM: 2.27 CM
BH CV ECHO MEAS - SI(MOD-SP2): 16.1 ML/M2
BH CV ECHO MEAS - SI(MOD-SP4): 19.6 ML/M2
BH CV ECHO MEAS - SV(LVOT): 68.5 ML
BH CV ECHO MEAS - SV(MOD-SP2): 32 ML
BH CV ECHO MEAS - SV(MOD-SP4): 39 ML
BH CV ECHO MEAS - SV(RVOT): 67.6 ML
BH CV ECHO MEAS - TAPSE (>1.6): 2.17 CM
BH CV ECHO MEAS - TR MAX PG: 24.5 MMHG
BH CV ECHO MEAS - TR MAX VEL: 247.6 CM/SEC
BH CV ECHO MEASUREMENTS AVERAGE E/E' RATIO: 12.89
BH CV XLRA - RV BASE: 3.2 CM
BH CV XLRA - RV LENGTH: 6.2 CM
BH CV XLRA - RV MID: 2.6 CM
BH CV XLRA - TDI S': 12.9 CM/SEC
LEFT ATRIUM VOLUME INDEX: 24 ML/M2
SINUS: 2.9 CM
STJ: 2.7 CM

## 2023-12-07 PROCEDURE — 93356 MYOCRD STRAIN IMG SPCKL TRCK: CPT

## 2023-12-07 PROCEDURE — 93306 TTE W/DOPPLER COMPLETE: CPT

## 2023-12-08 ENCOUNTER — TELEPHONE (OUTPATIENT)
Age: 68
End: 2023-12-08
Payer: MEDICARE

## 2023-12-08 NOTE — TELEPHONE ENCOUNTER
----- Message from DELMY Suárez sent at 12/8/2023  1:36 PM EST -----  Let patient know echo is normal.   ----- Message -----  From: Jerry Cruz III, MD  Sent: 12/7/2023   3:04 PM EST  To: DELMY Suárez

## 2023-12-14 ENCOUNTER — OFFICE VISIT (OUTPATIENT)
Dept: ONCOLOGY | Facility: CLINIC | Age: 68
End: 2023-12-14
Payer: MEDICARE

## 2023-12-14 ENCOUNTER — LAB (OUTPATIENT)
Dept: LAB | Facility: HOSPITAL | Age: 68
End: 2023-12-14
Payer: MEDICARE

## 2023-12-14 VITALS
DIASTOLIC BLOOD PRESSURE: 80 MMHG | HEART RATE: 77 BPM | SYSTOLIC BLOOD PRESSURE: 143 MMHG | OXYGEN SATURATION: 95 % | WEIGHT: 234.6 LBS | RESPIRATION RATE: 16 BRPM | TEMPERATURE: 98 F | BODY MASS INDEX: 46.06 KG/M2 | HEIGHT: 60 IN

## 2023-12-14 DIAGNOSIS — T45.4X5D ADVERSE EFFECT OF IRON, SUBSEQUENT ENCOUNTER: ICD-10-CM

## 2023-12-14 DIAGNOSIS — I10 ESSENTIAL HYPERTENSION: ICD-10-CM

## 2023-12-14 DIAGNOSIS — D50.9 IRON DEFICIENCY ANEMIA, UNSPECIFIED IRON DEFICIENCY ANEMIA TYPE: Primary | ICD-10-CM

## 2023-12-14 DIAGNOSIS — D50.9 IRON DEFICIENCY ANEMIA, UNSPECIFIED IRON DEFICIENCY ANEMIA TYPE: ICD-10-CM

## 2023-12-14 PROBLEM — T45.4X5A IRON ADVERSE REACTION: Status: ACTIVE | Noted: 2023-12-14

## 2023-12-14 LAB
ANION GAP SERPL CALCULATED.3IONS-SCNC: 12.9 MMOL/L (ref 5–15)
BASOPHILS # BLD AUTO: 0.04 10*3/MM3 (ref 0–0.2)
BASOPHILS NFR BLD AUTO: 0.5 % (ref 0–1.5)
BUN SERPL-MCNC: 19 MG/DL (ref 8–23)
BUN/CREAT SERPL: 17.8 (ref 7–25)
CALCIUM SPEC-SCNC: 9.2 MG/DL (ref 8.6–10.5)
CHLORIDE SERPL-SCNC: 103 MMOL/L (ref 98–107)
CO2 SERPL-SCNC: 23.1 MMOL/L (ref 22–29)
CREAT SERPL-MCNC: 1.07 MG/DL (ref 0.57–1)
DEPRECATED RDW RBC AUTO: 54.3 FL (ref 37–54)
EGFRCR SERPLBLD CKD-EPI 2021: 56 ML/MIN/1.73
EOSINOPHIL # BLD AUTO: 0.32 10*3/MM3 (ref 0–0.4)
EOSINOPHIL NFR BLD AUTO: 4 % (ref 0.3–6.2)
ERYTHROCYTE [DISTWIDTH] IN BLOOD BY AUTOMATED COUNT: 17.3 % (ref 12.3–15.4)
FERRITIN SERPL-MCNC: 36 NG/ML (ref 13–150)
GLUCOSE SERPL-MCNC: 150 MG/DL (ref 65–99)
HCT VFR BLD AUTO: 36.5 % (ref 34–46.6)
HGB BLD-MCNC: 11.1 G/DL (ref 12–15.9)
IMM GRANULOCYTES # BLD AUTO: 0.04 10*3/MM3 (ref 0–0.05)
IMM GRANULOCYTES NFR BLD AUTO: 0.5 % (ref 0–0.5)
IRON 24H UR-MRATE: 27 MCG/DL (ref 37–145)
IRON SATN MFR SERPL: 9 % (ref 20–50)
LYMPHOCYTES # BLD AUTO: 2.23 10*3/MM3 (ref 0.7–3.1)
LYMPHOCYTES NFR BLD AUTO: 28.1 % (ref 19.6–45.3)
MCH RBC QN AUTO: 25.8 PG (ref 26.6–33)
MCHC RBC AUTO-ENTMCNC: 30.4 G/DL (ref 31.5–35.7)
MCV RBC AUTO: 84.9 FL (ref 79–97)
MONOCYTES # BLD AUTO: 0.39 10*3/MM3 (ref 0.1–0.9)
MONOCYTES NFR BLD AUTO: 4.9 % (ref 5–12)
NEUTROPHILS NFR BLD AUTO: 4.93 10*3/MM3 (ref 1.7–7)
NEUTROPHILS NFR BLD AUTO: 62 % (ref 42.7–76)
PLATELET # BLD AUTO: 290 10*3/MM3 (ref 140–450)
PMV BLD AUTO: 9.8 FL (ref 6–12)
POTASSIUM SERPL-SCNC: 4.3 MMOL/L (ref 3.5–5.2)
RBC # BLD AUTO: 4.3 10*6/MM3 (ref 3.77–5.28)
SODIUM SERPL-SCNC: 139 MMOL/L (ref 136–145)
TIBC SERPL-MCNC: 313 MCG/DL (ref 298–536)
UIBC SERPL-MCNC: 286 MCG/DL (ref 112–346)
WBC NRBC COR # BLD AUTO: 7.95 10*3/MM3 (ref 3.4–10.8)

## 2023-12-14 PROCEDURE — 1125F AMNT PAIN NOTED PAIN PRSNT: CPT | Performed by: INTERNAL MEDICINE

## 2023-12-14 PROCEDURE — 83550 IRON BINDING TEST: CPT | Performed by: INTERNAL MEDICINE

## 2023-12-14 PROCEDURE — 80048 BASIC METABOLIC PNL TOTAL CA: CPT | Performed by: NURSE PRACTITIONER

## 2023-12-14 PROCEDURE — 83540 ASSAY OF IRON: CPT | Performed by: INTERNAL MEDICINE

## 2023-12-14 PROCEDURE — 85025 COMPLETE CBC W/AUTO DIFF WBC: CPT | Performed by: INTERNAL MEDICINE

## 2023-12-14 PROCEDURE — 36415 COLL VENOUS BLD VENIPUNCTURE: CPT

## 2023-12-14 PROCEDURE — 3077F SYST BP >= 140 MM HG: CPT | Performed by: INTERNAL MEDICINE

## 2023-12-14 PROCEDURE — 82728 ASSAY OF FERRITIN: CPT | Performed by: INTERNAL MEDICINE

## 2023-12-14 PROCEDURE — 3079F DIAST BP 80-89 MM HG: CPT | Performed by: INTERNAL MEDICINE

## 2023-12-14 NOTE — PROGRESS NOTES
Subjective     REASON FOR CONSULTATION: Iron deficiency anemia  Provide an opinion on any further workup or treatment                             REQUESTING PHYSICIAN: Dr. Slater    RECORDS OBTAINED:  Records of the patients history including those obtained from the referring provider were reviewed and summarized in detail.      History of Present Illness   This is a very pleasant 66-year-old woman with multiple medical comorbidities referred from her PCP for evaluation and treatment of iron deficiency anemia.  Reviewing the patient's records, she has had microcytic, hypochromic red blood cell indices since 2017/2018 and over the past 1 year or so been mildly anemic hemoglobin typically running around 11.0.  She has normal white blood cell and platelet counts.  Her ferritin has been low for at least 5 years most recently ten 1 month ago.  The patient has attempted to take oral iron on multiple occasions but difficulty tolerating secondary to either diarrhea or constipation.  She has not been able to improve her ferritin despite oral iron.  She complains of fatigue, pica and restless leg symptoms.  She thinks it has been about 10 years since her previous colonoscopy.  She does have occasional hematochezia.  She denies vaginal bleeding and denies donating blood.    Patient received Injectafer x2 on 3/23/2022 and 3/30/2022.   EGD and colonoscopy were performed 6/30/2022 with no obvious source of GI blood loss.    The patient returned today feeling well but having some increased fatigue recently.  She denies lightheadedness dizziness blood in the stool.    Past Medical History:   Diagnosis Date    Allergic     Anemia     Angina pectoris     Anxiety     Arthritis     ASCVD (arteriosclerotic cardiovascular disease)     Bilateral leg edema     CAD (coronary artery disease)     Cataract Jan 2022    Chest pain     Cholelithiasis     Colon polyp Feb 2023    Depression     Diabetes mellitus     Disease of thyroid gland      Dizziness     GERD (gastroesophageal reflux disease)     Glaucoma     Headache     HL (hearing loss)     Hyperlipidemia     Hypertension     Kidney stone     Morbid obesity     Orthostatic hypotension     PAD (peripheral artery disease)     Pancreatitis     Rectal bleeding 2022    Added automatically from request for surgery [id number]    Shortness of breath     Sleep apnea     Stroke     Tobacco abuse     Urinary tract infection     Visual impairment     Macular degeneration    Vitamin D deficiency         Past Surgical History:   Procedure Laterality Date    ANAL FISSURECTOMY      BRAIN SURGERY      CARDIAC CATHETERIZATION N/A 2016    Procedure: Coronary angiography;  Surgeon: Fredrick Kapoor MD;  Location:  [facility];  Service:     CARDIAC CATHETERIZATION N/A 2016    Procedure: Left heart cath;  Surgeon: Fredrick Kapoor MD;  Location:  [facility];  Service:     CARDIAC CATHETERIZATION N/A 2016    Procedure: Left ventriculography;  Surgeon: Fredrick Kapoor MD;  Location:  [facility];  Service:     CARDIAC CATHETERIZATION N/A 2016    Procedure: Right heart cath;  Surgeon: Fredrick Kapoor MD;  Location:  [facility];  Service:      SECTION      CHOLECYSTECTOMY      COLONOSCOPY N/A 2022    Procedure: COLONOSCOPY with Polypectomy;  Surgeon: Sarwat Church MD;  Location: [facility] OR;  Service: Gastroenterology;  Laterality: N/A;  Rectal polyps x3 and Hemorrhoids    ENDOSCOPY N/A 2022    Procedure: ESOPHAGOGASTRODUODENOSCOPY;  Surgeon: Sarwat Church MD;  Location: [facility] OR;  Service: Gastroenterology;  Laterality: N/A;  Small Hiatal Hernia    ERCP      FRACTURE SURGERY      arm    HYSTERECTOMY      ILIAC ARTERY STENT      SUBTOTAL HYSTERECTOMY      TONSILLECTOMY          Current Outpatient Medications on File Prior to Visit   Medication Sig Dispense Refill    ALPRAZolam (XANAX) 0.5 MG tablet Take 1  tablet by mouth As Needed for Anxiety.      amLODIPine (NORVASC) 10 MG tablet Take 1 tablet by mouth Daily. 30 tablet 11    aspirin 81 MG chewable tablet Chew 1 tablet Daily.      atorvastatin (LIPITOR) 40 MG tablet Take 1 tablet by mouth Daily. 90 tablet 1    citalopram (CeleXA) 20 MG tablet Take 1 tablet by mouth Daily. 90 tablet 1    clopidogrel (PLAVIX) 75 MG tablet Take 1 tablet by mouth Daily. 90 tablet 1    ezetimibe (ZETIA) 10 MG tablet Take 1 tablet by mouth Daily. 90 tablet 1    fluocinolone acetonide (DERMOTIC) 0.01 % oil otic oil Administer  into both ears 2 (Two) Times a Day. Indications: Chronic External Ear Eczema 20 mL 1    Fluticasone-Umeclidin-Vilant (Trelegy Ellipta) 100-62.5-25 MCG/ACT inhaler Inhale 1 puff Daily. 1 each 5    glimepiride (AMARYL) 2 MG tablet Take 1 tablet by mouth Every Morning Before Breakfast. 90 tablet 1    levothyroxine (SYNTHROID, LEVOTHROID) 75 MCG tablet Take 1 tablet by mouth once daily 90 tablet 0    losartan (Cozaar) 25 MG tablet Take 1 tablet by mouth Daily. 30 tablet 11    metFORMIN (GLUCOPHAGE) 1000 MG tablet TAKE 1 TABLET BY MOUTH TWICE DAILY WITH MEALS 180 tablet 0    mometasone (Nasonex) 50 MCG/ACT nasal spray 1 spray into the nostril(s) as directed by provider 2 (Two) Times a Day As Needed (nasal itch). 17 g 1    pantoprazole (PROTONIX) 40 MG EC tablet Take 1 tablet by mouth once daily 90 tablet 0    pimecrolimus (Elidel) 1 % cream Apply 1 application  topically to the appropriate area as directed 2 (Two) Times a Day As Needed (eczema). Indications: Atopic Dermatitis 60 g 0    Semaglutide,0.25 or 0.5MG/DOS, (Ozempic, 0.25 or 0.5 MG/DOSE,) 2 MG/3ML solution pen-injector Inject 0.25 mg under the skin into the appropriate area as directed 1 (One) Time Per Week. 3 mL 0    spironolactone (ALDACTONE) 25 MG tablet Take 1 tablet by mouth Daily. 30 tablet 3    trimethoprim-polymyxin b (POLYTRIM) 38672-8.1 UNIT/ML-% ophthalmic solution Apply 1 drop to eye(s) as directed by  provider. Four days before and four days after eye injection.       No current facility-administered medications on file prior to visit.        ALLERGIES:    Allergies   Allergen Reactions    Ciprofloxacin Hives and Swelling    Lisinopril Swelling and Other (See Comments)     Cough     Seasonal Ic [Cholestatin] Other (See Comments)     Cough, congestion        Social History     Socioeconomic History    Marital status:    Tobacco Use    Smoking status: Former     Packs/day: 0.50     Years: 50.00     Additional pack years: 0.00     Total pack years: 25.00     Types: Cigarettes     Quit date: 2023     Years since quittin.6    Smokeless tobacco: Never   Vaping Use    Vaping Use: Never used   Substance and Sexual Activity    Alcohol use: Yes     Alcohol/week: 1.0 standard drink of alcohol     Types: 1 Glasses of wine per week     Comment: Very occasional    Drug use: No    Sexual activity: Not Currently     Partners: Male     Birth control/protection: None     Comment: N/A        Family History   Problem Relation Age of Onset    Heart disease Mother     Hypertension Mother     Hyperlipidemia Mother     Breast cancer Mother     Heart disease Father     Heart attack Sister     Heart disease Sister     Breast cancer Sister     Heart disease Brother     Heart attack Brother     Hyperlipidemia Brother     Diabetes Brother     Heart attack Maternal Grandmother     Heart disease Maternal Grandmother     Heart failure Maternal Grandmother     Heart failure Maternal Grandfather     Heart disease Maternal Grandfather     Heart attack Maternal Grandfather     Heart attack Paternal Grandmother     Heart disease Paternal Grandmother     Heart failure Paternal Grandmother     Hypertension Paternal Grandmother         Review of Systems   Constitutional:  Positive for fatigue. Negative for fever and unexpected weight change.   HENT: Negative.     Respiratory:  Negative for cough and shortness of breath.   "  Cardiovascular:  Negative for chest pain, palpitations and leg swelling.   Gastrointestinal:  Negative for blood in stool, constipation, diarrhea and nausea.   Genitourinary: Negative.    Musculoskeletal:  Positive for arthralgias.   Skin: Negative.    Allergic/Immunologic: Negative.    Neurological:  Negative for dizziness, seizures, weakness and light-headedness.   Hematological: Negative.    Psychiatric/Behavioral: Negative.     ROS unchanged-12/14/2023      Objective     Vitals:    12/14/23 1303   BP: 143/80   Pulse: 77   Resp: 16   Temp: 98 °F (36.7 °C)   TempSrc: Temporal   SpO2: 95%   Weight: 106 kg (234 lb 9.6 oz)   Height: 152.4 cm (60\")   PainSc:   5   PainLoc: Back           12/14/2023     1:04 PM   Current Status   ECOG score 0       Physical Exam    CONSTITUTIONAL: pleasant well-developed adult woman  HEENT: no icterus, hearing intact, mask in place  LYMPH: no cervical or supraclavicular lad  CV: RRR, S1S2, no murmur  RESP: cta bilat, no wheezing, no rales  MUSC: no edema, normal gait  NEURO: alert and oriented x3, normal strength  PSYCH: Normal mood and affect  Exam unchanged 12/14/2023  RECENT LABS:  Hematology WBC   Date Value Ref Range Status   12/14/2023 7.95 3.40 - 10.80 10*3/mm3 Final   11/16/2023 7.57 3.40 - 10.80 10*3/mm3 Final   04/26/2023 9.68 4.5 - 11.0 10*3/uL Final     RBC   Date Value Ref Range Status   12/14/2023 4.30 3.77 - 5.28 10*6/mm3 Final   11/16/2023 4.48 3.77 - 5.28 10*6/mm3 Final   04/26/2023 4.38 4.0 - 5.2 10*6/uL Final     Hemoglobin   Date Value Ref Range Status   12/14/2023 11.1 (L) 12.0 - 15.9 g/dL Final   04/26/2023 11.7 (L) 12.0 - 16.0 g/dL Final     Hematocrit   Date Value Ref Range Status   12/14/2023 36.5 34.0 - 46.6 % Final   04/26/2023 36.9 36.0 - 46.0 % Final     Platelets   Date Value Ref Range Status   12/14/2023 290 140 - 450 10*3/mm3 Final   04/26/2023 267 140 - 440 10*3/uL Final        Lab Results   Component Value Date    GLUCOSE 150 (H) 12/14/2023    BUN 19 " 12/14/2023    CREATININE 1.07 (H) 12/14/2023    EGFRIFNONA 60 12/22/2021    EGFRIFAFRI >60 08/02/2022    BCR 17.8 12/14/2023    K 4.3 12/14/2023    CO2 23.1 12/14/2023    CALCIUM 9.2 12/14/2023    PROTENTOTREF 6.7 11/16/2023    ALBUMIN 4.4 11/16/2023    LABIL2 1.9 11/16/2023    AST 19 11/16/2023    ALT 23 11/16/2023     Ferritin 73/iron sat 13%    Assessment & Plan     *Iron deficiency anemia  The patient has had microcytic/hypochromic indices for 4 to 5 years, recently developed anemia with hemoglobin around 11  The patient has attempted oral iron but cannot tolerate secondary to constipation/diarrhea and has been unable to improve iron stores despite oral iron  She has occasional bright red blood per rectum, most recent colonoscopy she thinks was about 10 years ago.  She denies vaginal bleeding and denies blood donations.  She has never had gastric surgeries.  She is on chronic PPI therapy.  3/15/2022 ferritin 12, iron saturation 6%, TIBC 388 received  2 doses of Injectafer  EGD and colonoscopy June 2022 no obvious source of blood loss; polyps resected from the rectum benign  8/17/2022- hemoglobin 14.4, iron sat 16%, ferritin 142  2/8/2023-hemoglobin 14.4, iron sat 16%, ferritin 76  8/9/2023-hemoglobin 12.2, iron sat 13%, ferritin 73  12/14/2023-hemoglobin 11.1, iron sat 9%, ferritin 36    *Multiple comorbidities including peripheral vascular disease, coronary artery disease, diabetes mellitus, CVA, obesity, hyperlipidemia, hypertension, tobacco use etc.    Hematology plan/recommendations:  The patient has worsening iron deficiency anemia and is intolerant of oral iron because of GI upset.  I recommended IV iron replacement with 2 doses of Injectafer.  We will repeat her CBC ferritin iron profile 6 months.

## 2023-12-18 ENCOUNTER — TREATMENT (OUTPATIENT)
Dept: PHYSICAL THERAPY | Facility: CLINIC | Age: 68
End: 2023-12-18
Payer: MEDICARE

## 2023-12-18 DIAGNOSIS — M79.605 BILATERAL LEG PAIN: ICD-10-CM

## 2023-12-18 DIAGNOSIS — M79.604 BILATERAL LEG PAIN: ICD-10-CM

## 2023-12-18 DIAGNOSIS — S13.9XXD NECK SPRAIN, SUBSEQUENT ENCOUNTER: ICD-10-CM

## 2023-12-18 DIAGNOSIS — R29.898 WEAKNESS OF BOTH LOWER EXTREMITIES: Primary | ICD-10-CM

## 2023-12-18 DIAGNOSIS — M50.30 DDD (DEGENERATIVE DISC DISEASE), CERVICAL: ICD-10-CM

## 2023-12-18 NOTE — PROGRESS NOTES
[facility] [place]  [address] [place] [address]  Phone [phone]  Fax [phone]      Physical Therapy Daily Treatment Note      Patient: Rosalee Hargrove   : 1955  Referring practitioner: Eddie Slater DO  Date of Initial Visit: Type: THERAPY  Noted: 2023  Today's Date: 2023  Patient seen for 3 sessions       Visit Diagnoses:    ICD-10-CM ICD-9-CM   1. Weakness of both lower extremities  R29.898 729.89   2. Bilateral leg pain  M79.604 729.5    M79.605    3. Neck sprain, subsequent encounter  S13.9XXD V58.89     847.0       Rosalee Hargrove reports: left leg having shooting pain up to knee.  I haven't been able to walk very far without feeling.  No injury to the leg, just started hurting.    Subjective     Objective   See Exercise, Manual, and Modality Logs for complete treatment.   Tender posterior tibialis left at medial malleolus  (-) hgazala's no gastroc tenderness  (-) tinels  Pain with inversion/eversion and walking 2 minutes in clinic.  None with Nustep  Calf girth: 39.4cm R 9 cm inf to patella 38.0c m left  Noted more vein distention left vs right lower leg.    Before exercise   During exercise  After exercises    HR bpm 72    90-89    78      O2 saturation% 100-97          98-97    95    Blood pressure mmHG 142/82 162/78         Assessment/Plan  Patient with new onset of right medial leg pain that increases with inversion/eversion rom, palpation, and walking.  She was able to tolerate all other exercises and Nustep without pain.  She has a negative Ghazala's, no redness, heat, edema,  or associated paresthesias.  Her strength is as previously tested.  She will continue to monitor for changes and contact her vascular physician if needed.  From an exercise standpoint, she seems much more tolerant of open chain or reduced weight bearing.  We will focus more on these exercises until her leg pain improves.  She continues to show improvement in  the neck pain with traction and her HEP.    Continue to monitor leg symptoms.    Timed:         Manual Therapy:    -     mins  09348;     Therapeutic Exercise:    15     mins  15067;     Neuromuscular Hilary:    -    mins  30853;    Therapeutic Activity:     10     mins  64483;     Gait Trainin     mins  34655;     Ultrasound:     -     mins  68197;    Ionto                               -    mins   95893  Self Care                       -     mins   82732  Orthotic training    -     mins 08218      Un-Timed:  Electrical Stimulation:    -     mins  18790 ( );  Dry Needling     -     mins self-pay  Traction     15     mins 99644      Timed Treatment:   30   mins   Total Treatment:     50   mins    Amy Harrington PT, CIDN  [place] License: [id number]

## 2023-12-20 ENCOUNTER — TREATMENT (OUTPATIENT)
Dept: PHYSICAL THERAPY | Facility: CLINIC | Age: 68
End: 2023-12-20
Payer: MEDICARE

## 2023-12-20 ENCOUNTER — INFUSION (OUTPATIENT)
Dept: ONCOLOGY | Facility: HOSPITAL | Age: 68
End: 2023-12-20
Payer: MEDICARE

## 2023-12-20 VITALS — DIASTOLIC BLOOD PRESSURE: 78 MMHG | SYSTOLIC BLOOD PRESSURE: 151 MMHG | HEART RATE: 82 BPM

## 2023-12-20 DIAGNOSIS — R29.898 WEAKNESS OF BOTH LOWER EXTREMITIES: ICD-10-CM

## 2023-12-20 DIAGNOSIS — M50.30 DDD (DEGENERATIVE DISC DISEASE), CERVICAL: ICD-10-CM

## 2023-12-20 DIAGNOSIS — M79.604 BILATERAL LEG PAIN: ICD-10-CM

## 2023-12-20 DIAGNOSIS — S13.9XXD NECK SPRAIN, SUBSEQUENT ENCOUNTER: Primary | ICD-10-CM

## 2023-12-20 DIAGNOSIS — M79.605 BILATERAL LEG PAIN: ICD-10-CM

## 2023-12-20 DIAGNOSIS — D50.0 IRON DEFICIENCY ANEMIA DUE TO CHRONIC BLOOD LOSS: Primary | ICD-10-CM

## 2023-12-20 PROCEDURE — 25810000003 SODIUM CHLORIDE 0.9 % SOLUTION: Performed by: INTERNAL MEDICINE

## 2023-12-20 PROCEDURE — 63710000001 PROCHLORPERAZINE MALEATE PER 5 MG: Performed by: INTERNAL MEDICINE

## 2023-12-20 PROCEDURE — 25010000002 FERRIC CARBOXYMALTOSE 750 MG/15ML SOLUTION: Performed by: INTERNAL MEDICINE

## 2023-12-20 PROCEDURE — 96374 THER/PROPH/DIAG INJ IV PUSH: CPT

## 2023-12-20 RX ORDER — PROCHLORPERAZINE MALEATE 5 MG/1
10 TABLET ORAL ONCE
Status: COMPLETED | OUTPATIENT
Start: 2023-12-20 | End: 2023-12-20

## 2023-12-20 RX ORDER — SODIUM CHLORIDE 9 MG/ML
20 INJECTION, SOLUTION INTRAVENOUS ONCE
Status: COMPLETED | OUTPATIENT
Start: 2023-12-20 | End: 2023-12-20

## 2023-12-20 RX ADMIN — SODIUM CHLORIDE 20 ML/HR: 9 INJECTION, SOLUTION INTRAVENOUS at 15:18

## 2023-12-20 RX ADMIN — PROCHLORPERAZINE MALEATE 10 MG: 5 TABLET ORAL at 15:18

## 2023-12-20 RX ADMIN — FERRIC CARBOXYMALTOSE INJECTION 750 MG: 50 INJECTION, SOLUTION INTRAVENOUS at 15:32

## 2023-12-20 NOTE — PROGRESS NOTES
[facility] [place]  [address] [place] [address]  Phone [phone]  Fax [phone]      Physical Therapy Re Certification Of Plan of Care  Patient: Rosalee Hargrove   : 1955  Diagnosis/ICD-10 Code:  Weakness of both lower extremities [R29.898]  Referring practitioner: Eddie Slater DO  Date of Initial Visit: 2023  Today's Date: 2023  Patient seen for 8 sessions         Visit Diagnoses:    ICD-10-CM ICD-9-CM   1. Weakness of both lower extremities  R29.898 729.89   2. Bilateral leg pain  M79.604 729.5    M79.605    3. Neck sprain, subsequent encounter  S13.9XXD V58.89     847.0   4. DDD (degenerative disc disease), cervical  M50.30 722.4         Rosalee Hargrove reports: neck pain is much better.  1 or 2 at worst and no longer sharp.  The traction gives me relief for a long time.   My left leg isn't bothering me as much today.  I didn't take my BP meds yet today.  Subjective Questionnaire: NDI:9  Clinical Progress: improved  Home Program Compliance: Yes  Treatment has included: therapeutic exercise, neuromuscular re-education, manual therapy, therapeutic activity, traction, and moist heat      Subjective       Objective        Special Questions      Additional Special Questions  No acute onset of dizziness, vision changes.        Postural Observations    Additional Postural Observation Details       Palpation   Left   Hypertonic in the levator scapulae, suboccipitals and upper trapezius.   Tenderness of the upper trapezius.     Right   Hypertonic in the levator scapulae, suboccipitals and upper trapezius. Tenderness of the upper trapezius.     Tenderness   Cervical Spine   No tenderness in the facet joint.     Additional Tenderness Details       Neurological Testing     Sensation   Cervical/Thoracic   Left   Intact: light touch    Right   Intact: light touch    Active Range of Motion   Cervical/Thoracic Spine   Cervical    Flexion: 50 degrees    Extension: 50 degrees   Left lateral flexion: 25 degrees   Right lateral flexion: 50 degrees   Left rotation: 70 degrees   Right rotation: 72 degrees     Additional Active Range of Motion Details       Passive Range of Motion   Cervical/Thoracic Spine   Cervical   Flexion: WFL    Additional Passive Range of Motion Details       Strength/Myotome Testing     Left Shoulder     Planes of Motion   Flexion: 5   Abduction: 5     Right Shoulder     Planes of Motion   Flexion: 5   Abduction: 5     Left Elbow   Flexion: 5  Extension: 5    Right Elbow   Flexion: 5    Left Wrist/Hand   Wrist extension: 5  Wrist flexion: 5    Right Wrist/Hand   Wrist extension: 5  Wrist flexion: 5    Tests   Cervical     Left   Negative alar ligament integrity, active compression ([place]) and cervical distraction.     Right   Negative alar ligament integrity, active compression ([place]) and cervical distraction.       Before exercise   During exercise  After exercises    HR bpm 92    95    O2 saturation% 98  98    98    Blood pressure mmHG 142/75   130/70   132/72    Perceived exertion/dypsnea (1-10)0  4   0      Assessment/Plan  Patient demonstrates improvement in cervical ROM, left UE strength, and tenderness.  Her sharp pain has resolved.  They are progressing well towards the goals set during the initial evaluation. Continued therapy is needed to address stiffness. Patient shows steady improvement in tolerance of LE exercises.  Responding favorably to Nustep vs walking for longer duration and less leg pain.    Goal progress:  Plan Goals: STG 4 weeks MET  1.  Patient to tolerate initial exercises without increase in pain.  2.  Increase cervical rotation ROM by 10 degrees.  3.  Decrease cervical and radiating symptoms by 25%.  4. Reach overhead with tolerable symptoms to perform household tasks  LTG 8 weeks MET except sidebending ROM  1.  Patient to be independent with HEP  2.  Increase cervical sidebending and rotation ROM by 15 degrees  to perform driving and working with tolerable symptoms.  3.  Sleep without pain.  4.  Functional testing improved 10%.  5. Resume all household activities with tolerable symptoms.     Recommendations: Continue with recommendations continue traction as needed, HEP for neck.  Will continue LE therapy formally in clinic.  Timeframe: 1 month  Prognosis to achieve goals: good      Timed:         Manual Therapy:    -     mins  73525;     Therapeutic Exercise:    25     mins  92665;     Neuromuscular Hilary:    -    mins  74496;    Therapeutic Activity:     15     mins  45462;     Gait Training:      -     mins  71108;     Ultrasound:     -     mins  76402;    Ionto                               -    mins   88309  Self Care                       -     mins   34754  Canalith Repos    -     mins 82341      Un-Timed:  Electrical Stimulation:    -     mins  78839 ( );  Dry Needling     -     mins self-pay  Traction     15     mins 80080  Re-Eval                           -    mins  98739      Timed Treatment:   40   mins   Total Treatment:     60   mins          PT: Amy Harrington, PT, AWA     [place] License:  [id number]

## 2023-12-23 DIAGNOSIS — E11.3293 TYPE 2 DIABETES MELLITUS WITH BOTH EYES AFFECTED BY MILD NONPROLIFERATIVE RETINOPATHY WITHOUT MACULAR EDEMA, WITHOUT LONG-TERM CURRENT USE OF INSULIN: ICD-10-CM

## 2023-12-26 RX ORDER — SEMAGLUTIDE 0.68 MG/ML
INJECTION, SOLUTION SUBCUTANEOUS
Qty: 3 ML | Refills: 0 | Status: SHIPPED | OUTPATIENT
Start: 2023-12-26

## 2023-12-27 ENCOUNTER — OFFICE VISIT (OUTPATIENT)
Dept: CARDIOLOGY | Facility: CLINIC | Age: 68
End: 2023-12-27
Payer: MEDICARE

## 2023-12-27 VITALS
HEART RATE: 78 BPM | DIASTOLIC BLOOD PRESSURE: 98 MMHG | HEIGHT: 60 IN | OXYGEN SATURATION: 91 % | BODY MASS INDEX: 57.52 KG/M2 | SYSTOLIC BLOOD PRESSURE: 160 MMHG | WEIGHT: 293 LBS

## 2023-12-27 DIAGNOSIS — I10 ESSENTIAL HYPERTENSION: ICD-10-CM

## 2023-12-27 DIAGNOSIS — I25.10 CORONARY ARTERY DISEASE INVOLVING NATIVE CORONARY ARTERY OF NATIVE HEART WITHOUT ANGINA PECTORIS: ICD-10-CM

## 2023-12-27 DIAGNOSIS — Z86.73 HISTORY OF CARDIOEMBOLIC CEREBROVASCULAR ACCIDENT (CVA): ICD-10-CM

## 2023-12-27 DIAGNOSIS — I65.21 STENOSIS OF RIGHT CAROTID ARTERY: Primary | ICD-10-CM

## 2023-12-27 DIAGNOSIS — Z87.891 EX-CIGARETTE SMOKER: ICD-10-CM

## 2023-12-27 DIAGNOSIS — G47.33 OBSTRUCTIVE SLEEP APNEA SYNDROME: ICD-10-CM

## 2023-12-27 DIAGNOSIS — E78.2 MIXED HYPERLIPIDEMIA: ICD-10-CM

## 2023-12-27 PROCEDURE — 1160F RVW MEDS BY RX/DR IN RCRD: CPT | Performed by: NURSE PRACTITIONER

## 2023-12-27 PROCEDURE — 93000 ELECTROCARDIOGRAM COMPLETE: CPT | Performed by: NURSE PRACTITIONER

## 2023-12-27 PROCEDURE — 99214 OFFICE O/P EST MOD 30 MIN: CPT | Performed by: NURSE PRACTITIONER

## 2023-12-27 PROCEDURE — 1159F MED LIST DOCD IN RCRD: CPT | Performed by: NURSE PRACTITIONER

## 2023-12-27 PROCEDURE — 3077F SYST BP >= 140 MM HG: CPT | Performed by: NURSE PRACTITIONER

## 2023-12-27 PROCEDURE — 3080F DIAST BP >= 90 MM HG: CPT | Performed by: NURSE PRACTITIONER

## 2023-12-27 RX ORDER — LOSARTAN POTASSIUM 50 MG/1
50 TABLET ORAL DAILY
Qty: 90 TABLET | Refills: 3 | Status: SHIPPED | OUTPATIENT
Start: 2023-12-27

## 2023-12-27 RX ORDER — SPIRONOLACTONE 25 MG/1
25 TABLET ORAL DAILY
Qty: 90 TABLET | Refills: 3 | Status: SHIPPED | OUTPATIENT
Start: 2023-12-27

## 2023-12-27 NOTE — PROGRESS NOTES
Date of Office Visit: 2023  Encounter Provider: DELMY Suárez  Place of Service: [facility] CARDIOLOGY  Patient Name: Rosalee Hargrove  :1955    Chief Complaint   Patient presents with    Follow-up    Hypertension   :     HPI: Rosalee Hargrove is a 68 y.o. female who is a patient of  Dr. Mei and is known to me today. She has a history of a stroke, nonobstructive coronary disease, peripheral arterial disease, more more disease status post right carotid endarterectomy, hypertension and hyperlipidemia.  She had a cardiac cath several years ago showing mild nonobstructive disease throughout her coronaries.  She is followed by Dr. Coats for peripheral vascular disease and is status post right carotid endarterectomy.  She got it iliac stent in the past last evaluation was in  that showed some moderate in-stent stenosis.  She has severe peripheral disease of the descending aorta with scattered ulcerations and plaque.      In 2021 she had acute left-sided weakness and facial droop she was evaluated at [facility] and found to have a small CVA.  Echocardiogram was unremarkable Holter monitor showed no atrial fibrillation we implanted a Linq device to follow for A-fib she also had a carotid Doppler that showed an occluded right carotid artery.  On Memorial Day 2021 she presented to [facility] with stroke.     Earlier this year she had another stroke.  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 Castanon at [facility] did a cerebral angiogram and did an angioplasty to her right carotid.  She is followed up with vascular surgery she tells me they said her lower extremities were stable she does get some claudication symptoms with walking.       Over the last few months have been seeing her for elevated blood pressure I increased her amlodipine and she followed up with me in November.  I think a lot of her issues stem from her untreated sleep apnea tobacco  abuse.  I did an echocardiogram to evaluate her LV function.  It was unremarkable.  I stopped her Lasix and I put her on spironolactone and losartan.  She is here for follow-up today.    Overall she is feeling well.  But her blood pressures are still elevated.  She has been getting mostly in the 140s to 150s systolic.  She was recently started on Ozempic by her family doctor for weight loss and blood sugar management.  She is tolerating it well but in the last month has not noticed any weight loss.  Previous testing and notes have been reviewed by me.   Past Medical History:   Diagnosis Date    Allergic     Anemia     Angina pectoris     Anxiety     Arthritis     ASCVD (arteriosclerotic cardiovascular disease)     Bilateral leg edema     CAD (coronary artery disease)     Cataract Jan 2022    Chest pain     Cholelithiasis     Colon polyp Feb 2023    Depression     Diabetes mellitus     Disease of thyroid gland     Dizziness     GERD (gastroesophageal reflux disease)     Glaucoma     Headache     HL (hearing loss)     Hyperlipidemia     Hypertension     Kidney stone     Morbid obesity     Orthostatic hypotension     PAD (peripheral artery disease)     Pancreatitis     Rectal bleeding 03/24/2022    Added automatically from request for surgery [id number]    Shortness of breath     Sleep apnea     Stroke     Tobacco abuse     Urinary tract infection     Visual impairment     Macular degeneration    Vitamin D deficiency        Past Surgical History:   Procedure Laterality Date    ANAL FISSURECTOMY      BRAIN SURGERY      CARDIAC CATHETERIZATION N/A 09/23/2016    Procedure: Coronary angiography;  Surgeon: Fredrick Kapoor MD;  Location:  [facility]VAS[facility];  Service:     CARDIAC CATHETERIZATION N/A 09/23/2016    Procedure: Left heart cath;  Surgeon: Fredrick Kapoor MD;  Location:  [facility];  Service:     CARDIAC CATHETERIZATION N/A 09/23/2016    Procedure: Left ventriculography;  Surgeon: Fredrick Kapoor MD;   Location:  [facility];  Service:     CARDIAC CATHETERIZATION N/A 2016    Procedure: Right heart cath;  Surgeon: Fredrick Kapoor MD;  Location:  [facility];  Service:      SECTION      CHOLECYSTECTOMY      COLONOSCOPY N/A 2022    Procedure: COLONOSCOPY with Polypectomy;  Surgeon: Sarwat Church MD;  Location: [facility] MAIN [facility];  Service: Gastroenterology;  Laterality: N/A;  Rectal polyps x3 and Hemorrhoids    ENDOSCOPY N/A 2022    Procedure: ESOPHAGOGASTRODUODENOSCOPY;  Surgeon: Sarwat Church MD;  Location: [facility]IN [facility];  Service: Gastroenterology;  Laterality: N/A;  Small Hiatal Hernia    ERCP      FRACTURE SURGERY      arm    HYSTERECTOMY      ILIAC ARTERY STENT      SUBTOTAL HYSTERECTOMY      TONSILLECTOMY         Social History     Socioeconomic History    Marital status:    Tobacco Use    Smoking status: Former     Packs/day: 0.50     Years: 50.00     Additional pack years: 0.00     Total pack years: 25.00     Types: Cigarettes     Quit date: 2023     Years since quittin.6    Smokeless tobacco: Never   Vaping Use    Vaping Use: Never used   Substance and Sexual Activity    Alcohol use: Yes     Alcohol/week: 1.0 standard drink of alcohol     Types: 1 Glasses of wine per week     Comment: Very occasional    Drug use: No    Sexual activity: Not Currently     Partners: Male     Birth control/protection: None     Comment: N/A       Family History   Problem Relation Age of Onset    Heart disease Mother     Hypertension Mother     Hyperlipidemia Mother     Breast cancer Mother     Heart disease Father     Heart attack Sister     Heart disease Sister     Breast cancer Sister     Heart disease Brother     Heart attack Brother     Hyperlipidemia Brother     Diabetes Brother     Heart attack Maternal Grandmother     Heart disease Maternal Grandmother     Heart failure Maternal Grandmother     Heart failure Maternal Grandfather     Heart  disease Maternal Grandfather     Heart attack Maternal Grandfather     Heart attack Paternal Grandmother     Heart disease Paternal Grandmother     Heart failure Paternal Grandmother     Hypertension Paternal Grandmother        Review of Systems   Constitutional: Negative for diaphoresis and malaise/fatigue.   Cardiovascular:  Negative for chest pain, claudication, dyspnea on exertion, irregular heartbeat, leg swelling, near-syncope, orthopnea, palpitations, paroxysmal nocturnal dyspnea and syncope.   Respiratory:  Negative for cough, shortness of breath and sleep disturbances due to breathing.    Musculoskeletal:  Negative for falls.        Ankle pain on the left   Neurological:  Negative for dizziness and weakness.   Psychiatric/Behavioral:  Negative for altered mental status and substance abuse.        Allergies   Allergen Reactions    Ciprofloxacin Hives and Swelling    Lisinopril Swelling and Other (See Comments)     Cough     Seasonal Ic [Cholestatin] Other (See Comments)     Cough, congestion         Current Outpatient Medications:     ALPRAZolam (XANAX) 0.5 MG tablet, Take 1 tablet by mouth As Needed for Anxiety., Disp: , Rfl:     amLODIPine (NORVASC) 10 MG tablet, Take 1 tablet by mouth Daily., Disp: 30 tablet, Rfl: 11    aspirin 81 MG chewable tablet, Chew 1 tablet Daily., Disp: , Rfl:     atorvastatin (LIPITOR) 40 MG tablet, Take 1 tablet by mouth Daily., Disp: 90 tablet, Rfl: 1    citalopram (CeleXA) 20 MG tablet, Take 1 tablet by mouth Daily., Disp: 90 tablet, Rfl: 1    clopidogrel (PLAVIX) 75 MG tablet, Take 1 tablet by mouth Daily., Disp: 90 tablet, Rfl: 1    ezetimibe (ZETIA) 10 MG tablet, Take 1 tablet by mouth Daily., Disp: 90 tablet, Rfl: 1    fluocinolone acetonide (DERMOTIC) 0.01 % oil otic oil, Administer  into both ears 2 (Two) Times a Day. Indications: Chronic External Ear Eczema, Disp: 20 mL, Rfl: 1    Fluticasone-Umeclidin-Vilant (Trelegy Ellipta) 100-62.5-25 MCG/ACT inhaler, Inhale 1 puff  "Daily., Disp: 1 each, Rfl: 5    glimepiride (AMARYL) 2 MG tablet, Take 1 tablet by mouth Every Morning Before Breakfast., Disp: 90 tablet, Rfl: 1    levothyroxine (SYNTHROID, LEVOTHROID) 75 MCG tablet, Take 1 tablet by mouth once daily, Disp: 90 tablet, Rfl: 0    losartan (Cozaar) 50 MG tablet, Take 1 tablet by mouth Daily., Disp: 90 tablet, Rfl: 3    metFORMIN (GLUCOPHAGE) 1000 MG tablet, TAKE 1 TABLET BY MOUTH TWICE DAILY WITH MEALS, Disp: 180 tablet, Rfl: 0    mometasone (Nasonex) 50 MCG/ACT nasal spray, 1 spray into the nostril(s) as directed by provider 2 (Two) Times a Day As Needed (nasal itch)., Disp: 17 g, Rfl: 1    Ozempic, 0.25 or 0.5 MG/DOSE, 2 MG/3ML solution pen-injector, INJECT 0.25 MG INTO THE APPROPRIATE AREA AS DIRECTED ONCE A WEEK, Disp: 3 mL, Rfl: 0    pantoprazole (PROTONIX) 40 MG EC tablet, Take 1 tablet by mouth once daily, Disp: 90 tablet, Rfl: 0    pimecrolimus (Elidel) 1 % cream, Apply 1 application  topically to the appropriate area as directed 2 (Two) Times a Day As Needed (eczema). Indications: Atopic Dermatitis, Disp: 60 g, Rfl: 0    spironolactone (ALDACTONE) 25 MG tablet, Take 1 tablet by mouth Daily., Disp: 90 tablet, Rfl: 3    trimethoprim-polymyxin b (POLYTRIM) 77221-0.1 UNIT/ML-% ophthalmic solution, Apply 1 drop to eye(s) as directed by provider. Four days before and four days after eye injection., Disp: , Rfl:       Objective:     Vitals:    12/27/23 1207   BP: 160/98   Pulse: 78   SpO2: 91%   Weight: 136 kg (299 lb)   Height: 152.4 cm (60\")     Body mass index is 58.39 kg/m².    PHYSICAL EXAM:    Constitutional:       General: Not in acute distress.     Appearance: Normal appearance. Well-developed.   Eyes:      Pupils: Pupils are equal, round, and reactive to light.   HENT:      Head: Normocephalic.   Neck:      Vascular: No carotid bruit or JVD.   Pulmonary:      Effort: Pulmonary effort is normal. No tachypnea.      Breath sounds: Normal breath sounds. No wheezing. No rales. "   Cardiovascular:      Normal rate. Regular rhythm.      No gallop.    Pulses:     Intact distal pulses.   Edema:     Peripheral edema absent.   Abdominal:      General: Bowel sounds are normal.      Palpations: Abdomen is soft.      Tenderness: There is no abdominal tenderness.   Musculoskeletal: Normal range of motion.      Cervical back: Normal range of motion and neck supple. No edema. Skin:     General: Skin is warm and dry.   Neurological:      Mental Status: Alert and oriented to person, place, and time.           ECG 12 Lead    Date/Time: 12/27/2023 12:21 PM  Performed by: Jackelin Earl APRN    Authorized by: Jackelin Earl APRN  Comparison: compared with previous ECG from 11/28/2023  Similar to previous ECG  Rate: normal  QRS axis: normal    Clinical impression: normal ECG            Assessment:       Diagnosis Plan   1. Stenosis of right carotid artery     On statin therapy and aspirin, Plavix   2. Coronary artery disease involving native coronary artery of native heart without angina pectoris     No angina symptoms.  Optimize risk factor modification.  Encourage weight loss   3. Essential hypertension     Increase losartan to 50 mg a day   4. Mixed hyperlipidemia     Stay on statin goal LDL less than 70   5. History of cardioembolic cerebrovascular accident (CVA)     On aspirin Plavix and statin   6. Obstructive sleep apnea syndrome     Compliant   7. Ex-cigarette smoker     Remains smoke-free since April of this year     Orders Placed This Encounter   Procedures    ECG 12 Lead     This order was created via procedure documentation     Order Specific Question:   Release to patient     Answer:   Routine Release [[id number]]          Plan:       Has follow-up with Dr. Mei in a couple months.  Call with blood pressure readings that are consistently over 140.  Will likely need to go up to 100 mg of losartan.         Your medication list            Accurate as of December 27, 2023  1:05 PM. If you  have any questions, ask your nurse or doctor.                CHANGE how you take these medications        Instructions Last Dose Given Next Dose Due   losartan 50 MG tablet  Commonly known as: Cozaar  What changed:   medication strength  how much to take  Changed by: DELMY Suárez      Take 1 tablet by mouth Daily.              CONTINUE taking these medications        Instructions Last Dose Given Next Dose Due   ALPRAZolam 0.5 MG tablet  Commonly known as: XANAX      Take 1 tablet by mouth As Needed for Anxiety.       amLODIPine 10 MG tablet  Commonly known as: NORVASC      Take 1 tablet by mouth Daily.       aspirin 81 MG chewable tablet      Chew 1 tablet Daily.       atorvastatin 40 MG tablet  Commonly known as: LIPITOR      Take 1 tablet by mouth Daily.       citalopram 20 MG tablet  Commonly known as: CeleXA      Take 1 tablet by mouth Daily.       clopidogrel 75 MG tablet  Commonly known as: PLAVIX      Take 1 tablet by mouth Daily.       ezetimibe 10 MG tablet  Commonly known as: ZETIA      Take 1 tablet by mouth Daily.       fluocinolone acetonide 0.01 % oil otic oil  Commonly known as: DERMOTIC      Administer  into both ears 2 (Two) Times a Day. Indications: Chronic External Ear Eczema       glimepiride 2 MG tablet  Commonly known as: AMARYL      Take 1 tablet by mouth Every Morning Before Breakfast.       levothyroxine 75 MCG tablet  Commonly known as: SYNTHROID, LEVOTHROID      Take 1 tablet by mouth once daily       metFORMIN 1000 MG tablet  Commonly known as: GLUCOPHAGE      TAKE 1 TABLET BY MOUTH TWICE DAILY WITH MEALS       mometasone 50 MCG/ACT nasal spray  Commonly known as: Nasonex      1 spray into the nostril(s) as directed by provider 2 (Two) Times a Day As Needed (nasal itch).       Ozempic (0.25 or 0.5 MG/DOSE) 2 MG/3ML solution pen-injector  Generic drug: Semaglutide(0.25 or 0.5MG/DOS)      INJECT 0.25 MG INTO THE APPROPRIATE AREA AS DIRECTED ONCE A WEEK       pantoprazole 40 MG EC  tablet  Commonly known as: PROTONIX      Take 1 tablet by mouth once daily       pimecrolimus 1 % cream  Commonly known as: Elidel      Apply 1 application  topically to the appropriate area as directed 2 (Two) Times a Day As Needed (eczema). Indications: Atopic Dermatitis       spironolactone 25 MG tablet  Commonly known as: ALDACTONE      Take 1 tablet by mouth Daily.       Trelegy Ellipta 100-62.5-25 MCG/ACT inhaler  Generic drug: Fluticasone-Umeclidin-Vilant      Inhale 1 puff Daily.       trimethoprim-polymyxin b 40194-5.1 UNIT/ML-% ophthalmic solution  Commonly known as: POLYTRIM      Apply 1 drop to eye(s) as directed by provider. Four days before and four days after eye injection.                 Where to Get Your Medications        These medications were sent to [facility] [address] [place] [address] [address] [facility] - [phone]  - [phone] [address] [address]      Phone: [phone]   losartan 50 MG tablet  spironolactone 25 MG tablet           As always, it has been a pleasure to participate in your patient's care.      Sincerely,     Jackelin BROCK

## 2023-12-28 ENCOUNTER — INFUSION (OUTPATIENT)
Dept: ONCOLOGY | Facility: HOSPITAL | Age: 68
End: 2023-12-28
Payer: MEDICARE

## 2023-12-28 VITALS
OXYGEN SATURATION: 96 % | TEMPERATURE: 97.5 F | DIASTOLIC BLOOD PRESSURE: 67 MMHG | HEART RATE: 76 BPM | SYSTOLIC BLOOD PRESSURE: 149 MMHG

## 2023-12-28 DIAGNOSIS — D50.0 IRON DEFICIENCY ANEMIA DUE TO CHRONIC BLOOD LOSS: Primary | ICD-10-CM

## 2023-12-28 PROCEDURE — 63710000001 PROCHLORPERAZINE MALEATE PER 5 MG: Performed by: INTERNAL MEDICINE

## 2023-12-28 PROCEDURE — 25010000002 FERRIC CARBOXYMALTOSE 750 MG/15ML SOLUTION: Performed by: INTERNAL MEDICINE

## 2023-12-28 PROCEDURE — 25810000003 SODIUM CHLORIDE 0.9 % SOLUTION: Performed by: INTERNAL MEDICINE

## 2023-12-28 PROCEDURE — 96374 THER/PROPH/DIAG INJ IV PUSH: CPT

## 2023-12-28 RX ORDER — PROCHLORPERAZINE MALEATE 5 MG/1
10 TABLET ORAL ONCE
Status: COMPLETED | OUTPATIENT
Start: 2023-12-28 | End: 2023-12-28

## 2023-12-28 RX ORDER — SODIUM CHLORIDE 9 MG/ML
20 INJECTION, SOLUTION INTRAVENOUS ONCE
Status: COMPLETED | OUTPATIENT
Start: 2023-12-28 | End: 2023-12-28

## 2023-12-28 RX ADMIN — PROCHLORPERAZINE MALEATE 10 MG: 5 TABLET ORAL at 12:58

## 2023-12-28 RX ADMIN — FERRIC CARBOXYMALTOSE INJECTION 750 MG: 50 INJECTION, SOLUTION INTRAVENOUS at 13:08

## 2023-12-28 RX ADMIN — SODIUM CHLORIDE 20 ML/HR: 9 INJECTION, SOLUTION INTRAVENOUS at 12:58

## 2024-01-08 ENCOUNTER — TELEPHONE (OUTPATIENT)
Dept: FAMILY MEDICINE CLINIC | Facility: CLINIC | Age: 69
End: 2024-01-08
Payer: MEDICARE

## 2024-01-08 DIAGNOSIS — E11.3293 TYPE 2 DIABETES MELLITUS WITH BOTH EYES AFFECTED BY MILD NONPROLIFERATIVE RETINOPATHY WITHOUT MACULAR EDEMA, WITHOUT LONG-TERM CURRENT USE OF INSULIN: Primary | ICD-10-CM

## 2024-01-08 RX ORDER — SEMAGLUTIDE 0.68 MG/ML
0.5 INJECTION, SOLUTION SUBCUTANEOUS WEEKLY
Qty: 3 ML | Refills: 0 | Status: SHIPPED | OUTPATIENT
Start: 2024-01-08

## 2024-01-08 NOTE — TELEPHONE ENCOUNTER
Pt is asking to increase her Ozempic to 0.5 mg from 0.25. she recently picked up another refill, she just wanted to make sure you were ok with her increasing the dosage. She has been on 0.25 since November.

## 2024-01-12 ENCOUNTER — TREATMENT (OUTPATIENT)
Dept: PHYSICAL THERAPY | Facility: CLINIC | Age: 69
End: 2024-01-12
Payer: MEDICARE

## 2024-01-12 DIAGNOSIS — K21.9 GASTROESOPHAGEAL REFLUX DISEASE WITHOUT ESOPHAGITIS: ICD-10-CM

## 2024-01-12 DIAGNOSIS — M79.604 BILATERAL LEG PAIN: ICD-10-CM

## 2024-01-12 DIAGNOSIS — R29.898 WEAKNESS OF BOTH LOWER EXTREMITIES: Primary | ICD-10-CM

## 2024-01-12 DIAGNOSIS — M50.30 DDD (DEGENERATIVE DISC DISEASE), CERVICAL: ICD-10-CM

## 2024-01-12 DIAGNOSIS — S13.9XXD NECK SPRAIN, SUBSEQUENT ENCOUNTER: ICD-10-CM

## 2024-01-12 DIAGNOSIS — M79.605 BILATERAL LEG PAIN: ICD-10-CM

## 2024-01-12 RX ORDER — PANTOPRAZOLE SODIUM 40 MG/1
TABLET, DELAYED RELEASE ORAL
Qty: 90 TABLET | Refills: 0 | Status: SHIPPED | OUTPATIENT
Start: 2024-01-12

## 2024-01-12 NOTE — PROGRESS NOTES
[facility] [place]  [address] [place] [address]  Phone [phone]  Fax [phone]      Physical Therapy Re Certification Of Plan of Care/  Discharge summary Cervical  Patient: Rosalee Hargrove   : 1955  Diagnosis/ICD-10 Code:  Weakness of both lower extremities [R29.898]  Referring practitioner: Eddie Slater DO  Date of Initial Visit: 2024  Today's Date: 2024  Patient seen for 9 sessions         Visit Diagnoses:    ICD-10-CM ICD-9-CM   1. Weakness of both lower extremities  R29.898 729.89   2. Bilateral leg pain  M79.604 729.5    M79.605    3. Neck sprain, subsequent encounter  S13.9XXD V58.89     847.0   4. DDD (degenerative disc disease), cervical  M50.30 722.4         Rosalee Hargrove reports: went on vacation, able to walk usually until about noon before I had to rest.  No leg pain that didn't resolve when I sat. Worst pain with walking 8/10 heaviness.  Subjective Questionnaire: NDI:7 LEFS: 51  Clinical Progress: improved  Home Program Compliance: Yes  Treatment has included: therapeutic exercise, neuromuscular re-education, manual therapy, therapeutic activity, ultrasound, traction, and moist heat      Subjective       Objective          Observations   Left Knee   Negative for edema.     Right Knee   Negative for edema.     Additional Knee Observation Details  B veins prominence    Palpation     Additional Palpation Details  Tenderness along varicose veins B.    Tenderness     Right Knee   No tenderness in the medial joint line.     Additional Tenderness Details  None    Neurological Testing     Sensation     Knee   Left Knee   Intact: Light touch    Right Knee   Intact: light touch     Active Range of Motion   Cervical/Thoracic Spine   Normal active range of motion  Left Knee   Flexion: 105 degrees   Extension: 0 degrees     Right Knee   Flexion: 111 degrees   Extension: 0 degrees     Additional Active Range of Motion Details  No  pain    Strength/Myotome Testing     Left Hip   Planes of Motion   Flexion: 4    Right Hip   Planes of Motion   Flexion: 4    Left Knee   Flexion: 4+  Extension: 4+    Right Knee   Flexion: 4+  Extension: 4+    Additional Strength Details   Ankles PF and DF 5/5 MMT in seated.    Tests     Left Knee   Negative anterior Lachman, posterior drawer, valgus stress test at 0 degrees, valgus stress test at 30 degrees, varus stress test at 0 degrees and varus stress test at 30 degrees.     Right Knee   Negative anterior Lachman, posterior drawer, valgus stress test at 0 degrees, valgus stress test at 30 degrees, varus stress test at 0 degrees and varus stress test at 30 degrees.     Swelling     Left Knee Girth Measurement (cm)   Joint line: 39.0.  10 cm above joint line: 38.8.  10 cm below joint line: 38.0.    Right Knee Girth Measurement (cm)   Joint line: 40.2.  10 cm above joint line: 38.6.  10 cm below joint line: 39.6.    Ambulation   Weight-Bearing Status   Weight-Bearing Status (Left): full weight bearing   Weight-Bearing Status (Right): full weight-bearing    Assistive device used: none    Ambulation: Level Surfaces   Ambulation without assistive device: independent    Observational Gait   Decreased walking speed and stride length.     Functional Assessment     Comments  Sit to stand test:       Before exercise   During exercise  After exercises    HR bpm 68    73-78   70    O2 saturation% 97    97   97    Blood pressure mmHG 110/65  165/65  150/70          Perceived exertion/dypsnea (1-10)  0  3   0    Functional testing:  Sit to stand test: 13  in 45 seconds.  Leg pain limited completion of test.  Balance tandem: R 37 and left 25 seconds  Narrow base: 30 seconds with EO and EC  6 minute walk test:  onset on pain 3 at 2.77 minutes .    Immediately following walk test: patient reported  4 dyspnea scale, HR 80 bpm, blood pressure  165/65 mmHG, and O2 saturation 97%         Assessment/Plan  Patient demonstrates  improvement in LE strength, endurance, and pain in the legs.  She significantly increased her tandem balance and her walking endurance They are progressing well towards the goals set during the initial evaluation. Continued therapy is needed to address strength and endurance for the LE.  Her neck pain has completely resolved.    Goal progress:  Goals  Plan Goals: 6 weeks  1. Pt to tolerate initial HEP MET  2. Pt to exhibit increased B knee AROM to 110 to allow for improved ability to navigate curbs and stairs   3. Pt to report decreased pain to minimum with walking up to 4 minutes.  4. Pt to exhibit increased functional strength by performing 15 squats in one minute to allow for prolonged ADL's and walking  12 weeks  1.  Patient will demonstrate bilateral  knee ROM to 120 in open chain.  2.  Patient I with HEP and ready to continue with gym membership for long term maintenance   3.  Functionally patient will be able to ascend/descend stairs X 1 and improve tandem balance to 13 seconds with tolerable symptoms.  4.  Patient will be able to return to walking in the store to retrieve an item and checkout without a rest break.     Recommendations: Continue as planned  Timeframe: 1 month  Prognosis to achieve goals: good      Timed:         Manual Therapy:    -     mins  47618;     Therapeutic Exercise:    10     mins  41582;     Neuromuscular Hilary:    -    mins  67778;    Therapeutic Activity:     35     mins  35155;     Gait Training:      10     mins  24580;     Ultrasound:     -     mins  26322;    Ionto                               -    mins   08484  Self Care                       -     mins   77321  Canalith Repos    -     mins 16049      Un-Timed:  Electrical Stimulation:    -     mins  96798 ( );  Dry Needling     -     mins self-pay  Traction     -     mins 32855  Re-Eval                           -    mins  59609      Timed Treatment:   55   mins   Total Treatment:     65   mins          PT: Amy OH  ENRICO Harrington, CIDN     [place] License:  [id number]    Electronically signed by Amy Harrington PT, 01/12/24, 9:10 AM EST    Certification Period: 1/12/2024 thru 4/10/2024  I certify that the therapy services are furnished while this patient is under my care.  The services outlined above are required by this patient, and will be reviewed every 90 days.         Physician Signature:__________________________________________________    PHYSICIAN: Eddie Slater,       DATE:     Please sign and return via fax to [phone] Thank you, [facility]

## 2024-01-17 ENCOUNTER — TREATMENT (OUTPATIENT)
Dept: PHYSICAL THERAPY | Facility: CLINIC | Age: 69
End: 2024-01-17
Payer: MEDICARE

## 2024-01-17 DIAGNOSIS — R29.898 WEAKNESS OF BOTH LOWER EXTREMITIES: Primary | ICD-10-CM

## 2024-01-17 DIAGNOSIS — M79.605 BILATERAL LEG PAIN: ICD-10-CM

## 2024-01-17 DIAGNOSIS — M79.604 BILATERAL LEG PAIN: ICD-10-CM

## 2024-01-17 PROCEDURE — 97110 THERAPEUTIC EXERCISES: CPT | Performed by: PHYSICAL THERAPIST

## 2024-01-17 PROCEDURE — 97112 NEUROMUSCULAR REEDUCATION: CPT | Performed by: PHYSICAL THERAPIST

## 2024-01-17 NOTE — PROGRESS NOTES
[facility] [place]  [address] [place] [address]  Phone [phone]  Fax [phone]      Physical Therapy Daily Treatment Note      Patient: Rosalee Hargrove   : 1955  Referring practitioner: Eddie Slater DO  Date of Initial Visit: Type: THERAPY  Noted: 2023  Today's Date: 2024  Patient seen for 6 sessions       Visit Diagnoses:    ICD-10-CM ICD-9-CM   1. Weakness of both lower extremities  R29.898 729.89   2. Bilateral leg pain  M79.604 729.5    M79.605        Rosalee Hargrove reports: some muscle soreness in the left ankle again.  Usually at night.  No increased swelling.      Subjective     Objective   See Exercise, Manual, and Modality Logs for complete treatment.   Tender left medial calf, none at posterior tibialis      Before exercise   During exercise  After exercises    HR bpm 76    86-90    75      O2 saturation% 96-98   90-99    97    Blood pressure mmHG 148/70 165/65                 140/65    Perceived exertion/dypsnea (1-10) 0   3.5    0    Assessment/Plan  Increased tolerance with walking today but her oxygen drops more rapidly if talking.  Blood pressure improved throughout treatment.    Balance improving with patient able to perform standing tandem and balance board without pain.  Will continue to progress to standing and functional strengthening as leg pain and endurance permits.    Timed:         Manual Therapy:    -     mins  91533;     Therapeutic Exercise:    15     mins  97676;     Neuromuscular Hilary:    10    mins  45293;    Therapeutic Activity:     -     mins  43834;     Gait Trainin     mins  16522;     Ultrasound:     -     mins  90017;    Ionto                               -    mins   85484  Self Care                       -     mins   44453  Orthotic training    -     mins 07479      Un-Timed:  Electrical Stimulation:    -     mins  80322 ( );  Dry Needling     -     mins self-pay  Traction     -      mins 94406      Timed Treatment:   30   mins   Total Treatment:     40   mins    Amy Harrington PT, CIDN  [place] License: [id number]

## 2024-01-22 ENCOUNTER — TREATMENT (OUTPATIENT)
Dept: PHYSICAL THERAPY | Facility: CLINIC | Age: 69
End: 2024-01-22
Payer: MEDICARE

## 2024-01-22 DIAGNOSIS — M79.605 BILATERAL LEG PAIN: ICD-10-CM

## 2024-01-22 DIAGNOSIS — M79.604 BILATERAL LEG PAIN: ICD-10-CM

## 2024-01-22 DIAGNOSIS — R29.898 WEAKNESS OF BOTH LOWER EXTREMITIES: Primary | ICD-10-CM

## 2024-01-22 PROCEDURE — 97112 NEUROMUSCULAR REEDUCATION: CPT | Performed by: PHYSICAL THERAPIST

## 2024-01-22 PROCEDURE — 97110 THERAPEUTIC EXERCISES: CPT | Performed by: PHYSICAL THERAPIST

## 2024-01-22 NOTE — PROGRESS NOTES
[facility] [place]  [address] [place] [address]  Phone [phone]  Fax [phone]      Physical Therapy Daily Treatment Note      Patient: Rosalee Hargrove   : 1955  Referring practitioner: Eddie Slater DO  Date of Initial Visit: Type: THERAPY  Noted: 2023  Today's Date: 2024  Patient seen for 7 sessions       Visit Diagnoses:    ICD-10-CM ICD-9-CM   1. Weakness of both lower extremities  R29.898 729.89   2. Bilateral leg pain  M79.604 729.5    M79.605        Rosalee Hargrove reports: tight muscles for about a day but no pain. No complaints of shortness of breath.      Subjective     Objective   See Exercise, Manual, and Modality Logs for complete treatment.   Before exercise   During exercise  After exercises    HR bpm 55-76    92     76     O2 saturation% 94-99%  96%     97%    Blood pressure mmHG 138/68    Perceived exertion/dypsnea (1-10) 0  2    0    Assessment/Plan  Patient continues to slowly increase her endurance, strength, and with no significant LE pain.  Fatigue but resolves with rest.  No shortness of breath and less perceived exertion with exercise today.    Timed:         Manual Therapy:    -     mins  62969;     Therapeutic Exercise:    15     mins  66472;     Neuromuscular Hilary:    10    mins  41983;    Therapeutic Activity:     -     mins  99448;     Gait Training:      10     mins  69926;     Ultrasound:     -     mins  33641;    Ionto                               -    mins   88624  Self Care                       -     mins   09909  Orthotic training    -     mins 42563      Un-Timed:  Electrical Stimulation:    -     mins  96497 ( );  Dry Needling     -     mins self-pay  Traction     -     mins 57236      Timed Treatment:   30   mins   Total Treatment:     45   mins    Amy Harrington PT, CIDN  [place] License: [id number]

## 2024-01-24 ENCOUNTER — TREATMENT (OUTPATIENT)
Dept: PHYSICAL THERAPY | Facility: CLINIC | Age: 69
End: 2024-01-24
Payer: MEDICARE

## 2024-01-24 DIAGNOSIS — M79.604 BILATERAL LEG PAIN: ICD-10-CM

## 2024-01-24 DIAGNOSIS — R29.898 WEAKNESS OF BOTH LOWER EXTREMITIES: Primary | ICD-10-CM

## 2024-01-24 DIAGNOSIS — M79.605 BILATERAL LEG PAIN: ICD-10-CM

## 2024-01-24 DIAGNOSIS — M50.30 DDD (DEGENERATIVE DISC DISEASE), CERVICAL: ICD-10-CM

## 2024-01-24 DIAGNOSIS — S13.9XXD NECK SPRAIN, SUBSEQUENT ENCOUNTER: ICD-10-CM

## 2024-01-24 NOTE — PROGRESS NOTES
[facility] [place]  [address] [place] [address]  Phone [phone]  Fax [phone]      Physical Therapy Daily Treatment Note      Patient: Rosalee Hargrove   : 1955  Referring practitioner: Eddie Slater DO  Date of Initial Visit: Type: THERAPY  Noted: 2023  Today's Date: 2024  Patient seen for 8 sessions       Visit Diagnoses:    ICD-10-CM ICD-9-CM   1. Weakness of both lower extremities  R29.898 729.89   2. Bilateral leg pain  M79.604 729.5    M79.605    3. Neck sprain, subsequent encounter  S13.9XXD V58.89     847.0   4. DDD (degenerative disc disease), cervical  M50.30 722.4       Rosalee Hargrove reports: no increased LE soreness from exercise.  I have lost 6lbs and noticed I can stand better and for longer periods of time.    Subjective     Objective   See Exercise, Manual, and Modality Logs for complete treatment.     Before exercise   During exercise  After exercises     HR bpm 74    74-96    74    O2 saturation% 98%   96-97    97%    Blood pressure mmHG 130/70     Perceived exertion/dypsnea (1-10) 0  2   0    Assessment/Plan  Patient demonstrating no leg pain with exercises, only fatigue.  Her exertion levels and shortness of breath have improved significantly.  She is ambulating and completing her balance exercises safely.  Endurance increased with exercise to 40 minutes today.    Timed:         Manual Therapy:    -     mins  77130;     Therapeutic Exercise:    15     mins  83110;     Neuromuscular Hilary:    10    mins  22576;    Therapeutic Activity:     -     mins  59132;     Gait Training:      15     mins  83794;     Ultrasound:     -     mins  54114;    Ionto                               -    mins   03823  Self Care                       -     mins   20397  Orthotic training    -     mins 22622      Un-Timed:  Electrical Stimulation:    -     mins  96342 ( );  Dry Needling     -     mins self-pay  Traction     -     mins  96039      Timed Treatment:   40   mins   Total Treatment:     45   mins    Amy Harrington PT, CIDN  [place] License: [id number]

## 2024-01-29 ENCOUNTER — TREATMENT (OUTPATIENT)
Dept: PHYSICAL THERAPY | Facility: CLINIC | Age: 69
End: 2024-01-29
Payer: MEDICARE

## 2024-01-29 DIAGNOSIS — R29.898 WEAKNESS OF BOTH LOWER EXTREMITIES: Primary | ICD-10-CM

## 2024-01-29 DIAGNOSIS — E03.9 ACQUIRED HYPOTHYROIDISM: ICD-10-CM

## 2024-01-29 DIAGNOSIS — E78.2 MIXED HYPERLIPIDEMIA: ICD-10-CM

## 2024-01-29 DIAGNOSIS — M79.605 BILATERAL LEG PAIN: ICD-10-CM

## 2024-01-29 DIAGNOSIS — M79.604 BILATERAL LEG PAIN: ICD-10-CM

## 2024-01-29 PROCEDURE — 97112 NEUROMUSCULAR REEDUCATION: CPT | Performed by: PHYSICAL THERAPIST

## 2024-01-29 PROCEDURE — 97116 GAIT TRAINING THERAPY: CPT | Performed by: PHYSICAL THERAPIST

## 2024-01-29 PROCEDURE — 97110 THERAPEUTIC EXERCISES: CPT | Performed by: PHYSICAL THERAPIST

## 2024-01-29 RX ORDER — LEVOTHYROXINE SODIUM 0.07 MG/1
TABLET ORAL
Qty: 90 TABLET | Refills: 0 | Status: SHIPPED | OUTPATIENT
Start: 2024-01-29

## 2024-01-29 RX ORDER — EZETIMIBE 10 MG/1
10 TABLET ORAL DAILY
Qty: 90 TABLET | Refills: 0 | Status: SHIPPED | OUTPATIENT
Start: 2024-01-29

## 2024-01-29 NOTE — PROGRESS NOTES
[facility] Therapy [place]  [address] [place] [address]  Phone [phone]  Fax [phone]      Physical Therapy Daily Treatment Note      Patient: Rosalee Hargrove   : 1955  Referring practitioner: Eddie Slater DO  Date of Initial Visit: Type: THERAPY  Noted: 2023  Today's Date: 2024  Patient seen for 9 sessions       Visit Diagnoses:    ICD-10-CM ICD-9-CM   1. Weakness of both lower extremities  R29.898 729.89   2. Bilateral leg pain  M79.604 729.5    M79.605        Rosalee Hargrove reports: I walked in the lobby while I was waiting.  No leg pain today. I did fine this weekend.    Subjective     Objective   See Exercise, Manual, and Modality Logs for complete treatment.   Before exercise   During exercise  After exercises     HR bpm 84    85-97     84    O2 saturation% 96   95-97     97      Blood pressure mmHG 158/60    Perceived exertion/dypsnea (1-10) 0   Min   0    Gastroc girth right: 37.5 left 35.5  No tenderness lower leg  Knee flexion/extension 5/5  Ankle DF 5/5 B    Assessment/Plan  Improved tandem balance.  Progressed to unstable surface with maintaining balance 10 seconds.  Her LE strength testing in seated is progressing well.  Anticipate d/c to home program soon.     Timed:         Manual Therapy:    -     mins  53692;     Therapeutic Exercise:    15     mins  68279;     Neuromuscular Hilray:    10    mins  93382;    Therapeutic Activity:     -     mins  47702;     Gait Training:      15     mins  94632;     Ultrasound:     -     mins  69516;    Ionto                               -    mins   36140  Self Care                       -     mins   19100  Orthotic training    -     mins 48454      Un-Timed:  Electrical Stimulation:    -     mins  11595 ( );  Dry Needling     -     mins self-pay  Traction     -     mins 14430      Timed Treatment:   40   mins   Total Treatment:     50   mins    Amy Harrington PT, CIDN  [place] License: [id number]

## 2024-01-31 ENCOUNTER — TREATMENT (OUTPATIENT)
Dept: PHYSICAL THERAPY | Facility: CLINIC | Age: 69
End: 2024-01-31
Payer: MEDICARE

## 2024-01-31 DIAGNOSIS — M79.604 BILATERAL LEG PAIN: ICD-10-CM

## 2024-01-31 DIAGNOSIS — R29.898 WEAKNESS OF BOTH LOWER EXTREMITIES: Primary | ICD-10-CM

## 2024-01-31 DIAGNOSIS — M79.605 BILATERAL LEG PAIN: ICD-10-CM

## 2024-01-31 PROCEDURE — 97530 THERAPEUTIC ACTIVITIES: CPT | Performed by: PHYSICAL THERAPIST

## 2024-01-31 PROCEDURE — 97112 NEUROMUSCULAR REEDUCATION: CPT | Performed by: PHYSICAL THERAPIST

## 2024-01-31 PROCEDURE — 97110 THERAPEUTIC EXERCISES: CPT | Performed by: PHYSICAL THERAPIST

## 2024-01-31 NOTE — PROGRESS NOTES
[facility] Therapy [place]  [address] [place] [address]  Phone [phone]  Fax [phone]      Physical Therapy Daily Treatment Note      Patient: Rosalee Hargrove   : 1955  Referring practitioner: Eddie Slater DO  Date of Initial Visit: Type: THERAPY  Noted: 2023  Today's Date: 2024  Patient seen for 10 sessions       Visit Diagnoses:    ICD-10-CM ICD-9-CM   1. Weakness of both lower extremities  R29.898 729.89   2. Bilateral leg pain  M79.604 729.5    M79.605        Rosalee Hargrove reports: muscle soreness a little longer after last visit.  No vein pain.  I'm getting stronger, not as winded and my legs feel lighter.  I've lost weight on the new medicine but my sugar is still high.    Subjective     Objective   See Exercise, Manual, and Modality Logs for complete treatment.   Before exercise   During exercise  After exercises    HR bpm 64    74-91    74    O2 saturation% 97   94-99        Blood pressure mmHG  manual  wrist (patient's) 140/68 140/95    Perceived exertion/dypsnea (1-10)  0   4 moderate  0    Assessment/Plan  Strength, endurance, and pain continue to improve.  Patient is on a new weight loss medicine and had many questions about diet.  I advised her to speak to her MD about a possible referral to a dietician for meal advice.  Continue for 1-2 visits then d/c to HEP.    Timed:         Manual Therapy:    -     mins  89236;     Therapeutic Exercise:    15     mins  26085;     Neuromuscular Hilary:    8    mins  39037;    Therapeutic Activity:     10     mins  42705;     Gait Trainin     mins  26429;     Ultrasound:     -     mins  09972;    Ionto                               -    mins   02532  Self Care                       -     mins   17577  Orthotic training    -     mins 72272      Un-Timed:  Electrical Stimulation:    -     mins  73510 ( );  Dry Needling     -     mins self-pay  Traction     -     mins  46648      Timed Treatment:   38 divided   mins   Total Treatment:     70   mins    Amy Harrington PT, ULIN  [place] License: [id number]

## 2024-02-05 DIAGNOSIS — E03.9 ACQUIRED HYPOTHYROIDISM: Primary | ICD-10-CM

## 2024-02-05 DIAGNOSIS — E11.3293 TYPE 2 DIABETES MELLITUS WITH BOTH EYES AFFECTED BY MILD NONPROLIFERATIVE RETINOPATHY WITHOUT MACULAR EDEMA, WITHOUT LONG-TERM CURRENT USE OF INSULIN: ICD-10-CM

## 2024-02-05 DIAGNOSIS — E78.2 MIXED HYPERLIPIDEMIA: ICD-10-CM

## 2024-02-05 DIAGNOSIS — I10 ESSENTIAL HYPERTENSION: ICD-10-CM

## 2024-02-05 DIAGNOSIS — D50.8 OTHER IRON DEFICIENCY ANEMIA: ICD-10-CM

## 2024-02-05 DIAGNOSIS — E55.9 VITAMIN D DEFICIENCY: ICD-10-CM

## 2024-02-06 DIAGNOSIS — E11.3293 TYPE 2 DIABETES MELLITUS WITH BOTH EYES AFFECTED BY MILD NONPROLIFERATIVE RETINOPATHY WITHOUT MACULAR EDEMA, WITHOUT LONG-TERM CURRENT USE OF INSULIN: ICD-10-CM

## 2024-02-06 DIAGNOSIS — I10 ESSENTIAL HYPERTENSION: ICD-10-CM

## 2024-02-06 DIAGNOSIS — E55.9 VITAMIN D DEFICIENCY: ICD-10-CM

## 2024-02-06 DIAGNOSIS — D50.8 OTHER IRON DEFICIENCY ANEMIA: ICD-10-CM

## 2024-02-06 DIAGNOSIS — E78.2 MIXED HYPERLIPIDEMIA: ICD-10-CM

## 2024-02-06 DIAGNOSIS — E03.9 ACQUIRED HYPOTHYROIDISM: ICD-10-CM

## 2024-02-09 LAB
25(OH)D3+25(OH)D2 SERPL-MCNC: 18.9 NG/ML (ref 30–100)
ALBUMIN SERPL-MCNC: 4.6 G/DL (ref 3.5–5.2)
ALBUMIN/GLOB SERPL: 1.8 G/DL
ALP SERPL-CCNC: 106 U/L (ref 39–117)
ALT SERPL-CCNC: 45 U/L (ref 1–33)
AST SERPL-CCNC: 30 U/L (ref 1–32)
BASOPHILS # BLD AUTO: 0.05 10*3/MM3 (ref 0–0.2)
BASOPHILS NFR BLD AUTO: 0.6 % (ref 0–1.5)
BILIRUB SERPL-MCNC: 0.4 MG/DL (ref 0–1.2)
BUN SERPL-MCNC: 23 MG/DL (ref 8–23)
BUN/CREAT SERPL: 18.3 (ref 7–25)
CALCIUM SERPL-MCNC: 9.2 MG/DL (ref 8.6–10.5)
CHLORIDE SERPL-SCNC: 104 MMOL/L (ref 98–107)
CHOLEST SERPL-MCNC: 133 MG/DL (ref 0–200)
CO2 SERPL-SCNC: 24.7 MMOL/L (ref 22–29)
CREAT SERPL-MCNC: 1.26 MG/DL (ref 0.57–1)
EGFRCR SERPLBLD CKD-EPI 2021: 46 ML/MIN/1.73
EOSINOPHIL # BLD AUTO: 0.34 10*3/MM3 (ref 0–0.4)
EOSINOPHIL NFR BLD AUTO: 3.9 % (ref 0.3–6.2)
ERYTHROCYTE [DISTWIDTH] IN BLOOD BY AUTOMATED COUNT: 18.5 % (ref 12.3–15.4)
FERRITIN SERPL-MCNC: 523 NG/ML (ref 13–150)
GLOBULIN SER CALC-MCNC: 2.5 GM/DL
GLUCOSE SERPL-MCNC: 120 MG/DL (ref 65–99)
HBA1C MFR BLD: 6.9 % (ref 4.8–5.6)
HCT VFR BLD AUTO: 41.4 % (ref 34–46.6)
HDLC SERPL-MCNC: 34 MG/DL (ref 40–60)
HGB BLD-MCNC: 13.5 G/DL (ref 12–15.9)
IMM GRANULOCYTES # BLD AUTO: 0.05 10*3/MM3 (ref 0–0.05)
IMM GRANULOCYTES NFR BLD AUTO: 0.6 % (ref 0–0.5)
IRON SATN MFR SERPL: 15 % (ref 20–50)
IRON SERPL-MCNC: 53 MCG/DL (ref 37–145)
LDLC SERPL CALC-MCNC: 59 MG/DL (ref 0–100)
LYMPHOCYTES # BLD AUTO: 2.09 10*3/MM3 (ref 0.7–3.1)
LYMPHOCYTES NFR BLD AUTO: 24.1 % (ref 19.6–45.3)
MCH RBC QN AUTO: 28 PG (ref 26.6–33)
MCHC RBC AUTO-ENTMCNC: 32.6 G/DL (ref 31.5–35.7)
MCV RBC AUTO: 85.9 FL (ref 79–97)
MONOCYTES # BLD AUTO: 0.48 10*3/MM3 (ref 0.1–0.9)
MONOCYTES NFR BLD AUTO: 5.5 % (ref 5–12)
NEUTROPHILS # BLD AUTO: 5.68 10*3/MM3 (ref 1.7–7)
NEUTROPHILS NFR BLD AUTO: 65.3 % (ref 42.7–76)
NRBC BLD AUTO-RTO: 0 /100 WBC (ref 0–0.2)
PLATELET # BLD AUTO: 264 10*3/MM3 (ref 140–450)
POTASSIUM SERPL-SCNC: 4.3 MMOL/L (ref 3.5–5.2)
PROT SERPL-MCNC: 7.1 G/DL (ref 6–8.5)
RBC # BLD AUTO: 4.82 10*6/MM3 (ref 3.77–5.28)
SODIUM SERPL-SCNC: 142 MMOL/L (ref 136–145)
TIBC SERPL-MCNC: 350 MCG/DL
TRIGL SERPL-MCNC: 253 MG/DL (ref 0–150)
TSH SERPL DL<=0.005 MIU/L-ACNC: 1.99 UIU/ML (ref 0.27–4.2)
UIBC SERPL-MCNC: 297 MCG/DL (ref 112–346)
VLDLC SERPL CALC-MCNC: 40 MG/DL (ref 5–40)
WBC # BLD AUTO: 8.69 10*3/MM3 (ref 3.4–10.8)

## 2024-02-12 DIAGNOSIS — E03.9 ACQUIRED HYPOTHYROIDISM: ICD-10-CM

## 2024-02-12 DIAGNOSIS — E78.2 MIXED HYPERLIPIDEMIA: ICD-10-CM

## 2024-02-12 RX ORDER — LEVOTHYROXINE SODIUM 0.07 MG/1
TABLET ORAL
Qty: 90 TABLET | Refills: 0 | OUTPATIENT
Start: 2024-02-12

## 2024-02-12 RX ORDER — EZETIMIBE 10 MG/1
10 TABLET ORAL DAILY
Qty: 90 TABLET | Refills: 0 | OUTPATIENT
Start: 2024-02-12

## 2024-02-19 ENCOUNTER — OFFICE VISIT (OUTPATIENT)
Dept: FAMILY MEDICINE CLINIC | Facility: CLINIC | Age: 69
End: 2024-02-19
Payer: MEDICARE

## 2024-02-19 VITALS
OXYGEN SATURATION: 97 % | WEIGHT: 216 LBS | DIASTOLIC BLOOD PRESSURE: 76 MMHG | SYSTOLIC BLOOD PRESSURE: 128 MMHG | HEIGHT: 60 IN | BODY MASS INDEX: 42.41 KG/M2 | TEMPERATURE: 96.8 F | HEART RATE: 72 BPM

## 2024-02-19 DIAGNOSIS — I65.23 ATHEROSCLEROSIS OF BOTH CAROTID ARTERIES: ICD-10-CM

## 2024-02-19 DIAGNOSIS — I10 ESSENTIAL HYPERTENSION: Primary | ICD-10-CM

## 2024-02-19 DIAGNOSIS — E11.9 CONTROLLED TYPE 2 DIABETES MELLITUS WITHOUT COMPLICATION, WITHOUT LONG-TERM CURRENT USE OF INSULIN: ICD-10-CM

## 2024-02-19 DIAGNOSIS — E03.9 ACQUIRED HYPOTHYROIDISM: ICD-10-CM

## 2024-02-19 DIAGNOSIS — G47.33 OBSTRUCTIVE SLEEP APNEA SYNDROME: ICD-10-CM

## 2024-02-19 DIAGNOSIS — F41.9 ANXIETY: ICD-10-CM

## 2024-02-19 DIAGNOSIS — I77.9 DISORDER OF ARTERIES AND ARTERIOLES, UNSPECIFIED: ICD-10-CM

## 2024-02-19 DIAGNOSIS — Z86.73 HISTORY OF CARDIOEMBOLIC CEREBROVASCULAR ACCIDENT (CVA): ICD-10-CM

## 2024-02-19 DIAGNOSIS — I25.10 ATHEROSCLEROTIC HEART DISEASE OF NATIVE CORONARY ARTERY WITHOUT ANGINA PECTORIS: ICD-10-CM

## 2024-02-19 DIAGNOSIS — I77.9 PERIPHERAL ARTERIAL OCCLUSIVE DISEASE: ICD-10-CM

## 2024-02-19 DIAGNOSIS — E66.01 MORBID EXOGENOUS OBESITY: ICD-10-CM

## 2024-02-19 DIAGNOSIS — I25.10 CORONARY ARTERY DISEASE INVOLVING NATIVE CORONARY ARTERY OF NATIVE HEART WITHOUT ANGINA PECTORIS: ICD-10-CM

## 2024-02-19 DIAGNOSIS — E11.3293 TYPE 2 DIABETES MELLITUS WITH BOTH EYES AFFECTED BY MILD NONPROLIFERATIVE RETINOPATHY WITHOUT MACULAR EDEMA, WITHOUT LONG-TERM CURRENT USE OF INSULIN: ICD-10-CM

## 2024-02-19 DIAGNOSIS — N18.31 STAGE 3A CHRONIC KIDNEY DISEASE: ICD-10-CM

## 2024-02-19 DIAGNOSIS — E55.9 VITAMIN D DEFICIENCY: ICD-10-CM

## 2024-02-19 DIAGNOSIS — K21.9 GASTROESOPHAGEAL REFLUX DISEASE WITHOUT ESOPHAGITIS: ICD-10-CM

## 2024-02-19 DIAGNOSIS — E78.2 MIXED HYPERLIPIDEMIA: ICD-10-CM

## 2024-02-19 DIAGNOSIS — Z87.891 EX-CIGARETTE SMOKER: ICD-10-CM

## 2024-02-19 DIAGNOSIS — F41.0 PANIC DISORDER: ICD-10-CM

## 2024-02-19 DIAGNOSIS — F41.1 GENERALIZED ANXIETY DISORDER: ICD-10-CM

## 2024-02-20 PROBLEM — N18.31 STAGE 3A CHRONIC KIDNEY DISEASE: Status: ACTIVE | Noted: 2024-02-20

## 2024-02-20 RX ORDER — CLOPIDOGREL BISULFATE 75 MG/1
75 TABLET ORAL DAILY
Qty: 90 TABLET | Refills: 1 | Status: SHIPPED | OUTPATIENT
Start: 2024-02-20

## 2024-02-20 RX ORDER — CITALOPRAM 20 MG/1
20 TABLET ORAL DAILY
Qty: 90 TABLET | Refills: 1 | Status: SHIPPED | OUTPATIENT
Start: 2024-02-20

## 2024-02-20 RX ORDER — ATORVASTATIN CALCIUM 40 MG/1
40 TABLET, FILM COATED ORAL DAILY
Qty: 90 TABLET | Refills: 1 | Status: SHIPPED | OUTPATIENT
Start: 2024-02-20

## 2024-02-20 RX ORDER — SEMAGLUTIDE 0.68 MG/ML
0.5 INJECTION, SOLUTION SUBCUTANEOUS WEEKLY
Qty: 3 ML | Refills: 5 | Status: SHIPPED | OUTPATIENT
Start: 2024-02-20

## 2024-02-20 RX ORDER — AMLODIPINE BESYLATE 10 MG/1
10 TABLET ORAL DAILY
Qty: 90 TABLET | Refills: 1 | Status: SHIPPED | OUTPATIENT
Start: 2024-02-20

## 2024-02-20 NOTE — PROGRESS NOTES
Subjective   Rosalee Hargrove is a 68 y.o. female with   Chief Complaint   Patient presents with    Hypertension   .    Hypertension       68-year-old white female with multiple medical issues here for further medical management.  Patient with known history of hypertension, hyperlipidemia as well as CAD, carotid artery disease and mild CHF.  There is also history of hypothyroidism, vitamin D deficiency, morbid exogenous obesity as well as type II non-insulin-dependent diabetes mellitus.  Patient does suffer from GERD, renal lithiasis as well as general anxiety/panic disorder.  She has history of CVA with moyamoya disease, COPD as well as TRI and patient is an ex cigarette smoker.  She has a 50-pack-year smoking history and has quit just under 1 year ago.  Medications are multiple and are as listed.  All medications are used appropriately and are well-tolerated without side effects.  Fasting labs have been acquired prior to this visit.  In general she is doing well and is without acute complaint.  She is semiretired.  The following portions of the patient's history were reviewed and updated as appropriate: allergies, current medications, past family history, past medical history, past social history, past surgical history and problem list.    Review of Systems   Respiratory:  Positive for apnea.         COPD   Cardiovascular:         CAD, carotid artery disease, hypertension, hyperlipidemia, CHF   Endocrine:        Morbid exogenous obesity, vitamin D deficiency, hypothyroidism, type II non-insulin-dependent diabetes mellitus   Psychiatric/Behavioral:  The patient is nervous/anxious.        Objective     Vitals:    02/19/24 0803   BP: 128/76   Pulse: 72   Temp: 96.8 °F (36 °C)   SpO2: 97%       Recent Results (from the past 672 hour(s))   Ferritin    Collection Time: 02/08/24  8:31 AM    Specimen: Blood   Result Value Ref Range    Ferritin 523.00 (H) 13.00 - 150.00 ng/mL   Iron and TIBC    Collection Time: 02/08/24   8:31 AM    Specimen: Blood   Result Value Ref Range    TIBC 350 mcg/dL    UIBC 297 112 - 346 mcg/dL    Iron 53 37 - 145 mcg/dL    Iron Saturation 15 (L) 20 - 50 %   Hemoglobin A1c    Collection Time: 02/08/24  8:31 AM    Specimen: Blood   Result Value Ref Range    Hemoglobin A1C 6.90 (H) 4.80 - 5.60 %   Vitamin D,25-Hydroxy    Collection Time: 02/08/24  8:31 AM    Specimen: Blood   Result Value Ref Range    25 Hydroxy, Vitamin D 18.9 (L) 30.0 - 100.0 ng/ml   TSH    Collection Time: 02/08/24  8:31 AM    Specimen: Blood   Result Value Ref Range    TSH 1.990 0.270 - 4.200 uIU/mL   Lipid Panel    Collection Time: 02/08/24  8:31 AM    Specimen: Blood   Result Value Ref Range    Total Cholesterol 133 0 - 200 mg/dL    Triglycerides 253 (H) 0 - 150 mg/dL    HDL Cholesterol 34 (L) 40 - 60 mg/dL    VLDL Cholesterol [place] 40 5 - 40 mg/dL    LDL Chol Calc (NIH) 59 0 - 100 mg/dL   Comprehensive metabolic panel    Collection Time: 02/08/24  8:31 AM    Specimen: Blood   Result Value Ref Range    Glucose 120 (H) 65 - 99 mg/dL    BUN 23 8 - 23 mg/dL    Creatinine 1.26 (H) 0.57 - 1.00 mg/dL    EGFR Result 46.0 (L) >60.0 mL/min/1.73    BUN/Creatinine Ratio 18.3 7.0 - 25.0    Sodium 142 136 - 145 mmol/L    Potassium 4.3 3.5 - 5.2 mmol/L    Chloride 104 98 - 107 mmol/L    Total CO2 24.7 22.0 - 29.0 mmol/L    Calcium 9.2 8.6 - 10.5 mg/dL    Total Protein 7.1 6.0 - 8.5 g/dL    Albumin 4.6 3.5 - 5.2 g/dL    Globulin 2.5 gm/dL    A/G Ratio 1.8 g/dL    Total Bilirubin 0.4 0.0 - 1.2 mg/dL    Alkaline Phosphatase 106 39 - 117 U/L    AST (SGOT) 30 1 - 32 U/L    ALT (SGPT) 45 (H) 1 - 33 U/L   CBC & Differential    Collection Time: 02/08/24  8:31 AM    Specimen: Blood   Result Value Ref Range    WBC 8.69 3.40 - 10.80 10*3/mm3    RBC 4.82 3.77 - 5.28 10*6/mm3    Hemoglobin 13.5 12.0 - 15.9 g/dL    Hematocrit 41.4 34.0 - 46.6 %    MCV 85.9 79.0 - 97.0 fL    MCH 28.0 26.6 - 33.0 pg    MCHC 32.6 31.5 - 35.7 g/dL    RDW 18.5 (H) 12.3 - 15.4 %     Platelets 264 140 - 450 10*3/mm3    Neutrophil Rel % 65.3 42.7 - 76.0 %    Lymphocyte Rel % 24.1 19.6 - 45.3 %    Monocyte Rel % 5.5 5.0 - 12.0 %    Eosinophil Rel % 3.9 0.3 - 6.2 %    Basophil Rel % 0.6 0.0 - 1.5 %    Neutrophils Absolute 5.68 1.70 - 7.00 10*3/mm3    Lymphocytes Absolute 2.09 0.70 - 3.10 10*3/mm3    Monocytes Absolute 0.48 0.10 - 0.90 10*3/mm3    Eosinophils Absolute 0.34 0.00 - 0.40 10*3/mm3    Basophils Absolute 0.05 0.00 - 0.20 10*3/mm3    Immature Granulocyte Rel % 0.6 (H) 0.0 - 0.5 %    Immature Grans Absolute 0.05 0.00 - 0.05 10*3/mm3    nRBC 0.0 0.0 - 0.2 /100 WBC       Physical Exam  Vitals and nursing note reviewed.   Constitutional:       Appearance: Normal appearance. She is well-developed and well-groomed. She is morbidly obese.      Comments: Morbid exogenous obesity with a BMI of 42.2   HENT:      Head: Normocephalic and atraumatic.   Neck:      Thyroid: No thyroid mass or thyromegaly.      Vascular: Normal carotid pulses. No carotid bruit.      Trachea: Trachea and phonation normal.   Cardiovascular:      Rate and Rhythm: Normal rate and regular rhythm.      Heart sounds: Normal heart sounds. No murmur heard.     No friction rub. No gallop.   Pulmonary:      Effort: Pulmonary effort is normal. No respiratory distress.      Breath sounds: Normal breath sounds. No decreased breath sounds, wheezing, rhonchi or rales.   Musculoskeletal:      Cervical back: Neck supple.   Lymphadenopathy:      Cervical: No cervical adenopathy.   Skin:     General: Skin is warm and dry.      Findings: No rash.   Neurological:      Mental Status: She is alert and oriented to person, place, and time.   Psychiatric:         Attention and Perception: Attention and perception normal.         Mood and Affect: Mood and affect normal.         Speech: Speech normal.         Behavior: Behavior normal. Behavior is cooperative.         Thought Content: Thought content normal.         Cognition and Memory: Cognition  and memory normal.         Judgment: Judgment normal.         Assessment & Plan   Diagnoses and all orders for this visit:    1. Essential hypertension (Primary)  -     amLODIPine (NORVASC) 10 MG tablet; Take 1 tablet by mouth Daily.  Dispense: 90 tablet; Refill: 1  -     Comprehensive metabolic panel; Future  -     Vitamin D 25 hydroxy; Future  -     TSH; Future  -     Hemoglobin A1c; Future  -     Lipid panel; Future  -     CBC w AUTO Differential; Future    2. Mixed hyperlipidemia  -     atorvastatin (LIPITOR) 40 MG tablet; Take 1 tablet by mouth Daily.  Dispense: 90 tablet; Refill: 1  -     Comprehensive metabolic panel; Future  -     Vitamin D 25 hydroxy; Future  -     TSH; Future  -     Hemoglobin A1c; Future  -     Lipid panel; Future  -     CBC w AUTO Differential; Future    3. Coronary artery disease involving native coronary artery of native heart without angina pectoris  -     Comprehensive metabolic panel; Future  -     Vitamin D 25 hydroxy; Future  -     TSH; Future  -     Hemoglobin A1c; Future  -     Lipid panel; Future  -     CBC w AUTO Differential; Future    4. Atherosclerosis of both carotid arteries  -     Comprehensive metabolic panel; Future  -     Vitamin D 25 hydroxy; Future  -     TSH; Future  -     Hemoglobin A1c; Future  -     Lipid panel; Future  -     CBC w AUTO Differential; Future    5. Peripheral arterial occlusive disease  -     Comprehensive metabolic panel; Future  -     Vitamin D 25 hydroxy; Future  -     TSH; Future  -     Hemoglobin A1c; Future  -     Lipid panel; Future  -     CBC w AUTO Differential; Future    6. Acquired hypothyroidism  -     Comprehensive metabolic panel; Future  -     Vitamin D 25 hydroxy; Future  -     TSH; Future  -     Hemoglobin A1c; Future  -     Lipid panel; Future  -     CBC w AUTO Differential; Future    7. Type 2 diabetes mellitus with both eyes affected by mild nonproliferative retinopathy without macular edema, without long-term current use of  insulin  -     Semaglutide,0.25 or 0.5MG/DOS, (Ozempic, 0.25 or 0.5 MG/DOSE,) 2 MG/3ML solution pen-injector; Inject 0.5 mg under the skin into the appropriate area as directed 1 (One) Time Per Week.  Dispense: 3 mL; Refill: 5  -     Comprehensive metabolic panel; Future  -     Vitamin D 25 hydroxy; Future  -     TSH; Future  -     Hemoglobin A1c; Future  -     Lipid panel; Future  -     CBC w AUTO Differential; Future    8. Morbid exogenous obesity  -     Comprehensive metabolic panel; Future  -     Vitamin D 25 hydroxy; Future  -     TSH; Future  -     Hemoglobin A1c; Future  -     Lipid panel; Future  -     CBC w AUTO Differential; Future    9. Vitamin D deficiency  -     Comprehensive metabolic panel; Future  -     Vitamin D 25 hydroxy; Future  -     TSH; Future  -     Hemoglobin A1c; Future  -     Lipid panel; Future  -     CBC w AUTO Differential; Future    10. Gastroesophageal reflux disease without esophagitis  -     Comprehensive metabolic panel; Future  -     Vitamin D 25 hydroxy; Future  -     TSH; Future  -     Hemoglobin A1c; Future  -     Lipid panel; Future  -     CBC w AUTO Differential; Future    11. Generalized anxiety disorder  -     Comprehensive metabolic panel; Future  -     Vitamin D 25 hydroxy; Future  -     TSH; Future  -     Hemoglobin A1c; Future  -     Lipid panel; Future  -     CBC w AUTO Differential; Future    12. Panic disorder  -     citalopram (CeleXA) 20 MG tablet; Take 1 tablet by mouth Daily.  Dispense: 90 tablet; Refill: 1  -     Comprehensive metabolic panel; Future  -     Vitamin D 25 hydroxy; Future  -     TSH; Future  -     Hemoglobin A1c; Future  -     Lipid panel; Future  -     CBC w AUTO Differential; Future    13. History of cardioembolic cerebrovascular accident (CVA)  -     Comprehensive metabolic panel; Future  -     Vitamin D 25 hydroxy; Future  -     TSH; Future  -     Hemoglobin A1c; Future  -     Lipid panel; Future  -     CBC w AUTO Differential; Future    14.  Obstructive sleep apnea syndrome  -     Comprehensive metabolic panel; Future  -     Vitamin D 25 hydroxy; Future  -     TSH; Future  -     Hemoglobin A1c; Future  -     Lipid panel; Future  -     CBC w AUTO Differential; Future    15. Ex-cigarette smoker  -     CT Chest Low Dose Wo; Future  -     Comprehensive metabolic panel; Future  -     Vitamin D 25 hydroxy; Future  -     TSH; Future  -     Hemoglobin A1c; Future  -     Lipid panel; Future  -     CBC w AUTO Differential; Future    16. Controlled type 2 diabetes mellitus without complication, without long-term current use of insulin  -     metFORMIN (GLUCOPHAGE) 1000 MG tablet; 1 p.o. twice daily  Dispense: 180 tablet; Refill: 1  -     Comprehensive metabolic panel; Future  -     Vitamin D 25 hydroxy; Future  -     TSH; Future  -     Hemoglobin A1c; Future  -     Lipid panel; Future  -     CBC w AUTO Differential; Future    17. Anxiety  -     citalopram (CeleXA) 20 MG tablet; Take 1 tablet by mouth Daily.  Dispense: 90 tablet; Refill: 1  -     Comprehensive metabolic panel; Future  -     Vitamin D 25 hydroxy; Future  -     TSH; Future  -     Hemoglobin A1c; Future  -     Lipid panel; Future  -     CBC w AUTO Differential; Future    18. Disorder of arteries and arterioles, unspecified  -     clopidogrel (PLAVIX) 75 MG tablet; Take 1 tablet by mouth Daily.  Dispense: 90 tablet; Refill: 1  -     Comprehensive metabolic panel; Future  -     Vitamin D 25 hydroxy; Future  -     TSH; Future  -     Hemoglobin A1c; Future  -     Lipid panel; Future  -     CBC w AUTO Differential; Future    19. Atherosclerotic heart disease of native coronary artery without angina pectoris  -     clopidogrel (PLAVIX) 75 MG tablet; Take 1 tablet by mouth Daily.  Dispense: 90 tablet; Refill: 1  -     Comprehensive metabolic panel; Future  -     Vitamin D 25 hydroxy; Future  -     TSH; Future  -     Hemoglobin A1c; Future  -     Lipid panel; Future  -     CBC w AUTO Differential;  Future    20. Stage 3a chronic kidney disease        Return in about 6 months (around 8/19/2024) for Recheck.

## 2024-03-10 ENCOUNTER — HOSPITAL ENCOUNTER (OUTPATIENT)
Facility: HOSPITAL | Age: 69
Discharge: HOME | End: 2024-03-10
Admitting: FAMILY MEDICINE
Payer: MEDICARE

## 2024-03-10 DIAGNOSIS — Z87.891 EX-CIGARETTE SMOKER: ICD-10-CM

## 2024-03-10 PROCEDURE — 71271 CT THORAX LUNG CANCER SCR C-: CPT

## 2024-03-20 DIAGNOSIS — I73.9 PERIPHERAL VASCULAR DISEASE, UNSPECIFIED: ICD-10-CM

## 2024-03-20 DIAGNOSIS — I70.0 ATHEROSCLEROSIS OF AORTA: Primary | ICD-10-CM

## 2024-04-08 ENCOUNTER — OFFICE VISIT (OUTPATIENT)
Age: 69
End: 2024-04-08
Payer: MEDICARE

## 2024-04-08 VITALS
SYSTOLIC BLOOD PRESSURE: 148 MMHG | DIASTOLIC BLOOD PRESSURE: 70 MMHG | HEIGHT: 60 IN | HEART RATE: 64 BPM | BODY MASS INDEX: 41.82 KG/M2 | WEIGHT: 213 LBS

## 2024-04-08 DIAGNOSIS — I10 PRIMARY HYPERTENSION: Primary | ICD-10-CM

## 2024-04-08 RX ORDER — EZETIMIBE 10 MG/1
1 TABLET ORAL DAILY
COMMUNITY
Start: 2024-02-20

## 2024-04-08 NOTE — PROGRESS NOTES
Subjective:     Encounter Date: 04/08/24      Patient ID: Rosalee Hargrove is a 68 y.o. female.    Chief Complaint: Nonobstructive CAD, peripheral vascular disease, A. Fib, CVA  HPI:     This is a 67-year-old woman with recent CVA, nonobstructive CAD, PAD, graham graham disease s/p r CEA, HTN, HLD.      Several years of ago she underwent cardiac catheterization which showed mild, nonobstructive coronary disease throughout her coronary arteries.  She has a history of peripheral arterial disease and is followed by Dr. Coats. 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 She has a right iliac artery stent which on last evaluation 2021 showed moderate in-stent restenosis.  She also has severe peripheral disease of the descending aorta with scattered ulcerations and plaque formation.    In April 2021 she had acute left sided weakness and facial droop. She was evaluated at [facility] and found to have a small CVA. Echo unremarkable. 48 hour holter showed no AF.  When I saw her in follow-up we plan to implant a Linq device to follow for occult atrial fibrillation.  She had a carotid Doppler also which showed an occluded right carotid artery.  She then presented to [facility] with recurrent stroke in Memorial Day of 2021.  She underwent carotid artery stenting with Dr. Castanon.  She also had a Linq implanted by the cardiology team there.     April 2023 underwent right common carotid stenting due to residual stenosis with Dr. Castanon.  He has requested that due to recurrent CVAs she should be on dual antiplatelet therapy twice daily indefinitely.    She returns today.  She is quit smoking.  She is lost weight with the help of a GLP inhibitor.  She walks about 2 blocks without stopping at this point.  She is feeling better though she is bored after alf.  No angina or dyspnea beyond her usual baseline exertional dyspnea.    There is a strong family history of coronary disease and stroke    The following portions of the  patient's history were reviewed and updated as appropriate: allergies, current medications, past family history, past medical history, past social history, past surgical history and problem list.     REVIEW OF SYSTEMS:   All systems reviewed.  Pertinent positives identified in HPI.  All other systems are negative.    Past Medical History:   Diagnosis Date    Anemia     Anxiety     Arthritis     Bilateral leg edema     Cataract 2022    Cholelithiasis     Colon polyp 2023    Depression     Dizziness     GERD (gastroesophageal reflux disease)     Glaucoma     Headache     Kidney stone     Pancreatitis     Rectal bleeding 2022    Added automatically from request for surgery [id number]    Shortness of breath     Sleep apnea     Tobacco abuse     Urinary tract infection     Visual impairment     Macular degeneration    Vitamin D deficiency        Family History   Problem Relation Age of Onset    Heart disease Mother     Hypertension Mother     Hyperlipidemia Mother     Breast cancer Mother     Heart disease Father     Heart attack Sister     Heart disease Sister     Breast cancer Sister     Heart disease Brother     Heart attack Brother     Hyperlipidemia Brother     Diabetes Brother     Heart attack Maternal Grandmother     Heart disease Maternal Grandmother     Heart failure Maternal Grandmother     Heart failure Maternal Grandfather     Heart disease Maternal Grandfather     Heart attack Maternal Grandfather     Heart attack Paternal Grandmother     Heart disease Paternal Grandmother     Heart failure Paternal Grandmother     Hypertension Paternal Grandmother        Social History     Socioeconomic History    Marital status:    Tobacco Use    Smoking status: Former     Current packs/day: 0.00     Average packs/day: 0.5 packs/day for 50.0 years (25.0 ttl pk-yrs)     Types: Cigarettes     Start date: 1973     Quit date: 2023     Years since quittin.9    Smokeless tobacco: Never   Vaping  Use    Vaping status: Never Used   Substance and Sexual Activity    Alcohol use: Yes     Alcohol/week: 1.0 standard drink of alcohol     Types: 1 Glasses of wine per week     Comment: Very occasional    Drug use: No    Sexual activity: Not Currently     Partners: Male     Birth control/protection: None     Comment: N/A       Allergies   Allergen Reactions    Ciprofloxacin Hives and Swelling    Lisinopril Swelling and Other (See Comments)     Cough     Seasonal Ic [Cholestatin] Other (See Comments)     Cough, congestion       Past Surgical History:   Procedure Laterality Date    ANAL FISSURECTOMY      BRAIN SURGERY      CARDIAC CATHETERIZATION N/A 2016    Procedure: Coronary angiography;  Surgeon: Fredrick Kapoor MD;  [facility];  Service:     CARDIAC CATHETERIZATION N/A 2016    Procedure: Left heart cath;  Surgeon: Fredrick Kapoor MD;  [facility];  Service:     CARDIAC CATHETERIZATION N/A 2016    Procedure: Left ventriculography;  Surgeon: Fredrick Kapoor MD;  [facility];  Service:     CARDIAC CATHETERIZATION N/A 2016    Procedure: Right heart cath;  Surgeon: Fredrick Kapoor MD;  [facility];  Service:      SECTION      CHOLECYSTECTOMY      COLONOSCOPY N/A 2022    Procedure: COLONOSCOPY with Polypectomy;  Surgeon: Sarwat Church MD;  [facility];  Service: Gastroenterology;  Laterality: N/A;  Rectal polyps x3 and Hemorrhoids    ENDOSCOPY N/A 2022    Procedure: ESOPHAGOGASTRODUODENOSCOPY;  Surgeon: Sarwat Church MD;  [facility];  Service: Gastroenterology;  Laterality: N/A;  Small Hiatal Hernia    ERCP      FRACTURE SURGERY      arm    HYSTERECTOMY      ILIAC ARTERY STENT      SUBTOTAL HYSTERECTOMY      TONSILLECTOMY         Procedures       Objective:         PHYSICAL EXAM:  GEN: VSS, no distress,   Eyes: normal sclera, normal lids and  lashes  HENT: moist mucus membranes,   Respiratory: CTAB, no rales or wheezes  CV: RRR, no murmurs,  B/l carotid bruits  GI: NABS, soft,  Nontender, nondistended  MSK: no edema, no scoliosis or kyphosis  Skin: no rash, warm, dry  Heme/Lymph: no bruising or bleeding  Psych: organized thought, normal behavior and affect  Neuro: Cranial nerves grossly intact, Alert and Oriented x 3.         Assessment:          Diagnosis Plan   1. Primary hypertension               Plan:       1.  Paroxysmal atrial fibrillation: CHADSVASC is 6. One episode documented several years ago for a few minutes and was not anticoagulated. Linq without recurrence. No AC.   2.  Hyperlipidemia:Lipitor 40.  Continue.  3.  Coronary artery disease, nonobstructive: statin plavix  4.  Peripheral arterial disease, Graham graham disease: She  follows with Dr. Coats who implanted a right iliac stent and is s/p right CEA, right carotid stenting 2023.  Aspirin 81 and Plavix 75 BID due to PRA abnl per neurosurgery.  5.  Hypertension: Controlled on amlodipine 5 and HCTZ.   6.  Smoking cessation: Has successfully quit  7.  CVA: Multiple CVAs     Dr. Slater, thank you very much for referring this kind patient to me. Please call me with any questions or concerns. I will see the patient again in the office in 12 months.       Nohemi Mei MD  04/08/24  [facility]    Outpatient Encounter Medications as of 4/8/2024   Medication Sig Dispense Refill    ALPRAZolam (XANAX) 0.5 MG tablet Take 1 tablet by mouth As Needed for Anxiety.      amLODIPine (NORVASC) 10 MG tablet Take 1 tablet by mouth Daily. 90 tablet 1    aspirin 81 MG chewable tablet Chew 1 tablet Daily.      atorvastatin (LIPITOR) 40 MG tablet Take 1 tablet by mouth Daily. 90 tablet 1    citalopram (CeleXA) 20 MG tablet Take 1 tablet by mouth Daily. 90 tablet 1    clopidogrel (PLAVIX) 75 MG tablet Take 1 tablet by mouth Daily. 90 tablet 1    fluocinolone acetonide (DERMOTIC) 0.01 % oil otic oil  Administer  into both ears 2 (Two) Times a Day. Indications: Chronic External Ear Eczema 20 mL 1    Fluticasone-Umeclidin-Vilant (Trelegy Ellipta) 100-62.5-25 MCG/ACT inhaler Inhale 1 puff Daily. 1 each 5    levothyroxine (SYNTHROID, LEVOTHROID) 75 MCG tablet Take 1 tablet by mouth once daily 90 tablet 0    losartan (Cozaar) 50 MG tablet Take 1 tablet by mouth Daily. 90 tablet 3    metFORMIN (GLUCOPHAGE) 1000 MG tablet 1 p.o. twice daily 180 tablet 1    pantoprazole (PROTONIX) 40 MG EC tablet Take 1 tablet by mouth once daily 90 tablet 0    pimecrolimus (Elidel) 1 % cream Apply 1 application  topically to the appropriate area as directed 2 (Two) Times a Day As Needed (eczema). Indications: Atopic Dermatitis 60 g 0    Semaglutide,0.25 or 0.5MG/DOS, (Ozempic, 0.25 or 0.5 MG/DOSE,) 2 MG/3ML solution pen-injector Inject 0.5 mg under the skin into the appropriate area as directed 1 (One) Time Per Week. 3 mL 5    spironolactone (ALDACTONE) 25 MG tablet Take 1 tablet by mouth Daily. 90 tablet 3    trimethoprim-polymyxin b (POLYTRIM) 96948-3.1 UNIT/ML-% ophthalmic solution Apply 1 drop to eye(s) as directed by provider. Four days before and four days after eye injection.       No facility-administered encounter medications on file as of 4/8/2024.

## 2024-04-12 DIAGNOSIS — K21.9 GASTROESOPHAGEAL REFLUX DISEASE WITHOUT ESOPHAGITIS: ICD-10-CM

## 2024-04-12 RX ORDER — PANTOPRAZOLE SODIUM 40 MG/1
TABLET, DELAYED RELEASE ORAL
Qty: 90 TABLET | Refills: 0 | Status: SHIPPED | OUTPATIENT
Start: 2024-04-12

## 2024-05-20 DIAGNOSIS — E03.9 ACQUIRED HYPOTHYROIDISM: ICD-10-CM

## 2024-05-20 RX ORDER — LEVOTHYROXINE SODIUM 0.07 MG/1
TABLET ORAL
Qty: 90 TABLET | Refills: 0 | Status: SHIPPED | OUTPATIENT
Start: 2024-05-20

## 2024-05-21 RX ORDER — EZETIMIBE 10 MG/1
10 TABLET ORAL DAILY
Qty: 90 TABLET | Refills: 0 | Status: SHIPPED | OUTPATIENT
Start: 2024-05-21

## 2024-06-20 ENCOUNTER — OFFICE VISIT (OUTPATIENT)
Dept: ONCOLOGY | Facility: CLINIC | Age: 69
End: 2024-06-20
Payer: MEDICARE

## 2024-06-20 ENCOUNTER — LAB (OUTPATIENT)
Dept: LAB | Facility: HOSPITAL | Age: 69
End: 2024-06-20
Payer: MEDICARE

## 2024-06-20 VITALS
RESPIRATION RATE: 16 BRPM | OXYGEN SATURATION: 97 % | BODY MASS INDEX: 40.68 KG/M2 | SYSTOLIC BLOOD PRESSURE: 132 MMHG | WEIGHT: 207.2 LBS | HEIGHT: 60 IN | HEART RATE: 76 BPM | TEMPERATURE: 97.7 F | DIASTOLIC BLOOD PRESSURE: 71 MMHG

## 2024-06-20 DIAGNOSIS — D50.0 IRON DEFICIENCY ANEMIA DUE TO CHRONIC BLOOD LOSS: Primary | ICD-10-CM

## 2024-06-20 DIAGNOSIS — D50.9 IRON DEFICIENCY ANEMIA, UNSPECIFIED IRON DEFICIENCY ANEMIA TYPE: Primary | ICD-10-CM

## 2024-06-20 LAB
BASOPHILS # BLD AUTO: 0.04 10*3/MM3 (ref 0–0.2)
BASOPHILS NFR BLD AUTO: 0.5 % (ref 0–1.5)
DEPRECATED RDW RBC AUTO: 47.2 FL (ref 37–54)
EOSINOPHIL # BLD AUTO: 0.48 10*3/MM3 (ref 0–0.4)
EOSINOPHIL NFR BLD AUTO: 5.5 % (ref 0.3–6.2)
ERYTHROCYTE [DISTWIDTH] IN BLOOD BY AUTOMATED COUNT: 13.8 % (ref 12.3–15.4)
FERRITIN SERPL-MCNC: 296 NG/ML (ref 13–150)
HCT VFR BLD AUTO: 38.8 % (ref 34–46.6)
HGB BLD-MCNC: 12.2 G/DL (ref 12–15.9)
IMM GRANULOCYTES # BLD AUTO: 0.02 10*3/MM3 (ref 0–0.05)
IMM GRANULOCYTES NFR BLD AUTO: 0.2 % (ref 0–0.5)
IRON 24H UR-MRATE: 48 MCG/DL (ref 37–145)
IRON SATN MFR SERPL: 14 % (ref 20–50)
LYMPHOCYTES # BLD AUTO: 2.23 10*3/MM3 (ref 0.7–3.1)
LYMPHOCYTES NFR BLD AUTO: 25.8 % (ref 19.6–45.3)
MCH RBC QN AUTO: 29.1 PG (ref 26.6–33)
MCHC RBC AUTO-ENTMCNC: 31.4 G/DL (ref 31.5–35.7)
MCV RBC AUTO: 92.6 FL (ref 79–97)
MONOCYTES # BLD AUTO: 0.49 10*3/MM3 (ref 0.1–0.9)
MONOCYTES NFR BLD AUTO: 5.7 % (ref 5–12)
NEUTROPHILS NFR BLD AUTO: 5.4 10*3/MM3 (ref 1.7–7)
NEUTROPHILS NFR BLD AUTO: 62.3 % (ref 42.7–76)
PLATELET # BLD AUTO: 314 10*3/MM3 (ref 140–450)
PMV BLD AUTO: 10 FL (ref 6–12)
RBC # BLD AUTO: 4.19 10*6/MM3 (ref 3.77–5.28)
TIBC SERPL-MCNC: 343 MCG/DL (ref 298–536)
TRANSFERRIN SERPL-MCNC: 230 MG/DL (ref 200–360)
WBC NRBC COR # BLD AUTO: 8.66 10*3/MM3 (ref 3.4–10.8)

## 2024-06-20 PROCEDURE — 83540 ASSAY OF IRON: CPT | Performed by: INTERNAL MEDICINE

## 2024-06-20 PROCEDURE — 85025 COMPLETE CBC W/AUTO DIFF WBC: CPT | Performed by: INTERNAL MEDICINE

## 2024-06-20 PROCEDURE — 36415 COLL VENOUS BLD VENIPUNCTURE: CPT

## 2024-06-20 PROCEDURE — 82728 ASSAY OF FERRITIN: CPT | Performed by: INTERNAL MEDICINE

## 2024-06-20 PROCEDURE — 84466 ASSAY OF TRANSFERRIN: CPT | Performed by: INTERNAL MEDICINE

## 2024-06-20 RX ORDER — DESONIDE 0.5 MG/G
1 OINTMENT TOPICAL
COMMUNITY
Start: 2024-05-31

## 2024-06-20 NOTE — PROGRESS NOTES
Subjective     REASON FOR CONSULTATION: Iron deficiency anemia  Provide an opinion on any further workup or treatment                             REQUESTING PHYSICIAN: Dr. Slater    RECORDS OBTAINED:  Records of the patients history including those obtained from the referring provider were reviewed and summarized in detail.      History of Present Illness   This is a very pleasant 66-year-old woman with multiple medical comorbidities referred from her PCP for evaluation and treatment of iron deficiency anemia.  Reviewing the patient's records, she has had microcytic, hypochromic red blood cell indices since 2017/2018 and over the past 1 year or so been mildly anemic hemoglobin typically running around 11.0.  She has normal white blood cell and platelet counts.  Her ferritin has been low for at least 5 years most recently ten 1 month ago.  The patient has attempted to take oral iron on multiple occasions but difficulty tolerating secondary to either diarrhea or constipation.  She has not been able to improve her ferritin despite oral iron.  She complains of fatigue, pica and restless leg symptoms.  She thinks it has been about 10 years since her previous colonoscopy.  She does have occasional hematochezia.  She denies vaginal bleeding and denies donating blood.    Patient received Injectafer x2 on 3/23/2022 and 3/30/2022.   EGD and colonoscopy were performed 6/30/2022 with no obvious source of GI blood loss.  Patient received additional Injectafer x 2 in December 2023.    She denies any recent dark stools or bleeding.  She denies any recent fevers or infections or other health changes.  She has been struggling with some chronic pain from her left lower extremity vascular issues.    Past Medical History:   Diagnosis Date    Anemia     Anxiety     Arthritis     Bilateral leg edema     Cataract Jan 2022    Cholelithiasis     Colon polyp Feb 2023    Depression     Dizziness     GERD (gastroesophageal reflux disease)      Glaucoma     Headache     Kidney stone     Pancreatitis     Rectal bleeding 2022    Added automatically from request for surgery [id number]    Shortness of breath     Sleep apnea     Tobacco abuse     Urinary tract infection     Visual impairment     Macular degeneration    Vitamin D deficiency         Past Surgical History:   Procedure Laterality Date    ANAL FISSURECTOMY      BRAIN SURGERY      CARDIAC CATHETERIZATION N/A 2016    Procedure: Coronary angiography;  Surgeon: Fredrick Kapoor MD;  [facility];  Service:     CARDIAC CATHETERIZATION N/A 2016    Procedure: Left heart cath;  Surgeon: Fredrick Kapoor MD;  [facility];  Service:     CARDIAC CATHETERIZATION N/A 2016    Procedure: Left ventriculography;  Surgeon: Fredrick Kapoor MD;  [facility];  Service:     CARDIAC CATHETERIZATION N/A 2016    Procedure: Right heart cath;  Surgeon: Fredrick Kapoor MD;  [facility];  Service:      SECTION      CHOLECYSTECTOMY      COLONOSCOPY N/A 2022    Procedure: COLONOSCOPY with Polypectomy;  Surgeon: Sarwat Church MD;  [facility];  Service: Gastroenterology;  Laterality: N/A;  Rectal polyps x3 and Hemorrhoids    ENDOSCOPY N/A 2022    Procedure: ESOPHAGOGASTRODUODENOSCOPY;  Surgeon: Sarwat Church MD;  [facility];  Service: Gastroenterology;  Laterality: N/A;  Small Hiatal Hernia    ERCP      FRACTURE SURGERY      arm    HYSTERECTOMY      ILIAC ARTERY STENT      SUBTOTAL HYSTERECTOMY      TONSILLECTOMY          Current Outpatient Medications on File Prior to Visit   Medication Sig Dispense Refill    ALPRAZolam (XANAX) 0.5 MG tablet Take 1 tablet by mouth As Needed for Anxiety.      amLODIPine (NORVASC) 10 MG tablet Take 1 tablet by mouth Daily. 90 tablet 1    aspirin 81 MG chewable tablet Chew 1 tablet Daily.      atorvastatin (LIPITOR) 40 MG  tablet Take 1 tablet by mouth Daily. 90 tablet 1    citalopram (CeleXA) 20 MG tablet Take 1 tablet by mouth Daily. 90 tablet 1    clopidogrel (PLAVIX) 75 MG tablet Take 1 tablet by mouth Daily. 90 tablet 1    desonide (DESOWEN) 0.05 % ointment Apply 1 Application topically to the appropriate area as directed.      ezetimibe (ZETIA) 10 MG tablet Take 1 tablet by mouth once daily 90 tablet 0    fluocinolone acetonide (DERMOTIC) 0.01 % oil otic oil Administer  into both ears 2 (Two) Times a Day. Indications: Chronic External Ear Eczema 20 mL 1    Fluticasone-Umeclidin-Vilant (Trelegy Ellipta) 100-62.5-25 MCG/ACT inhaler Inhale 1 puff Daily. 1 each 5    levothyroxine (SYNTHROID, LEVOTHROID) 75 MCG tablet Take 1 tablet by mouth once daily 90 tablet 0    losartan (Cozaar) 50 MG tablet Take 1 tablet by mouth Daily. 90 tablet 3    metFORMIN (GLUCOPHAGE) 1000 MG tablet 1 p.o. twice daily 180 tablet 1    pantoprazole (PROTONIX) 40 MG EC tablet Take 1 tablet by mouth once daily 90 tablet 0    pimecrolimus (Elidel) 1 % cream Apply 1 application  topically to the appropriate area as directed 2 (Two) Times a Day As Needed (eczema). Indications: Atopic Dermatitis 60 g 0    Semaglutide,0.25 or 0.5MG/DOS, (Ozempic, 0.25 or 0.5 MG/DOSE,) 2 MG/3ML solution pen-injector Inject 0.5 mg under the skin into the appropriate area as directed 1 (One) Time Per Week. 3 mL 5    spironolactone (ALDACTONE) 25 MG tablet Take 1 tablet by mouth Daily. 90 tablet 3    trimethoprim-polymyxin b (POLYTRIM) 43199-6.1 UNIT/ML-% ophthalmic solution Apply 1 drop to eye(s) as directed by provider. Four days before and four days after eye injection.       No current facility-administered medications on file prior to visit.        ALLERGIES:    Allergies   Allergen Reactions    Ciprofloxacin Hives and Swelling    Lisinopril Swelling and Other (See Comments)     Cough     Seasonal Ic [Cholestatin] Other (See Comments)     Cough, congestion        Social History      Socioeconomic History    Marital status:    Tobacco Use    Smoking status: Former     Current packs/day: 0.00     Average packs/day: 0.5 packs/day for 50.0 years (25.0 ttl pk-yrs)     Types: Cigarettes     Start date: 1973     Quit date: 2023     Years since quittin.1    Smokeless tobacco: Never   Vaping Use    Vaping status: Never Used   Substance and Sexual Activity    Alcohol use: Yes     Alcohol/week: 1.0 standard drink of alcohol     Types: 1 Glasses of wine per week     Comment: Very occasional    Drug use: No    Sexual activity: Not Currently     Partners: Male     Birth control/protection: None     Comment: N/A        Family History   Problem Relation Age of Onset    Heart disease Mother     Hypertension Mother     Hyperlipidemia Mother     Breast cancer Mother     Heart disease Father     Heart attack Sister     Heart disease Sister     Breast cancer Sister     Heart disease Brother     Heart attack Brother     Hyperlipidemia Brother     Diabetes Brother     Heart attack Maternal Grandmother     Heart disease Maternal Grandmother     Heart failure Maternal Grandmother     Heart failure Maternal Grandfather     Heart disease Maternal Grandfather     Heart attack Maternal Grandfather     Heart attack Paternal Grandmother     Heart disease Paternal Grandmother     Heart failure Paternal Grandmother     Hypertension Paternal Grandmother         Review of Systems   Constitutional:  Positive for fatigue. Negative for fever and unexpected weight change.   HENT: Negative.     Respiratory:  Negative for cough and shortness of breath.    Cardiovascular:  Negative for chest pain, palpitations and leg swelling.   Gastrointestinal:  Negative for blood in stool, constipation, diarrhea and nausea.   Genitourinary: Negative.    Musculoskeletal:  Positive for arthralgias.   Skin: Negative.    Allergic/Immunologic: Negative.    Neurological:  Negative for dizziness, seizures, weakness and  "light-headedness.   Hematological: Negative.    Psychiatric/Behavioral: Negative.     ROS unchanged- 06/20/2024      Objective     Vitals:    06/20/24 1445   BP: 132/71   Pulse: 76   Resp: 16   Temp: 97.7 °F (36.5 °C)   TempSrc: Infrared   SpO2: 97%   Weight: 94 kg (207 lb 3.2 oz)   Height: 152.4 cm (60\")   PainSc:   9   PainLoc: Leg           6/20/2024     2:51 PM   Current Status   ECOG score 1       Physical Exam    CONSTITUTIONAL: pleasant well-developed adult woman  HEENT: no icterus, hearing intact, mask in place  LYMPH: no cervical or supraclavicular lad  CV: RRR, S1S2, no murmur  RESP: cta bilat, no wheezing, no rales  MUSC: no edema, normal gait  NEURO: alert and oriented x3, normal strength  PSYCH: Normal mood and affect  Exam unchanged 06/20/2024    RECENT LABS:  Hematology WBC   Date Value Ref Range Status   06/20/2024 8.66 3.40 - 10.80 10*3/mm3 Final   02/08/2024 8.69 3.40 - 10.80 10*3/mm3 Final   04/26/2023 9.68 4.5 - 11.0 10*3/uL Final     RBC   Date Value Ref Range Status   06/20/2024 4.19 3.77 - 5.28 10*6/mm3 Final   02/08/2024 4.82 3.77 - 5.28 10*6/mm3 Final   04/26/2023 4.38 4.0 - 5.2 10*6/uL Final     Hemoglobin   Date Value Ref Range Status   06/20/2024 12.2 12.0 - 15.9 g/dL Final   04/26/2023 11.7 (L) 12.0 - 16.0 g/dL Final     Hematocrit   Date Value Ref Range Status   06/20/2024 38.8 34.0 - 46.6 % Final   04/26/2023 36.9 36.0 - 46.0 % Final     Platelets   Date Value Ref Range Status   06/20/2024 314 140 - 450 10*3/mm3 Final   04/26/2023 267 140 - 440 10*3/uL Final        Lab Results   Component Value Date    GLUCOSE 120 (H) 02/08/2024    BUN 23 02/08/2024    CREATININE 1.26 (H) 02/08/2024    EGFRIFNONA 60 12/22/2021    EGFRIFAFRI >60 08/02/2022    BCR 18.3 02/08/2024    K 4.3 02/08/2024    CO2 24.7 02/08/2024    CALCIUM 9.2 02/08/2024    PROTENTOTREF 7.1 02/08/2024    ALBUMIN 4.6 02/08/2024    LABIL2 1.8 02/08/2024    AST 30 02/08/2024    ALT 45 (H) 02/08/2024     Anemia labs:      Lab " 06/20/24  1420   IRON 48   IRON SATURATION (TSAT) 14*   TIBC 343   TRANSFERRIN 230   FERRITIN 296.00*         Assessment & Plan     *Iron deficiency anemia  The patient has had microcytic/hypochromic indices for 4 to 5 years, recently developed anemia with hemoglobin around 11  The patient has attempted oral iron but cannot tolerate secondary to constipation/diarrhea and has been unable to improve iron stores despite oral iron  She has occasional bright red blood per rectum, most recent colonoscopy she thinks was about 10 years ago.  She denies vaginal bleeding and denies blood donations.  She has never had gastric surgeries.  She is on chronic PPI therapy.  3/15/2022 ferritin 12, iron saturation 6%, TIBC 388 received  2 doses of Injectafer  EGD and colonoscopy June 2022 no obvious source of blood loss; polyps resected from the rectum benign  8/17/2022- hemoglobin 14.4, iron sat 16%, ferritin 142  2/8/2023-hemoglobin 14.4, iron sat 16%, ferritin 76  8/9/2023-hemoglobin 12.2, iron sat 13%, ferritin 73  12/14/2023-hemoglobin 11.1, iron sat 9%, ferritin 36 2 doses of Injectafer given  6/20/2024-hemoglobin 12.2, iron sat 14%, ferritin 296    *Multiple comorbidities including peripheral vascular disease, coronary artery disease, diabetes mellitus, CVA, obesity, hyperlipidemia, hypertension, tobacco use etc.    Hematology plan/recommendations:  The patient is monitored for her iron deficiency anemia and is intolerant of oral iron because of GI upset.  She will have iron studies performed again by her primary care towards the end of August and will follow-up with Dr. Kenny in 6 months with repeat CBC, ferritin, iron profile.

## 2024-07-10 DIAGNOSIS — K21.9 GASTROESOPHAGEAL REFLUX DISEASE WITHOUT ESOPHAGITIS: ICD-10-CM

## 2024-07-10 RX ORDER — PANTOPRAZOLE SODIUM 40 MG/1
TABLET, DELAYED RELEASE ORAL
Qty: 90 TABLET | Refills: 0 | Status: SHIPPED | OUTPATIENT
Start: 2024-07-10

## 2024-07-17 PROBLEM — Z09 FOLLOW-UP EXAM: Status: ACTIVE | Noted: 2024-07-17

## 2024-07-18 ENCOUNTER — OFFICE VISIT (OUTPATIENT)
Age: 69
End: 2024-07-18
Payer: MEDICARE

## 2024-07-18 VITALS
BODY MASS INDEX: 38.48 KG/M2 | HEART RATE: 75 BPM | DIASTOLIC BLOOD PRESSURE: 80 MMHG | SYSTOLIC BLOOD PRESSURE: 130 MMHG | HEIGHT: 60 IN | WEIGHT: 196 LBS

## 2024-07-18 DIAGNOSIS — I65.21 STENOSIS OF RIGHT CAROTID ARTERY: ICD-10-CM

## 2024-07-18 DIAGNOSIS — M48.062 NEUROGENIC CLAUDICATION DUE TO LUMBAR SPINAL STENOSIS: ICD-10-CM

## 2024-07-18 DIAGNOSIS — I70.0 ATHEROSCLEROSIS OF AORTA: ICD-10-CM

## 2024-07-18 DIAGNOSIS — I75.023 ATHEROEMBOLISM OF BILATERAL LOWER EXTREMITIES: ICD-10-CM

## 2024-07-18 DIAGNOSIS — G57.00 SCIATIC NEUROPATHY, UNSPECIFIED LATERALITY: ICD-10-CM

## 2024-07-18 DIAGNOSIS — I77.9 PERIPHERAL ARTERIAL OCCLUSIVE DISEASE: Primary | ICD-10-CM

## 2024-07-18 DIAGNOSIS — M54.40 CHRONIC MIDLINE LOW BACK PAIN WITH SCIATICA, SCIATICA LATERALITY UNSPECIFIED: ICD-10-CM

## 2024-07-18 DIAGNOSIS — G89.29 CHRONIC MIDLINE LOW BACK PAIN WITH SCIATICA, SCIATICA LATERALITY UNSPECIFIED: ICD-10-CM

## 2024-07-18 PROBLEM — M54.9 BACK PAIN: Status: ACTIVE | Noted: 2024-07-18

## 2024-07-18 PROBLEM — R29.818 NEUROGENIC CLAUDICATION: Status: ACTIVE | Noted: 2024-07-18

## 2024-07-18 NOTE — PROGRESS NOTES
Patient Name: Rosalee Hargrove    MRN: [id number] Encounter Date: 07/18/2024      Consulting Service: Vascular Surgery    Referring Provider: No ref. provider found       CHIEF COMPLAINT:  Chief Complaint   Patient presents with    Leg Pain       Subjective    HPI: Rosalee Hargrove is a 68 y.o. female is being seen for evaluation/management of new and worsening leg pain that is difficult to define.  It sounds like this could be either vasculogenic or neurogenic claudication with pain that seems to be improved or worsened by certain body positions and comes on very fast even with small amount of walking.  This points away from the vascular standpoint however she already has a right iliac stent and a known SOFÍA on the left 0.85 last year so progression of peripheral vascular disease could also be at play.  To her credit she has stopped smoking completely and this is really a huge thing for her.  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  At this juncture I think we need to define whether her current complaints are primarily arterial or neurogenic.  Will get LS-spine films and a lower extremity arterial testing with stress we will cancel her appointment in September and we will do the workup for now.    PAST MEDICAL HISTORY:   Past Medical History:   Diagnosis Date    Afib 09/09/2021    Anemia     Anxiety     Arthritis     Atheroembolism of other site 11/12/2015    Atherosclerosis of aorta 11/12/2015    Atherosclerosis of native arteries of extremities with intermittent claudication, bilateral legs 08/29/2019    PVD    Bilateral carotid artery stenosis 08/29/2019    Bilateral leg edema     Carotid artery stenosis 08/11/2016    Right    Cataract Jan 2022    Chest pain on breathing 08/17/2017    Cholelithiasis     Colon polyp Feb 2023    Depression     Dizziness     GERD  (gastroesophageal reflux disease)     Glaucoma     Headache     Kidney stone     Localized edema 2015    Localized edema 2016    Orthostatic hypotension 2016    Pancreatitis     Peripheral vascular disease with claudication 2015    bilateral legs    Rectal bleeding 2022    Added automatically from request for surgery [id number]    Shortness of breath     Sleep apnea     Stroke 2021    Tobacco abuse     Urinary tract infection     Varicose veins of bilateral lower extremities with pain 2021    Visual impairment     Macular degeneration    Vitamin D deficiency       PAST SURGICAL HISTORY:   Past Surgical History:   Procedure Laterality Date    ANAL FISSURECTOMY      BRAIN SURGERY      CARDIAC CATHETERIZATION N/A 2016    Procedure: Coronary angiography;  Surgeon: Fredrick Kapoor MD;  Location:  [facility] CATH INVASIVE LOCATION;  Service:     CARDIAC CATHETERIZATION N/A 2016    Procedure: Left heart cath;  Surgeon: Fredrick Kapoor MD;  Location:  [facility];  Service:     CARDIAC CATHETERIZATION N/A 2016    Procedure: Left ventriculography;  Surgeon: Fredrick Kapoor MD;  [facility];  Service:     CARDIAC CATHETERIZATION N/A 2016    Procedure: Right heart cath;  Surgeon: Fredrick Kapoor MD;  Location:  [facility] CATH INVASIVE LOCATION;  Service:      SECTION      CHOLECYSTECTOMY      COLONOSCOPY N/A 2022    Procedure: COLONOSCOPY with Polypectomy;  Surgeon: Sarwat Church MD;  [place] [facility] [place];  Service: Gastroenterology;  Laterality: N/A;  Rectal polyps x3 and Hemorrhoids    CORONARY ANGIOPLASTY WITH STENT PLACEMENT Right 2021    ENDOSCOPY N/A 2022    Procedure: ESOPHAGOGASTRODUODENOSCOPY;  Surgeon: Sarwat Church MD;  [place] [facility] [place];  Service: Gastroenterology;  Laterality: N/A;  Small Hiatal Hernia    ERCP      FRACTURE SURGERY      arm    HYSTERECTOMY      ILIAC ARTERY STENT       SUBTOTAL HYSTERECTOMY      TONSILLECTOMY        FAMILY HISTORY:   Family History   Problem Relation Age of Onset    Heart disease Mother     Hypertension Mother     Hyperlipidemia Mother     Breast cancer Mother     Heart disease Father     Heart attack Sister     Heart disease Sister     Breast cancer Sister     Heart disease Brother     Heart attack Brother     Hyperlipidemia Brother     Diabetes Brother     Heart attack Maternal Grandmother     Heart disease Maternal Grandmother     Heart failure Maternal Grandmother     Heart failure Maternal Grandfather     Heart disease Maternal Grandfather     Heart attack Maternal Grandfather     Heart attack Paternal Grandmother     Heart disease Paternal Grandmother     Heart failure Paternal Grandmother     Hypertension Paternal Grandmother       SOCIAL HISTORY:   Social History     Tobacco Use    Smoking status: Every Day     Current packs/day: 0.00     Average packs/day: 0.5 packs/day for 50.0 years (25.0 ttl pk-yrs)     Types: Cigarettes     Start date: 1973     Last attempt to quit: 2023     Years since quittin.2    Smokeless tobacco: Never    Tobacco comments:     Patient smokes 6 cigarettes per day   Vaping Use    Vaping status: Never Used   Substance Use Topics    Alcohol use: Yes     Alcohol/week: 1.0 standard drink of alcohol     Types: 1 Glasses of wine per week     Comment: Very occasional; Patient is non drinker.    Drug use: No     Comment: Drug Abuse: none      MEDICATIONS:   Current Outpatient Medications on File Prior to Visit   Medication Sig Dispense Refill    ALPRAZolam (XANAX) 0.5 MG tablet Take 1 tablet by mouth As Needed for Anxiety.      amLODIPine (NORVASC) 10 MG tablet Take 1 tablet by mouth Daily. 90 tablet 1    aspirin 81 MG chewable tablet Chew 1 tablet Daily.      atorvastatin (LIPITOR) 20 MG tablet Take 1 tablet by mouth Daily.      citalopram (CeleXA) 20 MG tablet Take 1 tablet by mouth Daily. 90 tablet 1    clopidogrel  (PLAVIX) 75 MG tablet Take 1 tablet by mouth Daily. 90 tablet 1    desonide (DESOWEN) 0.05 % ointment Apply 1 Application topically to the appropriate area as directed.      ezetimibe (ZETIA) 10 MG tablet Take 1 tablet by mouth once daily 90 tablet 0    fluocinolone acetonide (DERMOTIC) 0.01 % oil otic oil Administer  into both ears 2 (Two) Times a Day. Indications: Chronic External Ear Eczema 20 mL 1    Fluticasone-Umeclidin-Vilant (Trelegy Ellipta) 100-62.5-25 MCG/ACT inhaler Inhale 1 puff Daily. 1 each 5    hydroCHLOROthiazide 25 MG tablet Take 1 tablet by mouth Daily.      levothyroxine (SYNTHROID, LEVOTHROID) 25 MCG tablet Take 1 tablet by mouth Every Morning. Daily      levothyroxine (SYNTHROID, LEVOTHROID) 75 MCG tablet Take 1 tablet by mouth once daily 90 tablet 0    lisinopril (PRINIVIL,ZESTRIL) 40 MG tablet Take 1 tablet by mouth Daily.      losartan (Cozaar) 50 MG tablet Take 1 tablet by mouth Daily. 90 tablet 3    metFORMIN (GLUCOPHAGE) 1000 MG tablet 1 p.o. twice daily 180 tablet 1    metFORMIN ER (GLUCOPHAGE-XR) 500 MG 24 hr tablet Take 1 tablet by mouth Take As Directed. (si tablet twice per day )      pantoprazole (PROTONIX) 40 MG EC tablet Take 1 tablet by mouth once daily 90 tablet 0    Pediatric Multivit-Minerals-C (CHILDRENS CHEW VIT/MINERALS PO) Take  by mouth Daily. Tablet      pimecrolimus (Elidel) 1 % cream Apply 1 application  topically to the appropriate area as directed 2 (Two) Times a Day As Needed (eczema). Indications: Atopic Dermatitis 60 g 0    Semaglutide,0.25 or 0.5MG/DOS, (Ozempic, 0.25 or 0.5 MG/DOSE,) 2 MG/3ML solution pen-injector Inject 0.5 mg under the skin into the appropriate area as directed 1 (One) Time Per Week. 3 mL 5    spironolactone (ALDACTONE) 25 MG tablet Take 1 tablet by mouth Daily. 90 tablet 3    trimethoprim-polymyxin b (POLYTRIM) 00733-3.1 UNIT/ML-% ophthalmic solution Apply 1 drop to eye(s) as directed by provider. Four days before and four days after eye  "injection.      ALPRAZolam (XANAX) 0.25 MG tablet Take 1 tablet by mouth Daily. (Patient not taking: Reported on 7/18/2024)      atorvastatin (LIPITOR) 40 MG tablet Take 1 tablet by mouth Daily. (Patient not taking: Reported on 7/18/2024) 90 tablet 1     No current facility-administered medications on file prior to visit.       ALLERGIES: Ciprofloxacin, Lisinopril, and Seasonal ic [cholestatin]       Objective   Vitals:    07/18/24 1051   BP: 130/80   Pulse: 75   Weight: 88.9 kg (196 lb)   Height: 152.4 cm (60\")     Body mass index is 38.28 kg/m².          PHYSICAL EXAM:   Physical Exam  Constitutional:       Appearance: Normal appearance. She is normal weight.   HENT:      Head: Normocephalic and atraumatic.      Nose: Nose normal.   Eyes:      Extraocular Movements: Extraocular movements intact.      Pupils: Pupils are equal, round, and reactive to light.   Cardiovascular:      Rate and Rhythm: Normal rate.      Pulses:           Carotid pulses are 1+ on the right side and 1+ on the left side.       Radial pulses are 1+ on the right side and 1+ on the left side.        Femoral pulses are 1+ on the right side and 1+ on the left side.       Popliteal pulses are 1+ on the right side and 0 on the left side.        Dorsalis pedis pulses are 1+ on the right side and detected w/ Doppler on the left side.        Posterior tibial pulses are 1+ on the right side and detected w/ Doppler on the left side.      Heart sounds: Normal heart sounds.   Pulmonary:      Effort: Pulmonary effort is normal.      Breath sounds: Normal breath sounds.   Abdominal:      General: Abdomen is flat. Bowel sounds are normal.      Palpations: Abdomen is soft.   Musculoskeletal:         General: Normal range of motion.      Cervical back: Normal range of motion and neck supple.      Right lower leg: No edema.      Left lower leg: No edema.   Skin:     General: Skin is warm and dry.   Neurological:      General: No focal deficit present.      " Mental Status: She is alert and oriented to person, place, and time. Mental status is at baseline.   Psychiatric:         Mood and Affect: Mood normal.         Thought Content: Thought content normal.          Result Review   LABS:      Results Review:       I reviewed the patient's new clinical results.    The following radiologic or non-invasive studies have been reviewed by me: Prior studies reviewed  No radiology results for the last 30 days.                ASSESSMENT/PLAN:   Diagnoses and all orders for this visit:    1. Peripheral arterial occlusive disease (Primary)  -     Doppler Arterial Lower Extremity Stress CAR; Future    2. Stenosis of right carotid artery    3. Atherosclerosis of aorta    4. Sciatic neuropathy, unspecified laterality  -     XR Spine Lumbar 4+ View; Future    5. Chronic midline low back pain with sciatica, sciatica laterality unspecified    6. Neurogenic claudication due to lumbar spinal stenosis    7. Atheroembolism of bilateral lower extremities  -     Doppler Arterial Lower Extremity Stress CAR; Future       68 y.o. female with neurogenic versus vasculogenic claudication and leg pain.  Simultaneous workups are needed and we will proceed with LS-spine films and lower extremity arterial testing.  We will cancel her September testing and have her back in to go over the results of these tests as quickly as we can.    I discussed the plan with the patient who is agreeable to the plan of care at this point. Thank you for this consult.   Follow Up  Return in about 1 month (around 8/18/2024).    Sujit Coats MD   07/18/24

## 2024-08-06 ENCOUNTER — HOSPITAL ENCOUNTER (OUTPATIENT)
Dept: GENERAL RADIOLOGY | Facility: HOSPITAL | Age: 69
Discharge: HOME | End: 2024-08-06
Payer: MEDICARE

## 2024-08-06 ENCOUNTER — HOSPITAL ENCOUNTER (OUTPATIENT)
Dept: CARDIOLOGY | Facility: HOSPITAL | Age: 69
Discharge: HOME | End: 2024-08-06
Payer: MEDICARE

## 2024-08-06 DIAGNOSIS — I77.9 PERIPHERAL ARTERIAL OCCLUSIVE DISEASE: ICD-10-CM

## 2024-08-06 DIAGNOSIS — I75.023 ATHEROEMBOLISM OF BILATERAL LOWER EXTREMITIES: ICD-10-CM

## 2024-08-06 DIAGNOSIS — G57.00 SCIATIC NEUROPATHY, UNSPECIFIED LATERALITY: ICD-10-CM

## 2024-08-06 PROCEDURE — 93924 LWR XTR VASC STDY BILAT: CPT

## 2024-08-06 PROCEDURE — 93924 LWR XTR VASC STDY BILAT: CPT | Performed by: SURGERY

## 2024-08-06 PROCEDURE — 72110 X-RAY EXAM L-2 SPINE 4/>VWS: CPT

## 2024-08-07 LAB
BH CV LOWER ARTERIAL LEFT ABI RATIO: 0.64
BH CV LOWER ARTERIAL LEFT CALF RATIO: 0.68
BH CV LOWER ARTERIAL LEFT DORSALIS PEDIS SYS MAX: 92
BH CV LOWER ARTERIAL LEFT GREAT TOE SYS MAX: 63
BH CV LOWER ARTERIAL LEFT HIGH THIGH RATIO: 0.99
BH CV LOWER ARTERIAL LEFT HIGH THIGH SYS MAX: 159
BH CV LOWER ARTERIAL LEFT LOW THIGH RATIO: 0.81
BH CV LOWER ARTERIAL LEFT LOW THIGH SYS MAX: 130
BH CV LOWER ARTERIAL LEFT POPLITEAL SYS MAX: 109
BH CV LOWER ARTERIAL LEFT POST EX ABI RATIO: 0.36
BH CV LOWER ARTERIAL LEFT POST TIBIAL SYS MAX: 103
BH CV LOWER ARTERIAL LEFT TBI RATIO: 0.39
BH CV LOWER ARTERIAL RIGHT ABI RATIO: 0.84
BH CV LOWER ARTERIAL RIGHT CALF RATIO: 0.91
BH CV LOWER ARTERIAL RIGHT DORSALIS PEDIS SYS MAX: 123
BH CV LOWER ARTERIAL RIGHT GREAT TOE SYS MAX: 90
BH CV LOWER ARTERIAL RIGHT HIGH THIGH RATIO: 1.21
BH CV LOWER ARTERIAL RIGHT HIGH THIGH SYS MAX: 194
BH CV LOWER ARTERIAL RIGHT LOW THIGH RATIO: 1.05
BH CV LOWER ARTERIAL RIGHT LOW THIGH SYS MAX: 168
BH CV LOWER ARTERIAL RIGHT POPLITEAL SYS MAX: 145
BH CV LOWER ARTERIAL RIGHT POST EX ABI RATIO: 0.72
BH CV LOWER ARTERIAL RIGHT POST TIBIAL SYS MAX: 134
BH CV LOWER ARTERIAL RIGHT TBI RATIO: 0.56
UPPER ARTERIAL LEFT ARM BRACHIAL SYS MAX: 160
UPPER ARTERIAL RIGHT ARM BRACHIAL SYS MAX: 160

## 2024-08-11 DIAGNOSIS — E03.9 ACQUIRED HYPOTHYROIDISM: ICD-10-CM

## 2024-08-12 DIAGNOSIS — F41.9 ANXIETY: ICD-10-CM

## 2024-08-12 DIAGNOSIS — E11.9 CONTROLLED TYPE 2 DIABETES MELLITUS WITHOUT COMPLICATION, WITHOUT LONG-TERM CURRENT USE OF INSULIN: ICD-10-CM

## 2024-08-12 DIAGNOSIS — E78.2 MIXED HYPERLIPIDEMIA: ICD-10-CM

## 2024-08-12 DIAGNOSIS — F41.0 PANIC DISORDER: ICD-10-CM

## 2024-08-12 RX ORDER — LEVOTHYROXINE SODIUM 0.07 MG/1
TABLET ORAL
Qty: 90 TABLET | Refills: 0 | Status: SHIPPED | OUTPATIENT
Start: 2024-08-12

## 2024-08-12 RX ORDER — CITALOPRAM 20 MG/1
20 TABLET ORAL DAILY
Qty: 90 TABLET | Refills: 0 | Status: SHIPPED | OUTPATIENT
Start: 2024-08-12

## 2024-08-12 RX ORDER — ATORVASTATIN CALCIUM 40 MG/1
40 TABLET, FILM COATED ORAL DAILY
Qty: 90 TABLET | Refills: 0 | Status: SHIPPED | OUTPATIENT
Start: 2024-08-12

## 2024-08-13 RX ORDER — EZETIMIBE 10 MG/1
10 TABLET ORAL DAILY
Qty: 90 TABLET | Refills: 0 | OUTPATIENT
Start: 2024-08-13

## 2024-08-15 ENCOUNTER — OFFICE VISIT (OUTPATIENT)
Age: 69
End: 2024-08-15
Payer: MEDICARE

## 2024-08-15 VITALS
HEIGHT: 60 IN | WEIGHT: 196 LBS | HEART RATE: 74 BPM | SYSTOLIC BLOOD PRESSURE: 136 MMHG | DIASTOLIC BLOOD PRESSURE: 80 MMHG | BODY MASS INDEX: 38.48 KG/M2

## 2024-08-15 DIAGNOSIS — I70.213 ATHEROSCLEROSIS OF NATIVE ARTERY OF BOTH LOWER EXTREMITIES WITH INTERMITTENT CLAUDICATION: ICD-10-CM

## 2024-08-15 DIAGNOSIS — I65.23 ATHEROSCLEROSIS OF BOTH CAROTID ARTERIES: ICD-10-CM

## 2024-08-15 DIAGNOSIS — I77.9 PERIPHERAL ARTERIAL OCCLUSIVE DISEASE: ICD-10-CM

## 2024-08-15 DIAGNOSIS — N18.31 STAGE 3A CHRONIC KIDNEY DISEASE: ICD-10-CM

## 2024-08-15 DIAGNOSIS — M48.062 NEUROGENIC CLAUDICATION DUE TO LUMBAR SPINAL STENOSIS: ICD-10-CM

## 2024-08-15 DIAGNOSIS — R29.818 NEUROGENIC CLAUDICATION: Primary | ICD-10-CM

## 2024-08-15 PROBLEM — I70.219 ATHEROSCLEROSIS OF NATIVE ARTERY OF EXTREMITY WITH INTERMITTENT CLAUDICATION: Status: ACTIVE | Noted: 2024-08-15

## 2024-08-15 NOTE — PROGRESS NOTES
Patient Name: Rosalee Hargrove    MRN: [id number] Encounter Date: 08/15/2024      Consulting Service: Vascular Surgery    Referring Provider: No ref. provider found       CHIEF COMPLAINT:  Chief Complaint   Patient presents with    Peripheral arterial occlusive disease       Subjective    HPI: Rosalee Hargrove is a 68 y.o. female is being seen for evaluation/management of  what I believed to be combined neurogenic and arterial issues.  At this point in time for reasons listed below I believe that the neurogenic side of things may actually be more pressing than the arteries and as such were going to start with an MRI and referral to neurosurgery.  She will continue to walk as best she can and we will continue to monitor pressures in her legs.    PAST MEDICAL HISTORY:   Past Medical History:   Diagnosis Date    Afib 09/09/2021    Anemia     Anxiety     Arthritis     Atheroembolism of other site 11/12/2015    Atherosclerosis of aorta 11/12/2015    Atherosclerosis of native arteries of extremities with intermittent claudication, bilateral legs 08/29/2019    PVD    Bilateral carotid artery stenosis 08/29/2019    Bilateral leg edema     Carotid artery stenosis 08/11/2016    Right    Cataract Jan 2022    Chest pain on breathing 08/17/2017    Cholelithiasis     Colon polyp Feb 2023    Depression     Dizziness     GERD (gastroesophageal reflux disease)     Glaucoma     Headache     Kidney stone     Localized edema 11/12/2015    Localized edema 08/11/2016    Orthostatic hypotension 08/11/2016    Pancreatitis     Peripheral vascular disease with claudication 11/12/2015    bilateral legs    Rectal bleeding 03/24/2022    Added automatically from request for surgery [id number]    Shortness of breath     Sleep apnea     Stroke 09/09/2021    Tobacco abuse     Urinary tract infection     Varicose veins of bilateral lower extremities with pain 09/09/2021    Visual impairment     Macular degeneration    Vitamin D deficiency       PAST  SURGICAL HISTORY:   Past Surgical History:   Procedure Laterality Date    ANAL FISSURECTOMY      BRAIN SURGERY      CARDIAC CATHETERIZATION N/A 2016    Procedure: Coronary angiography;  Surgeon: Fredrick Kapoor MD;  Location:  [facility] INVAS[facility] LOC[facility];  Service:     CARDIAC CATHETERIZATION N/A 2016    Procedure: Left heart cath;  Surgeon: Fredrick Kapoor MD;  Location:  [facility];  Service:     CARDIAC CATHETERIZATION N/A 2016    Procedure: Left ventriculography;  Surgeon: Fredrick Kapoor MD;  Location:  [facility];  Service:     CARDIAC CATHETERIZATION N/A 2016    Procedure: Right heart cath;  Surgeon: Fredrick Kapoor MD;  Location:  [facility];  Service:      SECTION      CHOLECYSTECTOMY      COLONOSCOPY N/A 2022    Procedure: COLONOSCOPY with Polypectomy;  Surgeon: Sarwat Church MD;  [place] [facility] [place];  Service: Gastroenterology;  Laterality: N/A;  Rectal polyps x3 and Hemorrhoids    CORONARY ANGIOPLASTY WITH STENT PLACEMENT Right 2021    ENDOSCOPY N/A 2022    Procedure: ESOPHAGOGASTRODUODENOSCOPY;  Surgeon: Sarwat Church MD;  Location: [facility];  Service: Gastroenterology;  Laterality: N/A;  Small Hiatal Hernia    ERCP      FRACTURE SURGERY      arm    HYSTERECTOMY      ILIAC ARTERY STENT      SUBTOTAL HYSTERECTOMY      TONSILLECTOMY        FAMILY HISTORY:   Family History   Problem Relation Age of Onset    Heart disease Mother     Hypertension Mother     Hyperlipidemia Mother     Breast cancer Mother     Heart disease Father     Heart attack Sister     Heart disease Sister     Breast cancer Sister     Heart disease Brother     Heart attack Brother     Hyperlipidemia Brother     Diabetes Brother     Heart attack Maternal Grandmother     Heart disease Maternal Grandmother     Heart failure Maternal Grandmother     Heart failure Maternal Grandfather     Heart disease Maternal Grandfather     Heart attack  Maternal Grandfather     Heart attack Paternal Grandmother     Heart disease Paternal Grandmother     Heart failure Paternal Grandmother     Hypertension Paternal Grandmother       SOCIAL HISTORY:   Social History     Tobacco Use    Smoking status: Every Day     Current packs/day: 0.00     Average packs/day: 0.5 packs/day for 50.0 years (25.0 ttl pk-yrs)     Types: Cigarettes     Start date: 1973     Last attempt to quit: 2023     Years since quittin.3    Smokeless tobacco: Never    Tobacco comments:     Patient smokes 6 cigarettes per day   Vaping Use    Vaping status: Never Used   Substance Use Topics    Alcohol use: Yes     Alcohol/week: 1.0 standard drink of alcohol     Types: 1 Glasses of wine per week     Comment: Very occasional; Patient is non drinker.    Drug use: No     Comment: Drug Abuse: none      MEDICATIONS:   Current Outpatient Medications on File Prior to Visit   Medication Sig Dispense Refill    ALPRAZolam (XANAX) 0.5 MG tablet Take 1 tablet by mouth As Needed for Anxiety.      amLODIPine (NORVASC) 10 MG tablet Take 1 tablet by mouth Daily. 90 tablet 1    aspirin 81 MG chewable tablet Chew 1 tablet Daily.      atorvastatin (LIPITOR) 40 MG tablet Take 1 tablet by mouth once daily 90 tablet 0    citalopram (CeleXA) 20 MG tablet Take 1 tablet by mouth once daily 90 tablet 0    clopidogrel (PLAVIX) 75 MG tablet Take 1 tablet by mouth Daily. 90 tablet 1    desonide (DESOWEN) 0.05 % ointment Apply 1 Application topically to the appropriate area as directed.      ezetimibe (ZETIA) 10 MG tablet Take 1 tablet by mouth once daily 90 tablet 0    fluocinolone acetonide (DERMOTIC) 0.01 % oil otic oil Administer  into both ears 2 (Two) Times a Day. Indications: Chronic External Ear Eczema 20 mL 1    Fluticasone-Umeclidin-Vilant (Trelegy Ellipta) 100-62.5-25 MCG/ACT inhaler Inhale 1 puff Daily. 1 each 5    hydroCHLOROthiazide 25 MG tablet Take 1 tablet by mouth Daily.      levothyroxine (SYNTHROID,  "LEVOTHROID) 25 MCG tablet Take 1 tablet by mouth Every Morning. Daily      levothyroxine (SYNTHROID, LEVOTHROID) 75 MCG tablet Take 1 tablet by mouth once daily 90 tablet 0    lisinopril (PRINIVIL,ZESTRIL) 40 MG tablet Take 1 tablet by mouth Daily.      losartan (Cozaar) 50 MG tablet Take 1 tablet by mouth Daily. 90 tablet 3    metFORMIN (GLUCOPHAGE) 1000 MG tablet Take 1 tablet by mouth twice daily 180 tablet 0    pantoprazole (PROTONIX) 40 MG EC tablet Take 1 tablet by mouth once daily 90 tablet 0    Pediatric Multivit-Minerals-C (CHILDRENS CHEW VIT/MINERALS PO) Take  by mouth Daily. Tablet      pimecrolimus (Elidel) 1 % cream Apply 1 application  topically to the appropriate area as directed 2 (Two) Times a Day As Needed (eczema). Indications: Atopic Dermatitis 60 g 0    Semaglutide,0.25 or 0.5MG/DOS, (Ozempic, 0.25 or 0.5 MG/DOSE,) 2 MG/3ML solution pen-injector Inject 0.5 mg under the skin into the appropriate area as directed 1 (One) Time Per Week. 3 mL 5    spironolactone (ALDACTONE) 25 MG tablet Take 1 tablet by mouth Daily. 90 tablet 3    trimethoprim-polymyxin b (POLYTRIM) 53388-6.1 UNIT/ML-% ophthalmic solution Apply 1 drop to eye(s) as directed by provider. Four days before and four days after eye injection.      ALPRAZolam (XANAX) 0.25 MG tablet Take 1 tablet by mouth Daily. (Patient not taking: Reported on 7/18/2024)       No current facility-administered medications on file prior to visit.       ALLERGIES: Ciprofloxacin, Lisinopril, and Seasonal ic [cholestatin]       Objective   Vitals:    08/15/24 0932   BP: 136/80   Pulse: 74   Weight: 88.9 kg (196 lb)   Height: 152.4 cm (60\")     Body mass index is 38.28 kg/m².          PHYSICAL EXAM:   Physical Exam  Constitutional:       Appearance: Normal appearance. She is normal weight.   HENT:      Head: Normocephalic and atraumatic.      Nose: Nose normal.   Eyes:      Extraocular Movements: Extraocular movements intact.      Pupils: Pupils are equal, " round, and reactive to light.   Cardiovascular:      Rate and Rhythm: Normal rate.      Pulses:           Carotid pulses are 2+ on the right side and 2+ on the left side.       Radial pulses are 2+ on the right side and 2+ on the left side.        Femoral pulses are 2+ on the right side and 2+ on the left side.       Popliteal pulses are 0 on the right side and 0 on the left side.        Dorsalis pedis pulses are detected w/ Doppler on the right side and detected w/ Doppler on the left side.        Posterior tibial pulses are detected w/ Doppler on the right side and detected w/ Doppler on the left side.      Heart sounds: Normal heart sounds.   Pulmonary:      Effort: Pulmonary effort is normal.      Breath sounds: Normal breath sounds.   Abdominal:      General: Abdomen is flat. Bowel sounds are normal.      Palpations: Abdomen is soft.   Musculoskeletal:         General: Normal range of motion.      Cervical back: Normal range of motion and neck supple.      Right lower leg: No edema.      Left lower leg: No edema.   Skin:     General: Skin is warm and dry.   Neurological:      General: No focal deficit present.      Mental Status: She is alert and oriented to person, place, and time. Mental status is at baseline.   Psychiatric:         Mood and Affect: Mood normal.         Thought Content: Thought content normal.          Result Review   LABS:    CBC          12/14/2023    12:47 2/8/2024    08:31 6/20/2024    14:20   CBC   WBC 7.95  8.69  8.66    RBC 4.30  4.82  4.19    Hemoglobin 11.1  13.5  12.2    Hematocrit 36.5  41.4  38.8    MCV 84.9  85.9  92.6    MCH 25.8  28.0  29.1    MCHC 30.4  32.6  31.4    RDW 17.3  18.5  13.8    Platelets 290  264  314      BMP          11/16/2023    13:06 12/14/2023    12:48 2/8/2024    08:31   BMP   BUN 28  19  23    Creatinine 1.34  1.07  1.26    Sodium 141  139  142    Potassium 4.0  4.3  4.3    Chloride 97  103  104    CO2 32.1  23.1  24.7    Calcium 9.0  9.2  9.2      Lipid  Panel          11/16/2023    13:06 2/8/2024    08:31   Lipid Panel   Total Cholesterol 152  133    Triglycerides 177  253    HDL Cholesterol 37  34    VLDL Cholesterol 30  40    LDL Cholesterol  85  59          A1C Last 3 Results          11/16/2023    13:06 2/8/2024    08:31   HGBA1C Last 3 Results   Hemoglobin A1C 8.50  6.90         Results Review:       I reviewed the patient's new clinical results.    The following radiologic or non-invasive studies have been reviewed by me: Lower extremity arterial study with stress and x-ray of the LS spine both reviewed with images reviewed                    ASSESSMENT/PLAN:   Diagnoses and all orders for this visit:    1. Neurogenic claudication (Primary)  -     MRI Lumbar Spine Without Contrast; Future  -     Ambulatory Referral to Neurosurgery    2. Atherosclerosis of both carotid arteries    3. Peripheral arterial occlusive disease    4. Stage 3a chronic kidney disease    5. Neurogenic claudication due to lumbar spinal stenosis    6. Atherosclerosis of native artery of both lower extremities with intermittent claudication       68 y.o. female with likely combined neurogenic and vascular claudication.  Left leg is positive for claudication on the arterial testing and right leg is indeterminate.  The big question for me is whether or not the highly positive LS-spine films correlate with her pain more than the arterial study.  Indeed her right leg seems to occur at least as much if not more and it should only be mild comparatively to the left as far as the arteries go.  She has a lot of arthritis at the L4-5 level and things have worsened markedly since her last x-ray of the spine.  At this point I believe that her complaints which include weakness numbness and pain that seems to be difficult to define as far as distance of walking points more towards the nerve than the arteries.  As such and with a get an MRI of the spine and set her up to see one of the neurosurgeons at  [facility].  He knows Dr. DESIREE CASTANEDA personally and wishes to see him.  We will get the MRI and try to set up this appointment with him.    I discussed the plan with the patient who is agreeable to the plan of care at this point. Thank you for this consult.   Follow Up  Return in about 3 months (around 11/15/2024).    Sujit Coats MD   08/15/24

## 2024-08-29 ENCOUNTER — HOSPITAL ENCOUNTER (OUTPATIENT)
Dept: MRI IMAGING | Facility: HOSPITAL | Age: 69
Discharge: HOME | End: 2024-08-29
Admitting: SURGERY
Payer: MEDICARE

## 2024-08-29 DIAGNOSIS — R29.818 NEUROGENIC CLAUDICATION: ICD-10-CM

## 2024-08-29 PROCEDURE — 72148 MRI LUMBAR SPINE W/O DYE: CPT

## 2024-09-02 RX ORDER — EZETIMIBE 10 MG/1
10 TABLET ORAL DAILY
Qty: 90 TABLET | Refills: 0 | Status: SHIPPED | OUTPATIENT
Start: 2024-09-02

## 2024-09-05 ENCOUNTER — OFFICE VISIT (OUTPATIENT)
Age: 69
End: 2024-09-05
Payer: MEDICARE

## 2024-09-05 VITALS
BODY MASS INDEX: 38.48 KG/M2 | SYSTOLIC BLOOD PRESSURE: 124 MMHG | WEIGHT: 196 LBS | HEART RATE: 71 BPM | DIASTOLIC BLOOD PRESSURE: 73 MMHG | HEIGHT: 60 IN

## 2024-09-05 DIAGNOSIS — I70.212 ATHEROSCLEROSIS OF NATIVE ARTERY OF LEFT LOWER EXTREMITY WITH INTERMITTENT CLAUDICATION: ICD-10-CM

## 2024-09-05 DIAGNOSIS — M48.062 NEUROGENIC CLAUDICATION DUE TO LUMBAR SPINAL STENOSIS: ICD-10-CM

## 2024-09-05 DIAGNOSIS — I65.21 STENOSIS OF RIGHT CAROTID ARTERY: Primary | ICD-10-CM

## 2024-09-05 DIAGNOSIS — I74.8 EMBOLISM AND THROMBOSIS OF OTHER ARTERIES: ICD-10-CM

## 2024-09-05 DIAGNOSIS — I77.9 PERIPHERAL ARTERIAL OCCLUSIVE DISEASE: ICD-10-CM

## 2024-09-05 DIAGNOSIS — R29.818 NEUROGENIC CLAUDICATION: ICD-10-CM

## 2024-09-05 NOTE — PROGRESS NOTES
Patient Name: Rosalee Hargrove    MRN: [id number] Encounter Date: 09/05/2024      Consulting Service: Vascular Surgery    Referring Provider: No ref. provider found       CHIEF COMPLAINT:  Chief Complaint   Patient presents with    Neurogenic claudication       Subjective    HPI: Rosalee Hargrove is a 69 y.o. female is being seen for evaluation/management of  severe leg pain and midline back pain.  Interestingly enough her pain in her legs is equal on both sides and may have even worse on the right which is opposite arterial testing.  She definitely claudicates on the left but is really not her main complaint she can only walk about 15 feet this morning without getting her legs so weak that she has to really sit down and leaning or lean against a wall.  This does not sound like true vasculogenic claudication and sounds more neurogenic.  Her MRI which is reviewed below does show hemangiomas and some L4-5 disease.  She knows Dr. Marquez her neurosurgeon will be seeing her later today and will hopefully have an answer.    PAST MEDICAL HISTORY:   Past Medical History:   Diagnosis Date    Afib 09/09/2021    Anemia     Anxiety     Arthritis     Atheroembolism of other site 11/12/2015    Atherosclerosis of aorta 11/12/2015    Atherosclerosis of native arteries of extremities with intermittent claudication, bilateral legs 08/29/2019    PVD    Bilateral carotid artery stenosis 08/29/2019    Bilateral leg edema     Carotid artery stenosis 08/11/2016    Right    Cataract Jan 2022    Chest pain on breathing 08/17/2017    Cholelithiasis     Colon polyp Feb 2023    Depression     Dizziness     GERD (gastroesophageal reflux disease)     Glaucoma     Headache     Kidney stone     Localized edema 11/12/2015    Localized edema 08/11/2016    Orthostatic hypotension 08/11/2016    Pancreatitis     Peripheral vascular disease with claudication 11/12/2015    bilateral legs    Rectal bleeding 03/24/2022    Added automatically from request for  surgery [id number]    Shortness of breath     Sleep apnea     Stroke 2021    Tobacco abuse     Urinary tract infection     Varicose veins of bilateral lower extremities with pain 2021    Visual impairment     Macular degeneration    Vitamin D deficiency       PAST SURGICAL HISTORY:   Past Surgical History:   Procedure Laterality Date    ANAL FISSURECTOMY      BRAIN SURGERY      CARDIAC CATHETERIZATION N/A 2016    Procedure: Coronary angiography;  Surgeon: Fredrick Kapoor MD;  Location:  [facility];  Service:     CARDIAC CATHETERIZATION N/A 2016    Procedure: Left heart cath;  Surgeon: Fredrick Kapoor MD;  Location:  [facility];  Service:     CARDIAC CATHETERIZATION N/A 2016    Procedure: Left ventriculography;  Surgeon: Fredrick Kapoor MD;  Location:  [facility];  Service:     CARDIAC CATHETERIZATION N/A 2016    Procedure: Right heart cath;  Surgeon: Fredrick Kapoor MD;  Location:  [facility];  Service:      SECTION      CHOLECYSTECTOMY      COLONOSCOPY N/A 2022    Procedure: COLONOSCOPY with Polypectomy;  Surgeon: Sarwat Church MD;  Location: [facility] MA[facility] OR;  Service: Gastroenterology;  Laterality: N/A;  Rectal polyps x3 and Hemorrhoids    CORONARY ANGIOPLASTY WITH STENT PLACEMENT Right 2021    ENDOSCOPY N/A 2022    Procedure: ESOPHAGOGASTRODUODENOSCOPY;  Surgeon: Sarwat Church MD;  Location: [facility] MAIN OR;  Service: Gastroenterology;  Laterality: N/A;  Small Hiatal Hernia    ERCP      FRACTURE SURGERY      arm    HYSTERECTOMY      ILIAC ARTERY STENT      SUBTOTAL HYSTERECTOMY      TONSILLECTOMY        FAMILY HISTORY:   Family History   Problem Relation Age of Onset    Heart disease Mother     Hypertension Mother     Hyperlipidemia Mother     Breast cancer Mother     Heart disease Father     Heart attack Sister     Heart disease Sister     Breast cancer Sister     Heart disease Brother     Heart  attack Brother     Hyperlipidemia Brother     Diabetes Brother     Heart attack Maternal Grandmother     Heart disease Maternal Grandmother     Heart failure Maternal Grandmother     Heart failure Maternal Grandfather     Heart disease Maternal Grandfather     Heart attack Maternal Grandfather     Heart attack Paternal Grandmother     Heart disease Paternal Grandmother     Heart failure Paternal Grandmother     Hypertension Paternal Grandmother       SOCIAL HISTORY:   Social History     Tobacco Use    Smoking status: Every Day     Current packs/day: 0.00     Average packs/day: 0.5 packs/day for 50.0 years (25.0 ttl pk-yrs)     Types: Cigarettes     Start date: 1973     Last attempt to quit: 2023     Years since quittin.3    Smokeless tobacco: Never    Tobacco comments:     Patient smokes 6 cigarettes per day   Vaping Use    Vaping status: Never Used   Substance Use Topics    Alcohol use: Yes     Alcohol/week: 1.0 standard drink of alcohol     Types: 1 Glasses of wine per week     Comment: Very occasional; Patient is non drinker.    Drug use: No     Comment: Drug Abuse: none      MEDICATIONS:   Current Outpatient Medications on File Prior to Visit   Medication Sig Dispense Refill    ALPRAZolam (XANAX) 0.5 MG tablet Take 1 tablet by mouth As Needed for Anxiety.      amLODIPine (NORVASC) 10 MG tablet Take 1 tablet by mouth Daily. 90 tablet 1    aspirin 81 MG chewable tablet Chew 1 tablet Daily.      atorvastatin (LIPITOR) 40 MG tablet Take 1 tablet by mouth once daily 90 tablet 0    citalopram (CeleXA) 20 MG tablet Take 1 tablet by mouth once daily 90 tablet 0    clopidogrel (PLAVIX) 75 MG tablet Take 1 tablet by mouth Daily. 90 tablet 1    desonide (DESOWEN) 0.05 % ointment Apply 1 Application topically to the appropriate area as directed.      ezetimibe (ZETIA) 10 MG tablet Take 1 tablet by mouth once daily 90 tablet 0    fluocinolone acetonide (DERMOTIC) 0.01 % oil otic oil Administer  into both ears 2  "(Two) Times a Day. Indications: Chronic External Ear Eczema 20 mL 1    Fluticasone-Umeclidin-Vilant (Trelegy Ellipta) 100-62.5-25 MCG/ACT inhaler Inhale 1 puff Daily. 1 each 5    hydroCHLOROthiazide 25 MG tablet Take 1 tablet by mouth Daily.      levothyroxine (SYNTHROID, LEVOTHROID) 25 MCG tablet Take 1 tablet by mouth Every Morning. Daily      levothyroxine (SYNTHROID, LEVOTHROID) 75 MCG tablet Take 1 tablet by mouth once daily 90 tablet 0    lisinopril (PRINIVIL,ZESTRIL) 40 MG tablet Take 1 tablet by mouth Daily.      losartan (Cozaar) 50 MG tablet Take 1 tablet by mouth Daily. 90 tablet 3    metFORMIN (GLUCOPHAGE) 1000 MG tablet Take 1 tablet by mouth twice daily 180 tablet 0    pantoprazole (PROTONIX) 40 MG EC tablet Take 1 tablet by mouth once daily 90 tablet 0    Pediatric Multivit-Minerals-C (CHILDRENS CHEW VIT/MINERALS PO) Take  by mouth Daily. Tablet      pimecrolimus (Elidel) 1 % cream Apply 1 application  topically to the appropriate area as directed 2 (Two) Times a Day As Needed (eczema). Indications: Atopic Dermatitis 60 g 0    Semaglutide,0.25 or 0.5MG/DOS, (Ozempic, 0.25 or 0.5 MG/DOSE,) 2 MG/3ML solution pen-injector Inject 0.5 mg under the skin into the appropriate area as directed 1 (One) Time Per Week. 3 mL 5    spironolactone (ALDACTONE) 25 MG tablet Take 1 tablet by mouth Daily. 90 tablet 3    trimethoprim-polymyxin b (POLYTRIM) 98882-6.1 UNIT/ML-% ophthalmic solution Apply 1 drop to eye(s) as directed by provider. Four days before and four days after eye injection.      ALPRAZolam (XANAX) 0.25 MG tablet Take 1 tablet by mouth Daily. (Patient not taking: Reported on 7/18/2024)       No current facility-administered medications on file prior to visit.       ALLERGIES: Ciprofloxacin, Lisinopril, and Seasonal ic [cholestatin]       Objective   Vitals:    09/05/24 0825   BP: 124/73   Pulse: 71   Weight: 88.9 kg (196 lb)   Height: 152.4 cm (60\")     Body mass index is 38.28 kg/m².          PHYSICAL " EXAM:   Physical Exam  Constitutional:       Appearance: Normal appearance. She is normal weight.   HENT:      Head: Normocephalic and atraumatic.      Nose: Nose normal.   Eyes:      Extraocular Movements: Extraocular movements intact.      Pupils: Pupils are equal, round, and reactive to light.   Cardiovascular:      Rate and Rhythm: Normal rate.      Pulses:           Carotid pulses are 2+ on the right side with bruit and 2+ on the left side with bruit.       Radial pulses are 2+ on the right side and 2+ on the left side.        Femoral pulses are 2+ on the right side and 2+ on the left side.       Popliteal pulses are 2+ on the right side and 2+ on the left side.        Dorsalis pedis pulses are detected w/ Doppler on the right side and detected w/ Doppler on the left side.        Posterior tibial pulses are detected w/ Doppler on the right side and detected w/ Doppler on the left side.      Heart sounds: Normal heart sounds.   Pulmonary:      Effort: Pulmonary effort is normal.      Breath sounds: Normal breath sounds.   Abdominal:      General: Abdomen is flat. Bowel sounds are normal.      Palpations: Abdomen is soft.   Musculoskeletal:         General: Normal range of motion.      Cervical back: Normal range of motion and neck supple.      Right lower leg: No edema.      Left lower leg: No edema.   Skin:     General: Skin is warm and dry.   Neurological:      General: No focal deficit present.      Mental Status: She is alert and oriented to person, place, and time. Mental status is at baseline.   Psychiatric:         Mood and Affect: Mood normal.         Thought Content: Thought content normal.          Result Review   LABS:    CBC          12/14/2023    12:47 2/8/2024    08:31 6/20/2024    14:20   CBC   WBC 7.95  8.69  8.66    RBC 4.30  4.82  4.19    Hemoglobin 11.1  13.5  12.2    Hematocrit 36.5  41.4  38.8    MCV 84.9  85.9  92.6    MCH 25.8  28.0  29.1    MCHC 30.4  32.6  31.4    RDW 17.3  18.5  13.8     Platelets 290  264  314      BMP          11/16/2023    13:06 12/14/2023    12:48 2/8/2024    08:31   BMP   BUN 28  19  23    Creatinine 1.34  1.07  1.26    Sodium 141  139  142    Potassium 4.0  4.3  4.3    Chloride 97  103  104    CO2 32.1  23.1  24.7    Calcium 9.0  9.2  9.2      Lipid Panel          11/16/2023    13:06 2/8/2024    08:31   Lipid Panel   Total Cholesterol 152  133    Triglycerides 177  253    HDL Cholesterol 37  34    VLDL Cholesterol 30  40    LDL Cholesterol  85  59          A1C Last 3 Results          11/16/2023    13:06 2/8/2024    08:31   HGBA1C Last 3 Results   Hemoglobin A1C 8.50  6.90         Results Review:       I reviewed the patient's new clinical results.    The following radiologic or non-invasive studies have been reviewed by me: MRI  No results found for this or any previous visit from the past 365 days.     MRI Lumbar Spine Without Contrast    Result Date: 8/29/2024  Impression: 1. Mild degenerative change of the lower lumbar spine. There is no significant spinal canal stenosis. There is mild left neural foraminal narrowing at the L4/5 level as well as narrowing of the left lateral recess at this level. No significant canal stenosis. Electronically Signed: Jeff Corrales MD  8/29/2024 11:57 AM EDT  Workstation ID: [id number]                 ASSESSMENT/PLAN:   Diagnoses and all orders for this visit:    1. Stenosis of right carotid artery (Primary)    2. Peripheral arterial occlusive disease    3. Atherosclerosis of native artery of left lower extremity with intermittent claudication  -     Doppler Ankle Brachial Index Single Level CAR; Future  -     Duplex Aorta IVC Iliac Graft Limited CAR; Future  -     CT Angio Abdominal Aorta Bilateral Iliofem Runoff With & Without Contrast; Future    4. Neurogenic claudication due to lumbar spinal stenosis  -     Doppler Ankle Brachial Index Single Level CAR; Future  -     Duplex Aorta IVC Iliac Graft Limited CAR; Future    5. Neurogenic  claudication  -     Doppler Ankle Brachial Index Single Level CAR; Future  -     Duplex Aorta IVC Iliac Graft Limited CAR; Future    6. Embolism and thrombosis of other arteries  -     Duplex Aorta IVC Iliac Graft Limited CAR; Future  -     CT Angio Abdominal Aorta Bilateral Iliofem Runoff With & Without Contrast; Future       69 y.o. female with neurogenic versus vasculogenic claudication.  Right now most of the findings point towards neurogenic but the MRI was a little less impressive perhaps than I expected.  Will see what Dr. Marquez has to say and then make a decision.  If he feels that this is not neurogenic then I think a CTA would be our next plan.  If he feels he can do something from the nurse standpoint and we will check her back in a year and we will set up the yearly follow-up as is.  Her carotids are followed by Dr. Castanon.  Will await a call if CT scan is needed.    Addendum: Patient was seen by Dr. Irving who feels her back is not the problem.  We will initiate CT angiogram with 3D reconstruction and see her back to discuss it      I discussed the plan with the patient who is agreeable to the plan of care at this point. Thank you for this consult.   Follow Up  Return in about 1 year (around 9/5/2025).    Sujit Coats MD   09/06/24

## 2024-09-06 ENCOUNTER — TELEPHONE (OUTPATIENT)
Age: 69
End: 2024-09-06
Payer: MEDICARE

## 2024-09-06 NOTE — ADDENDUM NOTE
Addended by: ALTAF HAMPTON on: 9/6/2024 01:01 PM     Modules accepted: Orders, Level of Service     16

## 2024-09-06 NOTE — TELEPHONE ENCOUNTER
Pt lvm informing us that she was to call dr. Coats after she was to see Dr. Marquez. She stated Dr. Marquez said that her back is perfectly fine. He sees no reason for claudication or that it is not neurogenic. He informed her that it is okay to proceed with the different type of test that you are wanting to do.     Will inform dr. Coats of this information.

## 2024-09-06 NOTE — TELEPHONE ENCOUNTER
CALLED PATIENT AND LEFT MESSAGE WITH HER CTA AORTA WITH RO APPT ON 9-13-24 AT [place] AT 5:00.  SHE NEEDS TO CALL BACK AND SCHEDULE HER FOLLOW UP APPT WITH DR. HAMPTON.

## 2024-09-09 DIAGNOSIS — E11.3293 TYPE 2 DIABETES MELLITUS WITH BOTH EYES AFFECTED BY MILD NONPROLIFERATIVE RETINOPATHY WITHOUT MACULAR EDEMA, WITHOUT LONG-TERM CURRENT USE OF INSULIN: ICD-10-CM

## 2024-09-09 RX ORDER — SEMAGLUTIDE 0.68 MG/ML
INJECTION, SOLUTION SUBCUTANEOUS
Qty: 3 ML | Refills: 0 | Status: SHIPPED | OUTPATIENT
Start: 2024-09-09

## 2024-09-13 ENCOUNTER — HOSPITAL ENCOUNTER (OUTPATIENT)
Dept: CT IMAGING | Facility: HOSPITAL | Age: 69
Discharge: HOME | End: 2024-09-13
Payer: MEDICARE

## 2024-09-13 DIAGNOSIS — I74.8 EMBOLISM AND THROMBOSIS OF OTHER ARTERIES: ICD-10-CM

## 2024-09-13 DIAGNOSIS — I70.212 ATHEROSCLEROSIS OF NATIVE ARTERY OF LEFT LOWER EXTREMITY WITH INTERMITTENT CLAUDICATION: ICD-10-CM

## 2024-09-13 LAB — CREAT BLDA-MCNC: 1.8 MG/DL (ref 0.6–1.3)

## 2024-09-13 PROCEDURE — 82565 ASSAY OF CREATININE: CPT

## 2024-09-16 ENCOUNTER — TELEPHONE (OUTPATIENT)
Age: 69
End: 2024-09-16
Payer: MEDICARE

## 2024-09-16 DIAGNOSIS — N18.31 STAGE 3A CHRONIC KIDNEY DISEASE: Primary | ICD-10-CM

## 2024-09-16 DIAGNOSIS — I70.219 ATHEROSCLEROSIS OF NATIVE ARTERY OF EXTREMITY WITH INTERMITTENT CLAUDICATION, UNSPECIFIED EXTREMITY: ICD-10-CM

## 2024-09-16 DIAGNOSIS — I70.223 ATHEROSCLEROSIS OF NATIVE ARTERIES OF EXTREMITIES WITH REST PAIN, BILATERAL LEGS: ICD-10-CM

## 2024-09-16 DIAGNOSIS — N18.30 STAGE 3 CHRONIC KIDNEY DISEASE, UNSPECIFIED WHETHER STAGE 3A OR 3B CKD: Primary | ICD-10-CM

## 2024-09-16 DIAGNOSIS — N18.30 STAGE 3 CHRONIC KIDNEY DISEASE, UNSPECIFIED WHETHER STAGE 3A OR 3B CKD: ICD-10-CM

## 2024-09-16 RX ORDER — SODIUM CHLORIDE 9 MG/ML
100 INJECTION, SOLUTION INTRAVENOUS ONCE
Status: DISCONTINUED | OUTPATIENT
Start: 2024-09-17 | End: 2024-09-16

## 2024-09-16 RX ORDER — SODIUM CHLORIDE 9 MG/ML
500 INJECTION, SOLUTION INTRAVENOUS ONCE
Status: DISCONTINUED | OUTPATIENT
Start: 2024-09-17 | End: 2024-09-16

## 2024-09-16 RX ORDER — SODIUM CHLORIDE 9 MG/ML
100 INJECTION, SOLUTION INTRAVENOUS ONCE
Status: SHIPPED | OUTPATIENT
Start: 2024-09-17

## 2024-09-16 RX ORDER — SODIUM CHLORIDE 9 MG/ML
500 INJECTION, SOLUTION INTRAVENOUS ONCE
Status: DISCONTINUED | OUTPATIENT
Start: 2024-09-16 | End: 2024-09-16

## 2024-09-16 RX ORDER — SODIUM CHLORIDE 9 MG/ML
500 INJECTION, SOLUTION INTRAVENOUS ONCE
Status: SHIPPED | OUTPATIENT
Start: 2024-09-17

## 2024-09-23 DIAGNOSIS — I25.10 ATHEROSCLEROTIC HEART DISEASE OF NATIVE CORONARY ARTERY WITHOUT ANGINA PECTORIS: ICD-10-CM

## 2024-09-23 DIAGNOSIS — K21.9 GASTROESOPHAGEAL REFLUX DISEASE WITHOUT ESOPHAGITIS: ICD-10-CM

## 2024-09-23 DIAGNOSIS — I77.9 DISORDER OF ARTERIES AND ARTERIOLES, UNSPECIFIED: ICD-10-CM

## 2024-09-23 RX ORDER — CLOPIDOGREL BISULFATE 75 MG/1
75 TABLET ORAL DAILY
Qty: 90 TABLET | Refills: 0 | Status: SHIPPED | OUTPATIENT
Start: 2024-09-23

## 2024-09-23 RX ORDER — PANTOPRAZOLE SODIUM 40 MG/1
TABLET, DELAYED RELEASE ORAL
Qty: 90 TABLET | Refills: 0 | Status: SHIPPED | OUTPATIENT
Start: 2024-09-23

## 2024-09-30 ENCOUNTER — TELEPHONE (OUTPATIENT)
Dept: FAMILY MEDICINE CLINIC | Facility: CLINIC | Age: 69
End: 2024-09-30

## 2024-09-30 ENCOUNTER — OFFICE VISIT (OUTPATIENT)
Dept: FAMILY MEDICINE CLINIC | Facility: CLINIC | Age: 69
End: 2024-09-30
Payer: MEDICARE

## 2024-09-30 VITALS
SYSTOLIC BLOOD PRESSURE: 118 MMHG | OXYGEN SATURATION: 94 % | DIASTOLIC BLOOD PRESSURE: 70 MMHG | TEMPERATURE: 97.3 F | HEIGHT: 60 IN | BODY MASS INDEX: 39.13 KG/M2 | WEIGHT: 199.3 LBS | HEART RATE: 78 BPM

## 2024-09-30 DIAGNOSIS — N18.31 STAGE 3A CHRONIC KIDNEY DISEASE: ICD-10-CM

## 2024-09-30 DIAGNOSIS — G47.33 OBSTRUCTIVE SLEEP APNEA SYNDROME: ICD-10-CM

## 2024-09-30 DIAGNOSIS — E66.01 MORBID EXOGENOUS OBESITY: ICD-10-CM

## 2024-09-30 DIAGNOSIS — J44.1 COPD WITH EXACERBATION: ICD-10-CM

## 2024-09-30 DIAGNOSIS — I10 ESSENTIAL HYPERTENSION: ICD-10-CM

## 2024-09-30 DIAGNOSIS — Z00.00 MEDICARE ANNUAL WELLNESS VISIT, SUBSEQUENT: Primary | ICD-10-CM

## 2024-09-30 DIAGNOSIS — I65.23 ATHEROSCLEROSIS OF BOTH CAROTID ARTERIES: ICD-10-CM

## 2024-09-30 DIAGNOSIS — I77.9 PERIPHERAL ARTERIAL OCCLUSIVE DISEASE: ICD-10-CM

## 2024-09-30 DIAGNOSIS — I67.5 MOYAMOYA DISEASE: ICD-10-CM

## 2024-09-30 DIAGNOSIS — E03.9 ACQUIRED HYPOTHYROIDISM: ICD-10-CM

## 2024-09-30 DIAGNOSIS — K21.9 GASTROESOPHAGEAL REFLUX DISEASE WITHOUT ESOPHAGITIS: ICD-10-CM

## 2024-09-30 DIAGNOSIS — E78.2 MIXED HYPERLIPIDEMIA: ICD-10-CM

## 2024-09-30 DIAGNOSIS — E87.5 HYPERKALEMIA: ICD-10-CM

## 2024-09-30 DIAGNOSIS — I25.10 CORONARY ARTERY DISEASE INVOLVING NATIVE CORONARY ARTERY OF NATIVE HEART WITHOUT ANGINA PECTORIS: ICD-10-CM

## 2024-09-30 DIAGNOSIS — F41.0 PANIC DISORDER: ICD-10-CM

## 2024-09-30 DIAGNOSIS — Z86.73 HISTORY OF CARDIOEMBOLIC CEREBROVASCULAR ACCIDENT (CVA): ICD-10-CM

## 2024-09-30 DIAGNOSIS — F41.1 GENERALIZED ANXIETY DISORDER: ICD-10-CM

## 2024-09-30 DIAGNOSIS — E55.9 VITAMIN D DEFICIENCY: ICD-10-CM

## 2024-09-30 DIAGNOSIS — E11.3293 TYPE 2 DIABETES MELLITUS WITH BOTH EYES AFFECTED BY MILD NONPROLIFERATIVE RETINOPATHY WITHOUT MACULAR EDEMA, WITHOUT LONG-TERM CURRENT USE OF INSULIN: ICD-10-CM

## 2024-09-30 PROCEDURE — 99214 OFFICE O/P EST MOD 30 MIN: CPT | Performed by: FAMILY MEDICINE

## 2024-09-30 PROCEDURE — G0439 PPPS, SUBSEQ VISIT: HCPCS | Performed by: FAMILY MEDICINE

## 2024-09-30 PROCEDURE — 1160F RVW MEDS BY RX/DR IN RCRD: CPT | Performed by: FAMILY MEDICINE

## 2024-09-30 PROCEDURE — 3078F DIAST BP <80 MM HG: CPT | Performed by: FAMILY MEDICINE

## 2024-09-30 PROCEDURE — 3044F HG A1C LEVEL LT 7.0%: CPT | Performed by: FAMILY MEDICINE

## 2024-09-30 PROCEDURE — 1159F MED LIST DOCD IN RCRD: CPT | Performed by: FAMILY MEDICINE

## 2024-09-30 PROCEDURE — 1125F AMNT PAIN NOTED PAIN PRSNT: CPT | Performed by: FAMILY MEDICINE

## 2024-09-30 PROCEDURE — 1170F FXNL STATUS ASSESSED: CPT | Performed by: FAMILY MEDICINE

## 2024-09-30 PROCEDURE — 3074F SYST BP LT 130 MM HG: CPT | Performed by: FAMILY MEDICINE

## 2024-09-30 RX ORDER — DIAZEPAM 5 MG
1 TABLET ORAL EVERY 12 HOURS SCHEDULED
COMMUNITY
Start: 2024-09-25

## 2024-09-30 RX ORDER — SEMAGLUTIDE 0.68 MG/ML
0.5 INJECTION, SOLUTION SUBCUTANEOUS WEEKLY
Qty: 3 ML | Refills: 5 | Status: SHIPPED | OUTPATIENT
Start: 2024-09-30

## 2024-09-30 RX ORDER — AMLODIPINE BESYLATE 10 MG/1
10 TABLET ORAL DAILY
Qty: 90 TABLET | Refills: 1 | Status: SHIPPED | OUTPATIENT
Start: 2024-09-30

## 2024-09-30 NOTE — TELEPHONE ENCOUNTER
Caller: Rosalee Hargrove    Relationship: Self    Best call back number: [phone]     What was the call regarding: PATIENT IS CALLING TO LET DR BELTRAN KNOW THAT THE CORRECT DOSAGES ON THE LEVOTHYROXINE IS 75 MCG AND THE ALPRAZOLAM IS .25MG     PLEASE ADVISE

## 2024-10-01 LAB
BUN SERPL-MCNC: 38 MG/DL (ref 8–23)
BUN/CREAT SERPL: 21.3 (ref 7–25)
CALCIUM SERPL-MCNC: 9.3 MG/DL (ref 8.6–10.5)
CHLORIDE SERPL-SCNC: 102 MMOL/L (ref 98–107)
CO2 SERPL-SCNC: 23.8 MMOL/L (ref 22–29)
CREAT SERPL-MCNC: 1.78 MG/DL (ref 0.57–1)
EGFRCR SERPLBLD CKD-EPI 2021: 30.2 ML/MIN/1.73
GLUCOSE SERPL-MCNC: 106 MG/DL (ref 65–99)
POTASSIUM SERPL-SCNC: 5.6 MMOL/L (ref 3.5–5.2)
SODIUM SERPL-SCNC: 139 MMOL/L (ref 136–145)

## 2024-10-01 NOTE — ASSESSMENT & PLAN NOTE
Patient's (Body mass index is 38.92 kg/m².) indicates that they are morbidly/severely obese (BMI > 40 or > 35 with obesity - related health condition) with health conditions that include obstructive sleep apnea, hypertension, coronary heart disease, diabetes mellitus, impaired fasting glucose, dyslipidemias, GERD, peripheral vascular disease, idiopathic intracranial hypertension, osteoarthritis, lower extremity venous stasis disease, urinary stress incontinence, peripheral vascular disease, and hepatic steatosis . Weight is improving with treatment. BMI  is above average; BMI management plan is completed. We discussed portion control and increasing exercise.

## 2024-10-01 NOTE — PROGRESS NOTES
Subjective   The ABCs of the Annual Wellness Visit  Medicare Wellness Visit      Rosalee Hargrove is a 69 y.o. patient who presents for a Medicare Wellness Visit.    The following portions of the patient's history were reviewed and   updated as appropriate: allergies, current medications, past family history, past medical history, past social history, past surgical history, and problem list.    Compared to one year ago, the patient's physical   health is the same.  Compared to one year ago, the patient's mental   health is the same.    Recent Hospitalizations:  She was not admitted to the hospital during the last year.     Current Medical Providers:  Patient Care Team:  Eddie Slater DO as PCP - General  Brian Kenny MD as Consulting Physician (Hematology and Oncology)  Isabelle Fuentes APRN as Nurse Practitioner (Gastroenterology)  Amy Harrington PT as Physical Therapist (Physical Therapy)  Sarwat Church MD as Consulting Physician (Gastroenterology)  Sujit Coats MD as Surgeon (Vascular Surgery)  Sofi Lizama APRN as Nurse Practitioner (Nurse Practitioner)  Jackelin Earl APRN as Nurse Practitioner (Nurse Practitioner)    Outpatient Medications Prior to Visit   Medication Sig Dispense Refill    aspirin 81 MG chewable tablet Chew 1 tablet Daily.      atorvastatin (LIPITOR) 40 MG tablet Take 1 tablet by mouth once daily 90 tablet 0    citalopram (CeleXA) 20 MG tablet Take 1 tablet by mouth once daily 90 tablet 0    clopidogrel (PLAVIX) 75 MG tablet Take 1 tablet by mouth once daily 90 tablet 0    desonide (DESOWEN) 0.05 % ointment Apply 1 Application topically to the appropriate area as directed.      diazePAM (VALIUM) 5 MG tablet Take 1 tablet by mouth Every 12 (Twelve) Hours.      ezetimibe (ZETIA) 10 MG tablet Take 1 tablet by mouth once daily 90 tablet 0    fluocinolone acetonide (DERMOTIC) 0.01 % oil otic oil Administer  into both ears 2 (Two) Times a Day. Indications: Chronic External  Ear Eczema 20 mL 1    losartan (Cozaar) 50 MG tablet Take 1 tablet by mouth Daily. 90 tablet 3    metFORMIN (GLUCOPHAGE) 1000 MG tablet Take 1 tablet by mouth twice daily 180 tablet 0    pantoprazole (PROTONIX) 40 MG EC tablet Take 1 tablet by mouth once daily 90 tablet 0    spironolactone (ALDACTONE) 25 MG tablet Take 1 tablet by mouth Daily. 90 tablet 3    trimethoprim-polymyxin b (POLYTRIM) 12420-8.1 UNIT/ML-% ophthalmic solution Apply 1 drop to eye(s) as directed by provider. Four days before and four days after eye injection.      amLODIPine (NORVASC) 10 MG tablet Take 1 tablet by mouth Daily. 90 tablet 1    lisinopril (PRINIVIL,ZESTRIL) 40 MG tablet Take 1 tablet by mouth Daily.      Ozempic, 0.25 or 0.5 MG/DOSE, 2 MG/3ML solution pen-injector INJECT 0.5 MG UNDER THE SKIN INTO THE APPROPRIATE AREA ONCE A WEEK 3 mL 0    ALPRAZolam (XANAX) 0.25 MG tablet Take 1 tablet by mouth Daily.      ALPRAZolam (XANAX) 0.5 MG tablet Take 1 tablet by mouth As Needed for Anxiety.      levothyroxine (SYNTHROID, LEVOTHROID) 25 MCG tablet Take 1 tablet by mouth Every Morning. Daily      levothyroxine (SYNTHROID, LEVOTHROID) 75 MCG tablet Take 1 tablet by mouth once daily 90 tablet 0    Fluticasone-Umeclidin-Vilant (Trelegy Ellipta) 100-62.5-25 MCG/ACT inhaler Inhale 1 puff Daily. (Patient not taking: Reported on 9/30/2024) 1 each 5    hydroCHLOROthiazide 25 MG tablet Take 1 tablet by mouth Daily. (Patient not taking: Reported on 9/30/2024)      Pediatric Multivit-Minerals-C (CHILDRENS CHEW VIT/MINERALS PO) Take  by mouth Daily. Tablet (Patient not taking: Reported on 9/30/2024)      pimecrolimus (Elidel) 1 % cream Apply 1 application  topically to the appropriate area as directed 2 (Two) Times a Day As Needed (eczema). Indications: Atopic Dermatitis (Patient not taking: Reported on 9/30/2024) 60 g 0     Facility-Administered Medications Prior to Visit   Medication Dose Route Frequency Provider Last Rate Last Admin    sodium  chloride 0.9 % infusion  500 mL/hr Intravenous Once Sujit Coats MD        sodium chloride 0.9 % infusion  100 mL/hr Intravenous Once Sujit Coats MD         No opioid medication identified on active medication list. I have reviewed chart for other potential  high risk medication/s and harmful drug interactions in the elderly.      Aspirin is on active medication list. Aspirin use is indicated based on review of current medical condition/s. Pros and cons of this therapy have been discussed today. Benefits of this medication outweigh potential harm.  Patient has been encouraged to continue taking this medication.  .      Patient Active Problem List   Diagnosis    Mixed hyperlipidemia    Essential hypertension    Acquired hypothyroidism    Vitamin D deficiency    Morbid exogenous obesity    Atopic rhinitis    Generalized anxiety disorder    Gastroesophageal reflux disease without esophagitis    Panic disorder    Peripheral arterial occlusive disease    Peripheral edema    Calculus of kidney    Coronary artery disease involving native coronary artery of native heart without angina pectoris    Atherosclerosis of both carotid arteries    Moyamoya disease    Atherosclerosis of aorta    Type 2 diabetes mellitus with both eyes affected by mild nonproliferative retinopathy without macular edema, without long-term current use of insulin    History of cardioembolic cerebrovascular accident (CVA)    Iron deficiency anemia    Clinical diagnosis of severe acute respiratory syndrome coronavirus 2 (SARS-CoV-2) disease    Ex-cigarette smoker    Stenosis of right carotid artery    COPD with exacerbation    Acute diastolic CHF (congestive heart failure)    DDD (degenerative disc disease), cervical    Iron adverse reaction    Obstructive sleep apnea syndrome    Stage 3a chronic kidney disease    Follow-up exam    Sciatic nerve disease    Neurogenic claudication    Neurogenic claudication due to lumbar spinal stenosis     "Atherosclerosis of native artery of extremity with intermittent claudication     Advance Care Planning Advance Directive is not on file.  ACP discussion was held with the patient during this visit. Patient does not have an advance directive, information provided.            Objective   Vitals:    24 0844   BP: 118/70   BP Location: Left arm   Patient Position: Sitting   Cuff Size: Large Adult   Pulse: 78   Temp: 97.3 °F (36.3 °C)   SpO2: 94%   Weight: 90.4 kg (199 lb 4.8 oz)   Height: 152.4 cm (60\")   PainSc:   2   PainLoc: Ankle       Estimated body mass index is 38.92 kg/m² as calculated from the following:    Height as of this encounter: 152.4 cm (60\").    Weight as of this encounter: 90.4 kg (199 lb 4.8 oz).            Does the patient have evidence of cognitive impairment? No  Lab Results   Component Value Date    CHLPL 115 2024    TRIG 146 2024    HDL 32 (L) 2024    LDL 58 2024    VLDL 25 2024    HGBA1C 5.60 2024                                                                                                Health  Risk Assessment    Smoking Status:  Social History     Tobacco Use   Smoking Status Every Day    Current packs/day: 0.00    Average packs/day: 0.5 packs/day for 50.0 years (25.0 ttl pk-yrs)    Types: Cigarettes    Start date: 1973    Last attempt to quit: 2023    Years since quittin.4   Smokeless Tobacco Never   Tobacco Comments    Patient smokes 6 cigarettes per day     Alcohol Consumption:  Social History     Substance and Sexual Activity   Alcohol Use Yes    Alcohol/week: 1.0 standard drink of alcohol    Types: 1 Glasses of wine per week    Comment: Very occasional; Patient is non drinker.       Fall Risk Screen  STEADI Fall Risk Assessment was completed, and patient is at LOW risk for falls.Assessment completed on:2024    Depression Screenin/30/2024     8:52 AM   PHQ-2/PHQ-9 Depression Screening   Little Interest or Pleasure in " Doing Things 0-->not at all   Feeling Down, Depressed or Hopeless 0-->not at all   PHQ-9: Brief Depression Severity Measure Score 0     Health Habits and Functional and Cognitive Screenin/30/2024     8:52 AM   Functional & Cognitive Status   Do you have difficulty preparing food and eating? No   Do you have difficulty bathing yourself, getting dressed or grooming yourself? No   Do you have difficulty using the toilet? No   Do you have difficulty moving around from place to place? No   Do you have trouble with steps or getting out of a bed or a chair? No   Current Diet Well Balanced Diet   Dental Exam Up to date   Eye Exam Up to date   Exercise (times per week) 3 times per week   Current Exercises Include Walking   Do you need help using the phone?  No   Are you deaf or do you have serious difficulty hearing?  No   Do you need help to go to places out of walking distance? No   Do you need help shopping? No   Do you need help preparing meals?  No   Do you need help with housework?  No   Do you need help with laundry? No   Do you need help taking your medications? No   Do you need help managing money? No   Do you ever drive or ride in a car without wearing a seat belt? No   Have you felt unusual stress, anger or loneliness in the last month? No   Who do you live with? Spouse   If you need help, do you have trouble finding someone available to you? No   Have you been bothered in the last four weeks by sexual problems? No   Do you have difficulty concentrating, remembering or making decisions? No           Age-appropriate Screening Schedule:  Refer to the list below for future screening recommendations based on patient's age, sex and/or medical conditions. Orders for these recommended tests are listed in the plan section. The patient has been provided with a written plan.    Health Maintenance List  Health Maintenance   Topic Date Due    Pneumococcal Vaccine 65+ (2 of 2 - PPSV23 or PCV20) 12/10/2019    DIABETIC  FOOT EXAM  10/29/2020    DXA SCAN  01/11/2024    DIABETIC EYE EXAM  06/20/2024    URINE MICROALBUMIN  08/08/2024    COVID-19 Vaccine (2 - 2023-24 season) 09/01/2024    INFLUENZA VACCINE  08/01/2024    LUNG CANCER SCREENING  03/10/2025    HEMOGLOBIN A1C  03/25/2025    BMI FOLLOWUP  09/09/2025    LIPID PANEL  09/25/2025    ANNUAL WELLNESS VISIT  09/30/2025    COLORECTAL CANCER SCREENING  06/30/2032    HEPATITIS C SCREENING  Completed    MAMMOGRAM  Discontinued    PAP SMEAR  Discontinued    TDAP/TD VACCINES  Discontinued    ZOSTER VACCINE  Discontinued                                                                                                                                                CMS Preventative Services Quick Reference  Risk Factors Identified During Encounter  Depression/Dysphoria: Current medication is effective, no change recommended  Immunizations Discussed/Encouraged: Influenza  Inactivity/Sedentary: Patient was advised to exercise at least 150 minutes a week per CDC recommendations.  Polypharmacy: Medication List reviewed  Dental Screening Recommended  Vision Screening Recommended    The above risks/problems have been discussed with the patient.  Pertinent information has been shared with the patient in the After Visit Summary.  An After Visit Summary and PPPS were made available to the patient.    Follow Up:   Next Medicare Wellness visit to be scheduled in 1 year.         Additional E&M Note during same encounter follows:  Patient has additional, significant, and separately identifiable condition(s)/problem(s) that require work above and beyond the Medicare Wellness Visit     Chief Complaint  Medicare Wellness-subsequent (Labs prior)    Subjective   HPI 69-year-old white male female with multiple medical issues here for further medical management.  Patient is also here for [facility].  She has history of hypertension, hyperlipidemia, PAD, CAD, carotid artery disease, as well as hypothyroidism,  "vitamin D deficiency, morbid exogenous obesity and type II non-insulin-dependent diabetes mellitus.  Patient also has history of GERD as well as stage IIIa chronic kidney disease.  She is followed by cardiology as well as vascular.  Fasting labs have been acquired prior to this visit.  Medications are multiple and are as listed.  All medications are used appropriately and are well-tolerated without side effects.  She also states that she completely quit smoking 1-1/2 years ago and continues to do well.  Rosalee is also being seen today for an annual adult preventative physical exam.  and Rosalee is also being seen today for additional medical problem/s.    Review of Systems   Cardiovascular:         Hypertension, hyperlipidemia, PAD, CAD, carotid artery disease,   Gastrointestinal:         GERD   Endocrine:        Hypothyroidism, vitamin D deficiency, morbid exogenous obesity, type II non-insulin-dependent diabetes mellitus              Objective   Vital Signs:  /70 (BP Location: Left arm, Patient Position: Sitting, Cuff Size: Large Adult)   Pulse 78   Temp 97.3 °F (36.3 °C)   Ht 152.4 cm (60\")   Wt 90.4 kg (199 lb 4.8 oz)   SpO2 94%   BMI 38.92 kg/m²   Physical Exam  Vitals and nursing note reviewed.   Constitutional:       Appearance: Normal appearance. She is well-developed and well-groomed. She is morbidly obese.      Comments:  morbid exogenous obesity with a BMI of 38.9   HENT:      Head: Normocephalic and atraumatic.   Neck:      Thyroid: No thyroid mass or thyromegaly.      Vascular: Normal carotid pulses. No carotid bruit.      Trachea: Trachea and phonation normal.   Cardiovascular:      Rate and Rhythm: Normal rate and regular rhythm.      Heart sounds: Normal heart sounds. No murmur heard.     No friction rub. No gallop.   Pulmonary:      Effort: Pulmonary effort is normal. No respiratory distress.      Breath sounds: Normal breath sounds. No decreased breath sounds, wheezing, rhonchi or rales. "   Musculoskeletal:      Cervical back: Neck supple.   Lymphadenopathy:      Cervical: No cervical adenopathy.   Skin:     General: Skin is warm and dry.      Findings: No rash.   Neurological:      Mental Status: She is alert and oriented to person, place, and time.   Psychiatric:         Attention and Perception: Attention and perception normal.         Mood and Affect: Mood and affect normal.         Speech: Speech normal.         Behavior: Behavior normal. Behavior is cooperative.         Thought Content: Thought content normal.         Cognition and Memory: Cognition and memory normal.         Judgment: Judgment normal.       Hospital Outpatient Visit on 09/13/2024   Component Date Value Ref Range Status    Creatinine 09/13/2024 1.80 (H)  0.60 - 1.30 mg/dL Final    Serial Number: [id number]:  [id number]   Results Encounter on 08/31/2024   Component Date Value Ref Range Status    Glucose 09/25/2024 82  65 - 99 mg/dL Final    BUN 09/25/2024 29 (H)  8 - 23 mg/dL Final    Creatinine 09/25/2024 1.66 (H)  0.57 - 1.00 mg/dL Final    EGFR Result 09/25/2024 32.9 (L)  >60.0 mL/min/1.73 Final    Comment: GFR Normal >60  Chronic Kidney Disease <60  Kidney Failure <15  The GFR formula is only valid for adults with stable renal  function between ages 18 and 70.      BUN/Creatinine Ratio 09/25/2024 17.5  7.0 - 25.0 Final    Sodium 09/25/2024 139  136 - 145 mmol/L Final    Potassium 09/25/2024 5.9 (H)  3.5 - 5.2 mmol/L Final    Chloride 09/25/2024 103  98 - 107 mmol/L Final    Total CO2 09/25/2024 23.6  22.0 - 29.0 mmol/L Final    Calcium 09/25/2024 9.0  8.6 - 10.5 mg/dL Final    Total Protein 09/25/2024 6.9  6.0 - 8.5 g/dL Final    Albumin 09/25/2024 4.4  3.5 - 5.2 g/dL Final    Globulin 09/25/2024 2.5  gm/dL Final    A/G Ratio 09/25/2024 1.8  g/dL Final    Total Bilirubin 09/25/2024 0.3  0.0 - 1.2 mg/dL Final    Alkaline Phosphatase 09/25/2024 98  39 - 117 U/L Final    AST (SGOT) 09/25/2024 15  1 - 32 U/L Final    ALT (SGPT)  09/25/2024 18  1 - 33 U/L Final    25 Hydroxy, Vitamin D 09/25/2024 23.9 (L)  30.0 - 100.0 ng/ml Final    Comment: Reference Range for Total Vitamin D 25(OH)  Deficiency <20.0 ng/mL  Insufficiency 21-29 ng/mL  Sufficiency  ng/mL  Toxicity >100 ng/ml      TSH 09/25/2024 1.910  0.270 - 4.200 uIU/mL Final    Hemoglobin A1C 09/25/2024 5.60  4.80 - 5.60 % Final    Comment: Hemoglobin A1C Ranges:  Increased Risk for Diabetes  5.7% to 6.4%  Diabetes                     >= 6.5%  Diabetic Goal                < 7.0%      Total Cholesterol 09/25/2024 115  0 - 200 mg/dL Final    Comment: Cholesterol Reference Ranges  (U.S. Department of Health and Human Services ATP III  Classifications)  Desirable          <200 mg/dL  Borderline High    200-239 mg/dL  High Risk          >240 mg/dL  Triglyceride Reference Ranges  (U.S. Department of Health and Human Services ATP III  Classifications)  Normal           <150 mg/dL  Borderline High  150-199 mg/dL  High             200-499 mg/dL  Very High        >500 mg/dL  HDL Reference Ranges  (U.S. Department of Health and Human Services ATP III  Classifications)  Low     <40 mg/dl (major risk factor for CHD)  High    >60 mg/dl ('negative' risk factor for CHD)  LDL Reference Ranges  (U.S. Department of Health and Human Services ATP III  Classifications)  Optimal          <100 mg/dL  Near Optimal     100-129 mg/dL  Borderline High  130-159 mg/dL  High             160-189 mg/dL  Very High        >189 mg/dL      Triglycerides 09/25/2024 146  0 - 150 mg/dL Final    HDL Cholesterol 09/25/2024 32 (L)  40 - 60 mg/dL Final    VLDL Cholesterol [place] 09/25/2024 25  5 - 40 mg/dL Final    LDL Chol Calc (NIH) 09/25/2024 58  0 - 100 mg/dL Final    WBC 09/25/2024 8.84  3.40 - 10.80 10*3/mm3 Final    RBC 09/25/2024 4.30  3.77 - 5.28 10*6/mm3 Final    Hemoglobin 09/25/2024 12.6  12.0 - 15.9 g/dL Final    Hematocrit 09/25/2024 37.8  34.0 - 46.6 % Final    MCV 09/25/2024 87.9  79.0 - 97.0 fL Final    MCH  09/25/2024 29.3  26.6 - 33.0 pg Final    MCHC 09/25/2024 33.3  31.5 - 35.7 g/dL Final    RDW 09/25/2024 13.7  12.3 - 15.4 % Final    Platelets 09/25/2024 290  140 - 450 10*3/mm3 Final    Neutrophil Rel % 09/25/2024 59.2  42.7 - 76.0 % Final    Lymphocyte Rel % 09/25/2024 27.4  19.6 - 45.3 % Final    Monocyte Rel % 09/25/2024 6.1  5.0 - 12.0 % Final    Eosinophil Rel % 09/25/2024 6.0  0.3 - 6.2 % Final    Basophil Rel % 09/25/2024 0.8  0.0 - 1.5 % Final    Neutrophils Absolute 09/25/2024 5.24  1.70 - 7.00 10*3/mm3 Final    Lymphocytes Absolute 09/25/2024 2.42  0.70 - 3.10 10*3/mm3 Final    Monocytes Absolute 09/25/2024 0.54  0.10 - 0.90 10*3/mm3 Final    Eosinophils Absolute 09/25/2024 0.53 (H)  0.00 - 0.40 10*3/mm3 Final    Basophils Absolute 09/25/2024 0.07  0.00 - 0.20 10*3/mm3 Final    Immature Granulocyte Rel % 09/25/2024 0.5  0.0 - 0.5 % Final    Immature Grans Absolute 09/25/2024 0.04  0.00 - 0.05 10*3/mm3 Final    nRBC 09/25/2024 0.0  0.0 - 0.2 /100 WBC Final         The following data was reviewed by: Eddie Slater DO on 09/30/2024:        Assessment and Plan Additional age appropriate preventative wellness advice topics were discussed during today's preventative wellness exam(some topics already addressed during AWV portion of the note above):    Physical Activity: Advised cardiovascular activity 150 minutes per week as tolerated. (example brisk walk for 30 minutes, 5 days a week).     Nutrition: Discussed nutrition plan with patient. Information shared in after visit summary. Goal is for a well balanced diet to enhance overall health.     Healthy Weight: Discussed current and goal BMI with patient. Steps to attain this goal discussed. Information shared in after visit summary.     Motor Vehicle Safety Discussion:  Wearing Seatbelt While in Motor Vehicle recommendation. Adhering to posted speed limit recommendation.     Injury Prevention Discussion:  Information shared in after visit  summary.                    Medicare annual wellness visit, subsequent    Hyperkalemia    Stage 3a chronic kidney disease  Renal condition is stable.  Continue current treatment regimen.  Renal condition will be reassessed in 6 months.  Essential hypertension  Hypertension is stable and controlled  Continue current treatment regimen.  Blood pressure will be reassessed in 6 months.  Mixed hyperlipidemia   Lipid abnormalities are stable    Plan:  Continue same medication/s without change.      Discussed medication dosage, use, side effects, and goals of treatment in detail.    Counseled patient on lifestyle modifications to help control hyperlipidemia.     Patient Treatment Goals:   LDL goal is less than 70    Followup in 6 months.  Peripheral arterial occlusive disease    Coronary artery disease involving native coronary artery of native heart without angina pectoris  Coronary Artery Disease (OPTIONAL): Coronary artery disease is stable.  Continue current treatment regimen.  Cardiac status will be reassessed in 6 months.  Atherosclerosis of both carotid arteries    Vitamin D deficiency    Type 2 diabetes mellitus with both eyes affected by mild nonproliferative retinopathy without macular edema, without long-term current use of insulin  Diabetes is stable.   Continue current treatment regimen.  Diabetes will be reassessed in 6 months  Morbid exogenous obesity  Patient's (Body mass index is 38.92 kg/m².) indicates that they are morbidly/severely obese (BMI > 40 or > 35 with obesity - related health condition) with health conditions that include obstructive sleep apnea, hypertension, coronary heart disease, diabetes mellitus, impaired fasting glucose, dyslipidemias, GERD, peripheral vascular disease, idiopathic intracranial hypertension, osteoarthritis, lower extremity venous stasis disease, urinary stress incontinence, peripheral vascular disease, and hepatic steatosis . Weight is improving with treatment. BMI  is  above average; BMI management plan is completed. We discussed portion control and increasing exercise.   Acquired hypothyroidism    Gastroesophageal reflux disease without esophagitis    Panic disorder  Psychological condition is stable.  Continue current treatment regimen.  Psychological condition  will be reassessed in 6 months.  Generalized anxiety disorder  Psychological condition is stable.  Continue current treatment regimen.  Psychological condition  will be reassessed in 6 months.  Moyamoya disease    History of cardioembolic cerebrovascular accident (CVA)    COPD with exacerbation  COPD is stable.    Plan:  Continue same medication/s without change.    Discussed medication dosage, use, side effects, and goals of treatment in detail.    Warning signs of respiratory distress were reviewed with the patient. .    Patient Treatment Goals:   symptom prevention, minimizing limitation in activity, prevention of exacerbations and use of ER/inpatient care, maintenance of optimal pulmonary function, and minimization of adverse effects of treatment    Followup in 6 months.    Obstructive sleep apnea syndrome      Orders Placed This Encounter   Procedures    Basic metabolic panel     Order Specific Question:   Release to patient     Answer:   Routine Release [[id number]]     New Medications Ordered This Visit   Medications    Semaglutide,0.25 or 0.5MG/DOS, (Ozempic, 0.25 or 0.5 MG/DOSE,) 2 MG/3ML solution pen-injector     Sig: Inject 0.5 mg under the skin into the appropriate area as directed 1 (One) Time Per Week.     Dispense:  3 mL     Refill:  5    amLODIPine (NORVASC) 10 MG tablet     Sig: Take 1 tablet by mouth Daily.     Dispense:  90 tablet     Refill:  1        I spent 30 minutes caring for Rosalee on this date of service. This time includes time spent by me in the following activities:preparing for the visit, reviewing tests, obtaining and/or reviewing a separately obtained history, performing a medically  appropriate examination and/or evaluation , counseling and educating the patient/family/caregiver, ordering medications, tests, or procedures, referring and communicating with other health care professionals , documenting information in the medical record, independently interpreting results and communicating that information with the patient/family/caregiver, and care coordination  Follow Up   Return in about 6 months (around 3/30/2025) for Recheck.  Patient was given instructions and counseling regarding her condition or for health maintenance advice. Please see specific information pulled into the AVS if appropriate.

## 2024-10-02 ENCOUNTER — TELEPHONE (OUTPATIENT)
Dept: FAMILY MEDICINE CLINIC | Facility: CLINIC | Age: 69
End: 2024-10-02
Payer: MEDICARE

## 2024-10-02 NOTE — TELEPHONE ENCOUNTER
Spoke with patient. Patient understands and has no further questions.        Please let patient know that her potassium still came back elevated-would like her to stop the spironolactone and lets recheck a BMP in 1 week.

## 2024-10-04 DIAGNOSIS — E87.5 HYPERKALEMIA: ICD-10-CM

## 2024-10-04 DIAGNOSIS — N18.31 STAGE 3A CHRONIC KIDNEY DISEASE: Primary | ICD-10-CM

## 2024-10-07 NOTE — ED PROVIDER NOTES
Subjective   History of Present Illness  Pt is a 68 y.o. female with PMH as listed who presents for   Chief Complaint   Patient presents with    Shortness of Breath     Difficulty breathing, started last night. Audibly wheezy.        Patient presents with shortness of breath since last night.  She states that last night she began to have fairly cute onset shortness of breath that has been persistent since yesterday evening.  It started after she was eating dinner and at rest.  Denies any associated chest pain, fever, rhinorrhea, congestion, abdominal pain.  Has no other acute complaints.  She does have some neck pain that has been evaluated by her PCP and she was determined to have arthritis, this has been constant and unchanging.  Patient to follow-up with her PCP regarding the symptoms.    Review of Systems    Past Medical History:   Diagnosis Date    Allergic     Anemia     Angina pectoris     Anxiety     Arthritis     ASCVD (arteriosclerotic cardiovascular disease)     Bilateral leg edema     CAD (coronary artery disease)     Cataract Jan 2022    Chest pain     Cholelithiasis     Colon polyp Feb 2023    Depression     Diabetes mellitus     Disease of thyroid gland     Dizziness     GERD (gastroesophageal reflux disease)     Glaucoma     Headache     HL (hearing loss)     Hyperlipidemia     Hypertension     Kidney stone     Morbid obesity     Orthostatic hypotension     PAD (peripheral artery disease)     Pancreatitis     Rectal bleeding 03/24/2022    Added automatically from request for surgery [id number]    Shortness of breath     Sleep apnea     Stroke     Tobacco abuse     Urinary tract infection     Visual impairment     Macular degeneration    Vitamin D deficiency        Allergies   Allergen Reactions    Ciprofloxacin Hives and Swelling    Lisinopril Swelling and Other (See Comments)     Cough     Seasonal Ic [Cholestatin] Other (See Comments)     Cough, congestion       Past Surgical History:   Procedure  Laterality Date    ANAL FISSURECTOMY      BRAIN SURGERY      CARDIAC CATHETERIZATION N/A 2016    Procedure: Coronary angiography;  Surgeon: Fredrick Kapoor MD;  Location:  [facility];  Service:     CARDIAC CATHETERIZATION N/A 2016    Procedure: Left heart cath;  Surgeon: Fredrick Kapoor MD;  Location:  [facility];  Service:     CARDIAC CATHETERIZATION N/A 2016    Procedure: Left ventriculography;  Surgeon: Frderick Kapoor MD;  Location:  [facility];  Service:     CARDIAC CATHETERIZATION N/A 2016    Procedure: Right heart cath;  Surgeon: Fredrick Kapoor MD;  Location:  [facility];  Service:      SECTION      CHOLECYSTECTOMY      COLONOSCOPY N/A 2022    Procedure: COLONOSCOPY with Polypectomy;  Surgeon: Sarwat Church MD;  Location: [facility];  Service: Gastroenterology;  Laterality: N/A;  Rectal polyps x3 and Hemorrhoids    ENDOSCOPY N/A 2022    Procedure: ESOPHAGOGASTRODUODENOSCOPY;  Surgeon: Sarwat Church MD;  Location: [facility];  Service: Gastroenterology;  Laterality: N/A;  Small Hiatal Hernia    ERCP      FRACTURE SURGERY      arm    HYSTERECTOMY      ILIAC ARTERY STENT      SUBTOTAL HYSTERECTOMY      TONSILLECTOMY         Family History   Problem Relation Age of Onset    Heart disease Mother     Hypertension Mother     Hyperlipidemia Mother     Breast cancer Mother     Heart disease Father     Heart attack Sister     Heart disease Sister     Breast cancer Sister     Heart disease Brother     Heart attack Brother     Hyperlipidemia Brother     Diabetes Brother     Heart attack Maternal Grandmother     Heart disease Maternal Grandmother     Heart failure Maternal Grandmother     Heart failure Maternal Grandfather     Heart disease Maternal Grandfather     Heart attack Maternal Grandfather     Heart attack Paternal Grandmother     Heart disease Paternal Grandmother     Heart failure Paternal  Grandmother     Hypertension Paternal Grandmother        Social History     Socioeconomic History    Marital status:    Tobacco Use    Smoking status: Former     Packs/day: 0.50     Years: 50.00     Additional pack years: 0.00     Total pack years: 25.00     Types: Cigarettes     Quit date: 2023     Years since quittin.4    Smokeless tobacco: Never   Vaping Use    Vaping Use: Never used   Substance and Sexual Activity    Alcohol use: Yes     Alcohol/week: 1.0 standard drink of alcohol     Types: 1 Glasses of wine per week     Comment: Very occasional    Drug use: No    Sexual activity: Not Currently     Partners: Male     Birth control/protection: None     Comment: N/A           Objective   Physical Exam  Constitutional:       Appearance: Normal appearance.   HENT:      Head: Normocephalic and atraumatic.      Mouth/Throat:      Mouth: Mucous membranes are moist.      Pharynx: Oropharynx is clear.   Eyes:      Conjunctiva/sclera: Conjunctivae normal.   Cardiovascular:      Rate and Rhythm: Normal rate and regular rhythm.   Pulmonary:      Effort: Pulmonary effort is normal.      Breath sounds: Wheezing (throughout) present.   Abdominal:      General: Abdomen is flat.      Palpations: Abdomen is soft.   Musculoskeletal:      Cervical back: Neck supple.   Skin:     General: Skin is warm and dry.   Neurological:      Mental Status: She is alert.   Psychiatric:         Mood and Affect: Mood normal.         Procedures           ED Course  ED Course as of 10/15/23 1354   Sun Oct 15, 2023   1211 Patient presents with shortness of breath since last night with wheezing.  Exam significant for x-ray wheezes throughout both lung fields.  Denies any fever, congestion, rhinorrhea or any other associated symptoms.  No new pains in her chest or elsewhere.  No other new complaints.  Will obtain CBC, CMP, troponin, procalcitonin as well as EKG and chest x-ray.  Patient given methylprednisolone and DuoNeb for wheezing.  [JF]   1352 Lab work unremarkable other than mild creatinine elevation, this is stable compared to prior from 2 months ago.  Chest x-ray shows no acute pulmonary process based on my interpretation.  EKG normal.  Patient has normal procalcitonin and normal troponin and BNP.  Symptoms significant improved with breathing treatment and IV steroids.  Will discharge patient with prescriptions for azithromycin, Medrol Dosepak, and nebulizer solution.  She has a nebulizer machine at home if able to use.  Patient to follow-up with her PCP in the next few days and return if she develops any chest pain, worsening shortness of breath, fever, or any other worrisome complaints.  Patient understands and agrees with plan of care.  All questions answered. [JF]      ED Course User Index  [JF] Jone Bowen MD                                           Medical Decision Making  My differential diagnosis for dyspnea includes but is not limited to:  Asthma, COPD, pneumonia, pulmonary embolus, acute respiratory distress syndrome, pneumothorax, pleural effusion, pulmonary fibrosis, congestive heart failure, myocardial infarction, DKA, uremia, acidosis, sepsis, anemia, drug related, hyperventilation, CNS disease      Problems Addressed:  COPD exacerbation: complicated acute illness or injury    Amount and/or Complexity of Data Reviewed  Labs: ordered.     Details: Lab work shows mild CKD which is stable compared to prior lab work from 2 months ago.  Rest of lab work unremarkable for any acute findings.  BNP and troponin specifically are normal.  Radiology: ordered.     Details: Chest x-ray shows no acute pulmonary process based on my interpretation.  ECG/medicine tests: ordered.     Details: EKG  10/15/2023 at 1208  Rhythm sinus, rate 68  Normal axis, normal intervals, Q-wave lead III, no other ST changes  EKG interpreted by me contemporaneously by me with care    Risk  Prescription drug management.        Final diagnoses:   COPD  exacerbation       ED Disposition  ED Disposition       ED Disposition   Discharge    Condition   Stable    Comment   --               BryonEddie jaquez, DO  [address]  [facility] [address] [phone]    Schedule an appointment as soon as possible for a visit in 3 days  For re-evaluation         Medication List        New Prescriptions      albuterol 1.25 MG/3ML nebulizer solution  Commonly known as: ACCUNEB  Take 3 mL by nebulization Every 6 (Six) Hours As Needed for Wheezing for up to 10 days.     azithromycin 250 MG tablet  Commonly known as: ZITHROMAX  Take 2 tablets by mouth Daily for 1 day, THEN 1 tablet Daily for 1 day, THEN 1 tablet Daily for 1 day.  Start taking on: October 15, 2023     methylPREDNISolone 4 MG dose pack  Commonly known as: MEDROL  Take as directed on package instructions.               Where to Get Your Medications        These medications were sent to [facility] [address] [place] [address] - [phone]  - [phone] [address] [address]      Phone: [phone]   albuterol 1.25 MG/3ML nebulizer solution  azithromycin 250 MG tablet  methylPREDNISolone 4 MG dose pack            Jone Bowen MD  10/15/23 7917     Render Risk Assessment In Note?: no Additional Notes: Patient consent was obtained to proceed with the visit and recommended plan of care after discussion of all risks and benefits, including the risk of COVID-19 exposure. Detail Level: Simple Additional Notes: 1.3 x 0.8

## 2024-10-10 ENCOUNTER — HOSPITAL ENCOUNTER (OUTPATIENT)
Dept: CT IMAGING | Facility: HOSPITAL | Age: 69
Discharge: HOME | End: 2024-10-10
Payer: MEDICARE

## 2024-10-10 ENCOUNTER — HOSPITAL ENCOUNTER (OUTPATIENT)
Dept: RADIOLOGY | Facility: HOSPITAL | Age: 69
Discharge: HOME | End: 2024-10-10
Payer: MEDICARE

## 2024-10-10 DIAGNOSIS — N18.31 STAGE 3A CHRONIC KIDNEY DISEASE: ICD-10-CM

## 2024-10-10 DIAGNOSIS — I70.219 ATHEROSCLEROSIS OF NATIVE ARTERY OF EXTREMITY WITH INTERMITTENT CLAUDICATION, UNSPECIFIED EXTREMITY: ICD-10-CM

## 2024-10-10 DIAGNOSIS — I70.223 ATHEROSCLEROSIS OF NATIVE ARTERIES OF EXTREMITIES WITH REST PAIN, BILATERAL LEGS: ICD-10-CM

## 2024-10-10 PROCEDURE — 96360 HYDRATION IV INFUSION INIT: CPT

## 2024-10-10 PROCEDURE — 25810000003 SODIUM CHLORIDE 0.9 % SOLUTION: Performed by: NURSE PRACTITIONER

## 2024-10-10 PROCEDURE — 75635 CT ANGIO ABDOMINAL ARTERIES: CPT

## 2024-10-10 PROCEDURE — 96361 HYDRATE IV INFUSION ADD-ON: CPT

## 2024-10-10 PROCEDURE — 25510000001 IOPAMIDOL PER 1 ML: Performed by: SURGERY

## 2024-10-10 RX ORDER — IOPAMIDOL 755 MG/ML
100 INJECTION, SOLUTION INTRAVASCULAR
Status: COMPLETED | OUTPATIENT
Start: 2024-10-10 | End: 2024-10-10

## 2024-10-10 RX ADMIN — SODIUM CHLORIDE 500 ML: 9 INJECTION, SOLUTION INTRAVENOUS at 08:40

## 2024-10-10 RX ADMIN — IOPAMIDOL 95 ML: 755 INJECTION, SOLUTION INTRAVENOUS at 09:53

## 2024-10-10 RX ADMIN — SODIUM CHLORIDE 400 ML: 9 INJECTION, SOLUTION INTRAVENOUS at 09:55

## 2024-10-13 DIAGNOSIS — I25.10 ATHEROSCLEROTIC HEART DISEASE OF NATIVE CORONARY ARTERY WITHOUT ANGINA PECTORIS: ICD-10-CM

## 2024-10-13 DIAGNOSIS — I77.9 DISORDER OF ARTERIES AND ARTERIOLES, UNSPECIFIED: ICD-10-CM

## 2024-10-14 DIAGNOSIS — I25.10 CORONARY ARTERY DISEASE INVOLVING NATIVE CORONARY ARTERY OF NATIVE HEART WITHOUT ANGINA PECTORIS: Primary | ICD-10-CM

## 2024-10-14 RX ORDER — CLOPIDOGREL BISULFATE 75 MG/1
75 TABLET ORAL DAILY
Qty: 90 TABLET | Refills: 0 | OUTPATIENT
Start: 2024-10-14

## 2024-10-14 RX ORDER — CLOPIDOGREL BISULFATE 75 MG/1
TABLET ORAL
Qty: 180 TABLET | Refills: 1 | Status: SHIPPED | OUTPATIENT
Start: 2024-10-14

## 2024-10-17 ENCOUNTER — OFFICE VISIT (OUTPATIENT)
Age: 69
End: 2024-10-17
Payer: MEDICARE

## 2024-10-17 VITALS — BODY MASS INDEX: 39.07 KG/M2 | HEIGHT: 60 IN | WEIGHT: 199 LBS

## 2024-10-17 DIAGNOSIS — I65.23 ATHEROSCLEROSIS OF BOTH CAROTID ARTERIES: ICD-10-CM

## 2024-10-17 DIAGNOSIS — I70.0 ATHEROSCLEROSIS OF AORTA: Primary | ICD-10-CM

## 2024-10-17 DIAGNOSIS — I77.9 PERIPHERAL ARTERIAL OCCLUSIVE DISEASE: ICD-10-CM

## 2024-10-17 DIAGNOSIS — I70.213 ATHEROSCLEROSIS OF NATIVE ARTERY OF BOTH LOWER EXTREMITIES WITH INTERMITTENT CLAUDICATION: ICD-10-CM

## 2024-10-17 RX ORDER — CILOSTAZOL 100 MG/1
100 TABLET ORAL 2 TIMES DAILY
Qty: 60 TABLET | Refills: 60 | Status: SHIPPED | OUTPATIENT
Start: 2024-10-17

## 2024-10-17 NOTE — PROGRESS NOTES
Patient Name: Rosalee Hargrove    MRN: [id number] Encounter Date: 10/17/2024      Consulting Service: Vascular Surgery    Referring Provider: No ref. provider found       CHIEF COMPLAINT:  Chief Complaint   Patient presents with    Peripheral Vascular Disease       Subjective    HPI: Rosalee Hargrove is a 69 y.o. female is being seen for evaluation/management of peripheral vascular disease follow-up.  The patient has known peripheral vascular disease with symptoms of claudication.  Current symptoms of claudication at 3 blocks distance are noted with lifestyle limitation.  Prior interventions include peripheral endovascular intervention.  Currently the patient reports symptoms are stable.  Testing today includes ABIs and CT angiogram.  Their current medical regimen includes antiplatelet and cholesterol medications.  The patient appears to have normal sensation.  Discussion as to the protection of feet and toes with prevention of injury including twice a day monitoring of feet and all digits, avoidance of walking barefoot, and the use of lightly colored socks to note any drainage were reviewed with the patient.  Plan moving forward will include continue to follow-up with noninvasive testing.    PAST MEDICAL HISTORY:   Past Medical History:   Diagnosis Date    Afib 09/09/2021    Anemia     Anxiety     Arthritis     Atheroembolism of other site 11/12/2015    Atherosclerosis of aorta 11/12/2015    Atherosclerosis of native arteries of extremities with intermittent claudication, bilateral legs 08/29/2019    PVD    Bilateral carotid artery stenosis 08/29/2019    Bilateral leg edema     Carotid artery stenosis 08/11/2016    Right    Cataract Jan 2022    Chest pain on breathing 08/17/2017    Cholelithiasis     Colon polyp Feb 2023    Depression     Dizziness     GERD (gastroesophageal reflux disease)     Glaucoma     Headache     Kidney stone     Localized edema 11/12/2015    Localized edema 08/11/2016    Orthostatic hypotension  2016    Pancreatitis     Peripheral vascular disease with claudication 2015    bilateral legs    Rectal bleeding 2022    Added automatically from request for surgery [id number]    Shortness of breath     Sleep apnea     Stroke 2021    Tobacco abuse     Urinary tract infection     Varicose veins of bilateral lower extremities with pain 2021    Visual impairment     Macular degeneration    Vitamin D deficiency       PAST SURGICAL HISTORY:   Past Surgical History:   Procedure Laterality Date    ANAL FISSURECTOMY      BRAIN SURGERY      CARDIAC CATHETERIZATION N/A 2016    Procedure: Coronary angiography;  Surgeon: Fredrick Kapoor MD;  Location:  [facility];  Service:     CARDIAC CATHETERIZATION N/A 2016    Procedure: Left heart cath;  Surgeon: Fredrick Kapoor MD;  Location:  [facility];  Service:     CARDIAC CATHETERIZATION N/A 2016    Procedure: Left ventriculography;  Surgeon: Fredrick Kapoor MD;  Location:  [facility];  Service:     CARDIAC CATHETERIZATION N/A 2016    Procedure: Right heart cath;  Surgeon: Fredrick Kapoor MD;  Location:  [facility];  Service:      SECTION      CHOLECYSTECTOMY      COLONOSCOPY N/A 2022    Procedure: COLONOSCOPY with Polypectomy;  Surgeon: Sarwat Church MD;  Location: [facility];  Service: Gastroenterology;  Laterality: N/A;  Rectal polyps x3 and Hemorrhoids    CORONARY ANGIOPLASTY WITH STENT PLACEMENT Right 2021    ENDOSCOPY N/A 2022    Procedure: ESOPHAGOGASTRODUODENOSCOPY;  Surgeon: Sarwta Church MD;  [facility]: [facility];  Service: Gastroenterology;  Laterality: N/A;  Small Hiatal Hernia    ERCP      FRACTURE SURGERY      arm    HYSTERECTOMY      ILIAC ARTERY STENT      SUBTOTAL HYSTERECTOMY      TONSILLECTOMY        FAMILY HISTORY:   Family History   Problem Relation Age of Onset    Heart disease Mother     Hypertension Mother      Hyperlipidemia Mother     Breast cancer Mother     Heart disease Father     Heart attack Sister     Heart disease Sister     Breast cancer Sister     Heart disease Brother     Heart attack Brother     Hyperlipidemia Brother     Diabetes Brother     Heart attack Maternal Grandmother     Heart disease Maternal Grandmother     Heart failure Maternal Grandmother     Heart failure Maternal Grandfather     Heart disease Maternal Grandfather     Heart attack Maternal Grandfather     Heart attack Paternal Grandmother     Heart disease Paternal Grandmother     Heart failure Paternal Grandmother     Hypertension Paternal Grandmother       SOCIAL HISTORY:   Social History     Tobacco Use    Smoking status: Every Day     Current packs/day: 0.00     Average packs/day: 0.5 packs/day for 50.0 years (25.0 ttl pk-yrs)     Types: Cigarettes     Start date: 1973     Last attempt to quit: 2023     Years since quittin.4    Smokeless tobacco: Never    Tobacco comments:     Patient smokes 6 cigarettes per day   Vaping Use    Vaping status: Never Used   Substance Use Topics    Alcohol use: Yes     Alcohol/week: 1.0 standard drink of alcohol     Types: 1 Glasses of wine per week     Comment: Very occasional; Patient is non drinker.    Drug use: No     Comment: Drug Abuse: none      MEDICATIONS:   Current Outpatient Medications on File Prior to Visit   Medication Sig Dispense Refill    ALPRAZolam (XANAX) 0.25 MG tablet Take 1 tablet by mouth Daily.      amLODIPine (NORVASC) 10 MG tablet Take 1 tablet by mouth Daily. 90 tablet 1    aspirin 81 MG chewable tablet Chew 1 tablet Daily.      atorvastatin (LIPITOR) 40 MG tablet Take 1 tablet by mouth once daily 90 tablet 0    citalopram (CeleXA) 20 MG tablet Take 1 tablet by mouth once daily 90 tablet 0    clopidogrel (Plavix) 75 MG tablet 1 p.o. twice daily 180 tablet 1    desonide (DESOWEN) 0.05 % ointment Apply 1 Application topically to the appropriate area as directed.       "diazePAM (VALIUM) 5 MG tablet Take 1 tablet by mouth Every 12 (Twelve) Hours.      ezetimibe (ZETIA) 10 MG tablet Take 1 tablet by mouth once daily 90 tablet 0    fluocinolone acetonide (DERMOTIC) 0.01 % oil otic oil Administer  into both ears 2 (Two) Times a Day. Indications: Chronic External Ear Eczema 20 mL 1    levothyroxine (SYNTHROID, LEVOTHROID) 75 MCG tablet Take 1 tablet by mouth once daily 90 tablet 0    losartan (Cozaar) 50 MG tablet Take 1 tablet by mouth Daily. 90 tablet 3    pantoprazole (PROTONIX) 40 MG EC tablet Take 1 tablet by mouth once daily 90 tablet 0    Semaglutide,0.25 or 0.5MG/DOS, (Ozempic, 0.25 or 0.5 MG/DOSE,) 2 MG/3ML solution pen-injector Inject 0.5 mg under the skin into the appropriate area as directed 1 (One) Time Per Week. 3 mL 5    trimethoprim-polymyxin b (POLYTRIM) 92092-9.1 UNIT/ML-% ophthalmic solution Apply 1 drop to eye(s) as directed by provider. Four days before and four days after eye injection.      metFORMIN (GLUCOPHAGE) 1000 MG tablet Take 1 tablet by mouth twice daily (Patient not taking: Reported on 10/17/2024) 180 tablet 0    spironolactone (ALDACTONE) 25 MG tablet Take 1 tablet by mouth Daily. (Patient not taking: Reported on 10/17/2024) 90 tablet 3     Current Facility-Administered Medications on File Prior to Visit   Medication Dose Route Frequency Provider Last Rate Last Admin    sodium chloride 0.9 % infusion  500 mL/hr Intravenous Once Sujit Coats MD        sodium chloride 0.9 % infusion  100 mL/hr Intravenous Once Sujit Coats MD           ALLERGIES: Ciprofloxacin, Lisinopril, and Seasonal ic [cholestatin]       Objective   Vitals:    10/17/24 1229   Weight: 90.3 kg (199 lb)   Height: 152.4 cm (60\")     Body mass index is 38.86 kg/m².          PHYSICAL EXAM:   Physical Exam  Constitutional:       Appearance: Normal appearance. She is normal weight.   HENT:      Head: Normocephalic and atraumatic.      Nose: Nose normal.   Eyes:      Extraocular " Movements: Extraocular movements intact.      Pupils: Pupils are equal, round, and reactive to light.   Cardiovascular:      Rate and Rhythm: Normal rate.      Pulses:           Carotid pulses are 2+ on the right side and 2+ on the left side.       Radial pulses are 2+ on the right side and 2+ on the left side.        Femoral pulses are 2+ on the right side and 2+ on the left side.       Popliteal pulses are 2+ on the right side and 2+ on the left side.        Dorsalis pedis pulses are 2+ on the right side and 2+ on the left side.        Posterior tibial pulses are 2+ on the right side and 2+ on the left side.      Heart sounds: Normal heart sounds.   Pulmonary:      Effort: Pulmonary effort is normal.      Breath sounds: Normal breath sounds.   Abdominal:      General: Abdomen is flat. Bowel sounds are normal.      Palpations: Abdomen is soft.   Musculoskeletal:         General: Normal range of motion.      Cervical back: Normal range of motion and neck supple.      Right lower leg: No edema.      Left lower leg: No edema.   Skin:     General: Skin is warm and dry.   Neurological:      General: No focal deficit present.      Mental Status: She is alert and oriented to person, place, and time. Mental status is at baseline.   Psychiatric:         Mood and Affect: Mood normal.         Thought Content: Thought content normal.          Result Review   LABS:    CBC          2/8/2024    08:31 6/20/2024    14:20 9/25/2024    09:18   CBC   WBC 8.69  8.66  8.84    RBC 4.82  4.19  4.30    Hemoglobin 13.5  12.2  12.6    Hematocrit 41.4  38.8  37.8    MCV 85.9  92.6  87.9    MCH 28.0  29.1  29.3    MCHC 32.6  31.4  33.3    RDW 18.5  13.8  13.7    Platelets 264  314  290      BMP          9/25/2024    09:18 9/30/2024    10:03 10/9/2024    08:32   BMP   BUN 29  38  25    Creatinine 1.66  1.78  1.47    Sodium 139  139  140    Potassium 5.9  5.6  4.4    Chloride 103  102  103    CO2 23.6  23.8  24.9    Calcium 9.0  9.3  9.6       Lipid Panel          11/16/2023    13:06 2/8/2024    08:31 9/25/2024    09:18   Lipid Panel   Total Cholesterol 152  133  115    Triglycerides 177  253  146    HDL Cholesterol 37  34  32    VLDL Cholesterol 30  40  25    LDL Cholesterol  85  59  58          A1C Last 3 Results          11/16/2023    13:06 2/8/2024    08:31 9/25/2024    09:18   HGBA1C Last 3 Results   Hemoglobin A1C 8.50  6.90  5.60         Results Review:       I reviewed the patient's new clinical results.    The following radiologic or non-invasive studies have been reviewed by me: CTA  No results found for this or any previous visit from the past 365 days.     CT Angio Abdominal Aorta Bilateral Iliofem Runoff    Result Date: 10/15/2024  1. Extensive irregular atheromatous plaque throughout the visualized aorta 2. Patent stent in the right common iliac artery 3. Severe stenosis of the proximal left external iliac artery and moderate stenosis mid left external iliac artery 4. Moderate to severe stenosis origin of left superficial femoral artery and severe stenosis proximal left superficial femoral artery 5. Additional findings as described    Radiation dose reduction techniques were utilized, including automated exposure control and exposure modulation based on body size.   This report was finalized on 10/15/2024 8:52 AM by Dr. Ryan Borden M.D on Workstation: [id number]                   ASSESSMENT/PLAN:   Diagnoses and all orders for this visit:    1. Atherosclerosis of aorta (Primary)    2. Atherosclerosis of both carotid arteries    3. Atherosclerosis of native artery of both lower extremities with intermittent claudication  -     Doppler Ankle Brachial Index Single Level CAR; Future    4. Peripheral arterial occlusive disease  -     Doppler Ankle Brachial Index Single Level CAR; Future    Other orders  -     cilostazol (PLETAL) 100 MG tablet; Take 1 tablet by mouth 2 (Two) Times a Day.  Dispense: 60 tablet; Refill: 60       69 y.o.  female with what appears to be at least some vasculogenic claudication left worse than right but also some pain in her feet and legs that are difficult to define such as pain in the ankles and a history of some great toe pain 3 weeks ago on the right side when it keep a close eye on these things as I do not believe all of her pain is vasculogenic but I do think there is a definite component.  She is willing to trial him structured exercise program and is going to do so by walking into her discomfort which is at about 3 blocks at a time several times up to 4 times a day.  She will stress her legs enough to get the capillary beds to improvement in the edition we can add Pletal to her medical regimen to try to get her some immediate improvement.  Depending on how things look she will see us back in 3 months and we will repeat her ABIs hopefully things will go up from 0.84 on the right and 0.64 on the left.  She will call if things go the other direction and she cannot walk as for far or she has symptoms or side effects of from the Pletal.  We have the option to place a stent in the left iliac if needed but at this point in time I believe that a walking protocol is probably a better choice.  Will see her back then and go over the results.    I discussed the plan with the patient who is agreeable to the plan of care at this point. Thank you for this consult.   Follow Up  Return in about 3 months (around 1/17/2025).    Sujit Coats MD   10/17/24

## 2024-11-10 DIAGNOSIS — F41.0 PANIC DISORDER: ICD-10-CM

## 2024-11-10 DIAGNOSIS — F41.9 ANXIETY: ICD-10-CM

## 2024-11-10 DIAGNOSIS — E78.2 MIXED HYPERLIPIDEMIA: ICD-10-CM

## 2024-11-10 DIAGNOSIS — E11.9 CONTROLLED TYPE 2 DIABETES MELLITUS WITHOUT COMPLICATION, WITHOUT LONG-TERM CURRENT USE OF INSULIN: ICD-10-CM

## 2024-11-11 DIAGNOSIS — E03.9 ACQUIRED HYPOTHYROIDISM: ICD-10-CM

## 2024-11-11 RX ORDER — ATORVASTATIN CALCIUM 40 MG/1
40 TABLET, FILM COATED ORAL DAILY
Qty: 90 TABLET | Refills: 0 | Status: SHIPPED | OUTPATIENT
Start: 2024-11-11

## 2024-11-11 RX ORDER — CITALOPRAM HYDROBROMIDE 20 MG/1
20 TABLET ORAL DAILY
Qty: 90 TABLET | Refills: 0 | Status: SHIPPED | OUTPATIENT
Start: 2024-11-11

## 2024-11-11 RX ORDER — LEVOTHYROXINE SODIUM 75 UG/1
TABLET ORAL
Qty: 90 TABLET | Refills: 0 | Status: SHIPPED | OUTPATIENT
Start: 2024-11-11

## 2024-11-23 DIAGNOSIS — H60.8X3 CHRONIC ECZEMATOUS OTITIS EXTERNA OF BOTH EARS: ICD-10-CM

## 2024-11-25 RX ORDER — EZETIMIBE 10 MG/1
10 TABLET ORAL DAILY
Qty: 90 TABLET | Refills: 0 | Status: SHIPPED | OUTPATIENT
Start: 2024-11-25

## 2024-11-25 RX ORDER — FLUOCINOLONE ACETONIDE 0.11 MG/ML
OIL AURICULAR (OTIC)
Qty: 20 ML | Refills: 0 | Status: SHIPPED | OUTPATIENT
Start: 2024-11-25

## 2024-12-06 ENCOUNTER — TELEPHONE (OUTPATIENT)
Age: 69
End: 2024-12-06
Payer: MEDICARE

## 2024-12-06 NOTE — TELEPHONE ENCOUNTER
Patient stated that from the knee down, her leg is swollen, painful/ sore. Patient rated the pain at a 5/10. She stated that her leg looks like a tree stump due to how swollen it is. Patient stated that is is not discolored. Patient is scheduled to return on 01.30.25 for an SOFÍA and f/u with Coats. Patient would like to know if there is anything that she should do in the meantime to help the swelling and the pain or if she should come in sooner.

## 2024-12-09 NOTE — TELEPHONE ENCOUNTER
"Spoke with patient, she has incredible concern over \"the blockage in my aorta\". I discussed the nature of aortic and arterial blockages versus venous blockages. She is having increased pain and \"swelling\" in her legs. The right leg is shiny and taut. I moved her appointment up to this week and to see Dr. Coats after. She verbalizes understanding.   "

## 2024-12-11 ENCOUNTER — HOSPITAL ENCOUNTER (OUTPATIENT)
Facility: HOSPITAL | Age: 69
Discharge: HOME | End: 2024-12-11
Admitting: SURGERY
Payer: MEDICARE

## 2024-12-11 ENCOUNTER — OFFICE VISIT (OUTPATIENT)
Age: 69
End: 2024-12-11
Payer: MEDICARE

## 2024-12-11 ENCOUNTER — TELEPHONE (OUTPATIENT)
Age: 69
End: 2024-12-11

## 2024-12-11 VITALS
BODY MASS INDEX: 38.87 KG/M2 | SYSTOLIC BLOOD PRESSURE: 132 MMHG | HEIGHT: 60 IN | DIASTOLIC BLOOD PRESSURE: 63 MMHG | WEIGHT: 198 LBS

## 2024-12-11 DIAGNOSIS — I87.321 CHRONIC VENOUS HYPERTENSION WITH INFLAMMATION INVOLVING RIGHT SIDE: ICD-10-CM

## 2024-12-11 DIAGNOSIS — I77.9 PERIPHERAL ARTERIAL OCCLUSIVE DISEASE: ICD-10-CM

## 2024-12-11 DIAGNOSIS — N18.31 STAGE 3A CHRONIC KIDNEY DISEASE: ICD-10-CM

## 2024-12-11 DIAGNOSIS — M79.89 RIGHT LEG SWELLING: ICD-10-CM

## 2024-12-11 DIAGNOSIS — I77.9 PERIPHERAL ARTERIAL OCCLUSIVE DISEASE: Primary | ICD-10-CM

## 2024-12-11 DIAGNOSIS — I70.213 ATHEROSCLEROSIS OF NATIVE ARTERY OF BOTH LOWER EXTREMITIES WITH INTERMITTENT CLAUDICATION: ICD-10-CM

## 2024-12-11 PROBLEM — I87.329 CHRONIC VENOUS HYPERTENSION WITH INFLAMMATION: Status: ACTIVE | Noted: 2024-12-11

## 2024-12-11 LAB
BH CV LOWER ARTERIAL LEFT ABI RATIO: 0.72
BH CV LOWER ARTERIAL LEFT DORSALIS PEDIS SYS MAX: 100
BH CV LOWER ARTERIAL LEFT POST TIBIAL SYS MAX: 102
BH CV LOWER ARTERIAL RIGHT ABI RATIO: 0.99
BH CV LOWER ARTERIAL RIGHT DORSALIS PEDIS SYS MAX: 120
BH CV LOWER ARTERIAL RIGHT POST TIBIAL SYS MAX: 140
UPPER ARTERIAL LEFT ARM BRACHIAL SYS MAX: NORMAL
UPPER ARTERIAL RIGHT ARM BRACHIAL SYS MAX: NORMAL

## 2024-12-11 PROCEDURE — 93922 UPR/L XTREMITY ART 2 LEVELS: CPT

## 2024-12-11 NOTE — TELEPHONE ENCOUNTER
Linq loop alert report received due to device at [facility] as of 12/10/24.     Linq implanted for Cryptogenic Stroke 6/2/2021. There has been no true AF recorded. Only 1 AF labeled episode recorded during the lifetime of the device. Event from 10/8/2023 showed artifact and intermittent PVCs. NO true AF.     3 pause labeled events were undersensing, implant artifact and 1 true 3 sec pause from 4/15/22 prior to pt following at [facility]. 1 Tachy labeled event from 4/6/2022 showing 13 sec of AT/SVT @171bpm. No other events.     Once at [facility] there is approx 30 days of service remaining in the Linq battery. I spoke with pt this am and she says her loop recorder no longer bothers her. She is not interested in having it removed at present. She is aware to let us know if she should change her mind.   NS

## 2024-12-11 NOTE — PROGRESS NOTES
Patient Name: Rosalee Hargrove    MRN: [id number] Encounter Date: 12/11/2024      Consulting Service: Vascular Surgery    Referring Provider: No ref. provider found       CHIEF COMPLAINT:  Chief Complaint   Patient presents with    Peripheral Vascular Disease       Subjective    HPI: Roslaee Hargrove is a 69 y.o. female is being seen for evaluation/management of  severe swelling to the right leg that did not get improved with elevation or with sleeping at night.  The swelling went into the foot and toes and was present for 3 days and then resolved and this Contin down to the very mild swelling that she has now with her stocking.  She describes the leg is painful and very white and as such we pursued an arterial test which shows no significant ischemia to the right leg and therefore I do not believe this is an arterial embolism or any type of issue with her arteries per se.  She does have known stenosis on the left external iliac and in the SFA on the left as well.  However the left leg is not bothering her significantly.  She is tolerating stockings on both legs.  At this point I think a full venous scan on the right is going to be our best next step to determine if there is any problems there.  At this point I do not believe to be a DVT but is something we do need to look for.    PAST MEDICAL HISTORY:   Past Medical History:   Diagnosis Date    Afib 09/09/2021    Anemia     Anxiety     Arthritis     Atheroembolism of other site 11/12/2015    Atherosclerosis of aorta 11/12/2015    Atherosclerosis of native arteries of extremities with intermittent claudication, bilateral legs 08/29/2019    PVD    Bilateral carotid artery stenosis 08/29/2019    Bilateral leg edema     Carotid artery stenosis 08/11/2016    Right    Cataract Jan 2022    Chest pain on breathing 08/17/2017    Cholelithiasis     Colon polyp Feb 2023    Depression     Dizziness     GERD (gastroesophageal reflux disease)     Glaucoma     Headache     Kidney  stone     Localized edema 2015    Localized edema 2016    Orthostatic hypotension 2016    Pancreatitis     Peripheral vascular disease with claudication 2015    bilateral legs    Rectal bleeding 2022    Added automatically from request for surgery [id number]    Shortness of breath     Sleep apnea     Stroke 2021    Tobacco abuse     Urinary tract infection     Varicose veins of bilateral lower extremities with pain 2021    Visual impairment     Macular degeneration    Vitamin D deficiency       PAST SURGICAL HISTORY:   Past Surgical History:   Procedure Laterality Date    ANAL FISSURECTOMY      BRAIN SURGERY      CARDIAC CATHETERIZATION N/A 2016    Procedure: Coronary angiography;  Surgeon: Fredrick Kapoor MD;  Location:  [facility] CATH INVASIVE LOC[facility];  Service:     CARDIAC CATHETERIZATION N/A 2016    Procedure: Left heart cath;  Surgeon: Fredrick Kapoor MD;  Location:  [facility];  Service:     CARDIAC CATHETERIZATION N/A 2016    Procedure: Left ventriculography;  Surgeon: Fredrick Kapoor MD;  Location:  [facility];  Service:     CARDIAC CATHETERIZATION N/A 2016    Procedure: Right heart cath;  Surgeon: Fredrick Kapoor MD;  Location:  [facility]ATH [facility];  Service:      SECTION      CHOLECYSTECTOMY      COLONOSCOPY N/A 2022    Procedure: COLONOSCOPY with Polypectomy;  Surgeon: Sarwat Church MD;  Location: [facility] [place] MAIN OR;  Service: Gastroenterology;  Laterality: N/A;  Rectal polyps x3 and Hemorrhoids    CORONARY ANGIOPLASTY WITH STENT PLACEMENT Right 2021    ENDOSCOPY N/A 2022    Procedure: ESOPHAGOGASTRODUODENOSCOPY;  Surgeon: Sarwat Church MD;  Location: [facility] MAIN [facility];  Service: Gastroenterology;  Laterality: N/A;  Small Hiatal Hernia    ERCP      FRACTURE SURGERY      arm    HYSTERECTOMY      ILIAC ARTERY STENT      SUBTOTAL HYSTERECTOMY      TONSILLECTOMY        FAMILY HISTORY:   Family  History   Problem Relation Age of Onset    Heart disease Mother     Hypertension Mother     Hyperlipidemia Mother     Breast cancer Mother     Heart disease Father     Heart attack Sister     Heart disease Sister     Breast cancer Sister     Heart disease Brother     Heart attack Brother     Hyperlipidemia Brother     Diabetes Brother     Heart attack Maternal Grandmother     Heart disease Maternal Grandmother     Heart failure Maternal Grandmother     Heart failure Maternal Grandfather     Heart disease Maternal Grandfather     Heart attack Maternal Grandfather     Heart attack Paternal Grandmother     Heart disease Paternal Grandmother     Heart failure Paternal Grandmother     Hypertension Paternal Grandmother       SOCIAL HISTORY:   Social History     Tobacco Use    Smoking status: Every Day     Current packs/day: 0.00     Average packs/day: 0.5 packs/day for 50.0 years (25.0 ttl pk-yrs)     Types: Cigarettes     Start date: 1973     Last attempt to quit: 2023     Years since quittin.6    Smokeless tobacco: Never    Tobacco comments:     Patient smokes 6 cigarettes per day   Vaping Use    Vaping status: Never Used   Substance Use Topics    Alcohol use: Yes     Alcohol/week: 1.0 standard drink of alcohol     Types: 1 Glasses of wine per week     Comment: Very occasional; Patient is non drinker.    Drug use: No     Comment: Drug Abuse: none      MEDICATIONS:   Current Outpatient Medications on File Prior to Visit   Medication Sig Dispense Refill    ALPRAZolam (XANAX) 0.25 MG tablet Take 1 tablet by mouth Daily.      amLODIPine (NORVASC) 10 MG tablet Take 1 tablet by mouth Daily. 90 tablet 1    aspirin 81 MG chewable tablet Chew 1 tablet Daily.      atorvastatin (LIPITOR) 40 MG tablet Take 1 tablet by mouth once daily 90 tablet 0    cilostazol (PLETAL) 100 MG tablet Take 1 tablet by mouth 2 (Two) Times a Day. 60 tablet 60    citalopram (CeleXA) 20 MG tablet Take 1 tablet by mouth once daily 90 tablet  "0    clopidogrel (Plavix) 75 MG tablet 1 p.o. twice daily 180 tablet 1    desonide (DESOWEN) 0.05 % ointment Apply 1 Application topically to the appropriate area as directed.      diazePAM (VALIUM) 5 MG tablet Take 1 tablet by mouth Every 12 (Twelve) Hours.      ezetimibe (ZETIA) 10 MG tablet Take 1 tablet by mouth once daily 90 tablet 0    fluocinolone acetonide (DERMOTIC) 0.01 % oil otic oil INSTILL 2 DROPS INTO EACH EAR 2 TIMES A DAY FOR EXTERNAL EAR ECZEMA 20 mL 0    levothyroxine (SYNTHROID, LEVOTHROID) 75 MCG tablet Take 1 tablet by mouth once daily 90 tablet 0    losartan (Cozaar) 50 MG tablet Take 1 tablet by mouth Daily. 90 tablet 3    pantoprazole (PROTONIX) 40 MG EC tablet Take 1 tablet by mouth once daily 90 tablet 0    Semaglutide,0.25 or 0.5MG/DOS, (Ozempic, 0.25 or 0.5 MG/DOSE,) 2 MG/3ML solution pen-injector Inject 0.5 mg under the skin into the appropriate area as directed 1 (One) Time Per Week. 3 mL 5    trimethoprim-polymyxin b (POLYTRIM) 07017-3.1 UNIT/ML-% ophthalmic solution Apply 1 drop to eye(s) as directed by provider. Four days before and four days after eye injection.      metFORMIN (GLUCOPHAGE) 1000 MG tablet Take 1 tablet by mouth twice daily (Patient not taking: Reported on 12/11/2024) 180 tablet 0    spironolactone (ALDACTONE) 25 MG tablet Take 1 tablet by mouth Daily. (Patient not taking: Reported on 12/11/2024) 90 tablet 3     Current Facility-Administered Medications on File Prior to Visit   Medication Dose Route Frequency Provider Last Rate Last Admin    sodium chloride 0.9 % infusion  500 mL/hr Intravenous Once Sujit Coats MD        sodium chloride 0.9 % infusion  100 mL/hr Intravenous Once Sujit Coats MD           ALLERGIES: Ciprofloxacin, Lisinopril, and Seasonal ic [cholestatin]       Objective   Vitals:    12/11/24 1403   BP: 132/63   BP Location: Right arm   Weight: 89.8 kg (198 lb)   Height: 152.4 cm (60\")     Body mass index is 38.67 kg/m².          PHYSICAL " EXAM:   Physical Exam   Right leg with +1-2 edema and no evidence of ischemia.  Tolerating hose.  Result Review   LABS:    CBC          2/8/2024    08:31 6/20/2024    14:20 9/25/2024    09:18   CBC   WBC 8.69  8.66  8.84    RBC 4.82  4.19  4.30    Hemoglobin 13.5  12.2  12.6    Hematocrit 41.4  38.8  37.8    MCV 85.9  92.6  87.9    MCH 28.0  29.1  29.3    MCHC 32.6  31.4  33.3    RDW 18.5  13.8  13.7    Platelets 264  314  290      BMP          9/25/2024    09:18 9/30/2024    10:03 10/9/2024    08:32   BMP   BUN 29  38  25    Creatinine 1.66  1.78  1.47    Sodium 139  139  140    Potassium 5.9  5.6  4.4    Chloride 103  102  103    CO2 23.6  23.8  24.9    Calcium 9.0  9.3  9.6      Lipid Panel          2/8/2024    08:31 9/25/2024    09:18   Lipid Panel   Total Cholesterol 133  115    Triglycerides 253  146    HDL Cholesterol 34  32    VLDL Cholesterol 40  25    LDL Cholesterol  59  58          A1C Last 3 Results          2/8/2024    08:31 9/25/2024    09:18   HGBA1C Last 3 Results   Hemoglobin A1C 6.90  5.60         Results Review:       I reviewed the patient's new clinical results.    The following radiologic or non-invasive studies have been reviewed by me: With normal right and moderate left  No results found for this or any previous visit from the past 365 days.     No radiology results for the last 30 days.                ASSESSMENT/PLAN:   Diagnoses and all orders for this visit:    1. Peripheral arterial occlusive disease (Primary)    2. Atherosclerosis of native artery of both lower extremities with intermittent claudication    3. Stage 3a chronic kidney disease    4. Right leg swelling  -     Duplex Venous Lower Extremity - Right CAR; Future  -     XR Foot 3+ View Right; Future  -     XR Tibia Fibula 2 View Right; Future    5. Chronic venous hypertension with inflammation involving right side  -     Duplex Venous Lower Extremity - Right CAR; Future  -     XR Foot 3+ View Right; Future  -     XR Tibia  Fibula 2 View Right; Future       69 y.o. female with recent CT scan that shows no abdominal or pelvic issues that would account for the swelling in her leg.  It does show her arterial occlusive disease and I think with that we can cancel her January scans.  Her ABIs was done now and she we will cancel her January SOFÍA.  I think her priority is shifting to swelling and wearing to pursue that.  Depending on what we can figure out from that standpoint we may be recommending treatment of her left iliac system if needed but this is going to take a backseat and we will get the scan done in the next several weeks of her right lower extremity vein and then discuss whether or not intervention on the left is indicated.    I discussed the plan with the patient who is agreeable to the plan of care at this point. Thank you for this consult.   Follow Up  Return in about 2 weeks (around 12/25/2024).    Sujit Coats MD   12/11/24

## 2024-12-12 NOTE — PROGRESS NOTES
Subjective     REASON FOR CONSULTATION: Iron deficiency anemia  Provide an opinion on any further workup or treatment                             REQUESTING PHYSICIAN: Dr. Slater    RECORDS OBTAINED:  Records of the patients history including those obtained from the referring provider were reviewed and summarized in detail.      History of Present Illness   This is a very pleasant 66-year-old woman with multiple medical comorbidities referred from her PCP for evaluation and treatment of iron deficiency anemia.  Reviewing the patient's records, she has had microcytic, hypochromic red blood cell indices since 2017/2018 and over the past 1 year or so been mildly anemic hemoglobin typically running around 11.0.  She has normal white blood cell and platelet counts.  Her ferritin has been low for at least 5 years most recently ten 1 month ago.  The patient has attempted to take oral iron on multiple occasions but difficulty tolerating secondary to either diarrhea or constipation.  She has not been able to improve her ferritin despite oral iron.  She complains of fatigue, pica and restless leg symptoms.  She thinks it has been about 10 years since her previous colonoscopy.  She does have occasional hematochezia.  She denies vaginal bleeding and denies donating blood.    Patient received Injectafer x2 on 3/23/2022 and 3/30/2022.   EGD and colonoscopy were performed 6/30/2022 with no obvious source of GI blood loss.    The patient returned today feeling well but having some increased fatigue and cold sensitivity recently.  She denies lightheadedness dizziness blood in the stool.  She has somewhat intermittent dark appearing stool unclear if melanotic.    Past Medical History:   Diagnosis Date    Afib 09/09/2021    Anemia     Anxiety     Arthritis     Atheroembolism of other site 11/12/2015    Atherosclerosis of aorta 11/12/2015    Atherosclerosis of native arteries of extremities with intermittent claudication, bilateral  legs 2019    PVD    Bilateral carotid artery stenosis 2019    Bilateral leg edema     Carotid artery stenosis 2016    Right    Cataract 2022    Chest pain on breathing 2017    Cholelithiasis     Colon polyp 2023    Depression     Dizziness     GERD (gastroesophageal reflux disease)     Glaucoma     Headache     Kidney stone     Localized edema 2015    Localized edema 2016    Orthostatic hypotension 2016    Pancreatitis     Peripheral vascular disease with claudication 2015    bilateral legs    Rectal bleeding 2022    Added automatically from request for surgery [id number]    Shortness of breath     Sleep apnea     Stroke 2021    Tobacco abuse     Urinary tract infection     Varicose veins of bilateral lower extremities with pain 2021    Visual impairment     Macular degeneration    Vitamin D deficiency         Past Surgical History:   Procedure Laterality Date    ANAL FISSURECTOMY      BRAIN SURGERY      CARDIAC CATHETERIZATION N/A 2016    Procedure: Coronary angiography;  Surgeon: Fredrick Kapoor MD;  [facility] C[facility];  Service:     CARDIAC CATHETERIZATION N/A 2016    Procedure: Left heart cath;  Surgeon: Fredrick Kapoor MD;  [facility];  Service:     CARDIAC CATHETERIZATION N/A 2016    Procedure: Left ventriculography;  Surgeon: Fredrick Kapoor MD;  [facility];  Service:     CARDIAC CATHETERIZATION N/A 2016    Procedure: Right heart cath;  Surgeon: Fredrick Kapoor MD;  [facility];  Service:      SECTION      CHOLECYSTECTOMY      COLONOSCOPY N/A 2022    Procedure: COLONOSCOPY with Polypectomy;  Surgeon: Sarwat Church MD;  Location: [facility] [place] MAIN OR;  Service: Gastroenterology;  Laterality: N/A;  Rectal polyps x3 and Hemorrhoids    CORONARY ANGIOPLASTY WITH STENT PLACEMENT Right 2021    ENDOSCOPY N/A 2022     Procedure: ESOPHAGOGASTRODUODENOSCOPY;  Surgeon: Sarwat Church MD;  Location: [facility] [place] MAIN OR;  Service: Gastroenterology;  Laterality: N/A;  Small Hiatal Hernia    ERCP      FRACTURE SURGERY      arm    HYSTERECTOMY      ILIAC ARTERY STENT      SUBTOTAL HYSTERECTOMY      TONSILLECTOMY          Current Outpatient Medications on File Prior to Visit   Medication Sig Dispense Refill    ALPRAZolam (XANAX) 0.25 MG tablet Take 1 tablet by mouth Daily.      amLODIPine (NORVASC) 10 MG tablet Take 1 tablet by mouth Daily. 90 tablet 1    aspirin 81 MG chewable tablet Chew 1 tablet Daily.      atorvastatin (LIPITOR) 40 MG tablet Take 1 tablet by mouth once daily 90 tablet 0    cilostazol (PLETAL) 100 MG tablet Take 1 tablet by mouth 2 (Two) Times a Day. 60 tablet 60    citalopram (CeleXA) 20 MG tablet Take 1 tablet by mouth once daily 90 tablet 0    clopidogrel (Plavix) 75 MG tablet 1 p.o. twice daily 180 tablet 1    desonide (DESOWEN) 0.05 % ointment Apply 1 Application topically to the appropriate area as directed.      diazePAM (VALIUM) 5 MG tablet Take 1 tablet by mouth Every 12 (Twelve) Hours.      ezetimibe (ZETIA) 10 MG tablet Take 1 tablet by mouth once daily 90 tablet 0    fluocinolone acetonide (DERMOTIC) 0.01 % oil otic oil INSTILL 2 DROPS INTO EACH EAR 2 TIMES A DAY FOR EXTERNAL EAR ECZEMA 20 mL 0    levothyroxine (SYNTHROID, LEVOTHROID) 75 MCG tablet Take 1 tablet by mouth once daily 90 tablet 0    losartan (Cozaar) 50 MG tablet Take 1 tablet by mouth Daily. 90 tablet 3    metFORMIN (GLUCOPHAGE) 1000 MG tablet Take 1 tablet by mouth twice daily (Patient not taking: Reported on 12/11/2024) 180 tablet 0    pantoprazole (PROTONIX) 40 MG EC tablet Take 1 tablet by mouth once daily 90 tablet 0    Semaglutide,0.25 or 0.5MG/DOS, (Ozempic, 0.25 or 0.5 MG/DOSE,) 2 MG/3ML solution pen-injector Inject 0.5 mg under the skin into the appropriate area as directed 1 (One) Time Per Week. 3 mL 5    spironolactone (ALDACTONE)  25 MG tablet Take 1 tablet by mouth Daily. (Patient not taking: Reported on 2024) 90 tablet 3    trimethoprim-polymyxin b (POLYTRIM) 60214-6.1 UNIT/ML-% ophthalmic solution Apply 1 drop to eye(s) as directed by provider. Four days before and four days after eye injection.       Current Facility-Administered Medications on File Prior to Visit   Medication Dose Route Frequency Provider Last Rate Last Admin    sodium chloride 0.9 % infusion  500 mL/hr Intravenous Once Sujit Coats MD        sodium chloride 0.9 % infusion  100 mL/hr Intravenous Once Sujit Coats MD            ALLERGIES:    Allergies   Allergen Reactions    Ciprofloxacin Hives and Swelling    Lisinopril Swelling and Other (See Comments)     Cough     Seasonal Ic [Cholestatin] Other (See Comments)     Cough, congestion        Social History     Socioeconomic History    Marital status:    Tobacco Use    Smoking status: Every Day     Current packs/day: 0.00     Average packs/day: 0.5 packs/day for 50.0 years (25.0 ttl pk-yrs)     Types: Cigarettes     Start date: 1973     Last attempt to quit: 2023     Years since quittin.6    Smokeless tobacco: Never    Tobacco comments:     Patient smokes 6 cigarettes per day   Vaping Use    Vaping status: Never Used   Substance and Sexual Activity    Alcohol use: Yes     Alcohol/week: 1.0 standard drink of alcohol     Types: 1 Glasses of wine per week     Comment: Very occasional; Patient is non drinker.    Drug use: No     Comment: Drug Abuse: none    Sexual activity: Not Currently     Partners: Male     Birth control/protection: None     Comment: N/A        Family History   Problem Relation Age of Onset    Heart disease Mother     Hypertension Mother     Hyperlipidemia Mother     Breast cancer Mother     Heart disease Father     Heart attack Sister     Heart disease Sister     Breast cancer Sister     Heart disease Brother     Heart attack Brother     Hyperlipidemia Brother      "Diabetes Brother     Heart attack Maternal Grandmother     Heart disease Maternal Grandmother     Heart failure Maternal Grandmother     Heart failure Maternal Grandfather     Heart disease Maternal Grandfather     Heart attack Maternal Grandfather     Heart attack Paternal Grandmother     Heart disease Paternal Grandmother     Heart failure Paternal Grandmother     Hypertension Paternal Grandmother         Review of Systems   Constitutional:  Positive for fatigue. Negative for fever and unexpected weight change.   HENT: Negative.     Respiratory:  Negative for cough and shortness of breath.    Cardiovascular:  Negative for chest pain, palpitations and leg swelling.   Gastrointestinal:  Negative for blood in stool, constipation, diarrhea and nausea.   Endocrine: Positive for cold intolerance.   Genitourinary: Negative.    Musculoskeletal:  Positive for arthralgias.   Skin: Negative.    Allergic/Immunologic: Negative.    Neurological:  Negative for dizziness, seizures, weakness and light-headedness.   Hematological: Negative.    Psychiatric/Behavioral: Negative.           Objective     Vitals:    12/18/24 1332   BP: 167/81   Pulse: 73   Resp: 16   Temp: 97.7 °F (36.5 °C)   TempSrc: Infrared   SpO2: 98%   Weight: 88.2 kg (194 lb 6.4 oz)   Height: 152.4 cm (60\")   PainSc: 0-No pain             12/18/2024     1:32 PM   Current Status   ECOG score 0       Physical Exam    CONSTITUTIONAL: pleasant well-developed adult woman  HEENT: no icterus, hearing intact, mask in place  LYMPH: no cervical or supraclavicular lad  CV: RRR, S1S2, no murmur  RESP: cta bilat, no wheezing, no rales  MUSC: no edema, normal gait  NEURO: alert and oriented x3, normal strength  PSYCH: Normal mood and affect  Exam unchanged 12/18/2024  RECENT LABS:  Hematology WBC   Date Value Ref Range Status   12/18/2024 8.15 3.40 - 10.80 10*3/mm3 Final   09/25/2024 8.84 3.40 - 10.80 10*3/mm3 Final   04/26/2023 9.68 4.5 - 11.0 10*3/uL Final     RBC   Date " Value Ref Range Status   12/18/2024 4.17 3.77 - 5.28 10*6/mm3 Final   09/25/2024 4.30 3.77 - 5.28 10*6/mm3 Final   04/26/2023 4.38 4.0 - 5.2 10*6/uL Final     Hemoglobin   Date Value Ref Range Status   12/18/2024 11.9 (L) 12.0 - 15.9 g/dL Final   04/26/2023 11.7 (L) 12.0 - 16.0 g/dL Final     Hematocrit   Date Value Ref Range Status   12/18/2024 36.8 34.0 - 46.6 % Final   04/26/2023 36.9 36.0 - 46.0 % Final     Platelets   Date Value Ref Range Status   12/18/2024 309 140 - 450 10*3/mm3 Final   04/26/2023 267 140 - 440 10*3/uL Final        Lab Results   Component Value Date    GLUCOSE 103 (H) 10/09/2024    BUN 25 (H) 10/09/2024    CREATININE 1.47 (H) 10/09/2024    EGFRIFNONA 60 12/22/2021    EGFRIFAFRI >60 08/02/2022    BCR 17.0 10/09/2024    K 4.4 10/09/2024    CO2 24.9 10/09/2024    CALCIUM 9.6 10/09/2024    PROTENTOTREF 6.9 09/25/2024    ALBUMIN 4.4 09/25/2024    LABIL2 1.8 09/25/2024    AST 15 09/25/2024    ALT 18 09/25/2024         Assessment & Plan     *Iron deficiency anemia  The patient has had microcytic/hypochromic indices for 4 to 5 years, recently developed anemia with hemoglobin around 11  The patient has attempted oral iron but cannot tolerate secondary to constipation/diarrhea and has been unable to improve iron stores despite oral iron  She has occasional bright red blood per rectum, most recent colonoscopy she thinks was about 10 years ago.  She denies vaginal bleeding and denies blood donations.  She has never had gastric surgeries.  She is on chronic PPI therapy.  3/15/2022 ferritin 12, iron saturation 6%, TIBC 388 received  2 doses of Injectafer  EGD and colonoscopy June 2022 no obvious source of blood loss; polyps resected from the rectum benign  8/17/2022- hemoglobin 14.4, iron sat 16%, ferritin 142  2/8/2023-hemoglobin 14.4, iron sat 16%, ferritin 76  8/9/2023-hemoglobin 12.2, iron sat 13%, ferritin 73  12/14/2023-hemoglobin 11.1, iron sat 9%, ferritin 36  12/18/2024-hemoglobin 11.9 iron sat  11%, ferritin 272    *Multiple comorbidities including peripheral vascular disease, coronary artery disease, diabetes mellitus, CVA, obesity, hyperlipidemia, hypertension, tobacco use etc.    Hematology plan/recommendations:  The patient has worsening iron deficiency anemia and is intolerant of oral iron because of GI upset.  I recommended IV iron replacement with 2 doses of Injectafer.  We will repeat her CBC ferritin iron profile 6 months.  GI referral for recurrent iron deficiency anemia

## 2024-12-17 ENCOUNTER — HOSPITAL ENCOUNTER (OUTPATIENT)
Dept: GENERAL RADIOLOGY | Facility: HOSPITAL | Age: 69
Discharge: HOME | End: 2024-12-17
Payer: MEDICARE

## 2024-12-17 DIAGNOSIS — M79.89 RIGHT LEG SWELLING: ICD-10-CM

## 2024-12-17 DIAGNOSIS — I87.321 CHRONIC VENOUS HYPERTENSION WITH INFLAMMATION INVOLVING RIGHT SIDE: ICD-10-CM

## 2024-12-17 PROCEDURE — 73590 X-RAY EXAM OF LOWER LEG: CPT

## 2024-12-17 PROCEDURE — 73630 X-RAY EXAM OF FOOT: CPT

## 2024-12-18 ENCOUNTER — LAB (OUTPATIENT)
Dept: LAB | Facility: HOSPITAL | Age: 69
End: 2024-12-18
Payer: MEDICARE

## 2024-12-18 ENCOUNTER — TELEPHONE (OUTPATIENT)
Dept: ONCOLOGY | Facility: CLINIC | Age: 69
End: 2024-12-18
Payer: MEDICARE

## 2024-12-18 ENCOUNTER — OFFICE VISIT (OUTPATIENT)
Dept: ONCOLOGY | Facility: CLINIC | Age: 69
End: 2024-12-18
Payer: MEDICARE

## 2024-12-18 VITALS
HEART RATE: 73 BPM | HEIGHT: 60 IN | OXYGEN SATURATION: 98 % | DIASTOLIC BLOOD PRESSURE: 81 MMHG | TEMPERATURE: 97.7 F | WEIGHT: 194.4 LBS | BODY MASS INDEX: 38.17 KG/M2 | SYSTOLIC BLOOD PRESSURE: 167 MMHG | RESPIRATION RATE: 16 BRPM

## 2024-12-18 DIAGNOSIS — D50.9 IRON DEFICIENCY ANEMIA, UNSPECIFIED IRON DEFICIENCY ANEMIA TYPE: Primary | ICD-10-CM

## 2024-12-18 DIAGNOSIS — D50.0 IRON DEFICIENCY ANEMIA DUE TO CHRONIC BLOOD LOSS: ICD-10-CM

## 2024-12-18 DIAGNOSIS — T45.4X5D ADVERSE EFFECT OF IRON, SUBSEQUENT ENCOUNTER: ICD-10-CM

## 2024-12-18 LAB
BASOPHILS # BLD AUTO: 0.05 10*3/MM3 (ref 0–0.2)
BASOPHILS NFR BLD AUTO: 0.6 % (ref 0–1.5)
DEPRECATED RDW RBC AUTO: 45.3 FL (ref 37–54)
EOSINOPHIL # BLD AUTO: 0.56 10*3/MM3 (ref 0–0.4)
EOSINOPHIL NFR BLD AUTO: 6.9 % (ref 0.3–6.2)
ERYTHROCYTE [DISTWIDTH] IN BLOOD BY AUTOMATED COUNT: 14 % (ref 12.3–15.4)
FERRITIN SERPL-MCNC: 172 NG/ML (ref 13–150)
HCT VFR BLD AUTO: 36.8 % (ref 34–46.6)
HGB BLD-MCNC: 11.9 G/DL (ref 12–15.9)
IMM GRANULOCYTES # BLD AUTO: 0.03 10*3/MM3 (ref 0–0.05)
IMM GRANULOCYTES NFR BLD AUTO: 0.4 % (ref 0–0.5)
IRON 24H UR-MRATE: 35 MCG/DL (ref 37–145)
IRON SATN MFR SERPL: 11 % (ref 20–50)
LYMPHOCYTES # BLD AUTO: 2.29 10*3/MM3 (ref 0.7–3.1)
LYMPHOCYTES NFR BLD AUTO: 28.1 % (ref 19.6–45.3)
MCH RBC QN AUTO: 28.5 PG (ref 26.6–33)
MCHC RBC AUTO-ENTMCNC: 32.3 G/DL (ref 31.5–35.7)
MCV RBC AUTO: 88.2 FL (ref 79–97)
MONOCYTES # BLD AUTO: 0.44 10*3/MM3 (ref 0.1–0.9)
MONOCYTES NFR BLD AUTO: 5.4 % (ref 5–12)
NEUTROPHILS NFR BLD AUTO: 4.78 10*3/MM3 (ref 1.7–7)
NEUTROPHILS NFR BLD AUTO: 58.6 % (ref 42.7–76)
PLATELET # BLD AUTO: 309 10*3/MM3 (ref 140–450)
PMV BLD AUTO: 9.6 FL (ref 6–12)
RBC # BLD AUTO: 4.17 10*6/MM3 (ref 3.77–5.28)
TIBC SERPL-MCNC: 332 MCG/DL (ref 298–536)
TRANSFERRIN SERPL-MCNC: 223 MG/DL (ref 200–360)
WBC NRBC COR # BLD AUTO: 8.15 10*3/MM3 (ref 3.4–10.8)

## 2024-12-18 PROCEDURE — 36415 COLL VENOUS BLD VENIPUNCTURE: CPT

## 2024-12-18 PROCEDURE — 84466 ASSAY OF TRANSFERRIN: CPT | Performed by: NURSE PRACTITIONER

## 2024-12-18 PROCEDURE — 83540 ASSAY OF IRON: CPT | Performed by: NURSE PRACTITIONER

## 2024-12-18 PROCEDURE — 82728 ASSAY OF FERRITIN: CPT | Performed by: NURSE PRACTITIONER

## 2024-12-18 PROCEDURE — 85025 COMPLETE CBC W/AUTO DIFF WBC: CPT | Performed by: NURSE PRACTITIONER

## 2024-12-18 RX ORDER — PROCHLORPERAZINE MALEATE 10 MG
10 TABLET ORAL ONCE
OUTPATIENT
Start: 2025-01-09 | End: 2025-01-07

## 2024-12-18 RX ORDER — SODIUM CHLORIDE 9 MG/ML
20 INJECTION, SOLUTION INTRAVENOUS ONCE
OUTPATIENT
Start: 2025-01-09

## 2024-12-18 RX ORDER — HYDROCORTISONE SODIUM SUCCINATE 100 MG/2ML
100 INJECTION INTRAMUSCULAR; INTRAVENOUS AS NEEDED
OUTPATIENT
Start: 2025-01-09

## 2024-12-18 RX ORDER — FAMOTIDINE 10 MG/ML
20 INJECTION, SOLUTION INTRAVENOUS AS NEEDED
OUTPATIENT
Start: 2025-01-09

## 2024-12-18 RX ORDER — DIPHENHYDRAMINE HYDROCHLORIDE 50 MG/ML
50 INJECTION INTRAMUSCULAR; INTRAVENOUS AS NEEDED
OUTPATIENT
Start: 2025-01-09

## 2024-12-18 NOTE — TELEPHONE ENCOUNTER
----- Message from Brian Kenny sent at 12/18/2024  2:53 PM EST -----  PIP iron levels low again-injectafer x2-okay for after first of yr; GI referral recurrent AYSHA; 6 month np cbc ferritin iron prof

## 2024-12-19 ENCOUNTER — OFFICE VISIT (OUTPATIENT)
Age: 69
End: 2024-12-19
Payer: MEDICARE

## 2024-12-19 ENCOUNTER — HOSPITAL ENCOUNTER (OUTPATIENT)
Facility: HOSPITAL | Age: 69
Discharge: HOME | End: 2024-12-19
Admitting: SURGERY
Payer: MEDICARE

## 2024-12-19 VITALS — BODY MASS INDEX: 38.21 KG/M2 | HEIGHT: 60 IN | WEIGHT: 194.6 LBS

## 2024-12-19 DIAGNOSIS — I65.21 STENOSIS OF RIGHT CAROTID ARTERY: Primary | ICD-10-CM

## 2024-12-19 DIAGNOSIS — I77.9 PERIPHERAL ARTERIAL OCCLUSIVE DISEASE: ICD-10-CM

## 2024-12-19 DIAGNOSIS — M79.89 RIGHT LEG SWELLING: ICD-10-CM

## 2024-12-19 DIAGNOSIS — I87.321 CHRONIC VENOUS HYPERTENSION WITH INFLAMMATION INVOLVING RIGHT SIDE: ICD-10-CM

## 2024-12-19 DIAGNOSIS — I65.23 ATHEROSCLEROSIS OF BOTH CAROTID ARTERIES: ICD-10-CM

## 2024-12-19 DIAGNOSIS — I70.0 ATHEROSCLEROSIS OF AORTA: ICD-10-CM

## 2024-12-19 DIAGNOSIS — G62.9 NEUROPATHY: ICD-10-CM

## 2024-12-19 PROCEDURE — 93971 EXTREMITY STUDY: CPT

## 2024-12-19 RX ORDER — LOSARTAN POTASSIUM 50 MG/1
50 TABLET ORAL DAILY
Qty: 90 TABLET | Refills: 0 | Status: SHIPPED | OUTPATIENT
Start: 2024-12-19

## 2024-12-19 NOTE — PROGRESS NOTES
Patient Name: Rosalee Hargrove    MRN: [id number] Encounter Date: 12/19/2024      Consulting Service: Vascular Surgery    Referring Provider: No ref. provider found       CHIEF COMPLAINT:  Chief Complaint   Patient presents with    Peripheral arterial occlusive disease       Subjective    HPI: Rosalee Hargrove is a 69 y.o. female is being seen for evaluation/management of peripheral vascular disease follow-up.  The patient has known peripheral vascular disease with symptoms of claudication and leg sugey n bilaterally .  Current symptoms of claudication at 1 block distance are noted with lifestyle limitation.  Prior interventions include peripheral endovascular intervention.  Currently the patient reports symptoms are worse.  Testing today includes ABIs.  Their current medical regimen includes antiplatelet and cholesterol medications.  The patient has significant peripheral neuropathy.  Discussion as to the protection of feet and toes with prevention of injury including twice a day monitoring of feet and all digits, avoidance of walking barefoot, and the use of lightly colored socks to note any drainage were reviewed with the patient.  Plan moving forward will include continue to follow-up with noninvasive testing.    PAST MEDICAL HISTORY:   Past Medical History:   Diagnosis Date    Afib 09/09/2021    Anemia     Anxiety     Arthritis     Atheroembolism of other site 11/12/2015    Atherosclerosis of aorta 11/12/2015    Atherosclerosis of native arteries of extremities with intermittent claudication, bilateral legs 08/29/2019    PVD    Bilateral carotid artery stenosis 08/29/2019    Bilateral leg edema     Carotid artery stenosis 08/11/2016    Right    Cataract Jan 2022    Chest pain on breathing 08/17/2017    Cholelithiasis     Colon polyp Feb 2023    Depression     Dizziness     GERD (gastroesophageal reflux disease)     Glaucoma     Headache     Kidney stone     Localized edema 11/12/2015    Localized edema 08/11/2016     Orthostatic hypotension 2016    Pancreatitis     Peripheral vascular disease with claudication 2015    bilateral legs    Rectal bleeding 2022    Added automatically from request for surgery [id number]    Shortness of breath     Sleep apnea     Stroke 2021    Tobacco abuse     Urinary tract infection     Varicose veins of bilateral lower extremities with pain 2021    Visual impairment     Macular degeneration    Vitamin D deficiency       PAST SURGICAL HISTORY:   Past Surgical History:   Procedure Laterality Date    ANAL FISSURECTOMY      BRAIN SURGERY      CARDIAC CATHETERIZATION N/A 2016    Procedure: Coronary angiography;  Surgeon: Fredrick Kapoor MD;  Location:  [facility];  Service:     CARDIAC CATHETERIZATION N/A 2016    Procedure: Left heart cath;  Surgeon: Fredrick Kapoor MD;  Location:  [facility];  Service:     CARDIAC CATHETERIZATION N/A 2016    Procedure: Left ventriculography;  Surgeon: Fredrick Kapoor MD;  Location:  [facility];  Service:     CARDIAC CATHETERIZATION N/A 2016    Procedure: Right heart cath;  Surgeon: Fredrick Kapoor MD;  Location:  [facility];  Service:      SECTION      CHOLECYSTECTOMY      COLONOSCOPY N/A 2022    Procedure: COLONOSCOPY with Polypectomy;  Surgeon: Sarwat Church MD;  Location: [facility];  Service: Gastroenterology;  Laterality: N/A;  Rectal polyps x3 and Hemorrhoids    CORONARY ANGIOPLASTY WITH STENT PLACEMENT Right 2021    ENDOSCOPY N/A 2022    Procedure: ESOPHAGOGASTRODUODENOSCOPY;  Surgeon: Sarwat Church MD;  Location: [facility];  Service: Gastroenterology;  Laterality: N/A;  Small Hiatal Hernia    ERCP      FRACTURE SURGERY      arm    HYSTERECTOMY      ILIAC ARTERY STENT      SUBTOTAL HYSTERECTOMY      TONSILLECTOMY        FAMILY HISTORY:   Family History   Problem Relation Age of Onset    Heart disease Mother      Hypertension Mother     Hyperlipidemia Mother     Breast cancer Mother     Heart disease Father     Heart attack Sister     Heart disease Sister     Breast cancer Sister     Heart disease Brother     Heart attack Brother     Hyperlipidemia Brother     Diabetes Brother     Heart attack Maternal Grandmother     Heart disease Maternal Grandmother     Heart failure Maternal Grandmother     Heart failure Maternal Grandfather     Heart disease Maternal Grandfather     Heart attack Maternal Grandfather     Heart attack Paternal Grandmother     Heart disease Paternal Grandmother     Heart failure Paternal Grandmother     Hypertension Paternal Grandmother       SOCIAL HISTORY:   Social History     Tobacco Use    Smoking status: Every Day     Current packs/day: 0.00     Average packs/day: 0.5 packs/day for 50.0 years (25.0 ttl pk-yrs)     Types: Cigarettes     Start date: 1973     Last attempt to quit: 2023     Years since quittin.6    Smokeless tobacco: Never    Tobacco comments:     Patient smokes 6 cigarettes per day   Vaping Use    Vaping status: Never Used   Substance Use Topics    Alcohol use: Yes     Alcohol/week: 1.0 standard drink of alcohol     Types: 1 Glasses of wine per week     Comment: Very occasional; Patient is non drinker.    Drug use: No     Comment: Drug Abuse: none      MEDICATIONS:   Current Outpatient Medications on File Prior to Visit   Medication Sig Dispense Refill    ALPRAZolam (XANAX) 0.25 MG tablet Take 1 tablet by mouth Daily.      amLODIPine (NORVASC) 10 MG tablet Take 1 tablet by mouth Daily. 90 tablet 1    aspirin 81 MG chewable tablet Chew 1 tablet Daily.      atorvastatin (LIPITOR) 40 MG tablet Take 1 tablet by mouth once daily 90 tablet 0    cilostazol (PLETAL) 100 MG tablet Take 1 tablet by mouth 2 (Two) Times a Day. 60 tablet 60    citalopram (CeleXA) 20 MG tablet Take 1 tablet by mouth once daily 90 tablet 0    clopidogrel (Plavix) 75 MG tablet 1 p.o. twice daily 180 tablet  "1    desonide (DESOWEN) 0.05 % ointment Apply 1 Application topically to the appropriate area as directed.      ezetimibe (ZETIA) 10 MG tablet Take 1 tablet by mouth once daily 90 tablet 0    fluocinolone acetonide (DERMOTIC) 0.01 % oil otic oil INSTILL 2 DROPS INTO EACH EAR 2 TIMES A DAY FOR EXTERNAL EAR ECZEMA 20 mL 0    levothyroxine (SYNTHROID, LEVOTHROID) 75 MCG tablet Take 1 tablet by mouth once daily 90 tablet 0    pantoprazole (PROTONIX) 40 MG EC tablet Take 1 tablet by mouth once daily 90 tablet 0    Semaglutide,0.25 or 0.5MG/DOS, (Ozempic, 0.25 or 0.5 MG/DOSE,) 2 MG/3ML solution pen-injector Inject 0.5 mg under the skin into the appropriate area as directed 1 (One) Time Per Week. 3 mL 5    trimethoprim-polymyxin b (POLYTRIM) 14233-5.1 UNIT/ML-% ophthalmic solution Apply 1 drop to eye(s) as directed by provider. Four days before and four days after eye injection.       Current Facility-Administered Medications on File Prior to Visit   Medication Dose Route Frequency Provider Last Rate Last Admin    sodium chloride 0.9 % infusion  500 mL/hr Intravenous Once Sujit Coats MD        sodium chloride 0.9 % infusion  100 mL/hr Intravenous Once Sujit Coats MD           ALLERGIES: Ciprofloxacin, Lisinopril, and Seasonal ic [cholestatin]       Objective   Vitals:    12/19/24 1406   Weight: 88.3 kg (194 lb 9.6 oz)   Height: 152.4 cm (60\")     Body mass index is 38.01 kg/m².          PHYSICAL EXAM:   Physical Exam  Constitutional:       Appearance: Normal appearance. She is normal weight.   HENT:      Head: Normocephalic and atraumatic.      Nose: Nose normal.   Eyes:      Extraocular Movements: Extraocular movements intact.      Pupils: Pupils are equal, round, and reactive to light.   Cardiovascular:      Rate and Rhythm: Normal rate.      Pulses:           Carotid pulses are 2+ on the right side and 2+ on the left side.       Radial pulses are 2+ on the right side and 2+ on the left side.        Femoral " pulses are 2+ on the right side and 2+ on the left side.       Popliteal pulses are 0 on the right side and 0 on the left side.        Dorsalis pedis pulses are detected w/ Doppler on the right side and detected w/ Doppler on the left side.        Posterior tibial pulses are detected w/ Doppler on the right side and detected w/ Doppler on the left side.      Heart sounds: Normal heart sounds.   Pulmonary:      Effort: Pulmonary effort is normal.      Breath sounds: Normal breath sounds.   Abdominal:      General: Abdomen is flat. Bowel sounds are normal.      Palpations: Abdomen is soft.   Musculoskeletal:         General: Normal range of motion.      Cervical back: Normal range of motion and neck supple.      Right lower leg: No edema.      Left lower leg: No edema.   Skin:     General: Skin is warm and dry.   Neurological:      General: No focal deficit present.      Mental Status: She is alert and oriented to person, place, and time. Mental status is at baseline.   Psychiatric:         Mood and Affect: Mood normal.         Thought Content: Thought content normal.          Result Review   LABS:    CBC          6/20/2024    14:20 9/25/2024    09:18 12/18/2024    13:58   CBC   WBC 8.66  8.84  8.15    RBC 4.19  4.30  4.17    Hemoglobin 12.2  12.6  11.9    Hematocrit 38.8  37.8  36.8    MCV 92.6  87.9  88.2    MCH 29.1  29.3  28.5    MCHC 31.4  33.3  32.3    RDW 13.8  13.7  14.0    Platelets 314  290  309      BMP          9/25/2024    09:18 9/30/2024    10:03 10/9/2024    08:32   BMP   BUN 29  38  25    Creatinine 1.66  1.78  1.47    Sodium 139  139  140    Potassium 5.9  5.6  4.4    Chloride 103  102  103    CO2 23.6  23.8  24.9    Calcium 9.0  9.3  9.6      Lipid Panel          2/8/2024    08:31 9/25/2024    09:18   Lipid Panel   Total Cholesterol 133  115    Triglycerides 253  146    HDL Cholesterol 34  32    VLDL Cholesterol 40  25    LDL Cholesterol  59  58          A1C Last 3 Results          2/8/2024    08:31  9/25/2024    09:18   HGBA1C Last 3 Results   Hemoglobin A1C 6.90  5.60         Results Review:       I reviewed the patient's new clinical results.    The following radiologic or non-invasive studies have been reviewed by me: Right lower extremity venous study negative  Doppler Ankle Brachial Index Single Level CAR 12/11/2024    Interpretation Summary    Right Conclusion: The right SOFÍA is normal.    Left Conclusion: The left SOFÍA is moderately reduced.     XR Foot 3+ View Right    Result Date: 12/17/2024  Impression: No acute bony injury. Degenerative changes and calcaneal enthesophytes. Electronically Signed: David Silver MD  12/17/2024 11:56 AM EST  Workstation ID: [id number]    XR Tibia Fibula 2 View Right    Result Date: 12/17/2024  Impression: No acute bony injury. Electronically Signed: David Silver MD  12/17/2024 11:51 AM EST  Workstation ID: [id number]                 ASSESSMENT/PLAN:   Diagnoses and all orders for this visit:    1. Stenosis of right carotid artery (Primary)    2. Peripheral arterial occlusive disease    3. Atherosclerosis of both carotid arteries    4. Atherosclerosis of aorta    5. Neuropathy       69 y.o. female with with diffuse leg pain that is really hard to define.  Once again we have not been able to convince ourselves that this is arterial in nature although she does have some recurrent arterial occlusive disease I do not think is pushing us to do anything quite yet I think the bigger question is what is the source of her pain is now bilateral with the left posterior calf and ankle being involved to we had a discussion about the possibility of it being related to her cholesterol medicine like Lipitor she realizes that she has not been taking the co-Q10 that she had to take before to get away from pain from that ER from Crestor.  At this point we are going to trial her with a weeks worth of co-Q10 if her legs get immediately better will know.  If they do not she may stop the Lipitor for a  month as a trial.  At this point I do not believe to be arterial or venous with a venous scan we recently did also negative and from a skeletal standpoint she really does not have anything on the x-ray of the right leg.  Unfortunately we are still at a loss for what exactly this might be and organ to have to start trying to manipulate medications to try to come up with something the points in a direction that we can workup further.    I discussed the plan with the patient who is agreeable to the plan of care at this point. Thank you for this consult.   Follow Up  Return in about 6 months (around 6/19/2025).    Sujit Coats MD   12/20/24

## 2024-12-22 LAB
BH CV LOWER VASCULAR LEFT COMMON FEMORAL AUGMENT: NORMAL
BH CV LOWER VASCULAR LEFT COMMON FEMORAL COMPETENT: NORMAL
BH CV LOWER VASCULAR LEFT COMMON FEMORAL COMPRESS: NORMAL
BH CV LOWER VASCULAR LEFT COMMON FEMORAL PHASIC: NORMAL
BH CV LOWER VASCULAR LEFT COMMON FEMORAL SPONT: NORMAL
BH CV LOWER VASCULAR RIGHT COMMON FEMORAL AUGMENT: NORMAL
BH CV LOWER VASCULAR RIGHT COMMON FEMORAL COMPETENT: NORMAL
BH CV LOWER VASCULAR RIGHT COMMON FEMORAL COMPRESS: NORMAL
BH CV LOWER VASCULAR RIGHT COMMON FEMORAL PHASIC: NORMAL
BH CV LOWER VASCULAR RIGHT COMMON FEMORAL SPONT: NORMAL
BH CV LOWER VASCULAR RIGHT DISTAL FEMORAL COMPRESS: NORMAL
BH CV LOWER VASCULAR RIGHT EXTERNAL ILIAC AUGMENT: NORMAL
BH CV LOWER VASCULAR RIGHT EXTERNAL ILIAC COMPETENT: NORMAL
BH CV LOWER VASCULAR RIGHT EXTERNAL ILIAC COMPRESS: NORMAL
BH CV LOWER VASCULAR RIGHT EXTERNAL ILIAC PHASIC: NORMAL
BH CV LOWER VASCULAR RIGHT EXTERNAL ILIAC SPONT: NORMAL
BH CV LOWER VASCULAR RIGHT GASTRONEMIUS COMPRESS: NORMAL
BH CV LOWER VASCULAR RIGHT GREATER SAPH AK COMPRESS: NORMAL
BH CV LOWER VASCULAR RIGHT GREATER SAPH BK COMPRESS: NORMAL
BH CV LOWER VASCULAR RIGHT LESSER SAPH COMPRESS: NORMAL
BH CV LOWER VASCULAR RIGHT MID FEMORAL AUGMENT: NORMAL
BH CV LOWER VASCULAR RIGHT MID FEMORAL COMPETENT: NORMAL
BH CV LOWER VASCULAR RIGHT MID FEMORAL COMPRESS: NORMAL
BH CV LOWER VASCULAR RIGHT MID FEMORAL PHASIC: NORMAL
BH CV LOWER VASCULAR RIGHT MID FEMORAL SPONT: NORMAL
BH CV LOWER VASCULAR RIGHT PERONEAL COMPRESS: NORMAL
BH CV LOWER VASCULAR RIGHT POPLITEAL AUGMENT: NORMAL
BH CV LOWER VASCULAR RIGHT POPLITEAL COMPETENT: NORMAL
BH CV LOWER VASCULAR RIGHT POPLITEAL COMPRESS: NORMAL
BH CV LOWER VASCULAR RIGHT POPLITEAL PHASIC: NORMAL
BH CV LOWER VASCULAR RIGHT POPLITEAL SPONT: NORMAL
BH CV LOWER VASCULAR RIGHT POSTERIOR TIBIAL COMPRESS: NORMAL
BH CV LOWER VASCULAR RIGHT PROFUNDA FEMORAL COMPRESS: NORMAL
BH CV LOWER VASCULAR RIGHT PROXIMAL FEMORAL COMPRESS: NORMAL
BH CV LOWER VASCULAR RIGHT SAPHENOFEMORAL JUNCTION COMPRESS: NORMAL
BH CV LOWER VASCULAR RIGHT SOLEAL COMPRESS: NORMAL

## 2025-01-06 DIAGNOSIS — K21.9 GASTROESOPHAGEAL REFLUX DISEASE WITHOUT ESOPHAGITIS: ICD-10-CM

## 2025-01-07 RX ORDER — PANTOPRAZOLE SODIUM 40 MG/1
TABLET, DELAYED RELEASE ORAL
Qty: 90 TABLET | Refills: 0 | Status: SHIPPED | OUTPATIENT
Start: 2025-01-07

## 2025-01-09 ENCOUNTER — INFUSION (OUTPATIENT)
Dept: ONCOLOGY | Facility: HOSPITAL | Age: 70
End: 2025-01-09
Payer: MEDICARE

## 2025-01-09 ENCOUNTER — OFFICE VISIT (OUTPATIENT)
Dept: GASTROENTEROLOGY | Facility: CLINIC | Age: 70
End: 2025-01-09
Payer: MEDICARE

## 2025-01-09 ENCOUNTER — TELEPHONE (OUTPATIENT)
Dept: GASTROENTEROLOGY | Facility: CLINIC | Age: 70
End: 2025-01-09
Payer: MEDICARE

## 2025-01-09 VITALS
SYSTOLIC BLOOD PRESSURE: 130 MMHG | DIASTOLIC BLOOD PRESSURE: 82 MMHG | BODY MASS INDEX: 38.01 KG/M2 | WEIGHT: 193.6 LBS | HEIGHT: 60 IN

## 2025-01-09 VITALS
SYSTOLIC BLOOD PRESSURE: 136 MMHG | OXYGEN SATURATION: 98 % | HEART RATE: 80 BPM | TEMPERATURE: 97.7 F | DIASTOLIC BLOOD PRESSURE: 75 MMHG

## 2025-01-09 DIAGNOSIS — K59.04 CHRONIC IDIOPATHIC CONSTIPATION: ICD-10-CM

## 2025-01-09 DIAGNOSIS — T45.4X5D ADVERSE EFFECT OF IRON, SUBSEQUENT ENCOUNTER: ICD-10-CM

## 2025-01-09 DIAGNOSIS — D50.9 IRON DEFICIENCY ANEMIA, UNSPECIFIED IRON DEFICIENCY ANEMIA TYPE: Primary | ICD-10-CM

## 2025-01-09 DIAGNOSIS — D50.0 IRON DEFICIENCY ANEMIA DUE TO CHRONIC BLOOD LOSS: Primary | ICD-10-CM

## 2025-01-09 DIAGNOSIS — K21.00 GASTROESOPHAGEAL REFLUX DISEASE WITH ESOPHAGITIS WITHOUT HEMORRHAGE: ICD-10-CM

## 2025-01-09 DIAGNOSIS — D50.0 IRON DEFICIENCY ANEMIA DUE TO CHRONIC BLOOD LOSS: ICD-10-CM

## 2025-01-09 LAB — PHOSPHATE SERPL-MCNC: 3.1 MG/DL (ref 2.5–4.5)

## 2025-01-09 PROCEDURE — 84100 ASSAY OF PHOSPHORUS: CPT | Performed by: INTERNAL MEDICINE

## 2025-01-09 PROCEDURE — 25010000002 FERRIC CARBOXYMALTOSE 750 MG/15ML SOLUTION: Performed by: INTERNAL MEDICINE

## 2025-01-09 PROCEDURE — 96374 THER/PROPH/DIAG INJ IV PUSH: CPT

## 2025-01-09 PROCEDURE — 63710000001 PROCHLORPERAZINE MALEATE PER 5 MG: Performed by: INTERNAL MEDICINE

## 2025-01-09 RX ORDER — PROCHLORPERAZINE MALEATE 5 MG/1
10 TABLET ORAL ONCE
Status: COMPLETED | OUTPATIENT
Start: 2025-01-09 | End: 2025-01-09

## 2025-01-09 RX ORDER — LUBIPROSTONE 24 UG/1
24 CAPSULE ORAL 2 TIMES DAILY WITH MEALS
Qty: 60 CAPSULE | Refills: 3 | Status: SHIPPED | OUTPATIENT
Start: 2025-01-09 | End: 2025-01-10 | Stop reason: SDUPTHER

## 2025-01-09 RX ORDER — CLOBETASOL PROPIONATE 0.5 MG/ML
SOLUTION TOPICAL
COMMUNITY
Start: 2024-12-13

## 2025-01-09 RX ORDER — SPIRONOLACTONE 25 MG/1
1 TABLET ORAL DAILY
COMMUNITY
Start: 2024-12-19

## 2025-01-09 RX ADMIN — PROCHLORPERAZINE MALEATE 10 MG: 5 TABLET ORAL at 11:07

## 2025-01-09 RX ADMIN — FERRIC CARBOXYMALTOSE INJECTION 750 MG: 50 INJECTION, SOLUTION INTRAVENOUS at 11:47

## 2025-01-09 NOTE — PROGRESS NOTES
"    PATIENT INFORMATION  Rosalee Hargrove       - 1955    CHIEF COMPLAINT  Chief Complaint   Patient presents with    Iron deficient anemia       HISTORY OF PRESENT ILLNESS    Here today for evaluation of anemia    Last seen by Dr. Church in  for anemia and an episode of rectal bleeding, scopes completed, no follow-up after. Anemia intermittent since , low iron, mild. Tries to take iron supplement, but has not been consistent with it because has underlying constipation issues. Has seen blood in stool in past, does have some dark stools which are sticky and difficult to clean. Managed on ASA, plavix, pletal. Has been receiving iron infusions for a yr, has received 3 rounds of 2 doses. Managed per Dr. Kenny.    40 mg pantoprazole daily, has fullness occasionally depending on what she eats less than weekly, no dysphagia, nausea, vomiting. PRN AA when its bad. No NSAIDs.     Has been on ozempic which has controlled, but price this month was >600$ and despite reviewing with medicare, no one mentioned the new deductible cap and monthly payments. Feels like she will qualify for PAPA.    Bowels moving every 4-5 days and not usually hard, no pain usually unless getting \"impacted\" and feels better once she gets a little bit out and then later major blow out. Urgency can be an issue. Bought some miralax and never started. Will take dulcolax for 2-3 days and if bowels do not move she will quit taking them and eat fruit and beans.    2022 EGD with small HH, normal duodenal bx, no celiac  2022 Colonoscopy 0/3 adenomas, hemorrhoids, No known fam hx CRC or polyps.        REVIEWED PERTINENT RESULTS/ LABS  Lab Results   Component Value Date    CASEREPORT  2022     Surgical Pathology Report                         Case: [id number]                                  Authorizing Provider:  Sarwat Church MD    Collected:           2022 10:35 AM          Ordering Location:     [facility]" SURGERY     Received:            06/30/2022 11:06 AM                                 [facility] MAIN OR                                                     Pathologist:           Mekhi Duenas MD                                                         Specimens:   1) - Small Intestine, small bowel r/o celiac                                                        2) - Large Intestine, Rectum, rectal polyp x3                                              FINALDX  06/30/2022     1. Small Bowel, Biopsy: Benign small bowel mucosa with    A. Normal intact villous surface.   B. No significant inflammation, no granulomas.   C. No viral inclusions or other organisms on routinely stained sections.     Comment: There are rare dilated lacteals.      2. Rectum, Biopsy:    A. Hyperplastic polyps.    don/jse        Lab Results   Component Value Date    HGB 11.9 (L) 12/18/2024    MCV 88.2 12/18/2024     12/18/2024    ALT 18 09/25/2024    AST 15 09/25/2024    HGBA1C 5.60 09/25/2024    INR 1.1 04/25/2023    TRIG 146 09/25/2024    FERRITIN 172.00 (H) 12/18/2024    IRON 35 (L) 12/18/2024    TIBC 332 12/18/2024      Duplex Venous Lower Extremity - Right CAR    Result Date: 12/22/2024  Narrative:   Normal right lower extremity venous duplex scan.     XR Foot 3+ View Right    Result Date: 12/17/2024  Narrative: XR FOOT 3+ VW RIGHT Date of Exam: 12/17/2024 10:10 AM EST Indication: Right fourth and fifth metatarsal pain Comparison: None available. Findings: No acute fracture or dislocation. No gross osseous lesions or periosteal reaction. There are mild degenerative changes in the interphalangeal joints and midfoot. Calcaneal enthesophytes are noted.     Impression: Impression: No acute bony injury. Degenerative changes and calcaneal enthesophytes. Electronically Signed: David Silver MD  12/17/2024 11:56 AM EST  Workstation ID: [id number]    XR Tibia Fibula 2 View Right    Result Date: 12/17/2024  Narrative: XR TIBIA FIBULA 2  VW RIGHT Date of Exam: 12/17/2024 10:10 AM EST Indication: Right shin pain and swelling Comparison: None available. Findings: No acute fracture or dislocation identified. No gross osseous lesion or periosteal reaction. Small superior patellar enthesophyte noted. There is mild degenerative change in the medial compartment of the knee.     Impression: Impression: No acute bony injury. Electronically Signed: David Silver MD  12/17/2024 11:51 AM EST  Workstation ID: [id number]    Doppler Ankle Brachial Index Single Level CAR    Result Date: 12/11/2024  Narrative:   Right Conclusion: The right SOFÍA is normal.   Left Conclusion: The left SOFÍA is moderately reduced.      REVIEW OF SYSTEMS  Review of Systems      ACTIVE PROBLEMS  Patient Active Problem List    Diagnosis     Neuropathy [G62.9]     Right leg swelling [M79.89]     Chronic venous hypertension with inflammation [I87.329]     Atherosclerosis of native artery of extremity with intermittent claudication [I70.219]     Sciatic nerve disease [G57.00]     Neurogenic claudication [R29.818]     Neurogenic claudication due to lumbar spinal stenosis [M48.062]     Follow-up exam [Z09]     Stage 3a chronic kidney disease [N18.31]     Obstructive sleep apnea syndrome [G47.33]     Iron adverse reaction [T45.4X5A]     COPD with exacerbation [J44.1]     Acute diastolic CHF (congestive heart failure) [I50.31]     DDD (degenerative disc disease), cervical [M50.30]     Ex-cigarette smoker [Z87.891]     Clinical diagnosis of severe acute respiratory syndrome coronavirus 2 (SARS-CoV-2) disease [U07.1]     Stenosis of right carotid artery [I65.21]     Iron deficiency anemia [D50.9]     History of cardioembolic cerebrovascular accident (CVA) [Z86.73]     Type 2 diabetes mellitus with both eyes affected by mild nonproliferative retinopathy without macular edema, without long-term current use of insulin [E11.3293]     Coronary artery disease involving native coronary artery of native heart  without angina pectoris [I25.10]     Morbid exogenous obesity [E66.01]     Atopic rhinitis [J30.9]     Generalized anxiety disorder [F41.1]     Gastroesophageal reflux disease without esophagitis [K21.9]     Panic disorder [F41.0]     Peripheral arterial occlusive disease [I77.9]     Peripheral edema [R60.0]     Calculus of kidney [N20.0]     Mixed hyperlipidemia [E78.2]     Essential hypertension [I10]     Acquired hypothyroidism [E03.9]     Vitamin D deficiency [E55.9]     Atherosclerosis of both carotid arteries [I65.23]     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]     Atherosclerosis of aorta [I70.0]          PAST MEDICAL HISTORY  Past Medical History:   Diagnosis Date    Afib 09/09/2021    Anemia     Anxiety     Arthritis     Atheroembolism of other site 11/12/2015    Atherosclerosis of aorta 11/12/2015    Atherosclerosis of native arteries of extremities with intermittent claudication, bilateral legs 08/29/2019    PVD    Bilateral carotid artery stenosis 08/29/2019    Bilateral leg edema     Carotid artery stenosis 08/11/2016    Right    Cataract Jan 2022    Chest pain on breathing 08/17/2017    Cholelithiasis     Colon polyp Feb 2023    Depression     Dizziness     GERD (gastroesophageal reflux disease)     Glaucoma     Headache     Kidney stone     Localized edema 11/12/2015    Localized edema 08/11/2016    Orthostatic hypotension 08/11/2016    Pancreatitis     Peripheral vascular disease with claudication 11/12/2015    bilateral legs    Rectal bleeding 03/24/2022    Added automatically from request for surgery [id number]    Shortness of breath     Sleep apnea     Stroke 09/09/2021    Tobacco abuse     Urinary tract infection     Varicose veins of bilateral lower extremities with pain 09/09/2021    Visual impairment     Macular degeneration    Vitamin D deficiency          SURGICAL HISTORY  Past Surgical History:   Procedure Laterality Date    ANAL FISSURECTOMY      BRAIN SURGERY      CARDIAC CATHETERIZATION N/A 09/23/2016     Procedure: Coronary angiography;  Surgeon: Fredrick Kapoor MD;  Location:  [facility];  Service:     CARDIAC CATHETERIZATION N/A 2016    Procedure: Left heart cath;  Surgeon: Fredrick Kapoor MD;  Location:  [facility];  Service:     CARDIAC CATHETERIZATION N/A 2016    Procedure: Left ventriculography;  Surgeon: Fredrick Kapoor MD;  Location:  [facility];  Service:     CARDIAC CATHETERIZATION N/A 2016    Procedure: Right heart cath;  Surgeon: Fredrick Kapoor MD;  Location:  [facility];  Service:      SECTION      CHOLECYSTECTOMY      COLONOSCOPY N/A 2022    Procedure: COLONOSCOPY with Polypectomy;  Surgeon: Sarwat Church MD;  Location: [facility];  Service: Gastroenterology;  Laterality: N/A;  Rectal polyps x3 and Hemorrhoids    CORONARY ANGIOPLASTY WITH STENT PLACEMENT Right 2021    ENDOSCOPY N/A 2022    Procedure: ESOPHAGOGASTRODUODENOSCOPY;  Surgeon: Sarwat Church MD;  Location: [facility];  Service: Gastroenterology;  Laterality: N/A;  Small Hiatal Hernia    ERCP      FRACTURE SURGERY      arm    HYSTERECTOMY      ILIAC ARTERY STENT      SUBTOTAL HYSTERECTOMY      TONSILLECTOMY           FAMILY HISTORY  Family History   Problem Relation Age of Onset    Heart disease Mother     Hypertension Mother     Hyperlipidemia Mother     Breast cancer Mother     Heart disease Father     Heart attack Sister     Heart disease Sister     Breast cancer Sister     Heart disease Brother     Heart attack Brother     Hyperlipidemia Brother     Diabetes Brother     Heart attack Maternal Grandmother     Heart disease Maternal Grandmother     Heart failure Maternal Grandmother     Heart failure Maternal Grandfather     Heart disease Maternal Grandfather     Heart attack Maternal Grandfather     Heart attack Paternal Grandmother     Heart disease Paternal Grandmother     Heart failure Paternal Grandmother     Hypertension  Paternal Grandmother          SOCIAL HISTORY  Social History     Occupational History    Occupation: retired   Tobacco Use    Smoking status: Every Day     Current packs/day: 0.00     Average packs/day: 0.5 packs/day for 50.0 years (25.0 ttl pk-yrs)     Types: Cigarettes     Start date: 1973     Last attempt to quit: 2023     Years since quittin.7    Smokeless tobacco: Never    Tobacco comments:     Patient smokes 6 cigarettes per day   Vaping Use    Vaping status: Never Used   Substance and Sexual Activity    Alcohol use: Yes     Alcohol/week: 1.0 standard drink of alcohol     Types: 1 Glasses of wine per week     Comment: Very occasional; Patient is non drinker.    Drug use: No     Comment: Drug Abuse: none    Sexual activity: Not Currently     Partners: Male     Birth control/protection: None     Comment: N/A         CURRENT MEDICATIONS    Current Outpatient Medications:     ALPRAZolam (XANAX) 0.25 MG tablet, Take 1 tablet by mouth Daily., Disp: , Rfl:     amLODIPine (NORVASC) 10 MG tablet, Take 1 tablet by mouth Daily., Disp: 90 tablet, Rfl: 1    aspirin 81 MG chewable tablet, Chew 1 tablet Daily., Disp: , Rfl:     atorvastatin (LIPITOR) 40 MG tablet, Take 1 tablet by mouth once daily, Disp: 90 tablet, Rfl: 0    cilostazol (PLETAL) 100 MG tablet, Take 1 tablet by mouth 2 (Two) Times a Day., Disp: 60 tablet, Rfl: 60    citalopram (CeleXA) 20 MG tablet, Take 1 tablet by mouth once daily, Disp: 90 tablet, Rfl: 0    clobetasol (TEMOVATE) 0.05 % external solution, APPLY A FEW DROPS TO THE SCALP UP TO TWICE A DAY AS NEEDED FOR ITCHING/FLARES, Disp: , Rfl:     clopidogrel (Plavix) 75 MG tablet, 1 p.o. twice daily, Disp: 180 tablet, Rfl: 1    desonide (DESOWEN) 0.05 % ointment, Apply 1 Application topically to the appropriate area as directed., Disp: , Rfl:     ezetimibe (ZETIA) 10 MG tablet, Take 1 tablet by mouth once daily, Disp: 90 tablet, Rfl: 0    fluocinolone acetonide (DERMOTIC) 0.01 % oil otic  "oil, INSTILL 2 DROPS INTO EACH EAR 2 TIMES A DAY FOR EXTERNAL EAR ECZEMA, Disp: 20 mL, Rfl: 0    levothyroxine (SYNTHROID, LEVOTHROID) 75 MCG tablet, Take 1 tablet by mouth once daily, Disp: 90 tablet, Rfl: 0    losartan (COZAAR) 50 MG tablet, Take 1 tablet by mouth once daily, Disp: 90 tablet, Rfl: 0    pantoprazole (PROTONIX) 40 MG EC tablet, Take 1 tablet by mouth once daily, Disp: 90 tablet, Rfl: 0    spironolactone (ALDACTONE) 25 MG tablet, Take 1 tablet by mouth Daily., Disp: , Rfl:     trimethoprim-polymyxin b (POLYTRIM) 47871-3.1 UNIT/ML-% ophthalmic solution, Apply 1 drop to eye(s) as directed by provider. Four days before and four days after eye injection., Disp: , Rfl:     lubiprostone (Amitiza) 24 MCG capsule, Take 1 capsule by mouth 2 (Two) Times a Day With Meals., Disp: 60 capsule, Rfl: 3    Semaglutide,0.25 or 0.5MG/DOS, (Ozempic, 0.25 or 0.5 MG/DOSE,) 2 MG/3ML solution pen-injector, Inject 0.5 mg under the skin into the appropriate area as directed 1 (One) Time Per Week. (Patient not taking: Reported on 1/9/2025), Disp: 3 mL, Rfl: 5    Current Facility-Administered Medications:     sodium chloride 0.9 % infusion, 500 mL/hr, Intravenous, Once, Sujit Coats MD    sodium chloride 0.9 % infusion, 100 mL/hr, Intravenous, Once, Sujit Coats MD    ALLERGIES  Ciprofloxacin, Lisinopril, and Seasonal ic [cholestatin]    VITALS  Vitals:    01/09/25 0811   BP: 130/82   BP Location: Left arm   Patient Position: Sitting   Cuff Size: Adult   Weight: 87.8 kg (193 lb 9.6 oz)   Height: 152.4 cm (60\")       PHYSICAL EXAM  Debilities/Disabilities Identified: None  Emotional Behavior: Appropriate  Wt Readings from Last 3 Encounters:   01/09/25 87.8 kg (193 lb 9.6 oz)   12/19/24 88.3 kg (194 lb 9.6 oz)   12/18/24 88.2 kg (194 lb 6.4 oz)     Ht Readings from Last 1 Encounters:   01/09/25 152.4 cm (60\")     Body mass index is 37.81 kg/m².  Physical Exam  Constitutional:       General: She is not in acute distress.   "   Appearance: Normal appearance. She is not ill-appearing.   HENT:      Head: Normocephalic and atraumatic.      Mouth/Throat:      Mouth: Mucous membranes are moist.      Pharynx: No posterior oropharyngeal erythema.   Eyes:      General: No scleral icterus.  Cardiovascular:      Rate and Rhythm: Normal rate and regular rhythm.      Heart sounds: Normal heart sounds.   Pulmonary:      Effort: Pulmonary effort is normal.      Breath sounds: Normal breath sounds.   Abdominal:      General: Abdomen is flat. Bowel sounds are normal. There is no distension.      Palpations: Abdomen is soft. There is no mass.      Tenderness: There is no abdominal tenderness. There is no guarding or rebound. Negative signs include Hilario's sign.      Hernia: No hernia is present.   Musculoskeletal:      Cervical back: Neck supple.   Skin:     General: Skin is warm.      Capillary Refill: Capillary refill takes less than 2 seconds.   Neurological:      General: No focal deficit present.      Mental Status: She is alert and oriented to person, place, and time.   Psychiatric:         Mood and Affect: Mood normal.         Behavior: Behavior normal.         Thought Content: Thought content normal.         Judgment: Judgment normal.         CLINICAL DATA REVIEWED   reviewed previous lab results and integrated with today's visit, reviewed notes from other physicians and/or last GI encounter, reviewed previous endoscopy results and available photos, reviewed surgical pathology results from previous biopsies    ASSESSMENT  Diagnoses and all orders for this visit:    Iron deficiency anemia due to chronic blood loss  -     Case Request; Standing  -     Case Request    Gastroesophageal reflux disease with esophagitis without hemorrhage    Chronic idiopathic constipation  -     lubiprostone (Amitiza) 24 MCG capsule; Take 1 capsule by mouth 2 (Two) Times a Day With Meals.    Other orders  -     clobetasol (TEMOVATE) 0.05 % external solution; APPLY A  FEW DROPS TO THE SCALP UP TO TWICE A DAY AS NEEDED FOR ITCHING/FLARES  -     spironolactone (ALDACTONE) 25 MG tablet; Take 1 tablet by mouth Daily.  -     Follow Anesthesia Guidelines / Protocol; Future          PLAN    CIC: Start amitiza BID AC, miralax every night with no BM, reviewed fluid recommendations  GERD: Continue pantoprazole  AYSHA: Repeat EGD today, continue with iron infusions, next step would be Capsule endoscopy  Will get clearance from Dr. Coats to hold thinners, patient will need to hold ozempic 2 weeks prior to procedure    Return in about 4 weeks (around 2/6/2025).    I have discussed the above plan with the patient.  They verbalize understanding and are in agreement with the plan.  They have been advised to contact the office for any questions, concerns, or changes related to their health.

## 2025-01-09 NOTE — H&P (VIEW-ONLY)
"    PATIENT INFORMATION  Rosalee Hargrove       - 1955    CHIEF COMPLAINT  Chief Complaint   Patient presents with    Iron deficient anemia       HISTORY OF PRESENT ILLNESS    Here today for evaluation of anemia    Last seen by Dr. Church in  for anemia and an episode of rectal bleeding, scopes completed, no follow-up after. Anemia intermittent since , low iron, mild. Tries to take iron supplement, but has not been consistent with it because has underlying constipation issues. Has seen blood in stool in past, does have some dark stools which are sticky and difficult to clean. Managed on ASA, plavix, pletal. Has been receiving iron infusions for a yr, has received 3 rounds of 2 doses. Managed per Dr. Kenny.    40 mg pantoprazole daily, has fullness occasionally depending on what she eats less than weekly, no dysphagia, nausea, vomiting. PRN AA when its bad. No NSAIDs.     Has been on ozempic which has controlled, but price this month was >600$ and despite reviewing with medicare, no one mentioned the new deductible cap and monthly payments. Feels like she will qualify for PAPA.    Bowels moving every 4-5 days and not usually hard, no pain usually unless getting \"impacted\" and feels better once she gets a little bit out and then later major blow out. Urgency can be an issue. Bought some miralax and never started. Will take dulcolax for 2-3 days and if bowels do not move she will quit taking them and eat fruit and beans.    2022 EGD with small HH, normal duodenal bx, no celiac  2022 Colonoscopy 0/3 adenomas, hemorrhoids, No known fam hx CRC or polyps.        REVIEWED PERTINENT RESULTS/ LABS  Lab Results   Component Value Date    CASEREPORT  2022     Surgical Pathology Report                         Case: [id number]                                  Authorizing Provider:  Sarwat Church MD    Collected:           2022 10:35 AM          Ordering Location:     [facility]" SURGERY     Received:            06/30/2022 11:06 AM                                 [facility] MAIN OR                                                     Pathologist:           Mekhi Duenas MD                                                         Specimens:   1) - Small Intestine, small bowel r/o celiac                                                        2) - Large Intestine, Rectum, rectal polyp x3                                              FINALDX  06/30/2022     1. Small Bowel, Biopsy: Benign small bowel mucosa with    A. Normal intact villous surface.   B. No significant inflammation, no granulomas.   C. No viral inclusions or other organisms on routinely stained sections.     Comment: There are rare dilated lacteals.      2. Rectum, Biopsy:    A. Hyperplastic polyps.    don/jse        Lab Results   Component Value Date    HGB 11.9 (L) 12/18/2024    MCV 88.2 12/18/2024     12/18/2024    ALT 18 09/25/2024    AST 15 09/25/2024    HGBA1C 5.60 09/25/2024    INR 1.1 04/25/2023    TRIG 146 09/25/2024    FERRITIN 172.00 (H) 12/18/2024    IRON 35 (L) 12/18/2024    TIBC 332 12/18/2024      Duplex Venous Lower Extremity - Right CAR    Result Date: 12/22/2024  Narrative:   Normal right lower extremity venous duplex scan.     XR Foot 3+ View Right    Result Date: 12/17/2024  Narrative: XR FOOT 3+ VW RIGHT Date of Exam: 12/17/2024 10:10 AM EST Indication: Right fourth and fifth metatarsal pain Comparison: None available. Findings: No acute fracture or dislocation. No gross osseous lesions or periosteal reaction. There are mild degenerative changes in the interphalangeal joints and midfoot. Calcaneal enthesophytes are noted.     Impression: Impression: No acute bony injury. Degenerative changes and calcaneal enthesophytes. Electronically Signed: David Silver MD  12/17/2024 11:56 AM EST  Workstation ID: [id number]    XR Tibia Fibula 2 View Right    Result Date: 12/17/2024  Narrative: XR TIBIA FIBULA 2  VW RIGHT Date of Exam: 12/17/2024 10:10 AM EST Indication: Right shin pain and swelling Comparison: None available. Findings: No acute fracture or dislocation identified. No gross osseous lesion or periosteal reaction. Small superior patellar enthesophyte noted. There is mild degenerative change in the medial compartment of the knee.     Impression: Impression: No acute bony injury. Electronically Signed: David Silver MD  12/17/2024 11:51 AM EST  Workstation ID: [id number]    Doppler Ankle Brachial Index Single Level CAR    Result Date: 12/11/2024  Narrative:   Right Conclusion: The right SOFÍA is normal.   Left Conclusion: The left SOFÍA is moderately reduced.      REVIEW OF SYSTEMS  Review of Systems      ACTIVE PROBLEMS  Patient Active Problem List    Diagnosis     Neuropathy [G62.9]     Right leg swelling [M79.89]     Chronic venous hypertension with inflammation [I87.329]     Atherosclerosis of native artery of extremity with intermittent claudication [I70.219]     Sciatic nerve disease [G57.00]     Neurogenic claudication [R29.818]     Neurogenic claudication due to lumbar spinal stenosis [M48.062]     Follow-up exam [Z09]     Stage 3a chronic kidney disease [N18.31]     Obstructive sleep apnea syndrome [G47.33]     Iron adverse reaction [T45.4X5A]     COPD with exacerbation [J44.1]     Acute diastolic CHF (congestive heart failure) [I50.31]     DDD (degenerative disc disease), cervical [M50.30]     Ex-cigarette smoker [Z87.891]     Clinical diagnosis of severe acute respiratory syndrome coronavirus 2 (SARS-CoV-2) disease [U07.1]     Stenosis of right carotid artery [I65.21]     Iron deficiency anemia [D50.9]     History of cardioembolic cerebrovascular accident (CVA) [Z86.73]     Type 2 diabetes mellitus with both eyes affected by mild nonproliferative retinopathy without macular edema, without long-term current use of insulin [E11.3293]     Coronary artery disease involving native coronary artery of native heart  without angina pectoris [I25.10]     Morbid exogenous obesity [E66.01]     Atopic rhinitis [J30.9]     Generalized anxiety disorder [F41.1]     Gastroesophageal reflux disease without esophagitis [K21.9]     Panic disorder [F41.0]     Peripheral arterial occlusive disease [I77.9]     Peripheral edema [R60.0]     Calculus of kidney [N20.0]     Mixed hyperlipidemia [E78.2]     Essential hypertension [I10]     Acquired hypothyroidism [E03.9]     Vitamin D deficiency [E55.9]     Atherosclerosis of both carotid arteries [I65.23]     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]     Atherosclerosis of aorta [I70.0]          PAST MEDICAL HISTORY  Past Medical History:   Diagnosis Date    Afib 09/09/2021    Anemia     Anxiety     Arthritis     Atheroembolism of other site 11/12/2015    Atherosclerosis of aorta 11/12/2015    Atherosclerosis of native arteries of extremities with intermittent claudication, bilateral legs 08/29/2019    PVD    Bilateral carotid artery stenosis 08/29/2019    Bilateral leg edema     Carotid artery stenosis 08/11/2016    Right    Cataract Jan 2022    Chest pain on breathing 08/17/2017    Cholelithiasis     Colon polyp Feb 2023    Depression     Dizziness     GERD (gastroesophageal reflux disease)     Glaucoma     Headache     Kidney stone     Localized edema 11/12/2015    Localized edema 08/11/2016    Orthostatic hypotension 08/11/2016    Pancreatitis     Peripheral vascular disease with claudication 11/12/2015    bilateral legs    Rectal bleeding 03/24/2022    Added automatically from request for surgery [id number]    Shortness of breath     Sleep apnea     Stroke 09/09/2021    Tobacco abuse     Urinary tract infection     Varicose veins of bilateral lower extremities with pain 09/09/2021    Visual impairment     Macular degeneration    Vitamin D deficiency          SURGICAL HISTORY  Past Surgical History:   Procedure Laterality Date    ANAL FISSURECTOMY      BRAIN SURGERY      CARDIAC CATHETERIZATION N/A 09/23/2016     Procedure: Coronary angiography;  Surgeon: Fredrick Kapoor MD;  Location:  [facility];  Service:     CARDIAC CATHETERIZATION N/A 2016    Procedure: Left heart cath;  Surgeon: Fredrick Kapoor MD;  Location:  [facility];  Service:     CARDIAC CATHETERIZATION N/A 2016    Procedure: Left ventriculography;  Surgeon: Fredrick Kapoor MD;  Location:  [facility];  Service:     CARDIAC CATHETERIZATION N/A 2016    Procedure: Right heart cath;  Surgeon: Fredrick Kapoor MD;  Location:  [facility];  Service:      SECTION      CHOLECYSTECTOMY      COLONOSCOPY N/A 2022    Procedure: COLONOSCOPY with Polypectomy;  Surgeon: Sarwat Church MD;  Location: [facility];  Service: Gastroenterology;  Laterality: N/A;  Rectal polyps x3 and Hemorrhoids    CORONARY ANGIOPLASTY WITH STENT PLACEMENT Right 2021    ENDOSCOPY N/A 2022    Procedure: ESOPHAGOGASTRODUODENOSCOPY;  Surgeon: Sarwat Church MD;  Location: [facility];  Service: Gastroenterology;  Laterality: N/A;  Small Hiatal Hernia    ERCP      FRACTURE SURGERY      arm    HYSTERECTOMY      ILIAC ARTERY STENT      SUBTOTAL HYSTERECTOMY      TONSILLECTOMY           FAMILY HISTORY  Family History   Problem Relation Age of Onset    Heart disease Mother     Hypertension Mother     Hyperlipidemia Mother     Breast cancer Mother     Heart disease Father     Heart attack Sister     Heart disease Sister     Breast cancer Sister     Heart disease Brother     Heart attack Brother     Hyperlipidemia Brother     Diabetes Brother     Heart attack Maternal Grandmother     Heart disease Maternal Grandmother     Heart failure Maternal Grandmother     Heart failure Maternal Grandfather     Heart disease Maternal Grandfather     Heart attack Maternal Grandfather     Heart attack Paternal Grandmother     Heart disease Paternal Grandmother     Heart failure Paternal Grandmother     Hypertension  Paternal Grandmother          SOCIAL HISTORY  Social History     Occupational History    Occupation: retired   Tobacco Use    Smoking status: Every Day     Current packs/day: 0.00     Average packs/day: 0.5 packs/day for 50.0 years (25.0 ttl pk-yrs)     Types: Cigarettes     Start date: 1973     Last attempt to quit: 2023     Years since quittin.7    Smokeless tobacco: Never    Tobacco comments:     Patient smokes 6 cigarettes per day   Vaping Use    Vaping status: Never Used   Substance and Sexual Activity    Alcohol use: Yes     Alcohol/week: 1.0 standard drink of alcohol     Types: 1 Glasses of wine per week     Comment: Very occasional; Patient is non drinker.    Drug use: No     Comment: Drug Abuse: none    Sexual activity: Not Currently     Partners: Male     Birth control/protection: None     Comment: N/A         CURRENT MEDICATIONS    Current Outpatient Medications:     ALPRAZolam (XANAX) 0.25 MG tablet, Take 1 tablet by mouth Daily., Disp: , Rfl:     amLODIPine (NORVASC) 10 MG tablet, Take 1 tablet by mouth Daily., Disp: 90 tablet, Rfl: 1    aspirin 81 MG chewable tablet, Chew 1 tablet Daily., Disp: , Rfl:     atorvastatin (LIPITOR) 40 MG tablet, Take 1 tablet by mouth once daily, Disp: 90 tablet, Rfl: 0    cilostazol (PLETAL) 100 MG tablet, Take 1 tablet by mouth 2 (Two) Times a Day., Disp: 60 tablet, Rfl: 60    citalopram (CeleXA) 20 MG tablet, Take 1 tablet by mouth once daily, Disp: 90 tablet, Rfl: 0    clobetasol (TEMOVATE) 0.05 % external solution, APPLY A FEW DROPS TO THE SCALP UP TO TWICE A DAY AS NEEDED FOR ITCHING/FLARES, Disp: , Rfl:     clopidogrel (Plavix) 75 MG tablet, 1 p.o. twice daily, Disp: 180 tablet, Rfl: 1    desonide (DESOWEN) 0.05 % ointment, Apply 1 Application topically to the appropriate area as directed., Disp: , Rfl:     ezetimibe (ZETIA) 10 MG tablet, Take 1 tablet by mouth once daily, Disp: 90 tablet, Rfl: 0    fluocinolone acetonide (DERMOTIC) 0.01 % oil otic  "oil, INSTILL 2 DROPS INTO EACH EAR 2 TIMES A DAY FOR EXTERNAL EAR ECZEMA, Disp: 20 mL, Rfl: 0    levothyroxine (SYNTHROID, LEVOTHROID) 75 MCG tablet, Take 1 tablet by mouth once daily, Disp: 90 tablet, Rfl: 0    losartan (COZAAR) 50 MG tablet, Take 1 tablet by mouth once daily, Disp: 90 tablet, Rfl: 0    pantoprazole (PROTONIX) 40 MG EC tablet, Take 1 tablet by mouth once daily, Disp: 90 tablet, Rfl: 0    spironolactone (ALDACTONE) 25 MG tablet, Take 1 tablet by mouth Daily., Disp: , Rfl:     trimethoprim-polymyxin b (POLYTRIM) 33734-7.1 UNIT/ML-% ophthalmic solution, Apply 1 drop to eye(s) as directed by provider. Four days before and four days after eye injection., Disp: , Rfl:     lubiprostone (Amitiza) 24 MCG capsule, Take 1 capsule by mouth 2 (Two) Times a Day With Meals., Disp: 60 capsule, Rfl: 3    Semaglutide,0.25 or 0.5MG/DOS, (Ozempic, 0.25 or 0.5 MG/DOSE,) 2 MG/3ML solution pen-injector, Inject 0.5 mg under the skin into the appropriate area as directed 1 (One) Time Per Week. (Patient not taking: Reported on 1/9/2025), Disp: 3 mL, Rfl: 5    Current Facility-Administered Medications:     sodium chloride 0.9 % infusion, 500 mL/hr, Intravenous, Once, Sujit Coats MD    sodium chloride 0.9 % infusion, 100 mL/hr, Intravenous, Once, Sujit Coats MD    ALLERGIES  Ciprofloxacin, Lisinopril, and Seasonal ic [cholestatin]    VITALS  Vitals:    01/09/25 0811   BP: 130/82   BP Location: Left arm   Patient Position: Sitting   Cuff Size: Adult   Weight: 87.8 kg (193 lb 9.6 oz)   Height: 152.4 cm (60\")       PHYSICAL EXAM  Debilities/Disabilities Identified: None  Emotional Behavior: Appropriate  Wt Readings from Last 3 Encounters:   01/09/25 87.8 kg (193 lb 9.6 oz)   12/19/24 88.3 kg (194 lb 9.6 oz)   12/18/24 88.2 kg (194 lb 6.4 oz)     Ht Readings from Last 1 Encounters:   01/09/25 152.4 cm (60\")     Body mass index is 37.81 kg/m².  Physical Exam  Constitutional:       General: She is not in acute distress.   "   Appearance: Normal appearance. She is not ill-appearing.   HENT:      Head: Normocephalic and atraumatic.      Mouth/Throat:      Mouth: Mucous membranes are moist.      Pharynx: No posterior oropharyngeal erythema.   Eyes:      General: No scleral icterus.  Cardiovascular:      Rate and Rhythm: Normal rate and regular rhythm.      Heart sounds: Normal heart sounds.   Pulmonary:      Effort: Pulmonary effort is normal.      Breath sounds: Normal breath sounds.   Abdominal:      General: Abdomen is flat. Bowel sounds are normal. There is no distension.      Palpations: Abdomen is soft. There is no mass.      Tenderness: There is no abdominal tenderness. There is no guarding or rebound. Negative signs include Hilario's sign.      Hernia: No hernia is present.   Musculoskeletal:      Cervical back: Neck supple.   Skin:     General: Skin is warm.      Capillary Refill: Capillary refill takes less than 2 seconds.   Neurological:      General: No focal deficit present.      Mental Status: She is alert and oriented to person, place, and time.   Psychiatric:         Mood and Affect: Mood normal.         Behavior: Behavior normal.         Thought Content: Thought content normal.         Judgment: Judgment normal.         CLINICAL DATA REVIEWED   reviewed previous lab results and integrated with today's visit, reviewed notes from other physicians and/or last GI encounter, reviewed previous endoscopy results and available photos, reviewed surgical pathology results from previous biopsies    ASSESSMENT  Diagnoses and all orders for this visit:    Iron deficiency anemia due to chronic blood loss  -     Case Request; Standing  -     Case Request    Gastroesophageal reflux disease with esophagitis without hemorrhage    Chronic idiopathic constipation  -     lubiprostone (Amitiza) 24 MCG capsule; Take 1 capsule by mouth 2 (Two) Times a Day With Meals.    Other orders  -     clobetasol (TEMOVATE) 0.05 % external solution; APPLY A  FEW DROPS TO THE SCALP UP TO TWICE A DAY AS NEEDED FOR ITCHING/FLARES  -     spironolactone (ALDACTONE) 25 MG tablet; Take 1 tablet by mouth Daily.  -     Follow Anesthesia Guidelines / Protocol; Future          PLAN    CIC: Start amitiza BID AC, miralax every night with no BM, reviewed fluid recommendations  GERD: Continue pantoprazole  AYSHA: Repeat EGD today, continue with iron infusions, next step would be Capsule endoscopy  Will get clearance from Dr. Coats to hold thinners, patient will need to hold ozempic 2 weeks prior to procedure    Return in about 4 weeks (around 2/6/2025).    I have discussed the above plan with the patient.  They verbalize understanding and are in agreement with the plan.  They have been advised to contact the office for any questions, concerns, or changes related to their health.

## 2025-01-09 NOTE — TELEPHONE ENCOUNTER
Our patient is scheduled for a colonoscopy on 1-23-25 with Dr. Julian. Patient is taking ASPIRIN, PLAVIX AND PLETAL ,could you please provide guidance on the management of these medications before the procedure?   Specifically, should these medications be held prior to the procedure, and if so, for how long?     Best regards, Lucy

## 2025-01-10 ENCOUNTER — TELEPHONE (OUTPATIENT)
Dept: GASTROENTEROLOGY | Facility: CLINIC | Age: 70
End: 2025-01-10
Payer: MEDICARE

## 2025-01-10 DIAGNOSIS — K59.04 CHRONIC IDIOPATHIC CONSTIPATION: ICD-10-CM

## 2025-01-10 RX ORDER — LUBIPROSTONE 24 UG/1
24 CAPSULE ORAL 2 TIMES DAILY WITH MEALS
Qty: 60 CAPSULE | Refills: 3 | Status: SHIPPED | OUTPATIENT
Start: 2025-01-10

## 2025-01-10 NOTE — TELEPHONE ENCOUNTER
CLARUS MESSAGE LEFT    I had an appointment today and the nurse practitioner, Margarita, told me to call her if the medicine she prescribed is too high and they will reorder it somewhere else. So I was calling to leave that message. Thank you

## 2025-01-10 NOTE — TELEPHONE ENCOUNTER
For Constipation:     A new prescription for the generic Amitiza called lubiprostone has been sent to [facility]      [facility] - [place], [place] - [address] - [phone]  - [phone]  [address]  [address], [facility] [address]  Phone: [phone]  Fax: [phone]    Here is a link to his website:     [email or web address]      From their website:    [email or web address]    My provider sent me here. What do I do next?    Just sign up for an account and the prescription from your doctor will be connected to your account. Then, our pharmacy partner will notify you when you’re ready to checkout and get your medications delivered.

## 2025-01-16 ENCOUNTER — INFUSION (OUTPATIENT)
Dept: ONCOLOGY | Facility: HOSPITAL | Age: 70
End: 2025-01-16
Payer: MEDICARE

## 2025-01-16 VITALS
DIASTOLIC BLOOD PRESSURE: 61 MMHG | TEMPERATURE: 97.8 F | OXYGEN SATURATION: 97 % | SYSTOLIC BLOOD PRESSURE: 101 MMHG | HEART RATE: 77 BPM

## 2025-01-16 DIAGNOSIS — T45.4X5D ADVERSE EFFECT OF IRON, SUBSEQUENT ENCOUNTER: ICD-10-CM

## 2025-01-16 DIAGNOSIS — D50.9 IRON DEFICIENCY ANEMIA, UNSPECIFIED IRON DEFICIENCY ANEMIA TYPE: Primary | ICD-10-CM

## 2025-01-16 DIAGNOSIS — D50.0 IRON DEFICIENCY ANEMIA DUE TO CHRONIC BLOOD LOSS: ICD-10-CM

## 2025-01-16 PROCEDURE — 96374 THER/PROPH/DIAG INJ IV PUSH: CPT

## 2025-01-16 PROCEDURE — 63710000001 PROCHLORPERAZINE MALEATE PER 5 MG: Performed by: INTERNAL MEDICINE

## 2025-01-16 PROCEDURE — 25010000002 FERRIC CARBOXYMALTOSE 750 MG/15ML SOLUTION: Performed by: INTERNAL MEDICINE

## 2025-01-16 RX ORDER — PROCHLORPERAZINE MALEATE 5 MG/1
10 TABLET ORAL ONCE
Status: COMPLETED | OUTPATIENT
Start: 2025-01-16 | End: 2025-01-16

## 2025-01-16 RX ADMIN — PROCHLORPERAZINE MALEATE 10 MG: 5 TABLET ORAL at 13:38

## 2025-01-16 RX ADMIN — FERRIC CARBOXYMALTOSE INJECTION 750 MG: 50 INJECTION, SOLUTION INTRAVENOUS at 13:51

## 2025-01-21 NOTE — TELEPHONE ENCOUNTER
Lvm to inform pt we need to reschedule scope unless she has held her plavix and aspirin 5 days, pletal 3 days  prior to her scope on 1-23-25

## 2025-01-22 ENCOUNTER — ANESTHESIA EVENT (OUTPATIENT)
Dept: PERIOP | Facility: HOSPITAL | Age: 70
End: 2025-01-22
Payer: MEDICARE

## 2025-01-23 ENCOUNTER — HOSPITAL ENCOUNTER (OUTPATIENT)
Facility: HOSPITAL | Age: 70
Setting detail: HOSPITAL OUTPATIENT SURGERY
Discharge: HOME | End: 2025-01-23
Attending: INTERNAL MEDICINE | Admitting: INTERNAL MEDICINE
Payer: MEDICARE

## 2025-01-23 ENCOUNTER — ANESTHESIA (OUTPATIENT)
Dept: PERIOP | Facility: HOSPITAL | Age: 70
End: 2025-01-23
Payer: MEDICARE

## 2025-01-23 VITALS
TEMPERATURE: 98 F | WEIGHT: 191.4 LBS | RESPIRATION RATE: 8 BRPM | HEART RATE: 57 BPM | SYSTOLIC BLOOD PRESSURE: 144 MMHG | OXYGEN SATURATION: 96 % | DIASTOLIC BLOOD PRESSURE: 74 MMHG | BODY MASS INDEX: 37.38 KG/M2

## 2025-01-23 DIAGNOSIS — D50.0 IRON DEFICIENCY ANEMIA DUE TO CHRONIC BLOOD LOSS: ICD-10-CM

## 2025-01-23 LAB — GLUCOSE BLDC GLUCOMTR-MCNC: 87 MG/DL (ref 70–130)

## 2025-01-23 PROCEDURE — 25010000002 LIDOCAINE 2% SOLUTION: Performed by: NURSE ANESTHETIST, CERTIFIED REGISTERED

## 2025-01-23 PROCEDURE — 82948 REAGENT STRIP/BLOOD GLUCOSE: CPT

## 2025-01-23 PROCEDURE — 88305 TISSUE EXAM BY PATHOLOGIST: CPT | Performed by: INTERNAL MEDICINE

## 2025-01-23 PROCEDURE — 43239 EGD BIOPSY SINGLE/MULTIPLE: CPT | Performed by: INTERNAL MEDICINE

## 2025-01-23 PROCEDURE — 25010000002 PROPOFOL 200 MG/20ML EMULSION: Performed by: NURSE ANESTHETIST, CERTIFIED REGISTERED

## 2025-01-23 RX ORDER — PROPOFOL 10 MG/ML
INJECTION, EMULSION INTRAVENOUS AS NEEDED
Status: DISCONTINUED | OUTPATIENT
Start: 2025-01-23 | End: 2025-01-23 | Stop reason: SURG

## 2025-01-23 RX ORDER — DIPHENHYDRAMINE HYDROCHLORIDE 50 MG/ML
12.5 INJECTION INTRAMUSCULAR; INTRAVENOUS
Status: DISCONTINUED | OUTPATIENT
Start: 2025-01-23 | End: 2025-01-23 | Stop reason: HOSPADM

## 2025-01-23 RX ORDER — LIDOCAINE HYDROCHLORIDE 10 MG/ML
0.5 INJECTION, SOLUTION EPIDURAL; INFILTRATION; INTRACAUDAL; PERINEURAL ONCE AS NEEDED
Status: DISCONTINUED | OUTPATIENT
Start: 2025-01-23 | End: 2025-01-23 | Stop reason: HOSPADM

## 2025-01-23 RX ORDER — SODIUM CHLORIDE 0.9 % (FLUSH) 0.9 %
10 SYRINGE (ML) INJECTION AS NEEDED
Status: DISCONTINUED | OUTPATIENT
Start: 2025-01-23 | End: 2025-01-23 | Stop reason: HOSPADM

## 2025-01-23 RX ORDER — LIDOCAINE HYDROCHLORIDE 20 MG/ML
INJECTION, SOLUTION INFILTRATION; PERINEURAL AS NEEDED
Status: DISCONTINUED | OUTPATIENT
Start: 2025-01-23 | End: 2025-01-23 | Stop reason: SURG

## 2025-01-23 RX ORDER — ONDANSETRON 2 MG/ML
4 INJECTION INTRAMUSCULAR; INTRAVENOUS ONCE AS NEEDED
Status: DISCONTINUED | OUTPATIENT
Start: 2025-01-23 | End: 2025-01-23 | Stop reason: HOSPADM

## 2025-01-23 RX ADMIN — LIDOCAINE HYDROCHLORIDE 60 MG: 20 INJECTION, SOLUTION INFILTRATION; PERINEURAL at 14:28

## 2025-01-23 RX ADMIN — PROPOFOL 20 MG: 10 INJECTION, EMULSION INTRAVENOUS at 14:32

## 2025-01-23 RX ADMIN — PROPOFOL 20 MG: 10 INJECTION, EMULSION INTRAVENOUS at 14:30

## 2025-01-23 RX ADMIN — PROPOFOL 100 MG: 10 INJECTION, EMULSION INTRAVENOUS at 14:28

## 2025-01-23 RX ADMIN — PROPOFOL 20 MG: 10 INJECTION, EMULSION INTRAVENOUS at 14:35

## 2025-01-23 RX ADMIN — PROPOFOL 20 MG: 10 INJECTION, EMULSION INTRAVENOUS at 14:37

## 2025-01-23 NOTE — INTERVAL H&P NOTE
Vital signs  /67   Pulse 63   Temp 98 °F (36.7 °C)   Wt 86.8 kg (191 lb 6.4 oz)   SpO2 97%   BMI 37.38 kg/m²     H&P reviewed. The patient was examined and there are no changes to the H&P.

## 2025-01-23 NOTE — BRIEF OP NOTE
ESOPHAGOGASTRODUODENOSCOPY WITH BIOPSY  Progress Note    Rosalee Hargrove  1/23/2025    Pre-op Diagnosis:   Iron deficiency anemia due to chronic blood loss [D50.0]       Post-Op Diagnosis Codes:     * Iron deficiency anemia due to chronic blood loss [D50.0]     * Reflux esophagitis [K21.00]    Procedure/CPT® Codes:  VA EGD TRANSORAL BIOPSY SINGLE/MULTIPLE [39941]      Procedure(s):  ESOPHAGOGASTRODUODENOSCOPY WITH BIOPSY              Surgeon(s):  Hayden Lopez MD    Anesthesia: Monitored Anesthesia Care    Staff:   Circulator: Alena Best RN  Scrub Person: Lejeune, Symphony         Estimated Blood Loss: none    Urine Voided: * No values recorded between 1/23/2025  2:22 PM and 1/23/2025  2:43 PM *    Specimens:                Specimens       ID Source Type Tests Collected By Collected At Frozen?    A Esophagus, Distal Tissue TISSUE PATHOLOGY EXAM   Hayden Lopez MD 1/23/25 1440     Description: distal esophagus biopsy    This specimen was not marked as sent.                  Drains: * No LDAs found *    Findings: Normal Duodenum  Mild gastritis  Reflux Esophagitis-R/O Domínguez's        Complications: None          Hayden Lopez MD     Date: 1/23/2025  Time: 14:47 EST

## 2025-01-23 NOTE — ANESTHESIA POSTPROCEDURE EVALUATION
Patient: Rosalee Hargrove    Procedure Summary       Date: 01/23/25 Room / Location: [facility] ENDOSCOPY 1 /  [facility]    Anesthesia Start: 1423 Anesthesia Stop: 1446    Procedure: ESOPHAGOGASTRODUODENOSCOPY WITH BIOPSY (Esophagus) Diagnosis:       Iron deficiency anemia due to chronic blood loss      Reflux esophagitis      (Iron deficiency anemia due to chronic blood loss [D50.0])    Surgeons: Hayden Lopez MD Provider: Gab Puentes CRNA    Anesthesia Type: MAC ASA Status: 3            Anesthesia Type: MAC    Vitals  Vitals Value Taken Time   /72 01/23/25 1500   Temp     Pulse 56 01/23/25 1502   Resp 16 01/23/25 1449   SpO2 94 % 01/23/25 1502   Vitals shown include unfiled device data.        Post Anesthesia Care and Evaluation    Patient location during evaluation: PHASE II  Patient participation: complete - patient participated  Level of consciousness: awake  Pain management: adequate    Airway patency: patent  Anesthetic complications: No anesthetic complications  PONV Status: none  Cardiovascular status: acceptable  Respiratory status: acceptable  Hydration status: acceptable

## 2025-01-23 NOTE — ANESTHESIA PREPROCEDURE EVALUATION
Anesthesia Evaluation     Patient summary reviewed and Nursing notes reviewed   NPO Solid Status: > 8 hours  NPO Liquid Status: > 8 hours           Airway   Mallampati: II  TM distance: >3 FB  Neck ROM: full  No difficulty expected  Dental    (+) edentulous    Pulmonary     breath sounds clear to auscultation  (+) a smoker (quit 2023) Former, COPD (pt states she does not have COPD),sleep apnea  Cardiovascular   Exercise tolerance: good (4-7 METS)    ECG reviewed  PT is on anticoagulation therapy  Rhythm: regular  Rate: normal    (+) hypertension well controlled less than 2 medications, CAD, dysrhythmias Atrial Fib, CHF , PVD, hyperlipidemia,  carotid artery disease carotid bilateral    ROS comment: ECG 12 Lead     Date/Time: 12/27/2023 12:21 PM  Performed by: Jackelin Earl APRN     Authorized by: Jackelin Earl APRN  Comparison: compared with previous ECG from 11/28/2023  Similar to previous ECG  Rate: normal  QRS axis: normal     Clinical impression: normal ECG     2023    Interpretation Summary       ·  Left ventricular systolic function is normal. Calculated left ventricular EF = 68%  ·  Left ventricular wall thickness is consistent with mild concentric hypertrophy.  ·  Left ventricular diastolic function was normal.     Loop recorder     Neuro/Psych  (+) TIA, CVA (2021 left UE weekness), headaches, dizziness/light headedness, numbness (sciatic pain), psychiatric history Anxiety and Depression    ROS Comment: Brain surgery right side, moyamoya pt unsure what year occurred   GI/Hepatic/Renal/Endo    (+) obesity, GERD, GI bleeding (rectal) lower , renal disease (stage 3)- CRI and stones, diabetes mellitus type 2, thyroid problem hypothyroidism    ROS Comment: Pancreatitis    Musculoskeletal     Abdominal   (+) obese   Substance History   (+) alcohol use (occ)     OB/GYN negative ob/gyn ROS         Other   arthritis,     ROS/Med Hx Other: Macular degeneration  Bilateral leg edema                Anesthesia  Plan    ASA 3     MAC     intravenous induction     Anesthetic plan, risks, benefits, and alternatives have been provided, discussed and informed consent has been obtained with: patient.    Use of blood products discussed with patient  Consented to blood products.      CODE STATUS:

## 2025-01-23 NOTE — DISCHARGE INSTRUCTIONS
Upper Endoscopy, Adult, Care After  This sheet gives you information about how to care for yourself after your procedure. Your health care provider may also give you more specific instructions. If you have problems or questions, contact your health care provider.  What can I expect after the procedure?  After the procedure, it is common to have:  A sore throat.  Mild stomach pain or discomfort.  Bloating.  Nausea.  Follow these instructions at home:       Follow instructions from your health care provider about what to eat or drink after your procedure.  Return to your normal activities as told by your health care provider. Ask your health care provider what activities are safe for you.  Take over-the-counter and prescription medicines only as told by your health care provider.  DO NOT DRIVE FOR THE REST OF TODAY if you were given a sedative during your procedure.  Keep all follow-up visits as told by your health care provider. This is important.  Contact a health care provider if you have:  A sore throat that lasts longer than one day.  Trouble swallowing.  Get help right away if:  You vomit blood or your vomit looks like coffee grounds.  You have:  A fever.  Bloody, black, or tarry stools.  A severe sore throat or you cannot swallow.  Difficulty breathing.  Severe pain in your chest or abdomen.  Summary  After the procedure, it is common to have a sore throat, mild stomach discomfort, bloating, and nausea.  Do not drive TODAY if you were given a sedative during the procedure.  Follow instructions from your health care provider about what to eat or drink after your procedure.  Return to your normal activities as told by your health care provider.  This information is not intended to replace advice given to you by your health care provider. Make sure you discuss any questions you have with your health care provider.  Document Released: 06/18/2013 Document Revised: 05/20/2019 Document Reviewed: 05/20/2019  [employer]  Interactive Patient Education © 2019 [employer]

## 2025-01-27 LAB
CYTO UR: NORMAL
LAB AP CASE REPORT: NORMAL
PATH REPORT.FINAL DX SPEC: NORMAL
PATH REPORT.GROSS SPEC: NORMAL

## 2025-01-29 DIAGNOSIS — D50.0 IRON DEFICIENCY ANEMIA DUE TO CHRONIC BLOOD LOSS: Primary | ICD-10-CM

## 2025-02-12 ENCOUNTER — TELEPHONE (OUTPATIENT)
Dept: FAMILY MEDICINE CLINIC | Facility: CLINIC | Age: 70
End: 2025-02-12

## 2025-02-12 ENCOUNTER — OFFICE VISIT (OUTPATIENT)
Dept: GASTROENTEROLOGY | Facility: CLINIC | Age: 70
End: 2025-02-12
Payer: MEDICARE

## 2025-02-12 ENCOUNTER — TELEPHONE (OUTPATIENT)
Dept: GASTROENTEROLOGY | Facility: CLINIC | Age: 70
End: 2025-02-12

## 2025-02-12 DIAGNOSIS — D50.9 IRON DEFICIENCY ANEMIA, UNSPECIFIED IRON DEFICIENCY ANEMIA TYPE: Primary | ICD-10-CM

## 2025-02-12 NOTE — PROGRESS NOTES
Patient has been given instructions for capsule. Patient gave verbal understanding.   Capsule has been successfully ingested.   All questions answered.   Patient to return equipment to office around 4pm.

## 2025-02-12 NOTE — TELEPHONE ENCOUNTER
Caller: Rosalee Hargrove    Relationship: Self    Best call back number: [phone]    What was the call regarding: PATIENT CALLING WOULD LIKE TO SPEAK WITH JOHANNA ABOUT THE OZEMPIC BEING DELIVERED TO THE OFFICE AND WHEN DOES PATIENT NEED TO PICK IT UP     PLEASE GIVE CALLBACK

## 2025-02-14 DIAGNOSIS — K59.04 CHRONIC IDIOPATHIC CONSTIPATION: Primary | ICD-10-CM

## 2025-02-14 NOTE — TELEPHONE ENCOUNTER
When pt calls back: Note results: No abnormalities seen including no bleeding lesions or masses, continue Iron supplement per Dr. Julian - scanned into media

## 2025-02-14 NOTE — TELEPHONE ENCOUNTER
Patient called and stated that she has not passed the capsule pill that she knows of. Will send to APRN.

## 2025-02-15 DIAGNOSIS — E03.9 ACQUIRED HYPOTHYROIDISM: ICD-10-CM

## 2025-02-17 RX ORDER — LEVOTHYROXINE SODIUM 75 UG/1
TABLET ORAL
Qty: 90 TABLET | Refills: 0 | Status: SHIPPED | OUTPATIENT
Start: 2025-02-17

## 2025-02-18 ENCOUNTER — HOSPITAL ENCOUNTER (OUTPATIENT)
Dept: GENERAL RADIOLOGY | Facility: HOSPITAL | Age: 70
Discharge: HOME | End: 2025-02-18
Payer: MEDICARE

## 2025-02-18 DIAGNOSIS — K59.04 CHRONIC IDIOPATHIC CONSTIPATION: ICD-10-CM

## 2025-02-18 PROCEDURE — 74018 RADEX ABDOMEN 1 VIEW: CPT

## 2025-02-21 RX ORDER — EZETIMIBE 10 MG/1
10 TABLET ORAL DAILY
Qty: 90 TABLET | Refills: 0 | Status: SHIPPED | OUTPATIENT
Start: 2025-02-21

## 2025-03-03 ENCOUNTER — OFFICE VISIT (OUTPATIENT)
Dept: GASTROENTEROLOGY | Facility: CLINIC | Age: 70
End: 2025-03-03
Payer: MEDICARE

## 2025-03-03 VITALS
BODY MASS INDEX: 38.72 KG/M2 | WEIGHT: 197.2 LBS | HEIGHT: 60 IN | DIASTOLIC BLOOD PRESSURE: 70 MMHG | SYSTOLIC BLOOD PRESSURE: 142 MMHG

## 2025-03-03 DIAGNOSIS — K21.9 GASTROESOPHAGEAL REFLUX DISEASE WITHOUT ESOPHAGITIS: ICD-10-CM

## 2025-03-03 DIAGNOSIS — K59.04 CHRONIC IDIOPATHIC CONSTIPATION: Primary | ICD-10-CM

## 2025-03-03 DIAGNOSIS — D50.9 IRON DEFICIENCY ANEMIA, UNSPECIFIED IRON DEFICIENCY ANEMIA TYPE: ICD-10-CM

## 2025-03-03 RX ORDER — PANTOPRAZOLE SODIUM 40 MG/1
40 TABLET, DELAYED RELEASE ORAL DAILY
Qty: 90 TABLET | Refills: 3 | Status: SHIPPED | OUTPATIENT
Start: 2025-03-03

## 2025-03-03 NOTE — PROGRESS NOTES
"    PATIENT INFORMATION  Rosalee Hargrove       - 1955    CHIEF COMPLAINT  Chief Complaint   Patient presents with    Iron deficient anemia    Heartburn    Chronic idiopathic constipation       HISTORY OF PRESENT ILLNESS  Here today for EGD follow-up    2025 EGD positive for chronic reflux, normal duodenum, stomach, no barretts, HP, celiac.  2025 normal Capsule endoscopy  Reviewed path and images with patient     40 mg pantoprazole daily, has fullness occasionally depending on what she eats less than weekly, no dysphagia, nausea, vomiting. PRN AA when its bad. No NSAIDs.     AYSHA: CKD 3B, childrens iron,      Per LOV:  Bowels moving every 4-5 days and not usually hard, no pain usually unless getting \"impacted\" and feels better once she gets a little bit out and then later major blow out. Urgency can be an issue. Bought some miralax and never started. Will take dulcolax for 2-3 days and if bowels do not move she will quit taking them and eat fruit and beans. TODAY: Only taking amitiza once a day because was afraid, having 2 easy BM a day starts with a solid piece and then more liquid, and feeling better. Sometimes has to go back after 20 min and then good for the day. Sometimes not taking it and then will have urge and will visit bathroom several times with no response. Also worries about dependency on amitiza. Reviewed with patient and encouraged to take BID for a few weeks and see if better improvement.      2022 EGD with small HH, normal duodenal bx, no celiac  2022 Colonoscopy 0/3 adenomas, hemorrhoids, No known fam hx CRC or polyps.    Last seen by Dr. Church in  for anemia and an episode of rectal bleeding, scopes completed, no follow-up after. Anemia intermittent since , low iron, mild. Tries to take iron supplement, but has not been consistent with it because has underlying constipation issues. Has seen blood in stool in past, does have some dark stools which are sticky and " difficult to clean. Managed on ASA, plavix, pletal. Has been receiving iron infusions for a yr, has received 3 rounds of 2 doses. Managed per Dr. Kenny.          REVIEWED PERTINENT RESULTS/ LABS  Lab Results   Component Value Date    CASEREPORT  01/23/2025     Surgical Pathology Report                         Case: [id number]                                  Authorizing Provider:  Hayden Lopez        Collected:           01/23/2025 02:40 PM                                 MD Bhupinder                                                                   [facility]   Received:            01/23/2025 03:48 PM                                 OR                                                                           Pathologist:           Mekhi Duenas MD                                                         Specimen:    Esophagus, Distal, distal esophagus biopsy                                                 FINALDX  01/23/2025     1.  Esophagus, distal, biopsy: Benign squamous and gastric mucosa with-   -A. Chronic inflammation in the gastric mucosa.   -B. No intestinal metaplasia.   -C. No Helicobacter pylori.          Lab Results   Component Value Date    HGB 11.9 (L) 12/18/2024    MCV 88.2 12/18/2024     12/18/2024    ALT 18 09/25/2024    AST 15 09/25/2024    HGBA1C 5.60 09/25/2024    INR 1.1 04/25/2023    TRIG 146 09/25/2024    FERRITIN 172.00 (H) 12/18/2024    IRON 35 (L) 12/18/2024    TIBC 332 12/18/2024      XR Abdomen KUB    Result Date: 2/18/2025  Narrative: XR ABDOMEN KUB Date of Exam: 2/18/2025 2:50 PM EST Indication: capsule endoscopy Comparison: None available. FINDINGS: A nonspecific bowel gas pattern is observed. A moderate stool burden is seen throughout the colon. No retained radiopaque foreign body correlating to an endoscopy capsule is identified. Surgical clips are seen in the right upper quadrant. A vascular stent is noted along the right iliac  distribution. Phleboliths are seen in the pelvis. No acute osseous abnormalities are noted.     Impression: 1.A nonspecific bowel gas pattern is observed. A moderate stool burden is seen throughout the colon. 2.The endoscopy capsule is not identified within the field-of-view for the study indicating passage of the capsule. Electronically Signed: Adrian Moreno MD  2/18/2025 3:17 PM EST  Workstation ID: [id number]     REVIEW OF SYSTEMS  Review of Systems      ACTIVE PROBLEMS  Patient Active Problem List    Diagnosis     Neuropathy [G62.9]     Right leg swelling [M79.89]     Chronic venous hypertension with inflammation [I87.329]     Atherosclerosis of native artery of extremity with intermittent claudication [I70.219]     Sciatic nerve disease [G57.00]     Neurogenic claudication [R29.818]     Neurogenic claudication due to lumbar spinal stenosis [M48.062]     Follow-up exam [Z09]     Stage 3a chronic kidney disease [N18.31]     Obstructive sleep apnea syndrome [G47.33]     Iron adverse reaction [T45.4X5A]     COPD with exacerbation [J44.1]     Acute diastolic CHF (congestive heart failure) [I50.31]     DDD (degenerative disc disease), cervical [M50.30]     Ex-cigarette smoker [Z87.891]     Clinical diagnosis of severe acute respiratory syndrome coronavirus 2 (SARS-CoV-2) disease [U07.1]     Stenosis of right carotid artery [I65.21]     Iron deficiency anemia [D50.9]     History of cardioembolic cerebrovascular accident (CVA) [Z86.73]     Type 2 diabetes mellitus with both eyes affected by mild nonproliferative retinopathy without macular edema, without long-term current use of insulin [E11.3293]     Coronary artery disease involving native coronary artery of native heart without angina pectoris [I25.10]     Morbid exogenous obesity [E66.01]     Atopic rhinitis [J30.9]     Generalized anxiety disorder [F41.1]     Gastroesophageal reflux disease without esophagitis [K21.9]     Panic disorder [F41.0]     Peripheral  arterial occlusive disease [I77.9]     Peripheral edema [R60.0]     Calculus of kidney [N20.0]     Mixed hyperlipidemia [E78.2]     Essential hypertension [I10]     Acquired hypothyroidism [E03.9]     Vitamin D deficiency [E55.9]     Atherosclerosis of both carotid arteries [I65.23]     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]     Atherosclerosis of aorta [I70.0]          PAST MEDICAL HISTORY  Past Medical History:   Diagnosis Date    Afib 09/09/2021    Anemia     Anxiety     Arthritis     Atheroembolism of other site 11/12/2015    Atherosclerosis of aorta 11/12/2015    Atherosclerosis of native arteries of extremities with intermittent claudication, bilateral legs 08/29/2019    PVD    Bilateral carotid artery stenosis 08/29/2019    Bilateral leg edema     Carotid artery stenosis 08/11/2016    Right    Cataract Jan 2022    Chest pain on breathing 08/17/2017    Cholelithiasis     Colon polyp Feb 2023    Depression     Dizziness     GERD (gastroesophageal reflux disease)     Glaucoma     Headache     Kidney stone     Localized edema 11/12/2015    Localized edema 08/11/2016    Orthostatic hypotension 08/11/2016    Pancreatitis     Peripheral vascular disease with claudication 11/12/2015    bilateral legs    Rectal bleeding 03/24/2022    Added automatically from request for surgery [id number]    Shortness of breath     Sleep apnea     Stroke 09/09/2021    Tobacco abuse     Urinary tract infection     Varicose veins of bilateral lower extremities with pain 09/09/2021    Visual impairment     Macular degeneration    Vitamin D deficiency          SURGICAL HISTORY  Past Surgical History:   Procedure Laterality Date    ANAL FISSURECTOMY      BRAIN SURGERY      temporaL ARTERY ON RIGHT SIDE    CARDIAC CATHETERIZATION N/A 09/23/2016    Procedure: Coronary angiography;  Surgeon: Fredrick Kapoor MD;  Location: [facility] INVASIVE LOCATION;  Service:     CARDIAC CATHETERIZATION N/A 09/23/2016    Procedure: Left heart cath;  Surgeon: Fredrick Kapoor MD;   Location:  [facility] C[facility] INVASIVE LOCATION;  Service:     CARDIAC CATHETERIZATION N/A 2016    Procedure: Left ventriculography;  Surgeon: Fredrick Kapoor MD;  Location:  [facility] INVASIVE LOCATION;  Service:     CARDIAC CATHETERIZATION N/A 2016    Procedure: Right heart cath;  Surgeon: Fredrick Kapoor MD;  Location:  [facility] INVASIVE LOCATION;  Service:      SECTION      CHOLECYSTECTOMY      COLONOSCOPY N/A 2022    Procedure: COLONOSCOPY with Polypectomy;  Surgeon: Sarwat Church MD;  Location: [facility];  Service: Gastroenterology;  Laterality: N/A;  Rectal polyps x3 and Hemorrhoids    CORONARY ANGIOPLASTY WITH STENT PLACEMENT Right 2021    ENDOSCOPY N/A 2022    Procedure: ESOPHAGOGASTRODUODENOSCOPY;  Surgeon: Sarwat Church MD;  Location: [facility];  Service: Gastroenterology;  Laterality: N/A;  Small Hiatal Hernia    ENDOSCOPY N/A 2025    Procedure: ESOPHAGOGASTRODUODENOSCOPY WITH BIOPSY;  Surgeon: Hayden Lopez MD;  Location:  [facility];  Service: Gastroenterology;  Laterality: N/A;  reflux esophagitis-biopsy    ERCP      FRACTURE SURGERY      arm    HYSTERECTOMY      ILIAC ARTERY STENT      SUBTOTAL HYSTERECTOMY      TONSILLECTOMY           FAMILY HISTORY  Family History   Problem Relation Age of Onset    Heart disease Mother     Hypertension Mother     Hyperlipidemia Mother     Breast cancer Mother     Heart disease Father     Heart attack Sister     Heart disease Sister     Breast cancer Sister     Heart disease Brother     Heart attack Brother     Hyperlipidemia Brother     Diabetes Brother     Heart attack Maternal Grandmother     Heart disease Maternal Grandmother     Heart failure Maternal Grandmother     Heart failure Maternal Grandfather     Heart disease Maternal Grandfather     Heart attack Maternal Grandfather     Heart attack Paternal Grandmother     Heart disease Paternal Grandmother     Heart failure Paternal Grandmother      Hypertension Paternal Grandmother          SOCIAL HISTORY  Social History     Occupational History    Occupation: retired   Tobacco Use    Smoking status: Former     Current packs/day: 0.00     Average packs/day: 0.5 packs/day for 50.0 years (25.0 ttl pk-yrs)     Types: Cigarettes     Start date: 1973     Quit date: 2023     Years since quittin.8    Smokeless tobacco: Never    Tobacco comments:     Patient smokes 6 cigarettes per day   Vaping Use    Vaping status: Never Used   Substance and Sexual Activity    Alcohol use: Yes     Alcohol/week: 1.0 standard drink of alcohol     Types: 1 Glasses of wine per week     Comment: Very occasional; Patient is non drinker.    Drug use: No     Comment: Drug Abuse: none    Sexual activity: Not Currently     Partners: Male     Birth control/protection: None     Comment: N/A         CURRENT MEDICATIONS    Current Outpatient Medications:     ALPRAZolam (XANAX) 0.25 MG tablet, Take 1 tablet by mouth Daily., Disp: , Rfl:     amLODIPine (NORVASC) 10 MG tablet, Take 1 tablet by mouth Daily., Disp: 90 tablet, Rfl: 1    aspirin 81 MG chewable tablet, Chew 1 tablet Daily., Disp: , Rfl:     cilostazol (PLETAL) 100 MG tablet, Take 1 tablet by mouth 2 (Two) Times a Day., Disp: 60 tablet, Rfl: 60    citalopram (CeleXA) 20 MG tablet, Take 1 tablet by mouth once daily, Disp: 90 tablet, Rfl: 0    clobetasol (TEMOVATE) 0.05 % external solution, APPLY A FEW DROPS TO THE SCALP UP TO TWICE A DAY AS NEEDED FOR ITCHING/FLARES, Disp: , Rfl:     clopidogrel (Plavix) 75 MG tablet, 1 p.o. twice daily, Disp: 180 tablet, Rfl: 1    desonide (DESOWEN) 0.05 % ointment, Apply 1 Application topically to the appropriate area as directed., Disp: , Rfl:     ezetimibe (ZETIA) 10 MG tablet, Take 1 tablet by mouth once daily, Disp: 90 tablet, Rfl: 0    fluocinolone acetonide (DERMOTIC) 0.01 % oil otic oil, INSTILL 2 DROPS INTO EACH EAR 2 TIMES A DAY FOR EXTERNAL EAR ECZEMA, Disp: 20 mL, Rfl: 0     "levothyroxine (SYNTHROID, LEVOTHROID) 75 MCG tablet, Take 1 tablet by mouth once daily, Disp: 90 tablet, Rfl: 0    losartan (COZAAR) 50 MG tablet, Take 1 tablet by mouth once daily, Disp: 90 tablet, Rfl: 0    lubiprostone (Amitiza) 24 MCG capsule, Take 1 capsule by mouth 2 (Two) Times a Day With Meals. (Patient taking differently: Take 1 capsule by mouth Daily With Breakfast.), Disp: 60 capsule, Rfl: 3    pantoprazole (PROTONIX) 40 MG EC tablet, Take 1 tablet by mouth Daily., Disp: 90 tablet, Rfl: 3    Semaglutide,0.25 or 0.5MG/DOS, (Ozempic, 0.25 or 0.5 MG/DOSE,) 2 MG/3ML solution pen-injector, Inject 0.5 mg under the skin into the appropriate area as directed 1 (One) Time Per Week., Disp: 3 mL, Rfl: 5    spironolactone (ALDACTONE) 25 MG tablet, Take 1 tablet by mouth Daily., Disp: , Rfl:     trimethoprim-polymyxin b (POLYTRIM) 07579-1.1 UNIT/ML-% ophthalmic solution, Apply 1 drop to eye(s) as directed by provider. Four days before and four days after eye injection., Disp: , Rfl:     ALLERGIES  Ciprofloxacin, Lisinopril, Crestor [rosuvastatin], and Seasonal ic [cholestatin]    VITALS  Vitals:    03/03/25 1435   BP: 142/70   BP Location: Left arm   Patient Position: Sitting   Cuff Size: Adult   Weight: 89.4 kg (197 lb 3.2 oz)   Height: 152.4 cm (60\")       PHYSICAL EXAM  Debilities/Disabilities Identified: None  Emotional Behavior: Appropriate  Wt Readings from Last 3 Encounters:   03/03/25 89.4 kg (197 lb 3.2 oz)   01/23/25 86.8 kg (191 lb 6.4 oz)   01/09/25 87.8 kg (193 lb 9.6 oz)     Ht Readings from Last 1 Encounters:   03/03/25 152.4 cm (60\")     Body mass index is 38.51 kg/m².  Physical Exam  Constitutional:       General: She is not in acute distress.     Appearance: Normal appearance. She is not ill-appearing.   HENT:      Head: Normocephalic and atraumatic.      Mouth/Throat:      Mouth: Mucous membranes are moist.      Pharynx: No posterior oropharyngeal erythema.   Eyes:      General: No scleral " icterus.  Cardiovascular:      Rate and Rhythm: Normal rate and regular rhythm.      Heart sounds: Normal heart sounds.   Pulmonary:      Effort: Pulmonary effort is normal.      Breath sounds: Normal breath sounds.   Abdominal:      General: Abdomen is flat. Bowel sounds are normal. There is no distension.      Palpations: Abdomen is soft. There is no mass.      Tenderness: There is no abdominal tenderness. There is no guarding or rebound. Negative signs include Hilario's sign.      Hernia: No hernia is present.   Musculoskeletal:      Cervical back: Neck supple.   Skin:     General: Skin is warm.      Capillary Refill: Capillary refill takes less than 2 seconds.   Neurological:      General: No focal deficit present.      Mental Status: She is alert and oriented to person, place, and time.   Psychiatric:         Mood and Affect: Mood normal.         Behavior: Behavior normal.         Thought Content: Thought content normal.         Judgment: Judgment normal.         CLINICAL DATA REVIEWED   reviewed previous lab results and integrated with today's visit, reviewed notes from other physicians and/or last GI encounter, reviewed previous endoscopy results and available photos, reviewed surgical pathology results from previous biopsies    ASSESSMENT  Diagnoses and all orders for this visit:    Chronic idiopathic constipation    Gastroesophageal reflux disease without esophagitis  -     pantoprazole (PROTONIX) 40 MG EC tablet; Take 1 tablet by mouth Daily.    Iron deficiency anemia, unspecified iron deficiency anemia type          PLAN    AYSHA: Continue with monitoring and management per hem/onc  CIC: Encouraged to take amitiza BID with meals  GERD: Continue pantoprazole daily    Return in about 4 months (around 7/3/2025).    I have discussed the above plan with the patient.  They verbalize understanding and are in agreement with the plan.  They have been advised to contact the office for any questions, concerns, or  changes related to their health.

## 2025-03-12 DIAGNOSIS — F41.0 PANIC DISORDER: ICD-10-CM

## 2025-03-12 DIAGNOSIS — F41.9 ANXIETY: ICD-10-CM

## 2025-03-12 RX ORDER — CITALOPRAM HYDROBROMIDE 20 MG/1
20 TABLET ORAL DAILY
Qty: 90 TABLET | Refills: 0 | Status: SHIPPED | OUTPATIENT
Start: 2025-03-12

## 2025-03-12 RX ORDER — EZETIMIBE 10 MG/1
10 TABLET ORAL DAILY
Qty: 90 TABLET | Refills: 0 | Status: SHIPPED | OUTPATIENT
Start: 2025-03-12

## 2025-03-12 RX ORDER — CILOSTAZOL 100 MG/1
100 TABLET ORAL 2 TIMES DAILY
Qty: 60 TABLET | Refills: 60 | Status: SHIPPED | OUTPATIENT
Start: 2025-03-12

## 2025-03-20 DIAGNOSIS — F41.1 GENERALIZED ANXIETY DISORDER: ICD-10-CM

## 2025-03-20 DIAGNOSIS — E66.01 MORBID EXOGENOUS OBESITY: ICD-10-CM

## 2025-03-20 DIAGNOSIS — N18.31 STAGE 3A CHRONIC KIDNEY DISEASE: ICD-10-CM

## 2025-03-20 DIAGNOSIS — E55.9 VITAMIN D DEFICIENCY: ICD-10-CM

## 2025-03-20 DIAGNOSIS — I10 ESSENTIAL HYPERTENSION: ICD-10-CM

## 2025-03-20 DIAGNOSIS — F41.0 PANIC DISORDER: ICD-10-CM

## 2025-03-20 DIAGNOSIS — F41.9 ANXIETY: ICD-10-CM

## 2025-03-20 DIAGNOSIS — E11.3293 TYPE 2 DIABETES MELLITUS WITH BOTH EYES AFFECTED BY MILD NONPROLIFERATIVE RETINOPATHY WITHOUT MACULAR EDEMA, WITHOUT LONG-TERM CURRENT USE OF INSULIN: ICD-10-CM

## 2025-03-20 DIAGNOSIS — E03.9 ACQUIRED HYPOTHYROIDISM: ICD-10-CM

## 2025-03-20 DIAGNOSIS — E78.2 MIXED HYPERLIPIDEMIA: Primary | ICD-10-CM

## 2025-03-21 DIAGNOSIS — E03.9 ACQUIRED HYPOTHYROIDISM: ICD-10-CM

## 2025-03-21 DIAGNOSIS — H60.8X3 CHRONIC ECZEMATOUS OTITIS EXTERNA OF BOTH EARS: ICD-10-CM

## 2025-03-21 DIAGNOSIS — I10 ESSENTIAL HYPERTENSION: ICD-10-CM

## 2025-03-21 RX ORDER — LOSARTAN POTASSIUM 50 MG/1
50 TABLET ORAL DAILY
Qty: 90 TABLET | Refills: 0 | Status: SHIPPED | OUTPATIENT
Start: 2025-03-21

## 2025-03-21 RX ORDER — CITALOPRAM HYDROBROMIDE 20 MG/1
20 TABLET ORAL DAILY
Qty: 90 TABLET | Refills: 3 | OUTPATIENT
Start: 2025-03-21

## 2025-03-21 RX ORDER — SPIRONOLACTONE 25 MG/1
25 TABLET ORAL DAILY
Qty: 90 TABLET | Refills: 0 | Status: SHIPPED | OUTPATIENT
Start: 2025-03-21

## 2025-03-21 RX ORDER — EZETIMIBE 10 MG/1
10 TABLET ORAL DAILY
Qty: 90 TABLET | Refills: 3 | OUTPATIENT
Start: 2025-03-21

## 2025-03-21 RX ORDER — AMLODIPINE BESYLATE 10 MG/1
10 TABLET ORAL DAILY
Qty: 90 TABLET | Refills: 0 | Status: SHIPPED | OUTPATIENT
Start: 2025-03-21

## 2025-03-21 RX ORDER — LEVOTHYROXINE SODIUM 75 UG/1
75 TABLET ORAL DAILY
Qty: 90 TABLET | Refills: 0 | Status: SHIPPED | OUTPATIENT
Start: 2025-03-21

## 2025-03-21 RX ORDER — FLUOCINOLONE ACETONIDE 0.11 MG/ML
OIL AURICULAR (OTIC)
Qty: 20 ML | Refills: 0 | Status: SHIPPED | OUTPATIENT
Start: 2025-03-21

## 2025-03-24 DIAGNOSIS — I25.10 CORONARY ARTERY DISEASE INVOLVING NATIVE CORONARY ARTERY OF NATIVE HEART WITHOUT ANGINA PECTORIS: ICD-10-CM

## 2025-03-24 DIAGNOSIS — H60.8X3 CHRONIC ECZEMATOUS OTITIS EXTERNA OF BOTH EARS: ICD-10-CM

## 2025-03-24 DIAGNOSIS — F41.9 ANXIETY: ICD-10-CM

## 2025-03-24 DIAGNOSIS — K21.9 GASTROESOPHAGEAL REFLUX DISEASE WITHOUT ESOPHAGITIS: ICD-10-CM

## 2025-03-24 DIAGNOSIS — E03.9 ACQUIRED HYPOTHYROIDISM: ICD-10-CM

## 2025-03-24 DIAGNOSIS — I10 ESSENTIAL HYPERTENSION: ICD-10-CM

## 2025-03-24 DIAGNOSIS — K59.04 CHRONIC IDIOPATHIC CONSTIPATION: ICD-10-CM

## 2025-03-24 DIAGNOSIS — F41.0 PANIC DISORDER: ICD-10-CM

## 2025-03-24 DIAGNOSIS — E11.3293 TYPE 2 DIABETES MELLITUS WITH BOTH EYES AFFECTED BY MILD NONPROLIFERATIVE RETINOPATHY WITHOUT MACULAR EDEMA, WITHOUT LONG-TERM CURRENT USE OF INSULIN: ICD-10-CM

## 2025-03-24 RX ORDER — LEVOTHYROXINE SODIUM 75 UG/1
75 TABLET ORAL DAILY
Qty: 90 TABLET | Refills: 0 | OUTPATIENT
Start: 2025-03-24

## 2025-03-24 RX ORDER — POLYMYXIN B SULFATE AND TRIMETHOPRIM 1; 10000 MG/ML; [USP'U]/ML
1 SOLUTION OPHTHALMIC
OUTPATIENT
Start: 2025-03-24

## 2025-03-24 RX ORDER — EZETIMIBE 10 MG/1
10 TABLET ORAL DAILY
Qty: 90 TABLET | Refills: 0 | OUTPATIENT
Start: 2025-03-24

## 2025-03-24 RX ORDER — LOSARTAN POTASSIUM 50 MG/1
50 TABLET ORAL DAILY
Qty: 90 TABLET | Refills: 0 | OUTPATIENT
Start: 2025-03-24

## 2025-03-24 RX ORDER — CLOBETASOL PROPIONATE 0.5 MG/ML
SOLUTION TOPICAL
OUTPATIENT
Start: 2025-03-24

## 2025-03-24 RX ORDER — PANTOPRAZOLE SODIUM 40 MG/1
40 TABLET, DELAYED RELEASE ORAL DAILY
Qty: 90 TABLET | Refills: 3 | OUTPATIENT
Start: 2025-03-24

## 2025-03-24 RX ORDER — ALPRAZOLAM 0.25 MG
0.25 TABLET ORAL DAILY
OUTPATIENT
Start: 2025-03-24

## 2025-03-24 RX ORDER — SPIRONOLACTONE 25 MG/1
25 TABLET ORAL DAILY
Qty: 90 TABLET | Refills: 0 | OUTPATIENT
Start: 2025-03-24

## 2025-03-24 RX ORDER — CLOPIDOGREL BISULFATE 75 MG/1
TABLET ORAL
Qty: 180 TABLET | Refills: 1 | Status: SHIPPED | OUTPATIENT
Start: 2025-03-24 | End: 2025-03-31 | Stop reason: SDUPTHER

## 2025-03-24 RX ORDER — CITALOPRAM HYDROBROMIDE 20 MG/1
20 TABLET ORAL DAILY
Qty: 90 TABLET | Refills: 0 | OUTPATIENT
Start: 2025-03-24

## 2025-03-24 RX ORDER — CILOSTAZOL 100 MG/1
100 TABLET ORAL 2 TIMES DAILY
Qty: 60 TABLET | Refills: 60 | OUTPATIENT
Start: 2025-03-24

## 2025-03-24 RX ORDER — FLUOCINOLONE ACETONIDE 0.11 MG/ML
OIL AURICULAR (OTIC)
Qty: 20 ML | Refills: 0 | OUTPATIENT
Start: 2025-03-24

## 2025-03-24 RX ORDER — DESONIDE 0.5 MG/G
1 OINTMENT TOPICAL
OUTPATIENT
Start: 2025-03-24

## 2025-03-24 RX ORDER — ASPIRIN 81 MG/1
81 TABLET, CHEWABLE ORAL DAILY
OUTPATIENT
Start: 2025-03-24

## 2025-03-24 RX ORDER — AMLODIPINE BESYLATE 10 MG/1
10 TABLET ORAL DAILY
Qty: 90 TABLET | Refills: 0 | OUTPATIENT
Start: 2025-03-24

## 2025-03-24 NOTE — TELEPHONE ENCOUNTER
Caller: Rosalee Hargrove    Relationship: Self    Best call back number: [phone]     Requested Prescriptions:   Requested Prescriptions     Pending Prescriptions Disp Refills    clobetasol (TEMOVATE) 0.05 % external solution      clopidogrel (Plavix) 75 MG tablet 180 tablet 1     Si p.o. twice daily    fluocinolone acetonide (DERMOTIC) 0.01 % oil otic oil 20 mL 0     Sig: INSTILL 2 DROPS INTO EACH EAR 2 TIMES A DAY FOR EXTERNAL EAR ECZEMA    ALPRAZolam (XANAX) 0.25 MG tablet       Sig: Take 1 tablet by mouth Daily.    desonide (DESOWEN) 0.05 % ointment       Sig: Apply 1 Application topically to the appropriate area as directed.    trimethoprim-polymyxin b (POLYTRIM) 12178-4.1 UNIT/ML-% ophthalmic solution       Sig: Apply 1 drop to eye(s) as directed by provider. Four days before and four days after eye injection.    amLODIPine (NORVASC) 10 MG tablet 90 tablet 0     Sig: Take 1 tablet by mouth Daily.    ezetimibe (ZETIA) 10 MG tablet 90 tablet 0     Sig: Take 1 tablet by mouth Daily.    cilostazol (PLETAL) 100 MG tablet 60 tablet 60     Sig: Take 1 tablet by mouth 2 (Two) Times a Day.    citalopram (CeleXA) 20 MG tablet 90 tablet 0     Sig: Take 1 tablet by mouth Daily.    spironolactone (ALDACTONE) 25 MG tablet 90 tablet 0     Sig: Take 1 tablet by mouth Daily.    pantoprazole (PROTONIX) 40 MG EC tablet 90 tablet 3     Sig: Take 1 tablet by mouth Daily.    losartan (COZAAR) 50 MG tablet 90 tablet 0     Sig: Take 1 tablet by mouth Daily.    levothyroxine (SYNTHROID, LEVOTHROID) 75 MCG tablet 90 tablet 0     Sig: Take 1 tablet by mouth Daily.    aspirin 81 MG chewable tablet       Sig: Chew 1 tablet Daily.    ALPRAZolam (XANAX) 0.5 MG tablet [325] (Order [id number])     [facility] [address] [place] [address] [phone] [facility] [phone]      Last office visit with prescribing clinician: 2024   Last telemedicine visit with  prescribing clinician: Visit date not found   Next office visit with prescribing clinician: 4/9/2025     Additional details provided by patient: CHANGE PHARMACY NEED NEW PRESCRIPTIONS FOR 90 DAYS WITH RENEWAL     Does the patient have less than a 3 day supply:  [] Yes  [] No    Would you like a call back once the refill request has been completed: [] Yes [] No    If the office needs to give you a call back, can they leave a voicemail: [] Yes [] No    Amos Phan   03/24/25 09:50 EDT

## 2025-03-27 ENCOUNTER — OFFICE VISIT (OUTPATIENT)
Dept: SLEEP MEDICINE | Facility: HOSPITAL | Age: 70
End: 2025-03-27
Payer: MEDICARE

## 2025-03-27 VITALS
HEART RATE: 79 BPM | OXYGEN SATURATION: 97 % | BODY MASS INDEX: 36.71 KG/M2 | WEIGHT: 187 LBS | DIASTOLIC BLOOD PRESSURE: 72 MMHG | SYSTOLIC BLOOD PRESSURE: 127 MMHG | HEIGHT: 60 IN

## 2025-03-27 DIAGNOSIS — I67.5 MOYAMOYA DISEASE: ICD-10-CM

## 2025-03-27 DIAGNOSIS — I10 ESSENTIAL HYPERTENSION: Primary | ICD-10-CM

## 2025-03-27 DIAGNOSIS — E78.2 MIXED HYPERLIPIDEMIA: ICD-10-CM

## 2025-03-27 DIAGNOSIS — K21.9 GASTROESOPHAGEAL REFLUX DISEASE WITHOUT ESOPHAGITIS: ICD-10-CM

## 2025-03-27 DIAGNOSIS — Z86.73 HISTORY OF CARDIOEMBOLIC CEREBROVASCULAR ACCIDENT (CVA): ICD-10-CM

## 2025-03-27 DIAGNOSIS — E11.3293 TYPE 2 DIABETES MELLITUS WITH BOTH EYES AFFECTED BY MILD NONPROLIFERATIVE RETINOPATHY WITHOUT MACULAR EDEMA, WITHOUT LONG-TERM CURRENT USE OF INSULIN: ICD-10-CM

## 2025-03-27 DIAGNOSIS — G47.33 OSA TREATED WITH BIPAP: ICD-10-CM

## 2025-03-27 LAB
25(OH)D3+25(OH)D2 SERPL-MCNC: 16.7 NG/ML (ref 30–100)
ALBUMIN SERPL-MCNC: 4.2 G/DL (ref 3.5–5.2)
ALBUMIN/CREAT UR: 546 MG/G CREAT (ref 0–29)
ALBUMIN/GLOB SERPL: 1.8 G/DL
ALP SERPL-CCNC: 131 U/L (ref 39–117)
ALT SERPL-CCNC: 16 U/L (ref 1–33)
AST SERPL-CCNC: 17 U/L (ref 1–32)
BASOPHILS # BLD AUTO: 0.05 10*3/MM3 (ref 0–0.2)
BASOPHILS NFR BLD AUTO: 0.6 % (ref 0–1.5)
BILIRUB SERPL-MCNC: 0.3 MG/DL (ref 0–1.2)
BUN SERPL-MCNC: 25 MG/DL (ref 8–23)
BUN/CREAT SERPL: 15.7 (ref 7–25)
CALCIUM SERPL-MCNC: 9 MG/DL (ref 8.6–10.5)
CHLORIDE SERPL-SCNC: 105 MMOL/L (ref 98–107)
CHOLEST SERPL-MCNC: 175 MG/DL (ref 0–200)
CO2 SERPL-SCNC: 24 MMOL/L (ref 22–29)
CREAT SERPL-MCNC: 1.59 MG/DL (ref 0.57–1)
CREAT UR-MCNC: 91.7 MG/DL
EGFRCR SERPLBLD CKD-EPI 2021: 34.6 ML/MIN/1.73
EOSINOPHIL # BLD AUTO: 0.47 10*3/MM3 (ref 0–0.4)
EOSINOPHIL NFR BLD AUTO: 6.1 % (ref 0.3–6.2)
ERYTHROCYTE [DISTWIDTH] IN BLOOD BY AUTOMATED COUNT: 15.8 % (ref 12.3–15.4)
GLOBULIN SER CALC-MCNC: 2.4 GM/DL
GLUCOSE SERPL-MCNC: 99 MG/DL (ref 65–99)
HBA1C MFR BLD: 5.8 % (ref 4.8–5.6)
HCT VFR BLD AUTO: 40 % (ref 34–46.6)
HDLC SERPL-MCNC: 33 MG/DL (ref 40–60)
HGB BLD-MCNC: 13.8 G/DL (ref 12–15.9)
IMM GRANULOCYTES # BLD AUTO: 0.04 10*3/MM3 (ref 0–0.05)
IMM GRANULOCYTES NFR BLD AUTO: 0.5 % (ref 0–0.5)
LDLC SERPL CALC-MCNC: 108 MG/DL (ref 0–100)
LYMPHOCYTES # BLD AUTO: 1.9 10*3/MM3 (ref 0.7–3.1)
LYMPHOCYTES NFR BLD AUTO: 24.5 % (ref 19.6–45.3)
MCH RBC QN AUTO: 30.1 PG (ref 26.6–33)
MCHC RBC AUTO-ENTMCNC: 34.5 G/DL (ref 31.5–35.7)
MCV RBC AUTO: 87.1 FL (ref 79–97)
MICROALBUMIN UR-MCNC: 500.6 UG/ML
MONOCYTES # BLD AUTO: 0.44 10*3/MM3 (ref 0.1–0.9)
MONOCYTES NFR BLD AUTO: 5.7 % (ref 5–12)
NEUTROPHILS # BLD AUTO: 4.86 10*3/MM3 (ref 1.7–7)
NEUTROPHILS NFR BLD AUTO: 62.6 % (ref 42.7–76)
NRBC BLD AUTO-RTO: 0 /100 WBC (ref 0–0.2)
PLATELET # BLD AUTO: 319 10*3/MM3 (ref 140–450)
POTASSIUM SERPL-SCNC: 4.6 MMOL/L (ref 3.5–5.2)
PROT SERPL-MCNC: 6.6 G/DL (ref 6–8.5)
RBC # BLD AUTO: 4.59 10*6/MM3 (ref 3.77–5.28)
SODIUM SERPL-SCNC: 140 MMOL/L (ref 136–145)
TRIGL SERPL-MCNC: 193 MG/DL (ref 0–150)
TSH SERPL DL<=0.005 MIU/L-ACNC: 1.55 UIU/ML (ref 0.27–4.2)
VLDLC SERPL CALC-MCNC: 34 MG/DL (ref 5–40)
WBC # BLD AUTO: 7.76 10*3/MM3 (ref 3.4–10.8)

## 2025-03-27 PROCEDURE — G0463 HOSPITAL OUTPT CLINIC VISIT: HCPCS

## 2025-03-27 NOTE — PROGRESS NOTES
Patient Care Team:  Eddie Slater DO as PCP - General  Brian Kenny MD as Consulting Physician (Hematology and Oncology)  Isabelle Fuentes APRN as Nurse Practitioner (Gastroenterology)  Amy Harrington PT as Physical Therapist (Physical Therapy)  Sarwat Church MD as Consulting Physician (Gastroenterology)  Sujit Coats MD as Surgeon (Vascular Surgery)  Sofi Lizama APRN as Nurse Practitioner (Nurse Practitioner)  Jackelin Earl APRN as Nurse Practitioner (Nurse Practitioner)  Eddie Slater DO as Referring Physician (Family Medicine)  Jaspreet Madison MD, MPH as Consulting Physician (Sleep Medicine)        History of Present Illness  The patient is a 69-year-old female who presents for evaluation of sleep apnea.    She has been using a CPAP machine for approximately 6 years, which she reports as being disruptive due to its noise. Despite attempts to mitigate the noise by wrapping it with her 's socks, the machine continues to leak air, causing dryness in her eyes. She has recently acquired a new mask but continues to experience leakage. Initially, she used a nasal mask for several years before transitioning to a full mask following a sleep study. She reports that her condition improved with the use of CPAP until the change in mask. She has no interest in getting a new machine. She has been purchasing medium-sized masks but was recently measured and found to require a small size. She has been ordering her supplies from [employer] but is considering switching to another supplier. She has previously obtained supplies from [facility]. She reports no end-of-life indicators from her machine. Prior to the initiation of CPAP therapy, she was known for her loud snoring.    Supplemental Information  She has lost about 50 pounds. She was put on a diabetic medicine shot that helped. She had her teeth pulled and is waiting to get her permanent teeth. She has moyamoya disease. Dr. Castanon is  her stroke doctor and he did the moyamoya surgery. She has peripheral artery disease in her legs.    SOCIAL HISTORY  She used to smoke.       [place]: 11    Data Reviewed: Medical chart and sleep questionnaire as well as compliance download      PMH:  Past Medical History:   Diagnosis Date    Afib 09/09/2021    Anemia     Anxiety     Arthritis     Atheroembolism of other site 11/12/2015    Atherosclerosis of aorta 11/12/2015    Atherosclerosis of native arteries of extremities with intermittent claudication, bilateral legs 08/29/2019    PVD    Bilateral carotid artery stenosis 08/29/2019    Bilateral leg edema     Carotid artery stenosis 08/11/2016    Right    Cataract Jan 2022    Chest pain on breathing 08/17/2017    Cholelithiasis     Colon polyp Feb 2023    Depression     Dizziness     GERD (gastroesophageal reflux disease)     Glaucoma     Headache     Kidney stone     Localized edema 11/12/2015    Localized edema 08/11/2016    Orthostatic hypotension 08/11/2016    Pancreatitis     Peripheral vascular disease with claudication 11/12/2015    bilateral legs    Rectal bleeding 03/24/2022    Added automatically from request for surgery [id number]    Shortness of breath     Sleep apnea     Stroke 09/09/2021    Tobacco abuse     Urinary tract infection     Varicose veins of bilateral lower extremities with pain 09/09/2021    Visual impairment     Macular degeneration    Vitamin D deficiency           Allergies:  Ciprofloxacin, Lisinopril, Crestor [rosuvastatin], and Seasonal ic [cholestatin]     Medication Review:   Current Outpatient Medications on File Prior to Visit   Medication Sig Dispense Refill    ALPRAZolam (XANAX) 0.25 MG tablet Take 1 tablet by mouth Daily.      amLODIPine (NORVASC) 10 MG tablet Take 1 tablet by mouth Daily. 90 tablet 0    aspirin 81 MG chewable tablet Chew 1 tablet Daily.      cilostazol (PLETAL) 100 MG tablet Take 1 tablet by mouth 2 (Two) Times a Day. 60 tablet 60    citalopram (CeleXA) 20  "MG tablet Take 1 tablet by mouth Daily. 90 tablet 0    clobetasol (TEMOVATE) 0.05 % external solution APPLY A FEW DROPS TO THE SCALP UP TO TWICE A DAY AS NEEDED FOR ITCHING/FLARES      clopidogrel (Plavix) 75 MG tablet 1 p.o. twice daily 180 tablet 1    desonide (DESOWEN) 0.05 % ointment Apply 1 Application topically to the appropriate area as directed.      ezetimibe (ZETIA) 10 MG tablet Take 1 tablet by mouth Daily. 90 tablet 0    fluocinolone acetonide (DERMOTIC) 0.01 % oil otic oil INSTILL 2 DROPS INTO EACH EAR 2 TIMES A DAY FOR EXTERNAL EAR ECZEMA 20 mL 0    levothyroxine (SYNTHROID, LEVOTHROID) 75 MCG tablet Take 1 tablet by mouth Daily. 90 tablet 0    losartan (COZAAR) 50 MG tablet Take 1 tablet by mouth Daily. 90 tablet 0    lubiprostone (Amitiza) 24 MCG capsule Take 1 capsule by mouth 2 (Two) Times a Day With Meals. (Patient taking differently: Take 1 capsule by mouth Daily With Breakfast.) 60 capsule 3    pantoprazole (PROTONIX) 40 MG EC tablet Take 1 tablet by mouth Daily. 90 tablet 3    Semaglutide,0.25 or 0.5MG/DOS, (Ozempic, 0.25 or 0.5 MG/DOSE,) 2 MG/3ML solution pen-injector Inject 0.5 mg under the skin into the appropriate area as directed 1 (One) Time Per Week. 3 mL 5    spironolactone (ALDACTONE) 25 MG tablet Take 1 tablet by mouth Daily. 90 tablet 0    trimethoprim-polymyxin b (POLYTRIM) 04043-7.1 UNIT/ML-% ophthalmic solution Apply 1 drop to eye(s) as directed by provider. Four days before and four days after eye injection.       No current facility-administered medications on file prior to visit.         Vital Signs:    Vitals:    03/27/25 1300   BP: 127/72   Pulse: 79   SpO2: 97%   Weight: 84.8 kg (187 lb)   Height: 152.4 cm (60\")        Body mass index is 36.52 kg/m².  Neck Circumference: 17 inches  .BMIFOLLOWUP         Physical Exam:    Constitutional:  Well developed 69 y.o. female that appears in no apparent distress.  Awake & oriented times 3.  Normal mood with normal recent and remote " memory and normal judgement.  Eyes:  Conjunctivae normal.  Oropharynx: Moist mucous membranes without exudate and Mallampati 4  Neck: Trachea midline  Respiratory: Effort is not labored  Cardiovascular: Radial pulse regular  Musculoskeletal: Gait appears normal, no digital clubbing evident, no pre-tibial edema        Impression:   Encounter Diagnoses   Name Primary?    Essential hypertension Yes    Mixed hyperlipidemia     Gastroesophageal reflux disease without esophagitis     Type 2 diabetes mellitus with both eyes affected by mild nonproliferative retinopathy without macular edema, without long-term current use of insulin     Moyamoya disease     History of cardioembolic cerebrovascular accident (CVA)     TRI treated with BiPAP      Patient's BMI is Body mass index is 36.52 kg/m².        Assessment & Plan  1. Sleep Apnea.  Her sleep apnea is currently well-managed, with less than 2 events per hour, even with a significant mask leak. The average pressure readings are 14.3 cm H2O during inhalation and 10.3 cm H2O during exhalation. A new nasal mask will be ordered for her. She is advised to select a mask that fits appropriately to prevent leaks. Additionally, a new BiPAP machine will be ordered as her current machine is 71-year-old and may soon be due for replacement.    Follow-up  The patient will follow up in 6 months.    PROCEDURE  The patient underwent moyamoya surgery performed by Dr. Castanon.       The patient should practice good sleep hygiene measures.      Weight loss might be beneficial in this patient who has a Body mass index is 36.52 kg/m².      Pathophysiology of TRI described to the patient.  Cardiovascular complications of untreated TRI also reviewed.      The patient was cautioned about the dangers of drowsy driving.    Patient or patient representative verbalized consent for the use of Ambient Listening during the visit with  Brian Madison MD for chart documentation. 3/27/2025  15:04 EDT      Brian Madison MD  Sleep Medicine  03/27/25  15:03 EDT

## 2025-03-28 ENCOUNTER — DOCUMENTATION (OUTPATIENT)
Dept: SLEEP MEDICINE | Facility: HOSPITAL | Age: 70
End: 2025-03-28
Payer: MEDICARE

## 2025-03-28 NOTE — PROGRESS NOTES
Pt was seen in sleep center office on 3/27/2025.  Supply order for patient has been sent over to [facility].

## 2025-03-31 DIAGNOSIS — I25.10 CORONARY ARTERY DISEASE INVOLVING NATIVE CORONARY ARTERY OF NATIVE HEART WITHOUT ANGINA PECTORIS: ICD-10-CM

## 2025-03-31 DIAGNOSIS — I77.9 PERIPHERAL ARTERIAL OCCLUSIVE DISEASE: Primary | ICD-10-CM

## 2025-03-31 RX ORDER — CLOPIDOGREL BISULFATE 75 MG/1
75 TABLET ORAL DAILY
Qty: 90 TABLET | Refills: 0 | Status: SHIPPED | OUTPATIENT
Start: 2025-03-31

## 2025-04-04 DIAGNOSIS — K21.9 GASTROESOPHAGEAL REFLUX DISEASE WITHOUT ESOPHAGITIS: ICD-10-CM

## 2025-04-04 RX ORDER — PANTOPRAZOLE SODIUM 40 MG/1
40 TABLET, DELAYED RELEASE ORAL DAILY
Qty: 90 TABLET | Refills: 0 | OUTPATIENT
Start: 2025-04-04

## 2025-04-08 ENCOUNTER — OFFICE VISIT (OUTPATIENT)
Dept: CARDIOLOGY | Age: 70
End: 2025-04-08
Payer: MEDICARE

## 2025-04-08 VITALS
OXYGEN SATURATION: 97 % | HEIGHT: 60 IN | SYSTOLIC BLOOD PRESSURE: 134 MMHG | WEIGHT: 195 LBS | DIASTOLIC BLOOD PRESSURE: 70 MMHG | BODY MASS INDEX: 38.28 KG/M2 | HEART RATE: 72 BPM

## 2025-04-08 DIAGNOSIS — I87.321 CHRONIC VENOUS HYPERTENSION WITH INFLAMMATION INVOLVING RIGHT SIDE: Primary | ICD-10-CM

## 2025-04-08 NOTE — PROGRESS NOTES
Date of Office Visit: 2025  Encounter Provider: DELMY Suárez  Place of Service: [facility] CARDIOLOGY  Patient Name: Rosalee Hargrove  :1955    Chief complaints:afib and CVA    HPI: Rosalee Hargrove is a 69 y.o. female who is a patient of Dr. Mei is well-known to me from previous.  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  Several years ago she had a coronary angiography that showed mild nonobstructive CAD in 2016.  She also has a history of peripheral arterial disease and is followed by Dr. Александр Coats.  She has moyamoya disease and is status post right carotid endarterectomy, she also has an iliac stent.  Last year she had a CTA of the abdomen showing stenosis of moderate to severe in the left iliac and severe in the left femoral.  She has a patent stent in the right iliac.    In 2021 she had a small stroke at [facility].  She wore a 48-hour Holter monitor.  There is no A-fib and the plan was to implant a Linq monitor which we did.  Will plan she has had recurrent strokes and is on dual antiplatelet therapy twice a day.  She was last in the office about a year ago she was on a GPL1 she had lost weight and was walking about 2 blocks a day    Since her last appointment she has had some issues with claudication.  There is also some concern that is being caused by her cholesterol medications.  Vascular surgery stopped her statin about a week or so ago to see if it would improve and she has a follow-up with them.  She has noticed some improvement but not completely.  She is also on Pletal.  She denies any chest pain, pressure or tightness no lightheadedness or dizziness.    Previous testing and notes have been reviewed by me.      Latest Reference Range & Units Most Recent   Sodium 136 - 145 mmol/L 140  3/26/25 08:08   Potassium 3.5 - 5.2  mmol/L 4.6  3/26/25 08:08   Chloride 98 - 107 mmol/L 105  3/26/25 08:08   CO2 22.0 - 29.0 mmol/L 24.0  3/26/25 08:08   CO2 22 - 29 mmol/L 27 (E)  6/4/21 03:51   Anion Gap 5.0 - 15.0 mmol/L 12.9  12/14/23 12:48   BUN 8 - 23 mg/dL 25 (H)  3/26/25 08:08   Creatinine 0.57 - 1.00 mg/dL 1.59 (H)  3/26/25 08:08   BUN/Creatinine Ratio 7.0 - 25.0  15.7  3/26/25 08:08   eGFR >60.0 mL/min/1.73 56.0 (L)  12/14/23 12:48   EGFR Result >60.0 mL/min/1.73 34.6 (L)  3/26/25 08:08   Est GFR by Clearance >60 /1.73 m2 >60 (E)  8/2/22 10:51   Glucose 65 - 99 mg/dL 99  3/26/25 08:08   Calcium 8.6 - 10.5 mg/dL 9.0  3/26/25 08:08   Magnesium 1.6 - 2.6 mg/dL 1.6 (E)  4/25/23 12:13   Phosphorus 2.5 - 4.5 mg/dL 3.1  1/9/25 10:23   Alkaline Phosphatase 39 - 117 U/L 131 (H)  3/26/25 08:08   Total Protein 6.0 - 8.5 g/dL 6.6  3/26/25 08:08   Albumin 3.5 - 5.2 g/dL 4.2  3/26/25 08:08   Globulin gm/dL 3.4  10/15/23 12:10   A/G Ratio g/dL 1.8  3/26/25 08:08   AST (SGOT) 1 - 32 U/L 17  3/26/25 08:08   ALT (SGPT) 1 - 33 U/L 16  3/26/25 08:08   Total Bilirubin 0.0 - 1.2 mg/dL 0.3  3/26/25 08:08   eGFR Non African Am >59 mL/min/1.73 60  12/22/21 08:56   eGFR  Am >60 /1.73 m2 >60 (E)  8/2/22 10:51   Uric Acid 3.4 - 7.0 mg/dL 6.4  8/7/18 09:05   Hemoglobin A1C 4.80 - 5.60 % 5.80 (H)  3/26/25 08:08   TSH Baseline 0.270 - 4.200 uIU/mL 1.550  3/26/25 08:08   Iron 37 - 145 mcg/dL 35 (L)  12/18/24 13:58   Ferritin 13.00 - 150.00 ng/mL 172.00 (H)  12/18/24 13:58   Iron Saturation 20 - 50 % 15 (L)  2/8/24 08:31   Iron Saturation (TSAT) 20 - 50 % 11 (L)  12/18/24 13:58   Transferrin 200 - 360 mg/dL 223  12/18/24 13:58   TIBC 298 - 536 mcg/dL 332  12/18/24 13:58   UIBC 112 - 346 mcg/dL 297  2/8/24 08:31   Vitamin B-12 213 - 816 pg/mL 381 (E)  6/1/21 03:52   Total Cholesterol 0 - 200 mg/dL 175  3/26/25 08:08   HDL Cholesterol 40 - 60 mg/dL 33 (L)  3/26/25 08:08   LDL Cholesterol  0 - 100 mg/dL 108 (H)  3/26/25 08:08   VLDL Cholesterol mg/dL 39.4  7/28/20  09:38   Triglycerides 0 - 150 mg/dL 193 (H)  3/26/25 08:08   VLDL Cholesterol [place] 5 - 40 mg/dL 34  3/26/25 08:08   Lipase 23 - 300 U/L 106 (E)  12/19/19 17:10   Procalcitonin 0.00 - 0.25 ng/mL 0.06  10/15/23 12:10   25 Hydroxy, Vitamin D 30.0 - 100.0 ng/ml 16.7 (L)  3/26/25 08:08   Globulin gm/dL 2.4  3/26/25 08:08   Protime 10.3 - 13.3 s 12.7 (E)  4/25/23 12:13   INR INR 1.1 (E)  4/25/23 12:13   PTT 25.1 - 36.5 s 31.1 (E)  4/25/23 12:13   WBC 3.40 - 10.80 10*3/mm3 7.76  3/26/25 08:08   RBC 3.77 - 5.28 10*6/mm3 4.59  3/26/25 08:08   Hemoglobin 12.0 - 15.9 g/dL 13.8  3/26/25 08:08   Hemoglobin 12.0 - 17.0 g/dL 12.6  9/23/16 12:29   Hematocrit 34.0 - 46.6 % 40.0  3/26/25 08:08   Hematocrit 38 - 51 % 37 (L)  9/23/16 12:29   Platelets 140 - 450 10*3/mm3 319  3/26/25 08:08   RDW 12.3 - 15.4 % 15.8 (H)  3/26/25 08:08   MCV 79.0 - 97.0 fL 87.1  3/26/25 08:08   MCH 26.6 - 33.0 pg 30.1  3/26/25 08:08   MCHC 31.5 - 35.7 g/dL 34.5  3/26/25 08:08   MPV 6.0 - 12.0 fL 9.6  12/18/24 13:58   RDW-SD 37.0 - 54.0 fl 45.3  12/18/24 13:58   Neutrophil Rel % 42.7 - 76.0 % 62.6  3/26/25 08:08   Lymphocyte Rel % 19.6 - 45.3 % 24.5  3/26/25 08:08   Monocyte Rel % 5.0 - 12.0 % 5.7  3/26/25 08:08   Eosinophil Rel % 0.3 - 6.2 % 6.1  3/26/25 08:08   Basophil Rel % 0.0 - 1.5 % 0.6  3/26/25 08:08   Immature Granulocyte Rel % 0.0 - 0.5 % 0.5  3/26/25 08:08   Neutrophils Absolute 1.70 - 7.00 10*3/mm3 4.86  3/26/25 08:08   Lymphocytes Absolute 0.70 - 3.10 10*3/mm3 1.90  3/26/25 08:08   Monocytes Absolute 0.10 - 0.90 10*3/mm3 0.44  3/26/25 08:08   Eosinophils Absolute 0.00 - 0.40 10*3/mm3 0.47 (H)  3/26/25 08:08   Basophils Absolute 0.00 - 0.20 10*3/mm3 0.05  3/26/25 08:08   Immature Grans, Absolute 0.00 - 0.05 10*3/mm3 0.04  3/26/25 08:08   nRBC 0.0 - 0.2 /100 WBC 0.0  3/26/25 08:08   Differential Type (arb'U) Hospital CBC w/AutoDiff (E)  4/26/23 04:11   Sed Rate 0 - 30 mm/hr 17  2/13/16 13:28   Creatinine, Urine Not Estab. mg/dL 91.7  3/26/25 08:08    Microalbumin, Urine Not Estab. ug/mL 500.6  3/26/25 08:08   Microalbumin/Creatinine Ratio 0 - 29 mg/g creat 546 (H)  3/26/25 08:08   (H): Data is abnormally high  (L): Data is abnormally low  (E): External lab result  Past Medical History:   Diagnosis Date    Afib 09/09/2021    Anemia     Anxiety     Arthritis     Atheroembolism of other site 11/12/2015    Atherosclerosis of aorta 11/12/2015    Atherosclerosis of native arteries of extremities with intermittent claudication, bilateral legs 08/29/2019    PVD    Bilateral carotid artery stenosis 08/29/2019    Bilateral leg edema     Carotid artery stenosis 08/11/2016    Right    Cataract Jan 2022    Chest pain on breathing 08/17/2017    Cholelithiasis     Colon polyp Feb 2023    Depression     Dizziness     GERD (gastroesophageal reflux disease)     Glaucoma     Headache     Kidney stone     Localized edema 11/12/2015    Localized edema 08/11/2016    Orthostatic hypotension 08/11/2016    Pancreatitis     Peripheral vascular disease with claudication 11/12/2015    bilateral legs    Rectal bleeding 03/24/2022    Added automatically from request for surgery [id number]    Shortness of breath     Sleep apnea     Stroke 09/09/2021    Tobacco abuse     Urinary tract infection     Varicose veins of bilateral lower extremities with pain 09/09/2021    Visual impairment     Macular degeneration    Vitamin D deficiency        Past Surgical History:   Procedure Laterality Date    ANAL FISSURECTOMY      BRAIN SURGERY      temporaL ARTERY ON RIGHT SIDE    CARDIAC CATHETERIZATION N/A 09/23/2016    Procedure: Coronary angiography;  Surgeon: Fredrick Kapoor MD;  [facility]AS[facility];  Service:     CARDIAC CATHETERIZATION N/A 09/23/2016    Procedure: Left heart cath;  Surgeon: Fredrick Kapoor MD;  [facility]AS[facility] LOC[facility];  Service:     CARDIAC CATHETERIZATION N/A 09/23/2016    Procedure: Left ventriculography;  Surgeon: Fredrick Kapoor MD;  Location: [facility] CATH  INVASIVE LOCATION;  Service:     CARDIAC CATHETERIZATION N/A 2016    Procedure: Right heart cath;  Surgeon: Fredrick Kapoor MD;  Location:  [facility] CATH INVASIVE LOCATION;  Service:      SECTION      CHOLECYSTECTOMY      COLONOSCOPY N/A 2022    Procedure: COLONOSCOPY with Polypectomy;  Surgeon: Sarwat Church MD;  Location: [facility] MAIN OR;  Service: Gastroenterology;  Laterality: N/A;  Rectal polyps x3 and Hemorrhoids    CORONARY ANGIOPLASTY WITH STENT PLACEMENT Right 2021    ENDOSCOPY N/A 2022    Procedure: ESOPHAGOGASTRODUODENOSCOPY;  Surgeon: Sarwat Church MD;  Location: [facility];  Service: Gastroenterology;  Laterality: N/A;  Small Hiatal Hernia    ENDOSCOPY N/A 2025    Procedure: ESOPHAGOGASTRODUODENOSCOPY WITH BIOPSY;  Surgeon: Hayden Lopez MD;  Location:  [facility] OR;  Service: Gastroenterology;  Laterality: N/A;  reflux esophagitis-biopsy    ERCP      FRACTURE SURGERY      arm    HYSTERECTOMY      ILIAC ARTERY STENT      SUBTOTAL HYSTERECTOMY      TONSILLECTOMY         Social History     Socioeconomic History    Marital status:    Tobacco Use    Smoking status: Former     Current packs/day: 0.00     Average packs/day: 0.5 packs/day for 50.0 years (25.0 ttl pk-yrs)     Types: Cigarettes     Start date: 1973     Quit date: 2023     Years since quittin.9    Smokeless tobacco: Never    Tobacco comments:     Patient smokes 6 cigarettes per day   Vaping Use    Vaping status: Never Used   Substance and Sexual Activity    Alcohol use: Yes     Alcohol/week: 1.0 standard drink of alcohol     Types: 1 Glasses of wine per week     Comment: Very occasional; Patient is non drinker.    Drug use: No     Comment: Drug Abuse: none    Sexual activity: Not Currently     Partners: Male     Birth control/protection: None     Comment: N/A       Family History   Problem Relation Age of Onset    Heart disease Mother     Hypertension Mother      Hyperlipidemia Mother     Breast cancer Mother     Heart disease Father     Heart attack Sister     Heart disease Sister     Breast cancer Sister     Heart disease Brother     Heart attack Brother     Hyperlipidemia Brother     Diabetes Brother     Heart attack Maternal Grandmother     Heart disease Maternal Grandmother     Heart failure Maternal Grandmother     Heart failure Maternal Grandfather     Heart disease Maternal Grandfather     Heart attack Maternal Grandfather     Heart attack Paternal Grandmother     Heart disease Paternal Grandmother     Heart failure Paternal Grandmother     Hypertension Paternal Grandmother        Review of Systems   Constitutional: Negative for diaphoresis and malaise/fatigue.   Cardiovascular:  Negative for chest pain, claudication, dyspnea on exertion, irregular heartbeat, leg swelling, near-syncope, orthopnea, palpitations, paroxysmal nocturnal dyspnea and syncope.   Respiratory:  Negative for cough, shortness of breath and sleep disturbances due to breathing.    Musculoskeletal:  Negative for falls.   Neurological:  Negative for dizziness and weakness.   Psychiatric/Behavioral:  Negative for altered mental status and substance abuse.        Allergies   Allergen Reactions    Ciprofloxacin Hives and Swelling    Lisinopril Swelling and Other (See Comments)     Cough     Crestor [Rosuvastatin] Other (See Comments)     Muscle weakness    Seasonal Ic [Cholestatin] Other (See Comments)     Cough, congestion         Current Outpatient Medications:     ALPRAZolam (XANAX) 0.25 MG tablet, Take 1 tablet by mouth Daily., Disp: , Rfl:     amLODIPine (NORVASC) 10 MG tablet, Take 1 tablet by mouth Daily., Disp: 90 tablet, Rfl: 0    aspirin 81 MG chewable tablet, Chew 1 tablet Daily., Disp: , Rfl:     cilostazol (PLETAL) 100 MG tablet, Take 1 tablet by mouth 2 (Two) Times a Day., Disp: 60 tablet, Rfl: 60    citalopram (CeleXA) 20 MG tablet, Take 1 tablet by mouth Daily., Disp: 90 tablet, Rfl:  "0    clobetasol (TEMOVATE) 0.05 % external solution, APPLY A FEW DROPS TO THE SCALP UP TO TWICE A DAY AS NEEDED FOR ITCHING/FLARES, Disp: , Rfl:     clopidogrel (Plavix) 75 MG tablet, Take 1 tablet by mouth Daily., Disp: 90 tablet, Rfl: 0    desonide (DESOWEN) 0.05 % ointment, Apply 1 Application topically to the appropriate area as directed., Disp: , Rfl:     ezetimibe (ZETIA) 10 MG tablet, Take 1 tablet by mouth Daily., Disp: 90 tablet, Rfl: 0    fluocinolone acetonide (DERMOTIC) 0.01 % oil otic oil, INSTILL 2 DROPS INTO EACH EAR 2 TIMES A DAY FOR EXTERNAL EAR ECZEMA, Disp: 20 mL, Rfl: 0    levothyroxine (SYNTHROID, LEVOTHROID) 75 MCG tablet, Take 1 tablet by mouth Daily., Disp: 90 tablet, Rfl: 0    losartan (COZAAR) 50 MG tablet, Take 1 tablet by mouth Daily., Disp: 90 tablet, Rfl: 0    lubiprostone (Amitiza) 24 MCG capsule, Take 1 capsule by mouth 2 (Two) Times a Day With Meals. (Patient taking differently: Take 1 capsule by mouth Daily With Breakfast.), Disp: 60 capsule, Rfl: 3    pantoprazole (PROTONIX) 40 MG EC tablet, Take 1 tablet by mouth Daily., Disp: 90 tablet, Rfl: 3    Semaglutide,0.25 or 0.5MG/DOS, (Ozempic, 0.25 or 0.5 MG/DOSE,) 2 MG/3ML solution pen-injector, Inject 0.5 mg under the skin into the appropriate area as directed 1 (One) Time Per Week., Disp: 3 mL, Rfl: 5    spironolactone (ALDACTONE) 25 MG tablet, Take 1 tablet by mouth Daily., Disp: 90 tablet, Rfl: 0    trimethoprim-polymyxin b (POLYTRIM) 63309-3.1 UNIT/ML-% ophthalmic solution, Apply 1 drop to eye(s) as directed by provider. Four days before and four days after eye injection., Disp: , Rfl:         Objective:     Vitals:    04/08/25 1033   BP: 134/70   Pulse: 72   SpO2: 97%   Weight: 88.5 kg (195 lb)   Height: 152.4 cm (60\")     Body mass index is 38.08 kg/m².    PHYSICAL EXAM:    Constitutional:       General: Not in acute distress.     Appearance: Normal appearance. Well-developed.   Eyes:      Pupils: Pupils are equal, round, and " reactive to light.   HENT:      Head: Normocephalic.   Neck:      Vascular: No carotid bruit or JVD.   Pulmonary:      Effort: Pulmonary effort is normal. No tachypnea.      Breath sounds: Normal breath sounds. No wheezing. No rales.   Cardiovascular:      Normal rate. Regular rhythm.      No gallop.    Pulses:     Intact distal pulses.   Edema:     Peripheral edema absent.   Abdominal:      General: Bowel sounds are normal.      Palpations: Abdomen is soft.      Tenderness: There is no abdominal tenderness.   Musculoskeletal: Normal range of motion.      Cervical back: Normal range of motion and neck supple. No edema. Skin:     General: Skin is warm and dry.   Neurological:      Mental Status: Alert and oriented to person, place, and time.           ECG 12 Lead    Date/Time: 4/8/2025 3:48 PM  Performed by: Jackelin Earl APRN    Authorized by: Jackelin Earl APRN  Comparison: compared with previous ECG from 12/27/2023  Similar to previous ECG  Rhythm: sinus rhythm  Rate: normal  ST Depression: V2 and V1  QRS axis: normal    Clinical impression: normal ECG        Lipid Panel          9/25/2024    09:18 3/26/2025    08:08   Lipid Panel   Total Cholesterol 115  175    Triglycerides 146  193    HDL Cholesterol 32  33    VLDL Cholesterol 25  34    LDL Cholesterol  58  108          Assessment/Plan:      1.  Nonobstructive coronary artery disease, continue aspirin 81 mg daily and Plavix 75 mg daily no angina symptoms.    2.  Peripheral vascular disease follows with Dr. Александр Coats.  Currently doing trial of being off statin due to ongoing leg pain.  Also on Pletal...    3.  Dyslipidemia-.  Continue options for lipid-lowering agents.  She has also tried atorvastatin and rosuvastatin in the past.  We may want to consider something like Repatha or Praluent.  Will wait for opinion on vascular surgery.    4.  Essential hypertension continue amlodipine 10 mg daily and spironolactone 25 mg daily and losartan 50 mg  daily.    Follow-up in 6 months sooner if needed.         Your medication list            Accurate as of April 8, 2025 11:17 AM. If you have any questions, ask your nurse or doctor.                CHANGE how you take these medications        Instructions Last Dose Given Next Dose Due   lubiprostone 24 MCG capsule  Commonly known as: Amitiza  What changed: when to take this      Take 1 capsule by mouth 2 (Two) Times a Day With Meals.              CONTINUE taking these medications        Instructions Last Dose Given Next Dose Due   ALPRAZolam 0.25 MG tablet  Commonly known as: XANAX      Take 1 tablet by mouth Daily.       amLODIPine 10 MG tablet  Commonly known as: NORVASC      Take 1 tablet by mouth Daily.       aspirin 81 MG chewable tablet      Chew 1 tablet Daily.       cilostazol 100 MG tablet  Commonly known as: PLETAL      Take 1 tablet by mouth 2 (Two) Times a Day.       citalopram 20 MG tablet  Commonly known as: CeleXA      Take 1 tablet by mouth Daily.       clobetasol 0.05 % external solution  Commonly known as: TEMOVATE      APPLY A FEW DROPS TO THE SCALP UP TO TWICE A DAY AS NEEDED FOR ITCHING/FLARES       clopidogrel 75 MG tablet  Commonly known as: Plavix      Take 1 tablet by mouth Daily.       desonide 0.05 % ointment  Commonly known as: DESOWEN      Apply 1 Application topically to the appropriate area as directed.       ezetimibe 10 MG tablet  Commonly known as: ZETIA      Take 1 tablet by mouth Daily.       fluocinolone acetonide 0.01 % oil otic oil  Commonly known as: DERMOTIC      INSTILL 2 DROPS INTO EACH EAR 2 TIMES A DAY FOR EXTERNAL EAR ECZEMA       levothyroxine 75 MCG tablet  Commonly known as: SYNTHROID, LEVOTHROID      Take 1 tablet by mouth Daily.       losartan 50 MG tablet  Commonly known as: COZAAR      Take 1 tablet by mouth Daily.       Ozempic (0.25 or 0.5 MG/DOSE) 2 MG/3ML solution pen-injector  Generic drug: Semaglutide(0.25 or 0.5MG/DOS)      Inject 0.5 mg under the skin into  the appropriate area as directed 1 (One) Time Per Week.       pantoprazole 40 MG EC tablet  Commonly known as: PROTONIX      Take 1 tablet by mouth Daily.       spironolactone 25 MG tablet  Commonly known as: ALDACTONE      Take 1 tablet by mouth Daily.       trimethoprim-polymyxin b 01523-2.1 UNIT/ML-% ophthalmic solution  Commonly known as: POLYTRIM      Apply 1 drop to eye(s) as directed by provider. Four days before and four days after eye injection.                  As always, it has been a pleasure to participate in your patient's care.      Sincerely,     Jackelin BROCK

## 2025-04-09 ENCOUNTER — OFFICE VISIT (OUTPATIENT)
Dept: FAMILY MEDICINE CLINIC | Facility: CLINIC | Age: 70
End: 2025-04-09
Payer: MEDICARE

## 2025-04-09 VITALS
WEIGHT: 193 LBS | HEART RATE: 72 BPM | DIASTOLIC BLOOD PRESSURE: 70 MMHG | BODY MASS INDEX: 37.89 KG/M2 | SYSTOLIC BLOOD PRESSURE: 126 MMHG | OXYGEN SATURATION: 99 % | TEMPERATURE: 97.5 F | HEIGHT: 60 IN

## 2025-04-09 DIAGNOSIS — I67.5 MOYAMOYA DISEASE: ICD-10-CM

## 2025-04-09 DIAGNOSIS — E55.9 VITAMIN D DEFICIENCY: ICD-10-CM

## 2025-04-09 DIAGNOSIS — I25.10 CORONARY ARTERY DISEASE INVOLVING NATIVE CORONARY ARTERY OF NATIVE HEART WITHOUT ANGINA PECTORIS: ICD-10-CM

## 2025-04-09 DIAGNOSIS — H60.8X3 CHRONIC ECZEMATOUS OTITIS EXTERNA OF BOTH EARS: ICD-10-CM

## 2025-04-09 DIAGNOSIS — F41.1 GENERALIZED ANXIETY DISORDER: ICD-10-CM

## 2025-04-09 DIAGNOSIS — F41.0 PANIC DISORDER: ICD-10-CM

## 2025-04-09 DIAGNOSIS — G47.33 OSA TREATED WITH BIPAP: ICD-10-CM

## 2025-04-09 DIAGNOSIS — I50.31 ACUTE DIASTOLIC CHF (CONGESTIVE HEART FAILURE): ICD-10-CM

## 2025-04-09 DIAGNOSIS — E78.2 MIXED HYPERLIPIDEMIA: ICD-10-CM

## 2025-04-09 DIAGNOSIS — E03.9 ACQUIRED HYPOTHYROIDISM: ICD-10-CM

## 2025-04-09 DIAGNOSIS — I10 ESSENTIAL HYPERTENSION: Primary | ICD-10-CM

## 2025-04-09 DIAGNOSIS — K21.9 GASTROESOPHAGEAL REFLUX DISEASE WITHOUT ESOPHAGITIS: ICD-10-CM

## 2025-04-09 DIAGNOSIS — N18.31 STAGE 3A CHRONIC KIDNEY DISEASE: ICD-10-CM

## 2025-04-09 DIAGNOSIS — R29.898 BILATERAL LEG WEAKNESS: ICD-10-CM

## 2025-04-09 DIAGNOSIS — E11.3293 TYPE 2 DIABETES MELLITUS WITH BOTH EYES AFFECTED BY MILD NONPROLIFERATIVE RETINOPATHY WITHOUT MACULAR EDEMA, WITHOUT LONG-TERM CURRENT USE OF INSULIN: ICD-10-CM

## 2025-04-09 DIAGNOSIS — I77.9 PERIPHERAL ARTERIAL OCCLUSIVE DISEASE: ICD-10-CM

## 2025-04-09 DIAGNOSIS — I65.21 STENOSIS OF RIGHT CAROTID ARTERY: ICD-10-CM

## 2025-04-09 DIAGNOSIS — Z87.891 EX-CIGARETTE SMOKER: ICD-10-CM

## 2025-04-09 DIAGNOSIS — I65.23 ATHEROSCLEROSIS OF BOTH CAROTID ARTERIES: ICD-10-CM

## 2025-04-09 DIAGNOSIS — E66.01 MORBID EXOGENOUS OBESITY: ICD-10-CM

## 2025-04-09 DIAGNOSIS — I70.213 ATHEROSCLEROSIS OF NATIVE ARTERY OF BOTH LOWER EXTREMITIES WITH INTERMITTENT CLAUDICATION: ICD-10-CM

## 2025-04-09 RX ORDER — CLOPIDOGREL BISULFATE 75 MG/1
TABLET ORAL
Qty: 180 TABLET | Refills: 1 | Status: SHIPPED | OUTPATIENT
Start: 2025-04-09

## 2025-04-09 RX ORDER — PITAVASTATIN CALCIUM 2.09 MG/1
2 TABLET, FILM COATED ORAL NIGHTLY
Qty: 90 TABLET | Refills: 1 | Status: SHIPPED | OUTPATIENT
Start: 2025-04-09

## 2025-04-09 RX ORDER — FLUOCINOLONE ACETONIDE 0.11 MG/ML
OIL AURICULAR (OTIC)
Qty: 60 ML | Refills: 6 | Status: SHIPPED | OUTPATIENT
Start: 2025-04-09

## 2025-04-09 NOTE — PROGRESS NOTES
Subjective   Rosalee Hargrove is a 69 y.o. female with   Chief Complaint   Patient presents with    Hypertension     Labs prior    Diabetes    Hyperlipidemia    Hypothyroidism    Chronic Kidney Disease   .    Hypertension    Diabetes  Hypoglycemia symptoms include nervousness/anxiousness.   Hyperlipidemia  Exacerbating diseases include hypothyroidism.   Hypothyroidism  Symptoms include anxiety. Her past medical history is significant for hyperlipidemia.   Chronic Kidney Disease     69-year-old white female with multiple medical issues here for further medical management.  Patient with extensive cardiovascular disease including carotid artery disease, CAD and peripheral arterial disease.  She also has history of moyamoya.  There is history of hypertension, hyperlipidemia, chronic kidney disease and hypothyroidism.  She is also a type II non-insulin-dependent diabetic.  She has been using semaglutide at 0.5 mg weekly.  She has slowly titrated this dose to this level and over time has lost nearly 40 pounds.  Fasting sugars are now running in the 110-120 range.  Other medications include amlodipine, alprazolam, Pletal as well as Celexa.  She is using Plavix at 75 mg twice daily as per the neurosurgeon discovering moyamoya.  She however has not been able to get it as such as the pharmacy has only been granting 1 a day.  She is using Zetia, levothyroxine as well as losartan.  Amitiza is used on an as-needed basis and she is also using regularly pantoprazole as well as spironolactone.  All medications and supplements are used appropriately and are well tolerated without side effects.  Fasting labs have been acquired prior to this visit.  She also has history of eczema and the external auditory canals and has been given an ophthalmic solution for her ears as she has had difficulty getting this filled.  She also request a referral to physical therapy for ongoing leg weakness.  Last year she had completed a course of therapy  and states that it was helpful.  She would like to go back and improve on this status.  The following portions of the patient's history were reviewed and updated as appropriate: allergies, current medications, past family history, past medical history, past social history, past surgical history and problem list.    Review of Systems   Cardiovascular:         CAD, PAD, carotid artery disease, hypertension, hyperlipidemia, moyamoya   Endocrine:        Exogenous obesity, vitamin D deficiency, type II non-insulin-dependent diabetes mellitus, hypothyroidism   Genitourinary:         Chronic kidney disease   Psychiatric/Behavioral:  Positive for dysphoric mood. The patient is nervous/anxious.        Objective     Vitals:    04/09/25 0842   BP: 126/70   Pulse: 72   Temp: 97.5 °F (36.4 °C)   SpO2: 99%       Recent Results (from the past 4 weeks)   Comprehensive Metabolic Panel    Collection Time: 03/26/25  8:08 AM    Specimen: Blood   Result Value Ref Range    Glucose 99 65 - 99 mg/dL    BUN 25 (H) 8 - 23 mg/dL    Creatinine 1.59 (H) 0.57 - 1.00 mg/dL    EGFR Result 34.6 (L) >60.0 mL/min/1.73    BUN/Creatinine Ratio 15.7 7.0 - 25.0    Sodium 140 136 - 145 mmol/L    Potassium 4.6 3.5 - 5.2 mmol/L    Chloride 105 98 - 107 mmol/L    Total CO2 24.0 22.0 - 29.0 mmol/L    Calcium 9.0 8.6 - 10.5 mg/dL    Total Protein 6.6 6.0 - 8.5 g/dL    Albumin 4.2 3.5 - 5.2 g/dL    Globulin 2.4 gm/dL    A/G Ratio 1.8 g/dL    Total Bilirubin 0.3 0.0 - 1.2 mg/dL    Alkaline Phosphatase 131 (H) 39 - 117 U/L    AST (SGOT) 17 1 - 32 U/L    ALT (SGPT) 16 1 - 33 U/L   TSH    Collection Time: 03/26/25  8:08 AM    Specimen: Blood   Result Value Ref Range    TSH 1.550 0.270 - 4.200 uIU/mL   Vitamin D,25-Hydroxy    Collection Time: 03/26/25  8:08 AM    Specimen: Blood   Result Value Ref Range    25 Hydroxy, Vitamin D 16.7 (L) 30.0 - 100.0 ng/ml   Lipid Panel    Collection Time: 03/26/25  8:08 AM    Specimen: Blood   Result Value Ref Range    Total  Cholesterol 175 0 - 200 mg/dL    Triglycerides 193 (H) 0 - 150 mg/dL    HDL Cholesterol 33 (L) 40 - 60 mg/dL    VLDL Cholesterol [place] 34 5 - 40 mg/dL    LDL Chol Calc (NIH) 108 (H) 0 - 100 mg/dL   Hemoglobin A1c    Collection Time: 03/26/25  8:08 AM    Specimen: Blood   Result Value Ref Range    Hemoglobin A1C 5.80 (H) 4.80 - 5.60 %   CBC & Differential    Collection Time: 03/26/25  8:08 AM    Specimen: Blood   Result Value Ref Range    WBC 7.76 3.40 - 10.80 10*3/mm3    RBC 4.59 3.77 - 5.28 10*6/mm3    Hemoglobin 13.8 12.0 - 15.9 g/dL    Hematocrit 40.0 34.0 - 46.6 %    MCV 87.1 79.0 - 97.0 fL    MCH 30.1 26.6 - 33.0 pg    MCHC 34.5 31.5 - 35.7 g/dL    RDW 15.8 (H) 12.3 - 15.4 %    Platelets 319 140 - 450 10*3/mm3    Neutrophil Rel % 62.6 42.7 - 76.0 %    Lymphocyte Rel % 24.5 19.6 - 45.3 %    Monocyte Rel % 5.7 5.0 - 12.0 %    Eosinophil Rel % 6.1 0.3 - 6.2 %    Basophil Rel % 0.6 0.0 - 1.5 %    Neutrophils Absolute 4.86 1.70 - 7.00 10*3/mm3    Lymphocytes Absolute 1.90 0.70 - 3.10 10*3/mm3    Monocytes Absolute 0.44 0.10 - 0.90 10*3/mm3    Eosinophils Absolute 0.47 (H) 0.00 - 0.40 10*3/mm3    Basophils Absolute 0.05 0.00 - 0.20 10*3/mm3    Immature Granulocyte Rel % 0.5 0.0 - 0.5 %    Immature Grans Absolute 0.04 0.00 - 0.05 10*3/mm3    nRBC 0.0 0.0 - 0.2 /100 WBC   Microalbumin / Creatinine Urine Ratio - Urine, Clean Catch    Collection Time: 03/26/25  8:08 AM    Specimen: Urine, Clean Catch   Result Value Ref Range    Creatinine, Urine 91.7 Not Estab. mg/dL    Microalbumin, Urine 500.6 Not Estab. ug/mL    Microalbumin/Creatinine Ratio 546 (H) 0 - 29 mg/g creat       Physical Exam  Vitals and nursing note reviewed.   Constitutional:       Appearance: Normal appearance. She is well-developed and well-groomed. She is obese.      Comments: Exogenous obesity with a BMI of 37.7   HENT:      Head: Normocephalic and atraumatic.   Neck:      Thyroid: No thyroid mass or thyromegaly.      Vascular: Normal carotid pulses. No  carotid bruit.      Trachea: Trachea and phonation normal.   Cardiovascular:      Rate and Rhythm: Normal rate and regular rhythm.      Heart sounds: Normal heart sounds. No murmur heard.     No friction rub. No gallop.   Pulmonary:      Effort: Pulmonary effort is normal. No respiratory distress.      Breath sounds: Normal breath sounds. No decreased breath sounds, wheezing, rhonchi or rales.   Musculoskeletal:      Cervical back: Neck supple.   Lymphadenopathy:      Cervical: No cervical adenopathy.   Skin:     General: Skin is warm and dry.      Findings: No rash.   Neurological:      Mental Status: She is alert and oriented to person, place, and time.   Psychiatric:         Attention and Perception: Attention and perception normal.         Mood and Affect: Mood and affect normal.         Speech: Speech normal.         Behavior: Behavior normal. Behavior is cooperative.         Thought Content: Thought content normal.         Cognition and Memory: Cognition and memory normal.         Judgment: Judgment normal.         Assessment & Plan   Diagnoses and all orders for this visit:    1. Essential hypertension (Primary)  -     Comprehensive metabolic panel; Future  -     Vitamin D 25 hydroxy; Future  -     TSH; Future  -     Lipid panel; Future  -     Hemoglobin A1c; Future  -     CBC w AUTO Differential; Future    2. Mixed hyperlipidemia  -     Comprehensive metabolic panel; Future  -     Vitamin D 25 hydroxy; Future  -     TSH; Future  -     Lipid panel; Future  -     Hemoglobin A1c; Future  -     CBC w AUTO Differential; Future    3. Stenosis of right carotid artery  -     Comprehensive metabolic panel; Future  -     Vitamin D 25 hydroxy; Future  -     TSH; Future  -     Lipid panel; Future  -     Hemoglobin A1c; Future  -     CBC w AUTO Differential; Future    4. Acute diastolic CHF (congestive heart failure)  -     Comprehensive metabolic panel; Future  -     Vitamin D 25 hydroxy; Future  -     TSH; Future  -      Lipid panel; Future  -     Hemoglobin A1c; Future  -     CBC w AUTO Differential; Future    5. Acquired hypothyroidism  -     Comprehensive metabolic panel; Future  -     Vitamin D 25 hydroxy; Future  -     TSH; Future  -     Lipid panel; Future  -     Hemoglobin A1c; Future  -     CBC w AUTO Differential; Future    6. Morbid exogenous obesity  -     Comprehensive metabolic panel; Future  -     Vitamin D 25 hydroxy; Future  -     TSH; Future  -     Lipid panel; Future  -     Hemoglobin A1c; Future  -     CBC w AUTO Differential; Future    7. 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  Dispense: 9 mL; Refill: 1  -     Comprehensive metabolic panel; Future  -     Vitamin D 25 hydroxy; Future  -     TSH; Future  -     Lipid panel; Future  -     Hemoglobin A1c; Future  -     CBC w AUTO Differential; Future    8. Vitamin D deficiency  -     Comprehensive metabolic panel; Future  -     Vitamin D 25 hydroxy; Future  -     TSH; Future  -     Lipid panel; Future  -     Hemoglobin A1c; Future  -     CBC w AUTO Differential; Future    9. Gastroesophageal reflux disease without esophagitis  -     Comprehensive metabolic panel; Future  -     Vitamin D 25 hydroxy; Future  -     TSH; Future  -     Lipid panel; Future  -     Hemoglobin A1c; Future  -     CBC w AUTO Differential; Future    10. Stage 3a chronic kidney disease  -     Comprehensive metabolic panel; Future  -     Vitamin D 25 hydroxy; Future  -     TSH; Future  -     Lipid panel; Future  -     Hemoglobin A1c; Future  -     CBC w AUTO Differential; Future    11. Generalized anxiety disorder  -     Comprehensive metabolic panel; Future  -     Vitamin D 25 hydroxy; Future  -     TSH; Future  -     Lipid  panel; Future  -     Hemoglobin A1c; Future  -     CBC w AUTO Differential; Future    12. Panic disorder  -     Comprehensive metabolic panel; Future  -     Vitamin D 25 hydroxy; Future  -     TSH; Future  -     Lipid panel; Future  -     Hemoglobin A1c; Future  -     CBC w AUTO Differential; Future    13. Bilateral leg weakness  -     Comprehensive metabolic panel; Future  -     Vitamin D 25 hydroxy; Future  -     TSH; Future  -     Lipid panel; Future  -     Hemoglobin A1c; Future  -     CBC w AUTO Differential; Future    14. Chronic eczematous otitis externa of both ears  -     fluocinolone acetonide (DERMOTIC) 0.01 % oil otic oil; INSTILL 2 DROPS INTO EACH EAR 2 TIMES A DAY FOR EXTERNAL EAR ECZEMA  Dispense: 60 mL; Refill: 6  -     Comprehensive metabolic panel; Future  -     Vitamin D 25 hydroxy; Future  -     TSH; Future  -     Lipid panel; Future  -     Hemoglobin A1c; Future  -     CBC w AUTO Differential; Future    15. Moyamoya disease  -     Comprehensive metabolic panel; Future  -     Vitamin D 25 hydroxy; Future  -     TSH; Future  -     Lipid panel; Future  -     Hemoglobin A1c; Future  -     CBC w AUTO Differential; Future    16. Atherosclerosis of both carotid arteries  -     Comprehensive metabolic panel; Future  -     Vitamin D 25 hydroxy; Future  -     TSH; Future  -     Lipid panel; Future  -     Hemoglobin A1c; Future  -     CBC w AUTO Differential; Future    17. Atherosclerosis of native artery of both lower extremities with intermittent claudication  -     Comprehensive metabolic panel; Future  -     Vitamin D 25 hydroxy; Future  -     TSH; Future  -     Lipid panel; Future  -     Hemoglobin A1c; Future  -     CBC w AUTO Differential; Future    18. Coronary artery disease involving native coronary artery of native heart without angina pectoris  -     clopidogrel (Plavix) 75 MG tablet; 1 po BID  Dispense: 180 tablet; Refill: 1  -     Comprehensive metabolic panel; Future  -     Vitamin D 25  hydroxy; Future  -     TSH; Future  -     Lipid panel; Future  -     Hemoglobin A1c; Future  -     CBC w AUTO Differential; Future    19. TRI treated with BiPAP  -     Comprehensive metabolic panel; Future  -     Vitamin D 25 hydroxy; Future  -     TSH; Future  -     Lipid panel; Future  -     Hemoglobin A1c; Future  -     CBC w AUTO Differential; Future    20. Ex-cigarette smoker  -     Comprehensive metabolic panel; Future  -     Vitamin D 25 hydroxy; Future  -     TSH; Future  -     Lipid panel; Future  -     Hemoglobin A1c; Future  -     CBC w AUTO Differential; Future    21. Peripheral arterial occlusive disease  -     clopidogrel (Plavix) 75 MG tablet; 1 po BID  Dispense: 180 tablet; Refill: 1  -     Comprehensive metabolic panel; Future  -     Vitamin D 25 hydroxy; Future  -     TSH; Future  -     Lipid panel; Future  -     Hemoglobin A1c; Future  -     CBC w AUTO Differential; Future        Return in about 6 months (around 10/9/2025) for Recheck.

## 2025-04-10 ENCOUNTER — TELEPHONE (OUTPATIENT)
Dept: FAMILY MEDICINE CLINIC | Facility: CLINIC | Age: 70
End: 2025-04-10
Payer: MEDICARE

## 2025-04-10 RX ORDER — FLUTICASONE PROPIONATE 50 MCG
2 SPRAY, SUSPENSION (ML) NASAL DAILY
Qty: 16 G | Refills: 5 | Status: SHIPPED | OUTPATIENT
Start: 2025-04-10

## 2025-04-16 ENCOUNTER — TREATMENT (OUTPATIENT)
Dept: PHYSICAL THERAPY | Facility: CLINIC | Age: 70
End: 2025-04-16
Payer: MEDICARE

## 2025-04-16 DIAGNOSIS — R29.898 WEAKNESS OF BOTH LOWER EXTREMITIES: Primary | ICD-10-CM

## 2025-04-16 DIAGNOSIS — R26.2 DIFFICULTY WALKING: ICD-10-CM

## 2025-04-16 PROCEDURE — 97110 THERAPEUTIC EXERCISES: CPT | Performed by: PHYSICAL THERAPIST

## 2025-04-16 PROCEDURE — 97162 PT EVAL MOD COMPLEX 30 MIN: CPT | Performed by: PHYSICAL THERAPIST

## 2025-04-16 PROCEDURE — 97530 THERAPEUTIC ACTIVITIES: CPT | Performed by: PHYSICAL THERAPIST

## 2025-04-16 NOTE — PROGRESS NOTES
Physical Therapy Initial Evaluation and Plan of Care    [facility]  [address] [place] [address]  [phone] (phone)  [phone] (fax)    Patient: Rosalee Hargrove   : 1955  Diagnosis/ICD-10 Code:  Weakness of both lower extremities [R29.898]  Referring practitioner: Eddie Slater DO  Date of Initial Visit: 2025  Today's Date:  2025  Patient seen for 1 sessions           Past Medical History:   Diagnosis Date    Afib 2021    Anemia     Anxiety     Arthritis     Atheroembolism of other site 2015    Atherosclerosis of aorta 2015    Atherosclerosis of native arteries of extremities with intermittent claudication, bilateral legs 2019    PVD    Bilateral carotid artery stenosis 2019    Bilateral leg edema     Carotid artery stenosis 2016    Right    Cataract 2022    Chest pain on breathing 2017    Cholelithiasis     Colon polyp 2023    Depression     Dizziness     GERD (gastroesophageal reflux disease)     Glaucoma     Headache     Kidney stone     Localized edema 2015    Localized edema 2016    Orthostatic hypotension 2016    Pancreatitis     Peripheral vascular disease with claudication 2015    bilateral legs    Rectal bleeding 2022    Added automatically from request for surgery [id number]    Shortness of breath     Sleep apnea     Stroke 2021    Tobacco abuse     Urinary tract infection     Varicose veins of bilateral lower extremities with pain 2021    Visual impairment     Macular degeneration    Vitamin D deficiency         Past Surgical History:   Procedure Laterality Date    ANAL FISSURECTOMY      BRAIN SURGERY      temporaL ARTERY ON RIGHT SIDE    CARDIAC CATHETERIZATION N/A 2016    Procedure: Coronary angiography;  Surgeon: Fredrick Kapoor MD;  Location: [facility] CATH INVASIVE LOCATION;  Service:     CARDIAC CATHETERIZATION N/A 2016    Procedure: Left heart  "cath;  Surgeon: Fredrick Kapoor MD;  Location:  [facility] CATH INVASIVE LOCATION;  Service:     CARDIAC CATHETERIZATION N/A 2016    Procedure: Left ventriculography;  Surgeon: Fredrick Kapoor MD;  Location:  [facility] CATH INVASIVE LOCATION;  Service:     CARDIAC CATHETERIZATION N/A 2016    Procedure: Right heart cath;  Surgeon: Fredrick Kapoor MD;  Location:  [facility] CATH INVASIVE LOCATION;  Service:      SECTION      CHOLECYSTECTOMY      COLONOSCOPY N/A 2022    Procedure: COLONOSCOPY with Polypectomy;  Surgeon: Sarwat Church MD;  Location: [facility];  Service: Gastroenterology;  Laterality: N/A;  Rectal polyps x3 and Hemorrhoids    CORONARY ANGIOPLASTY WITH STENT PLACEMENT Right 2021    ENDOSCOPY N/A 2022    Procedure: ESOPHAGOGASTRODUODENOSCOPY;  Surgeon: Sarwat Church MD;  Location: [facility] MAIN OR;  Service: Gastroenterology;  Laterality: N/A;  Small Hiatal Hernia    ENDOSCOPY N/A 2025    Procedure: ESOPHAGOGASTRODUODENOSCOPY WITH BIOPSY;  Surgeon: Hayden Lopez MD;  Location: [facility] OR;  Service: Gastroenterology;  Laterality: N/A;  reflux esophagitis-biopsy    ERCP      FRACTURE SURGERY      arm    HYSTERECTOMY      ILIAC ARTERY STENT      SUBTOTAL HYSTERECTOMY      TONSILLECTOMY          Subjective Evaluation    History of Present Illness  Mechanism of injury: Pt has a hx of stroke w/ subsequent B UE and LE weakness.   Pt presents today w/ chronic BLE weakness that began three years ago after a TIA and two strokes. The stroke caused UE and LE weakness w/ her L side being worse. She now consistently has a feeling of disconnect w/ her legs and has to frequently take a moment prior to walking to regain feeling in her legs. Her legs will also feel heavy and like she can't \"make them go.\"     Pt's MD would like for her to walk more, but she is unable to ambulate for extended periods of time w/o rest breaks. Presently, she is able to tolerate 10 minutes of walking " "before she requires a break. The decreased tolerance has prevented her from cooking, which was a big hobby for her before.    PMH: PAD, TIA, 2 strokes    PLOF: She has had weakness since the stroke    Hobbies: She loves to cook      Patient Occupation: Retired Quality of life: good    Pain  Current pain ratin  At best pain ratin  At worst pain ratin  Location: Bilateral hips and knees  Quality: sharp and dull ache  Relieving factors: change in position, rest and relaxation  Aggravating factors: ambulation, squatting, stairs, standing and movement  Progression: worsening    Social Support  Lives in: one-story house (3 stairs)  Lives with: spouse    Diagnostic Tests  X-ray: normal    Treatments  Previous treatment: physical therapy  Patient Goals  Patient goals for therapy: decreased pain, increased strength, independence with ADLs/IADLs, improved balance, increased motion and return to sport/leisure activities  Patient goal: \"Be able to walk w/o stopping and easily walk stairs\"             Objective          Neurological Testing     Sensation     Lumbar   Left   Intact: light touch    Right   Intact: light touch    Active Range of Motion   Left Hip   Flexion: 95 degrees     Right Hip   Flexion: 91 degrees   Left Knee   Flexion: 121 degrees     Right Knee   Flexion: 119 degrees     Strength/Myotome Testing     Left Hip   Planes of Motion   Flexion: 3+  Abduction: 4-    Right Hip   Planes of Motion   Flexion: 4  Abduction: 4    Left Knee   Flexion: 4  Extension: 4+    Right Knee   Flexion: 5  Extension: 5    Left Ankle/Foot   Dorsiflexion: 5    Right Ankle/Foot   Dorsiflexion: 5    Functional Assessment     Comments  30 Second STS: 10 reps  Single leg balance: 4 sec on L, 3 sec on R        See Exercise, Manual, and Modality Logs for complete treatment.       Functional Outcome Score: LEFS= 16         Assessment & Plan       Assessment  Impairments: abnormal gait, abnormal muscle tone, abnormal or restricted " ROM, activity intolerance, impaired balance, impaired physical strength, lacks appropriate home exercise program, pain with function and safety issue   Functional limitations: sleeping, walking, uncomfortable because of pain, sitting and standing   Assessment details: Rosalee Hargrove is a 69 y.o. year-old female referred to physical therapy for BLE weakness. She presents with a stable clinical presentation.  She has comorbidities PAD, a hx of strokes, A-fib, and [place]  and personal factors such as having to ascend/descend stairs when leaving home that may affect her progress in the plan of care.  Signs and symptoms are consistent with physical therapy diagnosis of BLE weakness. Patient is appropriate for skilled physical therapy in order to reduce pain and increase ease with daily mobility.     Prognosis: fair    Goals  Plan Goals: Goals  Plan Goals: STGs to be completed within 30 days:  -Patient will demonstrate compliance and independence with initial HEP  -Patient will increase bilateral hip flexion AROM to 100 degrees to help normalize gait mechanics and increase ease with transfers  -Patient will increase walking tolerance from 10 minutes to 15 minutes to increase ability w/ completing daily activities.  -Patient will improve score on LEFS from 16 at eval to 30 or greater to improve quality of life     LTGs to be completed within 90 days:  -Patient will increase bilateral hip flexion AROM to 110 or more degrees to help normalize gait mechanics and increase ease with transfers  -Patient will increase L hip strength to 4/5 to promote safety and independence w/ ambulation and stairs.  -Patient will improve score on LEFS from 16 at eval to 45 or greater to improve quality of life  -Patient will increase walking tolerance from 10 minutes at eval to 30 minutes to increase ability w/ completing daily activities.      Plan  Therapy options: will be seen for skilled therapy services  Planned modality interventions:  cryotherapy, dry needling, iontophoresis, low level laser therapy, TENS, traction, ultrasound, electrical stimulation/Russian stimulation and thermotherapy (hydrocollator packs)  Planned therapy interventions: ADL retraining, balance/weight-bearing training, manual therapy, postural training, soft tissue mobilization, strengthening, stretching, therapeutic activities, gait training, functional ROM exercises, home exercise program, flexibility, IADL retraining, spinal/joint mobilization, neuromuscular re-education, motor coordination training and transfer training  Frequency: 2x week (36 Visits)  Treatment plan discussed with: patient  Plan details: Physical Therapy for LE strength/ROM, balance, flexibility, and gait training.        Timed:  Manual Therapy:         mins  66633;  Therapeutic Exercise:    20     mins  32301;     Neuromuscular Hilary:        mins  63566;    Therapeutic Activity:     10     mins  04152;     Gait Training:           mins  16525;     Ultrasound:          mins  08621;    Iontophoresis         mins 53043;  Self Care         mins 58857    Untimed:  Electrical Stimulation:         mins  55870 ( );  Traction:       mins  39560;   Dry Needling   (1-2 muscles)   _     mins 03747 (Self-pay)  Dry Needling (3-4 muscles)  _     mins 00243 (Self-pay)  Dry Needling Trial    _     mins DRYNDLTRIAL  (No Charge)  Low Eval          Mins  04422  Mod Eval     20     Mins  16069  High Eval                            Mins  66737  Re- Eval                           Mins  03306    Timed Treatment:   30   mins   Total Treatment:     50   mins    PT SIGNATURE: Chelita Shabazz PT     License Number: [id number]    Electronically signed by Jarek Moore PT Student, 04/16/25, 8:50 AM EDT    DATE TREATMENT INITIATED: 4/16/2025    Initial Certification  Certification Period: 7/15/2025  I certify that the therapy services are furnished while this patient is under my care.  The services outlined above are required by  this patient, and will be reviewed every 90 days.     PHYSICIAN: Eddie Slater DO   NPI: [id number]                                         DATE:     Please sign and return via fax to [phone] Thank you, [facility].

## 2025-04-23 ENCOUNTER — TREATMENT (OUTPATIENT)
Dept: PHYSICAL THERAPY | Facility: CLINIC | Age: 70
End: 2025-04-23
Payer: MEDICARE

## 2025-04-23 DIAGNOSIS — R29.898 WEAKNESS OF BOTH LOWER EXTREMITIES: Primary | ICD-10-CM

## 2025-04-23 DIAGNOSIS — R26.2 DIFFICULTY WALKING: ICD-10-CM

## 2025-04-23 PROCEDURE — 97110 THERAPEUTIC EXERCISES: CPT | Performed by: PHYSICAL THERAPIST

## 2025-04-23 PROCEDURE — 97530 THERAPEUTIC ACTIVITIES: CPT | Performed by: PHYSICAL THERAPIST

## 2025-04-23 NOTE — PROGRESS NOTES
Physical Therapy Daily Treatment Note  [facility]  [address], [place] [id number] [phone] (phone)  [phone] (fax)    Patient: Rosalee Hargrove   : 1955  Diagnosis/ICD-10 Code:  Weakness of both lower extremities [R29.898]  Referring practitioner: Eddie Slater DO  Date of Initial Visit: Type: THERAPY  Noted: 2025  Today's Date: 2025  Patient seen for 2 sessions       Rosalee Hargrove reports: over worked myself this weekend out in my yard. Tired but doing okay.     Subjective     Objective   See Exercise, Manual, and Modality Logs for complete treatment.       Assessment/Plan  Subjectively, pt reports no increase of pain or discomfort with interventions performed today. Performed well with continued functional LE strengthening interventions. Continues to demonstrate more fatigue and difficulty with exercise on L LE vs R LE. Added hip adduction and increased repetitions with some exercises.  Continues to benefit from verbal/tactile cues to ensure proper form and technique for exercise performance.     Progress per Plan of Care           Manual Therapy:         mins  79227;  Therapeutic Exercise:    15     mins  47650;     Neuromuscular Hilary:        mins  51257;    Therapeutic Activity:     15     mins  87308;     Gait Training:           mins  81336;     Ultrasound:          mins  50650;    Electrical Stimulation:         mins  86375;  Traction          mins 04472    Timed Treatment:   30   mins   Total Treatment:     30   mins    Leyda Hernandez PTA  Physical Therapist Assistant [id number]

## 2025-04-29 ENCOUNTER — TREATMENT (OUTPATIENT)
Dept: PHYSICAL THERAPY | Facility: CLINIC | Age: 70
End: 2025-04-29
Payer: MEDICARE

## 2025-04-29 DIAGNOSIS — R26.2 DIFFICULTY WALKING: ICD-10-CM

## 2025-04-29 DIAGNOSIS — R29.898 WEAKNESS OF BOTH LOWER EXTREMITIES: Primary | ICD-10-CM

## 2025-04-29 PROCEDURE — 97110 THERAPEUTIC EXERCISES: CPT | Performed by: PHYSICAL THERAPIST

## 2025-04-29 PROCEDURE — 97530 THERAPEUTIC ACTIVITIES: CPT | Performed by: PHYSICAL THERAPIST

## 2025-04-29 NOTE — PROGRESS NOTES
Physical Therapy Daily Treatment Note      Patient: Rosalee Hargrove   : 1955  Referring practitioner: Eddie Slater DO  Date of Initial Visit: Type: THERAPY  Noted: 2025  Today's Date:  2025  Patient seen for 3 sessions         Rosalee Hargrove reports: she was a little sore after last session; can't get up off the couch after lying with legs extended for about 10-15 min and then has to move positions.               Objective   See Exercise, Manual, and Modality Logs for complete treatment.       Assessment/Plan  Pt able to tolerate increased weight with SAQ and added bridges ans squats today to continue to progress LE strength and core stability to improve ease of functional mobility.  Pt denies any increased pain but just muscle fatigue.       Progress per Plan of Care and Progress strengthening /stabilization /functional activity           Timed:  Manual Therapy:    -     mins  87603;  Therapeutic Exercise:    20     mins  26285;     Neuromuscular Hilary:    -    mins  72047;    Therapeutic Activity:     10     mins  70474;     Gait Training:      -     mins  11271;     Ultrasound:     -     mins  39223;      Untimed:  Electrical Stimulation:    -     mins  65355 ( );  Mechanical Traction:    -     mins  23407;   Dry needling:      -     mins  68039/    Timed Treatment:   30   mins   Total Treatment:     30   mins  Destinee Maria PT  Physical Therapist    License #: [id number]

## 2025-05-01 ENCOUNTER — OFFICE VISIT (OUTPATIENT)
Dept: FAMILY MEDICINE CLINIC | Facility: CLINIC | Age: 70
End: 2025-05-01
Payer: MEDICARE

## 2025-05-01 VITALS
SYSTOLIC BLOOD PRESSURE: 126 MMHG | BODY MASS INDEX: 37.66 KG/M2 | WEIGHT: 191.8 LBS | DIASTOLIC BLOOD PRESSURE: 66 MMHG | HEART RATE: 79 BPM | HEIGHT: 60 IN | TEMPERATURE: 97.3 F | OXYGEN SATURATION: 100 %

## 2025-05-01 DIAGNOSIS — B35.6 TINEA CRURIS: Primary | ICD-10-CM

## 2025-05-01 DIAGNOSIS — J30.1 SEASONAL ALLERGIC RHINITIS DUE TO POLLEN: ICD-10-CM

## 2025-05-01 DIAGNOSIS — L40.9 PSORIASIS: ICD-10-CM

## 2025-05-01 PROCEDURE — 3078F DIAST BP <80 MM HG: CPT | Performed by: FAMILY MEDICINE

## 2025-05-01 PROCEDURE — 3074F SYST BP LT 130 MM HG: CPT | Performed by: FAMILY MEDICINE

## 2025-05-01 PROCEDURE — 3044F HG A1C LEVEL LT 7.0%: CPT | Performed by: FAMILY MEDICINE

## 2025-05-01 PROCEDURE — 99213 OFFICE O/P EST LOW 20 MIN: CPT | Performed by: FAMILY MEDICINE

## 2025-05-01 PROCEDURE — 1160F RVW MEDS BY RX/DR IN RCRD: CPT | Performed by: FAMILY MEDICINE

## 2025-05-01 PROCEDURE — 1126F AMNT PAIN NOTED NONE PRSNT: CPT | Performed by: FAMILY MEDICINE

## 2025-05-01 PROCEDURE — 1159F MED LIST DOCD IN RCRD: CPT | Performed by: FAMILY MEDICINE

## 2025-05-01 RX ORDER — TERBINAFINE HYDROCHLORIDE 250 MG/1
250 TABLET ORAL DAILY
Qty: 90 TABLET | Refills: 0 | Status: SHIPPED | OUTPATIENT
Start: 2025-05-01

## 2025-05-01 RX ORDER — CLOTRIMAZOLE AND BETAMETHASONE DIPROPIONATE 10; .64 MG/G; MG/G
1 CREAM TOPICAL 2 TIMES DAILY
Qty: 135 G | Refills: 2 | Status: SHIPPED | OUTPATIENT
Start: 2025-05-01

## 2025-05-01 RX ORDER — FLUOCINOLONE ACETONIDE 0.11 MG/ML
OIL AURICULAR (OTIC) 2 TIMES DAILY
Qty: 60 ML | Refills: 1 | Status: SHIPPED | OUTPATIENT
Start: 2025-05-01

## 2025-05-01 RX ORDER — FLUTICASONE PROPIONATE 50 MCG
2 SPRAY, SUSPENSION (ML) NASAL DAILY
Qty: 48 G | Refills: 1 | Status: SHIPPED | OUTPATIENT
Start: 2025-05-01

## 2025-05-01 NOTE — PROGRESS NOTES
Subjective   Rosalee Hargrove is a 69 y.o. female with   Chief Complaint   Patient presents with    Skin irritation     Possible yeast under skin folds - under breast, under belly, between thighs   .    History of Present Illness   69-year-old white female with several month history of skin irritation and erythema beneath right breast as well as in the inguinal area and CLIFF labial region.  She also has similar rash beneath the pannus of her abdomen.  She has used Lotrisone cream in the past with success but the tube that she has  in 2017.  She also has complaint of seasonal allergic rhinitis that responds well to Flonase nasal spray.  She also has psoriasis within the external ear canal and has had success using a steroid topical eardrop.  She will need refills of creams as well as Flonase nasal spray and her eardrop.  She understands that the environment often contributes to the situation-warm and increase humidity.  Her body habitus also contributes.  The following portions of the patient's history were reviewed and updated as appropriate: allergies, current medications, past family history, past medical history, past social history, past surgical history and problem list.    Review of Systems   Skin:  Positive for rash.   Allergic/Immunologic: Positive for environmental allergies.       Objective     Vitals:    25 1045   BP: 126/66   Pulse: 79   Temp: 97.3 °F (36.3 °C)   SpO2: 100%       No results found for this or any previous visit (from the past 4 weeks).    Physical Exam  Vitals and nursing note reviewed.   Constitutional:       Appearance: Normal appearance. She is well-developed and well-groomed.   HENT:      Head: Normocephalic and atraumatic.   Musculoskeletal:      Cervical back: Neck supple.   Skin:     General: Skin is warm and dry.      Findings: Erythema and rash present.      Comments: Erythematous patch in the inferior lateral portion of the right breast as well as the inguinal area  and underneath the abdominal pannus.   Neurological:      Mental Status: She is alert and oriented to person, place, and time.   Psychiatric:         Attention and Perception: Attention and perception normal.         Mood and Affect: Mood and affect normal.         Speech: Speech normal.         Behavior: Behavior normal. Behavior is cooperative.         Thought Content: Thought content normal.         Cognition and Memory: Cognition and memory normal.         Judgment: Judgment normal.         Assessment & Plan   Diagnoses and all orders for this visit:    1. Tinea cruris (Primary)  -     clotrimazole-betamethasone (LOTRISONE) 1-0.05 % cream; Apply 1 Application topically to the appropriate area as directed 2 (Two) Times a Day.  Dispense: 135 g; Refill: 2  -     terbinafine (lamiSIL) 250 MG tablet; Take 1 tablet by mouth Daily.  Dispense: 90 tablet; Refill: 0    2. Seasonal allergic rhinitis due to pollen  -     fluticasone (FLONASE) 50 MCG/ACT nasal spray; Administer 2 sprays into the nostril(s) as directed by provider Daily.  Dispense: 48 g; Refill: 1    3. Psoriasis  -     fluocinolone acetonide (DERMOTIC) 0.01 % oil otic oil; Administer  into both ears 2 (Two) Times a Day.  Dispense: 60 mL; Refill: 1        Return if symptoms worsen or fail to improve, for Next scheduled follow up.

## 2025-05-05 ENCOUNTER — TREATMENT (OUTPATIENT)
Dept: PHYSICAL THERAPY | Facility: CLINIC | Age: 70
End: 2025-05-05
Payer: MEDICARE

## 2025-05-05 DIAGNOSIS — R29.898 WEAKNESS OF BOTH LOWER EXTREMITIES: ICD-10-CM

## 2025-05-05 DIAGNOSIS — R26.2 DIFFICULTY WALKING: Primary | ICD-10-CM

## 2025-05-05 PROCEDURE — 97110 THERAPEUTIC EXERCISES: CPT | Performed by: PHYSICAL THERAPIST

## 2025-05-05 PROCEDURE — 97530 THERAPEUTIC ACTIVITIES: CPT | Performed by: PHYSICAL THERAPIST

## 2025-05-05 NOTE — PROGRESS NOTES
Physical Therapy Daily Treatment Note    [facility]  [address], [place] [address] [phone] (phone)  [phone] (fax)    Patient: Rosalee Hargrove   : 1955  Diagnosis/ICD-10 Code:  Difficulty walking [R26.2]  Referring practitioner: Eddie Slater DO  Date of Initial Visit: Type: THERAPY  Noted: 2025  Today's Date:  2025  Patient seen for 4 sessions           Subjective   Been feeling awful due to the rainy weather. Mostly sore all over.    Objective     See Exercise, Manual, and Modality Logs for complete treatment.     Assessment/Plan  Patient was still able to tolerate all mat exercises even though B LE is feeling sore today. Progressed sets of bridges to promote better hip stability. She continues to complain of bouts of weakness where her legs don't want to work, specifically in her lateral hips. The feeling tends to come on randomly.            Timed:    Manual Therapy:         mins  23474;  Therapeutic Exercise:    20     mins  68189;     Neuromuscular Hilary:        mins  85315;    Therapeutic Activity:     10     mins  89686;     Gait Training:           mins  41477;     Ultrasound:          mins  19093;    Electrical Stimulation:         mins  68411 ( );  Iontophoresis         mins 28121;  Aquatic Therapy         mins 98026;    Untimed:  Electrical Stimulation:         mins  63966 ( );  Traction:         mins  98004;   Dry Needling   (1-2 muscles)       mins  (Self-pay)  Dry Needling (3-4 muscles)        mins  (Self-pay)  Dry Needling Trial          mins DRYNDLTRIAL  (No Charge)    Timed Treatment:   30   mins   Total Treatment:     40   mins    Chelita Shabazz PT  Physical Therapist    [place] License:[id number]

## 2025-05-13 ENCOUNTER — OFFICE VISIT (OUTPATIENT)
Dept: FAMILY MEDICINE CLINIC | Facility: CLINIC | Age: 70
End: 2025-05-13
Payer: MEDICARE

## 2025-05-13 VITALS
DIASTOLIC BLOOD PRESSURE: 68 MMHG | HEART RATE: 84 BPM | BODY MASS INDEX: 38.48 KG/M2 | SYSTOLIC BLOOD PRESSURE: 126 MMHG | HEIGHT: 60 IN | WEIGHT: 196 LBS | OXYGEN SATURATION: 98 % | TEMPERATURE: 97.7 F

## 2025-05-13 DIAGNOSIS — F41.0 PANIC DISORDER: ICD-10-CM

## 2025-05-13 DIAGNOSIS — B02.9 HERPES ZOSTER WITHOUT COMPLICATION: Primary | ICD-10-CM

## 2025-05-13 DIAGNOSIS — F41.9 ANXIETY: ICD-10-CM

## 2025-05-13 PROCEDURE — 3074F SYST BP LT 130 MM HG: CPT | Performed by: FAMILY MEDICINE

## 2025-05-13 PROCEDURE — 3078F DIAST BP <80 MM HG: CPT | Performed by: FAMILY MEDICINE

## 2025-05-13 PROCEDURE — 1159F MED LIST DOCD IN RCRD: CPT | Performed by: FAMILY MEDICINE

## 2025-05-13 PROCEDURE — 1126F AMNT PAIN NOTED NONE PRSNT: CPT | Performed by: FAMILY MEDICINE

## 2025-05-13 PROCEDURE — 3044F HG A1C LEVEL LT 7.0%: CPT | Performed by: FAMILY MEDICINE

## 2025-05-13 PROCEDURE — 1160F RVW MEDS BY RX/DR IN RCRD: CPT | Performed by: FAMILY MEDICINE

## 2025-05-13 PROCEDURE — 99213 OFFICE O/P EST LOW 20 MIN: CPT | Performed by: FAMILY MEDICINE

## 2025-05-13 RX ORDER — GABAPENTIN 300 MG/1
300 CAPSULE ORAL 3 TIMES DAILY
Qty: 90 CAPSULE | Refills: 1 | Status: SHIPPED | OUTPATIENT
Start: 2025-05-13

## 2025-05-13 RX ORDER — CITALOPRAM HYDROBROMIDE 20 MG/1
20 TABLET ORAL DAILY
Qty: 90 TABLET | Refills: 1 | Status: SHIPPED | OUTPATIENT
Start: 2025-05-13

## 2025-05-13 RX ORDER — EZETIMIBE 10 MG/1
10 TABLET ORAL DAILY
Qty: 90 TABLET | Refills: 1 | Status: SHIPPED | OUTPATIENT
Start: 2025-05-13

## 2025-05-13 NOTE — PROGRESS NOTES
Subjective   Rosalee Hargrove is a 69 y.o. female with   Chief Complaint   Patient presents with    Follow-up from  for shingles     Patient has shingles in gianna area   .    History of Present Illness   69-year-old white female who broke out with a single dermatome rash and was seen in the emergency room approximately 3 days ago.  This was identified as herpes zoster and patient was prescribed Valtrex..  She was for some reason given Keflex also with apparently some confusion with the MD who saw her.  She has had a burning pain in this distribution without any medication provided by the emergency room.  Initial lesions were on the right side of the sacral area and has spread into the inguinal area as well as anterior pubic region.  Her pain distribution is along this route.  She does admit to chickenpox as a child and has not had a bout of zoster as to date.  The following portions of the patient's history were reviewed and updated as appropriate: allergies, current medications, past family history, past medical history, past social history, past surgical history and problem list.    Review of Systems   Skin:  Positive for rash.       Objective     Vitals:    05/13/25 1316   BP: 126/68   Pulse: 84   Temp: 97.7 °F (36.5 °C)   SpO2: 98%       No results found for this or any previous visit (from the past 4 weeks).    Physical Exam  Vitals and nursing note reviewed.   Constitutional:       Appearance: Normal appearance. She is well-developed and well-groomed.   HENT:      Head: Normocephalic and atraumatic.   Musculoskeletal:      Cervical back: Neck supple.   Skin:     General: Skin is warm and dry.      Findings: Rash present.      Comments: Numerous vesicles in various stages of development running from right sacral area into the right inguinal region.   Neurological:      Mental Status: She is alert and oriented to person, place, and time.   Psychiatric:         Attention and Perception: Attention and perception  normal.         Mood and Affect: Mood and affect normal.         Speech: Speech normal.         Behavior: Behavior normal. Behavior is cooperative.         Thought Content: Thought content normal.         Cognition and Memory: Cognition and memory normal.         Judgment: Judgment normal.         Assessment & Plan   Diagnoses and all orders for this visit:    1. Herpes zoster without complication (Primary)  -     gabapentin (NEURONTIN) 300 MG capsule; Take 1 capsule by mouth 3 (Three) Times a Day.  Dispense: 90 capsule; Refill: 1    Full discussion in regards to the use of the above product as well as potential side effects.  Patient will contact this office early next week if symptoms are improved but not quite to goal.  This product can be increased in milligrams strength as per need.  It has been stressed that she must complete the entire course of Valtrex.    Return if symptoms worsen or fail to improve.

## 2025-05-19 ENCOUNTER — TREATMENT (OUTPATIENT)
Dept: PHYSICAL THERAPY | Facility: CLINIC | Age: 70
End: 2025-05-19
Payer: MEDICARE

## 2025-05-19 ENCOUNTER — TELEPHONE (OUTPATIENT)
Dept: FAMILY MEDICINE CLINIC | Facility: CLINIC | Age: 70
End: 2025-05-19
Payer: MEDICARE

## 2025-05-19 DIAGNOSIS — R26.2 DIFFICULTY WALKING: Primary | ICD-10-CM

## 2025-05-19 DIAGNOSIS — F17.210 CIGARETTE SMOKER: Primary | ICD-10-CM

## 2025-05-19 DIAGNOSIS — R29.898 WEAKNESS OF BOTH LOWER EXTREMITIES: ICD-10-CM

## 2025-05-19 PROCEDURE — 97110 THERAPEUTIC EXERCISES: CPT | Performed by: PHYSICAL THERAPIST

## 2025-05-19 PROCEDURE — 97530 THERAPEUTIC ACTIVITIES: CPT | Performed by: PHYSICAL THERAPIST

## 2025-05-19 NOTE — PROGRESS NOTES
30-Day / 10-Visit Progress Note     [facility]  [address] [id number] [phone] (phone)  [phone] (fax)    Patient: Rosalee Hargrove   : 1955  Diagnosis/ICD-10 Code:  Difficulty walking [R26.2]  Referring practitioner: Eddie Slater DO  Date of Initial Visit: Type: THERAPY  Noted: 2025  Today's Date:  2025  Patient seen for 5 sessions      Subjective:     Clinical Progress: improved  Home Program Compliance: Yes    Subjective   Patient reports she is feeling much better compared to last week. She can also tell her legs are stronger.      Objective     Neurological Testing      Sensation      Lumbar   Left   Intact: light touch     Right   Intact: light touch     Active Range of Motion   Left Hip   Flexion: 115 degrees      Right Hip   Flexion: 115 degrees     Left Knee   Flexion: 121 degrees      Right Knee   Flexion: 119 degrees      Strength/Myotome Testing      Left Hip   Planes of Motion   Flexion: 4-  Abduction: 4     Right Hip   Planes of Motion   Flexion: 4+  Abduction: 4     Left Knee   Flexion: 4  Extension: 4+     Right Knee   Flexion: 5  Extension: 5     Left Ankle/Foot   Dorsiflexion: 5     Right Ankle/Foot   Dorsiflexion: 5     Functional Assessment      Comments  30 Second STS: 11 reps  Single leg balance: 6 sec on L, 4 sec on R         See Exercise, Manual, and Modality Logs for complete treatment.         Functional Outcome Score:  LEFS 32/80             Assessment/Plan  Rosalee Hargrove has been seen for 5 physical therapy sessions for physical therapy for B LE weakness .  Treatment has included therapeutic exercise, therapeutic activity, and patient education with home exercise program . Progress to physical therapy goals is good. Patient has increased her walking tolerance and demonstrates good improvement in L hip strength and B hip flexion AROM. She continues to voice the desire to further progress her walking tolerance, and stairs  continue to be a challenge.  She will benefit from continued skilled physical therapy to address remaining impairments and functional limitations.       Goals  Plan Goals: Goals  Plan Goals: STGs to be completed within 30 days:  -Patient will demonstrate compliance and independence with initial HEP (MET)  -Patient will increase bilateral hip flexion AROM to 100 degrees to help normalize gait mechanics and increase ease with transfers (MET)  -Patient will increase walking tolerance from 10 minutes to 15 minutes to increase ability w/ completing daily activities. (MET)  -Patient will improve score on LEFS from 16 at eval to 30 or greater to improve quality of life (MET)     LTGs to be completed within 90 days:  -Patient will increase bilateral hip flexion AROM to 110 or more degrees to help normalize gait mechanics and increase ease with transfers (MET)  -Patient will increase L hip strength to 4/5 to promote safety and independence w/ ambulation and stairs. (PROGRESSING)  -Patient will improve score on LEFS from 16 at eval to 45 or greater to improve quality of life (ONGOING)  -Patient will increase walking tolerance from 10 minutes at eval to 30 minutes to increase ability w/ completing daily activities. (ONGOING, hasn't been tested)       Recommendations: Continue as planned  Timeframe: 1 month; 2x/week  Prognosis to achieve goals: good    PT Signature: Chelita Shabazz PT    [place] License Number: [id number]    Electronically signed by Chelita Shabazz PT, 05/19/25, 10:58 AM EDT      Based upon review of the patient's progress and continued therapy plan, it is my medical opinion that Rosalee Hargrove should continue physical therapy treatment at [facility]  [address] [place] [id number] [phone].    Signature: __________________________________  Eddie Slater DO    Timed:  Manual Therapy:         mins  34802;  Therapeutic Exercise:    15     mins  92286;      Neuromuscular Hilary:        mins  07455;    Therapeutic Activity:     15     mins  36414;     Gait Training:           mins  62311;     Ultrasound:          mins  18084;    Iontophoresis         mins 15132;    Untimed:  Electrical Stimulation:         mins  62139 ( );  Traction:         mins  80542;   Dry Needling   (1-2 muscles)       mins 20560 (Self-pay)  Dry Needling (3-4 muscles)        mins 20561 (Self-pay)  Dry Needling Trial          mins DRYNDLTRIAL  (No Charge)  Re Eval       mins 18933     Timed Treatment:   30   mins   Total Treatment:     35   mins

## 2025-05-19 NOTE — TELEPHONE ENCOUNTER
Pt is requesting an order for Lung Cancer Screening. She received a letter in the mail she was due to have it done.    Please place order.

## 2025-05-19 NOTE — TELEPHONE ENCOUNTER
Pt is requesting an order for Lung Cancer Screening. She received a letter in the mail she was due to have it done

## 2025-05-21 DIAGNOSIS — E03.9 ACQUIRED HYPOTHYROIDISM: ICD-10-CM

## 2025-05-21 RX ORDER — LEVOTHYROXINE SODIUM 75 UG/1
75 TABLET ORAL DAILY
Qty: 90 TABLET | Refills: 0 | Status: SHIPPED | OUTPATIENT
Start: 2025-05-21

## 2025-05-22 RX ORDER — LOSARTAN POTASSIUM 50 MG/1
50 TABLET ORAL DAILY
Qty: 90 TABLET | Refills: 3 | OUTPATIENT
Start: 2025-05-22

## 2025-05-22 RX ORDER — SPIRONOLACTONE 25 MG/1
25 TABLET ORAL DAILY
Qty: 90 TABLET | Refills: 3 | OUTPATIENT
Start: 2025-05-22

## 2025-05-23 ENCOUNTER — TREATMENT (OUTPATIENT)
Dept: PHYSICAL THERAPY | Facility: CLINIC | Age: 70
End: 2025-05-23
Payer: MEDICARE

## 2025-05-23 DIAGNOSIS — R26.2 DIFFICULTY WALKING: Primary | ICD-10-CM

## 2025-05-23 DIAGNOSIS — R29.898 WEAKNESS OF BOTH LOWER EXTREMITIES: ICD-10-CM

## 2025-05-23 PROCEDURE — 97530 THERAPEUTIC ACTIVITIES: CPT | Performed by: PHYSICAL THERAPIST

## 2025-05-23 PROCEDURE — 97110 THERAPEUTIC EXERCISES: CPT | Performed by: PHYSICAL THERAPIST

## 2025-05-23 PROCEDURE — 97112 NEUROMUSCULAR REEDUCATION: CPT | Performed by: PHYSICAL THERAPIST

## 2025-05-23 NOTE — PROGRESS NOTES
Physical Therapy Daily Treatment Note  [facility]  [address], [place] [id number] [phone] (phone)  [phone] (fax)    Patient: Rosalee Hargrove   : 1955  Diagnosis/ICD-10 Code:  Difficulty walking [R26.2]  Referring practitioner: Edide Slater DO  Date of Initial Visit: Type: THERAPY  Noted: 2025  Today's Date: 2025  Patient seen for 6 sessions       Rosalee Hargrove reports: I have been up and moving all day, had to go into hospital for appointment, had to go to [facility]. I have walked more today than I have in a while. So both legs are hurting, achy and tired.     Subjective     Objective   See Exercise, Manual, and Modality Logs for complete treatment.       Assessment/Plan  Subjectively, pt reports no increase of pain or discomfort with interventions performed today. Performed well with mostly supine or seated activities due to pt complaints of tired and achy legs. Plan to reintroduce standing activities next session. Continues to benefit from verbal/tactile cues to ensure proper form and technique for exercise performance.     Progress per Plan of Care           Manual Therapy:         mins  92210;  Therapeutic Exercise:    28     mins  76924;     Neuromuscular Hilary:        mins  54298;    Therapeutic Activity:     10     mins  40639;     Gait Training:           mins  82114;     Ultrasound:          mins  06747;    Electrical Stimulation:         mins  77178;  Traction          mins 40179    Timed Treatment:   38   mins   Total Treatment:     38   mins    Leyda Hernandez PTA  Physical Therapist Assistant [id number]

## 2025-05-27 ENCOUNTER — TREATMENT (OUTPATIENT)
Dept: PHYSICAL THERAPY | Facility: CLINIC | Age: 70
End: 2025-05-27
Payer: MEDICARE

## 2025-05-27 DIAGNOSIS — R29.898 WEAKNESS OF BOTH LOWER EXTREMITIES: ICD-10-CM

## 2025-05-27 DIAGNOSIS — R26.2 DIFFICULTY WALKING: Primary | ICD-10-CM

## 2025-05-27 PROCEDURE — 97110 THERAPEUTIC EXERCISES: CPT | Performed by: PHYSICAL THERAPIST

## 2025-05-27 PROCEDURE — 97530 THERAPEUTIC ACTIVITIES: CPT | Performed by: PHYSICAL THERAPIST

## 2025-05-27 NOTE — PROGRESS NOTES
Physical Therapy Daily Treatment Note    [facility]  [address], [place] [address] [phone] (phone)  [phone] (fax)    Patient: Rosalee Hargrove   : 1955  Diagnosis/ICD-10 Code:  Difficulty walking [R26.2]  Referring practitioner: Eddie Slater DO  Date of Initial Visit: Type: THERAPY  Noted: 2025  Today's Date:  2025  Patient seen for 7 sessions           Subjective   Been feeling a little achy the past few days but could be related to all the rain.     Objective     See Exercise, Manual, and Modality Logs for complete treatment.     Assessment/Plan  Added tandem walk forward and backwards along railing to challenge patient's overall balance. She required intermittent CGA and railing support to avoid LOB. L LE continues to feel heavier with all mat exercises due to weakness and also tends to fatigue more quickly.          Timed:    Manual Therapy:         mins  42369;  Therapeutic Exercise:    20     mins  35980;     Neuromuscular Hilary:        mins  80905;    Therapeutic Activity:     10     mins  60348;     Gait Training:           mins  97134;     Ultrasound:          mins  79877;    Electrical Stimulation:         mins  06153 ( );  Iontophoresis         mins 75318;  Aquatic Therapy         mins 27745;    Untimed:  Electrical Stimulation:         mins  67414 ( );  Traction:         mins  71226;   Dry Needling   (1-2 muscles)       mins 39658 (Self-pay)  Dry Needling (3-4 muscles)        mins  (Self-pay)  Dry Needling Trial          mins DRYNDLTRIAL  (No Charge)    Timed Treatment:   30   mins   Total Treatment:     35   mins    Chelita Shabazz PT  Physical Therapist    [place] License:[id number]

## 2025-05-29 ENCOUNTER — TREATMENT (OUTPATIENT)
Dept: PHYSICAL THERAPY | Facility: CLINIC | Age: 70
End: 2025-05-29
Payer: MEDICARE

## 2025-05-29 DIAGNOSIS — R26.2 DIFFICULTY WALKING: Primary | ICD-10-CM

## 2025-05-29 DIAGNOSIS — R29.898 WEAKNESS OF BOTH LOWER EXTREMITIES: ICD-10-CM

## 2025-05-29 PROCEDURE — 97530 THERAPEUTIC ACTIVITIES: CPT | Performed by: PHYSICAL THERAPIST

## 2025-05-29 PROCEDURE — 97110 THERAPEUTIC EXERCISES: CPT | Performed by: PHYSICAL THERAPIST

## 2025-05-29 NOTE — PROGRESS NOTES
Physical Therapy Daily Treatment Note    [facility]  [address], [place] [id number] [phone] (phone)  [phone] (fax)    Patient: Rosalee Hargrove   : 1955  Diagnosis/ICD-10 Code:  Difficulty walking [R26.2]  Referring practitioner: Eddie Slater DO  Date of Initial Visit: Type: THERAPY  Noted: 2025  Today's Date:  2025  Patient seen for 8 sessions           Subjective   Patient reports she is feeling pretty good today. Going to [facility] tomorrow which will be a good test since there will be lots of walking.     Objective     See Exercise, Manual, and Modality Logs for complete treatment.     Assessment/Plan  Patient demonstrates better form with squats. She still is unsteady with a narrow DIALLO and requires SBA during heel-toe walks along the railing. Quad control has improved during step ups and no issues with pain or soreness throughout treatment today. Will continues to progress for B LE strength, stability, and balance.          Timed:    Manual Therapy:         mins  24556;  Therapeutic Exercise:    20     mins  05838;     Neuromuscular Hilary:        mins  01230;    Therapeutic Activity:     10     mins  81428;     Gait Training:           mins  02560;     Ultrasound:          mins  90232;    Electrical Stimulation:         mins  60982 ( );  Iontophoresis         mins 91836;  Aquatic Therapy         mins 36332;    Untimed:  Electrical Stimulation:         mins  80515 ( );  Traction:         mins  05593;   Dry Needling   (1-2 muscles)       mins  (Self-pay)  Dry Needling (3-4 muscles)        mins  (Self-pay)  Dry Needling Trial          mins DRYNDLTRIAL  (No Charge)    Timed Treatment:   30   mins   Total Treatment:     40   mins    Chelita Shabazz PT  Physical Therapist    [place] License:[id number]

## 2025-06-02 ENCOUNTER — OFFICE VISIT (OUTPATIENT)
Dept: FAMILY MEDICINE CLINIC | Facility: CLINIC | Age: 70
End: 2025-06-02
Payer: MEDICARE

## 2025-06-02 VITALS
DIASTOLIC BLOOD PRESSURE: 78 MMHG | HEIGHT: 60 IN | HEART RATE: 76 BPM | BODY MASS INDEX: 38.48 KG/M2 | WEIGHT: 196 LBS | SYSTOLIC BLOOD PRESSURE: 138 MMHG | OXYGEN SATURATION: 99 % | TEMPERATURE: 97.5 F

## 2025-06-02 DIAGNOSIS — B02.8 HERPES ZOSTER WITH OTHER COMPLICATION: ICD-10-CM

## 2025-06-02 DIAGNOSIS — R22.2 MASS OF BUTTOCK: Primary | ICD-10-CM

## 2025-06-02 PROCEDURE — 3078F DIAST BP <80 MM HG: CPT

## 2025-06-02 PROCEDURE — 3075F SYST BP GE 130 - 139MM HG: CPT

## 2025-06-02 PROCEDURE — G2211 COMPLEX E/M VISIT ADD ON: HCPCS

## 2025-06-02 PROCEDURE — 99213 OFFICE O/P EST LOW 20 MIN: CPT

## 2025-06-02 PROCEDURE — 1126F AMNT PAIN NOTED NONE PRSNT: CPT

## 2025-06-02 PROCEDURE — 3044F HG A1C LEVEL LT 7.0%: CPT

## 2025-06-02 NOTE — PROGRESS NOTES
"Patient or patient representative verbalized consent for the use of Ambient Listening during the visit with  DELMY Marroquin for chart documentation. 6/2/2025  08:47 EDT    Chief Complaint   Patient presents with    Possible shingles    Mass       Subjective      Patient ID: Rosalee is a 69 y.o. female.     Chief Complaint   Patient presents with    Possible shingles    Mass        History of Present Illness     History of Present Illness  The patient presents for evaluation of a mass on her left buttock.    She reports a recent episode of shingles, which was localized to her tailbone and extended on the right side to her genitals. The outbreak was minimal, with only three or four lesions. She sought immediate care and subsequently followed up with Dr. Slater. She experienced a burning sensation on the right side of her body, extending down her leg, accompanied by tenderness in her buttock.    Last week, she fell asleep on her couch while watching TV and upon waking, she noticed a new area of tenderness on her left buttock and experienced a burning and tingling sensation. Despite adjusting her pants and checking for visible signs, she did not observe any bumps but continued to feel discomfort. During a weekend trip, she experienced increased pain and difficulty walking. Upon returning to her hotel, she discovered a large growth on her upper left buttock, which is tender. She reports no recent falls or injuries. She reports no fevers, body aches, chills, swollen lymph nodes, upper respiratory symptoms, or cough. She has been using Tylenol for pain management.       The following portions of the patient's history were reviewed and updated as appropriate: allergies, current medications, past family history, past medical history, past social history, past surgical history, and problem list.    Results          Objective      /78   Pulse 76   Temp 97.5 °F (36.4 °C) (Infrared)   Ht 152.4 cm (60\")   Wt " "88.9 kg (196 lb)   SpO2 99%   BMI 38.28 kg/m²      Body mass index is 38.28 kg/m².           Physical Exam  Constitutional:       Appearance: Normal appearance. She is not ill-appearing.   Eyes:      Pupils: Pupils are equal, round, and reactive to light.   Cardiovascular:      Rate and Rhythm: Normal rate and regular rhythm.      Pulses: Normal pulses.      Heart sounds: Normal heart sounds. No murmur heard.     No friction rub.   Pulmonary:      Effort: Pulmonary effort is normal. No respiratory distress.      Breath sounds: Normal breath sounds. No wheezing or rales.   Musculoskeletal:      Cervical back: Neck supple.        Legs:       Comments: 6\" wide by 3\" tall raised flesh colored mass under the skin that is mildly tender to palpation without erythema, crusting, vesicles, or drainage.  A few deroofed vesicles remain on the right buttock with sanguinous crusting and erythema, but no purulent drainage noted.   Lymphadenopathy:      Cervical: No cervical adenopathy.   Neurological:      General: No focal deficit present.      Mental Status: She is alert.   Psychiatric:         Mood and Affect: Mood normal.         Behavior: Behavior normal.          Physical Exam          Assessment & Plan  1. Left buttock mass.  - The mass feels like a lipoma, a benign cyst composed of fat cells. It could also be a fluid-filled cyst or an abscess.  - The absence of fever, body aches, chills, or other infection signs suggests it is not an infectious process. The mass could have been present for some time and may have been exacerbated by recent physical activity during her recent trip. It could also be a muscle spasm from walking.  - An ultrasound of the left buttock will be ordered to further evaluate the mass. Based on the ultrasound results, a referral to a general surgeon may be necessary for potential drainage or removal of the mass.  - In the interim, ice can be applied to the area and Tylenol can be taken for pain " management.    2. Shingles.  - Recent episode of shingles affecting the right buttock and genital area, with symptoms including burning and tingling.  - Symptoms included tenderness and pain radiating down the leg.  - Shingles appear to be resolving.  - Monitoring for any recurrence or worsening of symptoms.    Assessment & Plan       Diagnoses and all orders for this visit:    1. Mass of buttock (Primary)  -     US soft tissue; Future    2. Herpes zoster with other complication        Return if symptoms worsen or fail to improve.       Angelina Sandra, DELMY           Note to patient: The 21st Century Cures Act makes medical notes like these available to patients in the interest of transparency. However, be advised this is a medical document. It is intended as peer to peer communication. It is written in medical language and may contain abbreviations or verbiage that are unfamiliar. It may appear blunt or direct. Medical documents are intended to carry relevant information, facts as evident, and the clinical opinion of the practitioner.

## 2025-06-09 ENCOUNTER — RESULTS FOLLOW-UP (OUTPATIENT)
Dept: FAMILY MEDICINE CLINIC | Facility: CLINIC | Age: 70
End: 2025-06-09
Payer: MEDICARE

## 2025-06-09 ENCOUNTER — HOSPITAL ENCOUNTER (OUTPATIENT)
Dept: ULTRASOUND IMAGING | Facility: HOSPITAL | Age: 70
Discharge: HOME | End: 2025-06-09
Payer: MEDICARE

## 2025-06-09 DIAGNOSIS — R22.2 MASS OF BUTTOCK: ICD-10-CM

## 2025-06-09 PROCEDURE — 76999 ECHO EXAMINATION PROCEDURE: CPT

## 2025-06-10 DIAGNOSIS — I10 ESSENTIAL HYPERTENSION: ICD-10-CM

## 2025-06-10 RX ORDER — AMLODIPINE BESYLATE 10 MG/1
10 TABLET ORAL DAILY
Qty: 90 TABLET | Refills: 3 | OUTPATIENT
Start: 2025-06-10

## 2025-06-11 ENCOUNTER — HOSPITAL ENCOUNTER (OUTPATIENT)
Dept: CT IMAGING | Facility: HOSPITAL | Age: 70
Discharge: HOME | End: 2025-06-11
Admitting: FAMILY MEDICINE
Payer: MEDICARE

## 2025-06-11 DIAGNOSIS — F17.210 CIGARETTE SMOKER: ICD-10-CM

## 2025-06-11 PROCEDURE — 71271 CT THORAX LUNG CANCER SCR C-: CPT

## 2025-06-12 ENCOUNTER — TREATMENT (OUTPATIENT)
Dept: PHYSICAL THERAPY | Facility: CLINIC | Age: 70
End: 2025-06-12
Payer: MEDICARE

## 2025-06-12 DIAGNOSIS — R26.2 DIFFICULTY WALKING: Primary | ICD-10-CM

## 2025-06-12 DIAGNOSIS — R29.898 WEAKNESS OF BOTH LOWER EXTREMITIES: ICD-10-CM

## 2025-06-12 NOTE — PROGRESS NOTES
I have reviewed the notes, assessments, and/or procedures performed by Anand Anaya, I concur with his documentation of Rosalee Hargrove. Destinee Osborn, PT License #[id number]

## 2025-06-12 NOTE — PROGRESS NOTES
Physical Therapy Daily Treatment Note    [facility]  [address] [place] [address]  [phone] (phone)     [phone] (fax)    Patient: Rosalee Hargrove   : 1955  Referring practitioner: Eddie Slater DO  Date of Initial Visit: Type: THERAPY  Noted: 2025  Today's Date:  2025  Patient seen for 9 sessions         Visit Diagnoses:    ICD-10-CM ICD-9-CM   1. Difficulty walking  R26.2 719.7   2. Weakness of both lower extremities  R29.898 729.89         Subjective:  Rosalee Hargrove reports: Pt states she is doing okay today but that her back has been bothering her due to a lipoma.      Objective     See Exercise, Manual, and Modality Logs for complete treatment.     Assessment:  Pt continues to improve with LE strength and able to complete exercises with no issues. Pt progressed to 6in step for step-ups today which she tolerated well. Pt responds well to verbal cues for proper posture and form with exercises. Pt noted with slight discomfort in lumbar area during supine/hooklying exercises due to lipoma. Pt will continue to benefit from skilled therapy to further improve her LE strength and mobility.    Plan:   Continue POC as indicated    Timed:  Manual Therapy:           mins  18084;  Therapeutic Exercise:    31   mins  31575;     Neuromuscular Hilary:          mins  65068;    Therapeutic Activity:     16    mins  61653;     Gait Training:             mins  41481;     Ultrasound:                       mins  11169;    Electrical Stimulation:          mins  77968 ( );  Iontophoresis           mins 20970;  Self Care            mins 17438    Untimed:  Electrical Stimulation:          mins  31081 ( );  Traction:           mins  26979;       Timed Treatment:    47   mins   Total Treatment:      53   mins      Anand Anaya PT  Physical Therapist    License Number: [id number]

## 2025-06-16 ENCOUNTER — TREATMENT (OUTPATIENT)
Dept: PHYSICAL THERAPY | Facility: CLINIC | Age: 70
End: 2025-06-16
Payer: MEDICARE

## 2025-06-16 DIAGNOSIS — R29.898 WEAKNESS OF BOTH LOWER EXTREMITIES: Primary | ICD-10-CM

## 2025-06-16 DIAGNOSIS — R26.2 DIFFICULTY WALKING: ICD-10-CM

## 2025-06-16 PROCEDURE — 97110 THERAPEUTIC EXERCISES: CPT | Performed by: PHYSICAL THERAPIST

## 2025-06-16 PROCEDURE — 97164 PT RE-EVAL EST PLAN CARE: CPT | Performed by: PHYSICAL THERAPIST

## 2025-06-16 PROCEDURE — 97530 THERAPEUTIC ACTIVITIES: CPT | Performed by: PHYSICAL THERAPIST

## 2025-06-16 NOTE — PROGRESS NOTES
Physical Therapy Recertification     [facility]  [address] [place] [address]  [phone] (phone)  [phone] (fax)    Patient: Rosalee Hargrove   : 1955  Diagnosis/ICD-10 Code:  Weakness of both lower extremities [R29.898]  Referring practitioner: Eddie Slater DO  Date of Initial Visit: 2025  Today's Date:  2025  Patient seen for 10 sessions           Clinical Progress: improved  Home Program Compliance: Yes    Subjective   Patient reports her legs and knee aren't hurting like they used to.    Objective     Neurological Testing      Sensation      Lumbar   Left   Intact: light touch     Right   Intact: light touch     Active Range of Motion   Left Hip   Flexion: 115 degrees      Right Hip   Flexion: 115 degrees      Left Knee   Flexion: 121 degrees      Right Knee   Flexion: 120 degrees      Strength/Myotome Testing      Left Hip   Planes of Motion   Flexion: 4-  Abduction: 4     Right Hip   Planes of Motion   Flexion: 4+  Abduction: 4     Left Knee   Flexion: 4  Extension: 4+     Right Knee   Flexion: 5  Extension: 5     Left Ankle/Foot   Dorsiflexion: 5     Right Ankle/Foot   Dorsiflexion: 5     Functional Assessment      Comments  30 Second STS: 11 reps  Single leg balance: 5 sec on L, 5 sec on R         See Exercise, Manual, and Modality Logs for complete treatment.         Functional Outcome Score:  LEFS 31/80       Assessment/Plan  Impairments: abnormal gait, abnormal muscle tone, abnormal or restricted ROM, activity intolerance, impaired balance, impaired physical strength, lacks appropriate home exercise program, pain with function and safety issue   Functional limitations: sleeping, walking, uncomfortable because of pain, sitting and standing   Rosalee Hargrove has been seen for 10 physical therapy sessions for physical therapy for B LE weakness .  Treatment has included therapeutic exercise, therapeutic activity, and patient education with home  exercise program . Progress to physical therapy goals is good. Patient reports that her leg pain has decreased and her knees don't ache like they used to. She still hasn't attempted a long walk recently (mostly due to fear of falling) and she continues to avoid stairs when possible. She will benefit from continued skilled physical therapy to address remaining impairments and functional limitations.                  Goals  Plan Goals: Goals  Plan Goals: STGs to be completed within 30 days:  -Patient will demonstrate compliance and independence with initial HEP (MET)  -Patient will increase bilateral hip flexion AROM to 100 degrees to help normalize gait mechanics and increase ease with transfers (MET)  -Patient will increase walking tolerance from 10 minutes to 15 minutes to increase ability w/ completing daily activities. (MET)  -Patient will improve score on LEFS from 16 at eval to 30 or greater to improve quality of life (MET)     LTGs to be completed within 90 days:  -Patient will increase bilateral hip flexion AROM to 110 or more degrees to help normalize gait mechanics and increase ease with transfers (MET)  -Patient will increase L hip strength to 4/5 to promote safety and independence w/ ambulation and stairs. (PROGRESSING)  -Patient will improve score on LEFS from 16 at eval to 45 or greater to improve quality of life (ONGOING)  -Patient will increase walking tolerance from 10 minutes at eval to 30 minutes to increase ability w/ completing daily activities. (ONGOING)  -Patient will navigate down 4-5 stairs without requiring handrail support to increase ease with household mobility (NEW)        Recommendations: Continue as planned  Timeframe: 1 month; 2x/week  Prognosis to achieve goals: good       Timed:  Manual Therapy:         mins  68685;  Therapeutic Exercise:    15     mins  52254;     Neuromuscular Hilary:        mins  38659;    Therapeutic Activity:     10     mins  79264;     Gait Training:            mins  50530;     Ultrasound:          mins  60795;    Iontophoresis         mins 21720    Untimed:  Electrical Stimulation:         mins  58650 ( );  Traction:         mins  75401;   Dry Needling   (1-2 muscles)       mins 20560 (Self-pay)  Dry Needling (3-4 muscles)        mins 20561 (Self-pay)  Dry Needling Trial          mins DRYNDLTRIAL  (No Charge)  Re Eval  10     mins 10797     Timed Treatment:   25   mins   Total Treatment:     50   mins    PT SIGNATURE: ENRICO Campuzano PT License Number: [id number]    Electronically signed by Chelita Shabazz PT, 06/16/25, 10:56 AM EDT]    DATE TREATMENT INITIATED: 6/16/2025    90 Day Recertification  Certification Period: 9/14/2025  I certify that the therapy services are furnished while this patient is under my care.  The services outlined above are required by this patient, and will be reviewed every 90 days.     PHYSICIAN: Eddie Slater DO   NPI: [id number]                                         DATE:

## 2025-06-18 ENCOUNTER — TREATMENT (OUTPATIENT)
Dept: PHYSICAL THERAPY | Facility: CLINIC | Age: 70
End: 2025-06-18
Payer: MEDICARE

## 2025-06-18 DIAGNOSIS — R29.898 WEAKNESS OF BOTH LOWER EXTREMITIES: Primary | ICD-10-CM

## 2025-06-18 DIAGNOSIS — R26.2 DIFFICULTY WALKING: ICD-10-CM

## 2025-06-18 PROCEDURE — 97110 THERAPEUTIC EXERCISES: CPT | Performed by: PHYSICAL THERAPIST

## 2025-06-18 PROCEDURE — 97530 THERAPEUTIC ACTIVITIES: CPT | Performed by: PHYSICAL THERAPIST

## 2025-06-18 NOTE — PROGRESS NOTES
Physical Therapy Daily Treatment Note  [facility]  [address], [place] [address] [phone] (phone)  [phone] (fax)    Patient: Rosalee Hargrove   : 1955  Diagnosis/ICD-10 Code:  Weakness of both lower extremities [R29.898]  Referring practitioner: Eddie Slater DO  Date of Initial Visit: Type: THERAPY  Noted: 2025  Today's Date: 2025  Patient seen for 11 sessions       Rosalee Hargrove reports: doing okay, already tired today.     Subjective     Objective   See Exercise, Manual, and Modality Logs for complete treatment.       Assessment/Plan  Subjectively, pt reports no increase of pain or discomfort with interventions performed today. Performed well with continued functional strengthening and balance interventions. Continues to demonstrate fatigue with exercises in clinic.  Continues to benefit from verbal/tactile cues to ensure proper form and technique for exercise performance.     Progress per Plan of Care           Manual Therapy:         mins  38492;  Therapeutic Exercise:    15     mins  54026;     Neuromuscular Hilary:        mins  80449;    Therapeutic Activity:     15     mins  67659;     Gait Training:           mins  70082;     Ultrasound:          mins  35014;    Electrical Stimulation:         mins  79771;  Traction          mins 47383    Timed Treatment:   30   mins   Total Treatment:     40   mins    Leyda Hernandez PTA  Physical Therapist Assistant [id number]

## 2025-06-19 DIAGNOSIS — I25.10 CORONARY ARTERY DISEASE INVOLVING NATIVE CORONARY ARTERY OF NATIVE HEART WITHOUT ANGINA PECTORIS: ICD-10-CM

## 2025-06-19 DIAGNOSIS — I77.9 PERIPHERAL ARTERIAL OCCLUSIVE DISEASE: ICD-10-CM

## 2025-06-19 RX ORDER — CLOPIDOGREL BISULFATE 75 MG/1
TABLET ORAL
Qty: 180 TABLET | Refills: 1 | OUTPATIENT
Start: 2025-06-19

## 2025-06-19 NOTE — TELEPHONE ENCOUNTER
Caller: Rosalee Hargrove    Relationship: Self    Best call back number: [phone]     Requested Prescriptions:   Requested Prescriptions     Pending Prescriptions Disp Refills    clopidogrel (Plavix) 75 MG tablet 180 tablet 1     Si po BID        Pharmacy where request should be sent: [facility] [id number] [facility] [id number] [facility] [address] [facility] [address] [facility] [phone] [facility] [phone] FX     Last office visit with prescribing clinician: 2025   Last telemedicine visit with prescribing clinician: Visit date not found   Next office visit with prescribing clinician: 10/16/2025     Additional details provided by patient: PATIENT WILL BE OUT TOMORROW    Does the patient have less than a 3 day supply:  [x] Yes  [] No    Would you like a call back once the refill request has been completed: [] Yes [] No    If the office needs to give you a call back, can they leave a voicemail: [] Yes [] No    Rubi Ross Rep   25 10:09 EDT     
Heterosexual

## 2025-06-20 DIAGNOSIS — I25.10 CORONARY ARTERY DISEASE INVOLVING NATIVE CORONARY ARTERY OF NATIVE HEART WITHOUT ANGINA PECTORIS: ICD-10-CM

## 2025-06-20 DIAGNOSIS — F41.9 ANXIETY: ICD-10-CM

## 2025-06-20 DIAGNOSIS — B35.6 TINEA CRURIS: ICD-10-CM

## 2025-06-20 DIAGNOSIS — I10 ESSENTIAL HYPERTENSION: ICD-10-CM

## 2025-06-20 DIAGNOSIS — I77.9 PERIPHERAL ARTERIAL OCCLUSIVE DISEASE: ICD-10-CM

## 2025-06-20 DIAGNOSIS — F41.0 PANIC DISORDER: ICD-10-CM

## 2025-06-20 RX ORDER — AMLODIPINE BESYLATE 10 MG/1
10 TABLET ORAL DAILY
Qty: 90 TABLET | Refills: 1 | Status: SHIPPED | OUTPATIENT
Start: 2025-06-20

## 2025-06-20 RX ORDER — CITALOPRAM HYDROBROMIDE 20 MG/1
20 TABLET ORAL DAILY
Qty: 90 TABLET | Refills: 1 | Status: SHIPPED | OUTPATIENT
Start: 2025-06-20

## 2025-06-20 RX ORDER — CLOPIDOGREL BISULFATE 75 MG/1
TABLET ORAL
Qty: 180 TABLET | Refills: 1 | Status: SHIPPED | OUTPATIENT
Start: 2025-06-20

## 2025-06-20 RX ORDER — LOSARTAN POTASSIUM 50 MG/1
50 TABLET ORAL DAILY
Qty: 90 TABLET | Refills: 1 | Status: SHIPPED | OUTPATIENT
Start: 2025-06-20

## 2025-06-20 RX ORDER — EZETIMIBE 10 MG/1
10 TABLET ORAL DAILY
Qty: 90 TABLET | Refills: 1 | Status: SHIPPED | OUTPATIENT
Start: 2025-06-20

## 2025-06-20 RX ORDER — TERBINAFINE HYDROCHLORIDE 250 MG/1
250 TABLET ORAL DAILY
Qty: 90 TABLET | Refills: 1 | Status: SHIPPED | OUTPATIENT
Start: 2025-06-20

## 2025-06-20 RX ORDER — SPIRONOLACTONE 25 MG/1
25 TABLET ORAL DAILY
Qty: 90 TABLET | Refills: 1 | Status: SHIPPED | OUTPATIENT
Start: 2025-06-20

## 2025-06-20 RX ORDER — CLOTRIMAZOLE AND BETAMETHASONE DIPROPIONATE 10; .64 MG/G; MG/G
1 CREAM TOPICAL 2 TIMES DAILY
Qty: 135 G | Refills: 2 | Status: SHIPPED | OUTPATIENT
Start: 2025-06-20

## 2025-06-20 NOTE — TELEPHONE ENCOUNTER
Patient came into the office and requested these medications be sent to [facility], she is not going to use [facility] any longer.

## 2025-06-23 ENCOUNTER — TREATMENT (OUTPATIENT)
Dept: PHYSICAL THERAPY | Facility: CLINIC | Age: 70
End: 2025-06-23
Payer: MEDICARE

## 2025-06-23 DIAGNOSIS — R29.898 WEAKNESS OF BOTH LOWER EXTREMITIES: ICD-10-CM

## 2025-06-23 DIAGNOSIS — R26.2 DIFFICULTY WALKING: Primary | ICD-10-CM

## 2025-06-23 PROCEDURE — 97530 THERAPEUTIC ACTIVITIES: CPT | Performed by: PHYSICAL THERAPIST

## 2025-06-23 PROCEDURE — 97110 THERAPEUTIC EXERCISES: CPT | Performed by: PHYSICAL THERAPIST

## 2025-06-23 NOTE — PROGRESS NOTES
Physical Therapy Daily Treatment Note    [facility]  [address] [place] [address]  [phone] (phone)  [phone] (fax)    Patient: Rosalee Hargrove   : 1955  Diagnosis/ICD-10 Code:  Difficulty walking [R26.2]  Referring practitioner: Eddie Slater DO  Date of Initial Visit: Type: THERAPY  Noted: 2025  Today's Date:  2025  Patient seen for 12 sessions           Subjective   R knee is hurting worse than normal today. Feels stiff.    Objective     See Exercise, Manual, and Modality Logs for complete treatment.     Assessment/Plan  Patient was still able to tolerate step ups but had a little more discomfort on the R side due to her knee pain. No issues noted with other knee strengthening exercises such as LAQ or SAQs. Muscle endurance seems to be improving with PT exercises but patient still has fatigue with community mobility.            Timed:    Manual Therapy:         mins  03295;  Therapeutic Exercise:    20     mins  24512;     Neuromuscular Hilary:        mins  80845;    Therapeutic Activity:     10     mins  96522;     Gait Training:           mins  18670;     Ultrasound:          mins  05636;    Electrical Stimulation:         mins  14727 ( );  Iontophoresis         mins 39291;  Aquatic Therapy         mins 50011;  Self Care                           mins 28735     Untimed:  Electrical Stimulation:         mins  76726 ( );  Traction:         mins  38234;   Dry Needling   (1-2 muscles)       mins 52987 (Self-pay)  Dry Needling (3-4 muscles)        mins  (Self-pay)  Dry Needling Trial          mins DRYNDLTRIAL  (No Charge)    Timed Treatment:   30   mins   Total Treatment:     50   mins    Chelita Shabazz PT  Physical Therapist    [place] License:[id number]

## 2025-06-26 ENCOUNTER — OFFICE VISIT (OUTPATIENT)
Age: 70
End: 2025-06-26
Payer: MEDICARE

## 2025-06-26 VITALS
BODY MASS INDEX: 38.48 KG/M2 | SYSTOLIC BLOOD PRESSURE: 160 MMHG | WEIGHT: 196 LBS | HEIGHT: 60 IN | DIASTOLIC BLOOD PRESSURE: 77 MMHG | HEART RATE: 70 BPM

## 2025-06-26 DIAGNOSIS — I65.21 STENOSIS OF RIGHT CAROTID ARTERY: ICD-10-CM

## 2025-06-26 DIAGNOSIS — I65.23 ATHEROSCLEROSIS OF BOTH CAROTID ARTERIES: ICD-10-CM

## 2025-06-26 DIAGNOSIS — I77.9 PERIPHERAL ARTERIAL OCCLUSIVE DISEASE: ICD-10-CM

## 2025-06-26 DIAGNOSIS — I87.323 CHRONIC VENOUS HYPERTENSION WITH INFLAMMATION INVOLVING BOTH SIDES: ICD-10-CM

## 2025-06-26 DIAGNOSIS — D17.24 LIPOMA OF LEFT LOWER EXTREMITY: ICD-10-CM

## 2025-06-26 DIAGNOSIS — I70.0 ATHEROSCLEROSIS OF AORTA: Primary | ICD-10-CM

## 2025-06-26 NOTE — PROGRESS NOTES
Patient Name: Rosalee Hargrove    MRN: [id number] Encounter Date: 06/26/2025      Consulting Service: Vascular Surgery    Referring Provider: No ref. provider found       CHIEF COMPLAINT:  Chief Complaint   Patient presents with    <9>Peripheral arterial occlusive disease       Subjective    HPI: Rosalee Hargrove is a 69 y.o. female being seen for evaluation/management of known carotid disease.  Patient is followed for right carotid stenosis.  The patient's carotid disease appears to be primarily atherosclerotic based in nature.  The patient has had prior carotid endarterectomy and has had prior TCAR or stent.  Currently the patient denies symptoms of TIA or stroke.  The patient has been compliant in controlling risk factors including smoking cessation, cholesterol control, and hypertension control.  Their current medical therapy consists of dual antiplatelets.  The patient currently is on ongoing statin therapy.  At this point in time patient presents for follow-up of their carotid disease with review of testing including carotid duplex.  Findings indicate disease stability.      PAST MEDICAL HISTORY:   Past Medical History:   Diagnosis Date    Afib 09/09/2021    Anemia     Anxiety     Arthritis     Atheroembolism of other site 11/12/2015    Atherosclerosis of aorta 11/12/2015    Atherosclerosis of native arteries of extremities with intermittent claudication, bilateral legs 08/29/2019    PVD    Bilateral carotid artery stenosis 08/29/2019    Bilateral leg edema     Carotid artery stenosis 08/11/2016    Right    Cataract Jan 2022    Chest pain on breathing 08/17/2017    Cholelithiasis     Colon polyp Feb 2023    Depression     Dizziness     GERD (gastroesophageal reflux disease)     Glaucoma     Headache     Kidney stone     Localized edema 11/12/2015    Localized edema 08/11/2016    Orthostatic hypotension 08/11/2016    Pancreatitis     Peripheral vascular disease with claudication 11/12/2015    bilateral legs     Rectal bleeding 2022    Added automatically from request for surgery [id number]    Shortness of breath     Sleep apnea     Stroke 2021    Tobacco abuse     Urinary tract infection     Varicose veins of bilateral lower extremities with pain 2021    Visual impairment     Macular degeneration    Vitamin D deficiency       PAST SURGICAL HISTORY:   Past Surgical History:   Procedure Laterality Date    ANAL FISSURECTOMY      BRAIN SURGERY      temporaL ARTERY ON RIGHT SIDE    CARDIAC CATHETERIZATION N/A 2016    Procedure: Coronary angiography;  Surgeon: Fredrick Kapoor MD;  Location:  [facility];  Service:     CARDIAC CATHETERIZATION N/A 2016    Procedure: Left heart cath;  Surgeon: Fredrick Kapoor MD;  Location:  [facility];  Service:     CARDIAC CATHETERIZATION N/A 2016    Procedure: Left ventriculography;  Surgeon: Fredrick Kapoor MD;  Location:  [facility];  Service:     CARDIAC CATHETERIZATION N/A 2016    Procedure: Right heart cath;  Surgeon: Fredrick Kapoor MD;  Location:  [facility];  Service:      SECTION      CHOLECYSTECTOMY      COLONOSCOPY N/A 2022    Procedure: COLONOSCOPY with Polypectomy;  Surgeon: Sarwat Church MD;  Location: [facility] MAIN OR;  Service: Gastroenterology;  Laterality: N/A;  Rectal polyps x3 and Hemorrhoids    CORONARY ANGIOPLASTY WITH STENT PLACEMENT Right 2021    ENDOSCOPY N/A 2022    Procedure: ESOPHAGOGASTRODUODENOSCOPY;  Surgeon: Sarwat Church MD;  Location: [facility] MAIN OR;  Service: Gastroenterology;  Laterality: N/A;  Small Hiatal Hernia    ENDOSCOPY N/A 2025    Procedure: ESOPHAGOGASTRODUODENOSCOPY WITH BIOPSY;  Surgeon: Hayden Lopez MD;  Location: [facility] OR;  Service: Gastroenterology;  Laterality: N/A;  reflux esophagitis-biopsy    ERCP      FRACTURE SURGERY      arm    HYSTERECTOMY      ILIAC ARTERY STENT      SUBTOTAL HYSTERECTOMY       TONSILLECTOMY        FAMILY HISTORY:   Family History   Problem Relation Age of Onset    Heart disease Mother     Hypertension Mother     Hyperlipidemia Mother     Breast cancer Mother     Heart disease Father     Heart attack Sister     Heart disease Sister     Breast cancer Sister     Heart disease Brother     Heart attack Brother     Hyperlipidemia Brother     Diabetes Brother     Heart attack Maternal Grandmother     Heart disease Maternal Grandmother     Heart failure Maternal Grandmother     Heart failure Maternal Grandfather     Heart disease Maternal Grandfather     Heart attack Maternal Grandfather     Heart attack Paternal Grandmother     Heart disease Paternal Grandmother     Heart failure Paternal Grandmother     Hypertension Paternal Grandmother       SOCIAL HISTORY:   Social History     Tobacco Use    Smoking status: Former     Current packs/day: 0.00     Average packs/day: 0.5 packs/day for 50.0 years (25.0 ttl pk-yrs)     Types: Cigarettes     Start date: 1973     Quit date: 2023     Years since quittin.1    Smokeless tobacco: Never    Tobacco comments:     Patient smokes 6 cigarettes per day   Vaping Use    Vaping status: Never Used   Substance Use Topics    Alcohol use: Yes     Alcohol/week: 1.0 standard drink of alcohol     Types: 1 Glasses of wine per week     Comment: Very occasional; Patient is non drinker.    Drug use: No     Comment: Drug Abuse: none      MEDICATIONS:   Current Outpatient Medications on File Prior to Visit   Medication Sig Dispense Refill    ALPRAZolam (XANAX) 0.25 MG tablet Take 1 tablet by mouth Daily.      amLODIPine (NORVASC) 10 MG tablet Take 1 tablet by mouth Daily. 90 tablet 1    aspirin 81 MG chewable tablet Chew 1 tablet Daily.      cilostazol (PLETAL) 100 MG tablet Take 1 tablet by mouth 2 (Two) Times a Day. 60 tablet 60    citalopram (CeleXA) 20 MG tablet Take 1 tablet by mouth Daily. 90 tablet 1    clobetasol (TEMOVATE) 0.05 % external solution APPLY  A FEW DROPS TO THE SCALP UP TO TWICE A DAY AS NEEDED FOR ITCHING/FLARES      clopidogrel (Plavix) 75 MG tablet 1 po  tablet 1    clotrimazole-betamethasone (LOTRISONE) 1-0.05 % cream Apply 1 Application topically to the appropriate area as directed 2 (Two) Times a Day. 135 g 2    desonide (DESOWEN) 0.05 % ointment Apply 1 Application topically to the appropriate area as directed.      ezetimibe (ZETIA) 10 MG tablet Take 1 tablet by mouth Daily. 90 tablet 1    fluocinolone acetonide (DERMOTIC) 0.01 % oil otic oil INSTILL 2 DROPS INTO EACH EAR 2 TIMES A DAY FOR EXTERNAL EAR ECZEMA 60 mL 6    fluocinolone acetonide (DERMOTIC) 0.01 % oil otic oil Administer  into both ears 2 (Two) Times a Day. 60 mL 1    fluticasone (FLONASE) 50 MCG/ACT nasal spray Administer 2 sprays into the nostril(s) as directed by provider Daily. 48 g 1    gabapentin (NEURONTIN) 300 MG capsule Take 1 capsule by mouth 3 (Three) Times a Day. 90 capsule 1    levothyroxine (SYNTHROID, LEVOTHROID) 75 MCG tablet Take 1 tablet by mouth Daily. 90 tablet 0    losartan (COZAAR) 50 MG tablet Take 1 tablet by mouth Daily. 90 tablet 1    lubiprostone (Amitiza) 24 MCG capsule Take 1 capsule by mouth 2 (Two) Times a Day With Meals. (Patient taking differently: Take 1 capsule by mouth Daily With Breakfast.) 60 capsule 3    pantoprazole (PROTONIX) 40 MG EC tablet Take 1 tablet by mouth Daily. 90 tablet 3    pitavastatin calcium (Livalo) 2 MG tablet tablet Take 1 tablet by mouth Every Night. 90 tablet 1    Semaglutide, 1 MG/DOSE, (OZEMPIC) 2 MG/1.5ML solution pen-injector Inject 1 mg under the skin into the appropriate area as directed 1 (One) Time Per Week. 9 mL 1    spironolactone (ALDACTONE) 25 MG tablet Take 1 tablet by mouth Daily. 90 tablet 1    terbinafine (lamiSIL) 250 MG tablet Take 1 tablet by mouth Daily. 90 tablet 1    trimethoprim-polymyxin b (POLYTRIM) 88959-0.1 UNIT/ML-% ophthalmic solution Apply 1 drop to eye(s) as directed by provider. Four  "days before and four days after eye injection.       No current facility-administered medications on file prior to visit.       ALLERGIES: Ciprofloxacin, Lisinopril, Atorvastatin calcium, Crestor [rosuvastatin], Penicillins, and Seasonal ic [cholestatin]       Objective   Vitals:    06/26/25 1359   BP: 160/77   Pulse: 70   Weight: 88.9 kg (196 lb)   Height: 152.4 cm (60\")     Body mass index is 38.28 kg/m².          PHYSICAL EXAM:   Physical Exam  Constitutional:       Appearance: Normal appearance. She is normal weight.   HENT:      Head: Normocephalic and atraumatic.      Nose: Nose normal.   Eyes:      Extraocular Movements: Extraocular movements intact.      Pupils: Pupils are equal, round, and reactive to light.   Cardiovascular:      Rate and Rhythm: Normal rate.      Pulses:           Carotid pulses are 2+ on the right side and 2+ on the left side.       Radial pulses are 2+ on the right side and 2+ on the left side.        Femoral pulses are 2+ on the right side and 2+ on the left side.       Popliteal pulses are 2+ on the right side and 2+ on the left side.        Dorsalis pedis pulses are 2+ on the right side and 2+ on the left side.        Posterior tibial pulses are 2+ on the right side and 2+ on the left side.      Heart sounds: Normal heart sounds.   Pulmonary:      Effort: Pulmonary effort is normal.      Breath sounds: Normal breath sounds.   Abdominal:      General: Abdomen is flat. Bowel sounds are normal.      Palpations: Abdomen is soft.   Musculoskeletal:         General: Normal range of motion.      Cervical back: Normal range of motion and neck supple.      Right lower leg: No edema.      Left lower leg: No edema.   Skin:     General: Skin is warm and dry.   Neurological:      General: No focal deficit present.      Mental Status: She is alert and oriented to person, place, and time. Mental status is at baseline.   Psychiatric:         Mood and Affect: Mood normal.         Thought Content: " Thought content normal.          Result Review   LABS:    CBC          9/25/2024    09:18 12/18/2024    13:58 3/26/2025    08:08   CBC   WBC 8.84  8.15  7.76    RBC 4.30  4.17  4.59    Hemoglobin 12.6  11.9  13.8    Hematocrit 37.8  36.8  40.0    MCV 87.9  88.2  87.1    MCH 29.3  28.5  30.1    MCHC 33.3  32.3  34.5    RDW 13.7  14.0  15.8    Platelets 290  309  319      BMP          9/30/2024    10:03 10/9/2024    08:32 3/26/2025    08:08   BMP   BUN 38  25  25    Creatinine 1.78  1.47  1.59    Sodium 139  140  140    Potassium 5.6  4.4  4.6    Chloride 102  103  105    CO2 23.8  24.9  24.0    Calcium 9.3  9.6  9.0      Lipid Panel          9/25/2024    09:18 3/26/2025    08:08   Lipid Panel   Total Cholesterol 115  175    Triglycerides 146  193    HDL Cholesterol 32  33    VLDL Cholesterol 25  34    LDL Cholesterol  58  108          A1C Last 3 Results          9/25/2024    09:18 3/26/2025    08:08   HGBA1C Last 3 Results   Hemoglobin A1C 5.60  5.80         Results Review:       I reviewed the patient's new clinical results.    The following radiologic or non-invasive studies have been reviewed by me: Duplex at [facility] reviewed with less than 50% left carotid and widely patent right carotid stent.  Both vertebrals are antegrade  Duplex Venous Lower Extremity - Right CAR 12/19/2024    Interpretation Summary    Normal right lower extremity venous duplex scan.     CT Chest Low Dose Cancer Screening WO  Result Date: 6/16/2025  Stable low-dose chest CT. Follow-up low-dose chest CT recommended in 12 months to continue annual lung cancer screening  ACR Lung-Rads category 2   CTDI 1.41 mGy. DLP 48.9 mGycm.  Radiation dose reduction techniques were utilized, including automated exposure control and exposure modulation based on body size.   This report was finalized on 6/16/2025 9:10 AM by Dr. Ryan Borden M.D on Workstation: [id number]      US Soft Tissue  Result Date: 6/9/2025  Impression: Findings most consistent with  a lipoma in the subcutaneous soft tissues in the region of interest. Electronically Signed: Adrian Moreno MD  6/9/2025 3:44 PM EDT  Workstation ID: [id number]                  ASSESSMENT/PLAN:   Diagnoses and all orders for this visit:    1. Atherosclerosis of aorta (Primary)  -     Doppler Ankle Brachial Index Single Level CAR; Future    2. Atherosclerosis of both carotid arteries    3. Chronic venous hypertension with inflammation involving both sides    4. Peripheral arterial occlusive disease  -     Doppler Ankle Brachial Index Single Level CAR; Future    5. Stenosis of right carotid artery    6. Lipoma of left lower extremity       69 y.o. female with stable carotid disease study by Dr. Reilly at [facility] with a good-looking stent on the right.  She has recovered well from her prior stroke on the left but still has some complaints in that left leg.  More importantly her legs and improved off of the Lipitor and I suspect a lot of the pain she was having was related she is on [place] now and seems to be doing better.  At this point should continue to CoQ 10 and I think Dr. Dick I will be seeing her in a year for her carotid scan.Will alos check SOFÍA at that time    Additionally we discussed the new finding of a large lipoma 4 x 6 inches on her posterior left hip.  I do not think it is an grow into any critical is obviously making it difficult for her to even sit normally.  At this point she would like to see a general surgeon and Dr. Eddie De Guzman as excellent and we will send referral to him.    I discussed the plan with the patient who is agreeable to the plan of care at this point. Thank you for this consult.   Follow Up  Return in about 1 year (around 6/26/2026).    Sujit Coats MD   06/26/25

## 2025-06-27 ENCOUNTER — TREATMENT (OUTPATIENT)
Dept: PHYSICAL THERAPY | Facility: CLINIC | Age: 70
End: 2025-06-27
Payer: MEDICARE

## 2025-06-27 DIAGNOSIS — R26.2 DIFFICULTY WALKING: Primary | ICD-10-CM

## 2025-06-27 DIAGNOSIS — R29.898 WEAKNESS OF BOTH LOWER EXTREMITIES: ICD-10-CM

## 2025-06-27 PROCEDURE — 97530 THERAPEUTIC ACTIVITIES: CPT | Performed by: PHYSICAL THERAPIST

## 2025-06-27 PROCEDURE — 97110 THERAPEUTIC EXERCISES: CPT | Performed by: PHYSICAL THERAPIST

## 2025-06-27 NOTE — PROGRESS NOTES
Physical Therapy Daily Treatment Note  [facility]  [address], [place] [id number] [phone] (phone)  [phone] (fax)    Patient: Rosalee Hargrove   : 1955  Diagnosis/ICD-10 Code:  Difficulty walking [R26.2]  Referring practitioner: Eddie Slater DO  Date of Initial Visit: Type: THERAPY  Noted: 2025  Today's Date: 2025  Patient seen for 13 sessions       Rosalee Hargrove reports: feeling overall much better. I have noticed my restless leg is so much better. Getting stronger but L leg still aches and gets tired easily.     Subjective     Objective   See Exercise, Manual, and Modality Logs for complete treatment.       Assessment/Plan  Subjectively, pt reports no increase of pain or discomfort with interventions performed today. Performed well with continued functional strengthening interventions with increased fatigue with L LE versus R LE. Able to increase reps of step up activities.  Continues to benefit from verbal/tactile cues to ensure proper form and technique for exercise performance.     Progress per Plan of Care           Manual Therapy:         mins  81976;  Therapeutic Exercise:    20     mins  58675;     Neuromuscular Hilary:        mins  31836;    Therapeutic Activity:     15     mins  90675;     Gait Training:           mins  65674;     Ultrasound:          mins  52236;    Electrical Stimulation:         mins  32908;  Traction          mins 93004    Timed Treatment:   35   mins   Total Treatment:     35   mins    Leyda Hernandez PTA  Physical Therapist Assistant [phone]

## 2025-06-30 ENCOUNTER — TREATMENT (OUTPATIENT)
Dept: PHYSICAL THERAPY | Facility: CLINIC | Age: 70
End: 2025-06-30
Payer: MEDICARE

## 2025-06-30 DIAGNOSIS — R29.898 WEAKNESS OF BOTH LOWER EXTREMITIES: ICD-10-CM

## 2025-06-30 DIAGNOSIS — R26.2 DIFFICULTY WALKING: Primary | ICD-10-CM

## 2025-06-30 PROCEDURE — 97530 THERAPEUTIC ACTIVITIES: CPT | Performed by: PHYSICAL THERAPIST

## 2025-06-30 PROCEDURE — 97110 THERAPEUTIC EXERCISES: CPT | Performed by: PHYSICAL THERAPIST

## 2025-06-30 PROCEDURE — 97112 NEUROMUSCULAR REEDUCATION: CPT | Performed by: PHYSICAL THERAPIST

## 2025-06-30 NOTE — TELEPHONE ENCOUNTER
Pt is requesting a refill of xanax sent to [facility] pharmacy. States she takes prior to eye injections.         LV-6/2/25  NV-10/16/25  LF-N/A  UDS-N/A  CONTRACT-None for this drug    Please advise for refill.

## 2025-06-30 NOTE — PROGRESS NOTES
Physical Therapy Daily Treatment Note    [facility]  [address], [place] [id number] [phone] (phone)  [phone] (fax)    Patient: Rosalee Hargrove   : 1955  Diagnosis/ICD-10 Code:  Difficulty walking [R26.2]  Referring practitioner: Eddie Slater DO  Date of Initial Visit: Type: THERAPY  Noted: 2025  Today's Date:  2025  Patient seen for 14 sessions           Subjective   Patient reports she has been busy preparing for a birthday dinner.     Objective     See Exercise, Manual, and Modality Logs for complete treatment.     Assessment/Plan  Patient was able to get up onto mat table without step stool indicating some improvement with basic mobility and transfers. More muscle fatigue noted in L LE with SAQs and SLRs. Also still working on maintaining core engagement/PPT during supine marches. Ended with heat to address some low back pain as well as ice on L knee to reduce soreness post treatment.            Timed:    Manual Therapy:         mins  53199;  Therapeutic Exercise:    20     mins  60539;     Neuromuscular Hilary:    10    mins  78727;    Therapeutic Activity:     10     mins  72590;     Gait Training:           mins  18809;     Ultrasound:          mins  40243;    Electrical Stimulation:         mins  82933 ( );  Iontophoresis         mins 08348;  Aquatic Therapy         mins 13925;  Self Care                           mins 89320     Untimed:  Electrical Stimulation:         mins  81870 ( );  Traction:         mins  43482;   Dry Needling   (1-2 muscles)       mins  (Self-pay)  Dry Needling (3-4 muscles)        mins  (Self-pay)  Dry Needling Trial          mins DRYNDLTRIAL  (No Charge)    Timed Treatment:   40   mins   Total Treatment:     50   mins    Chelita Shabzaz PT  Physical Therapist    [place] License:[id number]

## 2025-06-30 NOTE — TELEPHONE ENCOUNTER
Pt is requesting a refill of xanax sent to [facility] pharmacy. States she takes prior to eye injections.

## 2025-07-01 RX ORDER — ALPRAZOLAM 0.25 MG
0.25 TABLET ORAL DAILY
Qty: 30 TABLET | Refills: 3 | Status: SHIPPED | OUTPATIENT
Start: 2025-07-01

## 2025-07-03 ENCOUNTER — OFFICE VISIT (OUTPATIENT)
Dept: GASTROENTEROLOGY | Facility: CLINIC | Age: 70
End: 2025-07-03
Payer: MEDICARE

## 2025-07-03 VITALS
WEIGHT: 196 LBS | DIASTOLIC BLOOD PRESSURE: 78 MMHG | BODY MASS INDEX: 38.48 KG/M2 | HEIGHT: 60 IN | SYSTOLIC BLOOD PRESSURE: 136 MMHG

## 2025-07-03 DIAGNOSIS — K59.04 CHRONIC IDIOPATHIC CONSTIPATION: ICD-10-CM

## 2025-07-03 DIAGNOSIS — D50.9 IRON DEFICIENCY ANEMIA, UNSPECIFIED IRON DEFICIENCY ANEMIA TYPE: Primary | ICD-10-CM

## 2025-07-03 DIAGNOSIS — K21.9 GASTROESOPHAGEAL REFLUX DISEASE WITHOUT ESOPHAGITIS: ICD-10-CM

## 2025-07-03 RX ORDER — PANTOPRAZOLE SODIUM 40 MG/1
40 TABLET, DELAYED RELEASE ORAL DAILY
Qty: 90 TABLET | Refills: 3 | Status: SHIPPED | OUTPATIENT
Start: 2025-07-03

## 2025-07-03 NOTE — PROGRESS NOTES
PATIENT INFORMATION  Rosalee Hargrove       - 1955    CHIEF COMPLAINT  Chief Complaint   Patient presents with    Chronic idopathic constipation    Heartburn    Iron deficiency anemia       HISTORY OF PRESENT ILLNESS  Here today for EGD follow-up     2025 EGD positive for chronic reflux, normal duodenum, stomach, no barretts, HP, celiac.  2025 normal Capsule endoscopy     40 mg pantoprazole daily, has fullness occasionally depending on what she eats less than weekly, no dysphagia, nausea, vomiting. PRN AA when its bad. No NSAIDs. Well controlled on PPI.     AYSHA: CKD 3B, childrens iron, managed by Hem. Last H/H normal. No bleeding. Going for labs today.     Bowels are doing well on daily amitiza, BID led to episodes at night, so now back to daily and doing good without impaction. Occasionally will take awhile to start and encouraged PRN supp/fleets.     2022 EGD with small HH, normal duodenal bx, no celiac  2022 Colonoscopy 0/3 adenomas, hemorrhoids, No known fam hx CRC or polyps.     Seen by Dr. Church in  for anemia and an episode of rectal bleeding.     2025 EGD positive for chronic reflux, normal duodenum, stomach, no barretts, HP, celiac.  2025 normal Capsule endoscopy             REVIEWED PERTINENT RESULTS/ LABS  Lab Results   Component Value Date    CASEREPORT  2025     Surgical Pathology Report                         Case: [id number]                                  Authorizing Provider:  Hayden Lopez        Collected:           2025 02:40 PM                                 MD Bhupinder                                                                   Ordering Location:     [facility]   Received:            2025 03:48 PM                                 OR                                                                           Pathologist:           Mekhi Duenas MD                                                          Specimen:    Esophagus, Distal, distal esophagus biopsy                                                 FINALDX  01/23/2025     1.  Esophagus, distal, biopsy: Benign squamous and gastric mucosa with-   -A. Chronic inflammation in the gastric mucosa.   -B. No intestinal metaplasia.   -C. No Helicobacter pylori.          Lab Results   Component Value Date    HGB 13.8 03/26/2025    MCV 87.1 03/26/2025     03/26/2025    ALT 16 03/26/2025    AST 17 03/26/2025    HGBA1C 5.80 (H) 03/26/2025    INR 1.1 04/25/2023    TRIG 193 (H) 03/26/2025    FERRITIN 172.00 (H) 12/18/2024    IRON 35 (L) 12/18/2024    TIBC 332 12/18/2024      CT Chest Low Dose Cancer Screening WO  Result Date: 6/16/2025  Narrative: LOW-DOSE CHEST CT WITHOUT IV CONTRAST  HISTORY: Lung cancer screening  TECHNIQUE: Radiation dose reduction techniques were utilized, including automated exposure control and exposure modulation based on body size. Low-dose chest CT performed per protocol without contrast utilizing 2 mm intervals. Coronal and sagittal reformatted imaging obtained.  COMPARISON: 3/10/2024  FINDINGS: Stable tiny groundglass nodule in the right upper lobe image 31. No new nodules or infiltrates. No lymphadenopathy or pleural effusion. Coronary artery calcifications are present. Limited imaging of the upper abdomen demonstrates cholecystectomy. No acute bony abnormality      Impression: Stable low-dose chest CT. Follow-up low-dose chest CT recommended in 12 months to continue annual lung cancer screening  ACR Lung-Rads category 2   CTDI 1.41 mGy. DLP 48.9 mGycm.  Radiation dose reduction techniques were utilized, including automated exposure control and exposure modulation based on body size.   This report was finalized on 6/16/2025 9:10 AM by Dr. Ryan Borden M.D on Workstation: [id number]      US Soft Tissue  Result Date: 6/9/2025  Narrative: US SOFT TISSUE Date of Exam: 6/9/2025 2:01 PM EDT Indication: left buttock mass. Comparison: No  comparisons available. Technique: Sonographic imaging was obtained of the superior margin of the left buttock in the transverse and sagittal planes per protocol. Color Doppler imaging was also performed. Findings: There is an ovoid hypoechoic to isoechoic focus noted in the subcutaneous soft tissues in the region of interest. This finding measures approximately 6.9 x 2.9 x 8.2 cm. The findings are most consistent with a lipoma. No significant increased vascularity  is noted on color flow Doppler evaluation. No edema is observed. No abnormal fluid collections are seen.     Impression: Impression: Findings most consistent with a lipoma in the subcutaneous soft tissues in the region of interest. Electronically Signed: Adrian Moreno MD  6/9/2025 3:44 PM EDT  Workstation ID: [id number]      REVIEW OF SYSTEMS  Review of Systems      ACTIVE PROBLEMS  Patient Active Problem List    Diagnosis     Lipoma of left lower extremity [D17.24]     Lipoma of left lower extremity [D17.24]     Psoriasis [L40.9]     Chronic eczematous otitis externa of both ears [H60.8X3]     Bilateral leg weakness [R29.898]     Neuropathy [G62.9]     Right leg swelling [M79.89]     Chronic venous hypertension with inflammation [I87.329]     Atherosclerosis of native artery of extremity with intermittent claudication [I70.219]     Sciatic nerve disease [G57.00]     Neurogenic claudication [R29.818]     Neurogenic claudication due to lumbar spinal stenosis [M48.062]     Follow-up exam [Z09]     Stage 3a chronic kidney disease [N18.31]     TRI treated with BiPAP [G47.33]     Iron adverse reaction [T45.4X5A]     COPD with exacerbation [J44.1]     Acute diastolic CHF (congestive heart failure) [I50.31]     DDD (degenerative disc disease), cervical [M50.30]     Ex-cigarette smoker [Z87.891]     Clinical diagnosis of severe acute respiratory syndrome coronavirus 2 (SARS-CoV-2) disease [U07.1]     Stenosis of right carotid artery [I65.21]     Iron deficiency  anemia [D50.9]     History of cardioembolic cerebrovascular accident (CVA) [Z86.73]     Type 2 diabetes mellitus with both eyes affected by mild nonproliferative retinopathy without macular edema, without long-term current use of insulin [E11.3293]     Coronary artery disease involving native coronary artery of native heart without angina pectoris [I25.10]     Morbid exogenous obesity [E66.01]     Atopic rhinitis [J30.9]     Generalized anxiety disorder [F41.1]     Gastroesophageal reflux disease without esophagitis [K21.9]     Panic disorder [F41.0]     Peripheral arterial occlusive disease [I77.9]     Peripheral edema [R60.0]     Calculus of kidney [N20.0]     Mixed hyperlipidemia [E78.2]     Essential hypertension [I10]     Acquired hypothyroidism [E03.9]     Vitamin D deficiency [E55.9]     Atherosclerosis of both carotid arteries [I65.23]     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]     Atherosclerosis of aorta [I70.0]          PAST MEDICAL HISTORY  Past Medical History:   Diagnosis Date    Afib 09/09/2021    Anemia     Anxiety     Arthritis     Atheroembolism of other site 11/12/2015    Atherosclerosis of aorta 11/12/2015    Atherosclerosis of native arteries of extremities with intermittent claudication, bilateral legs 08/29/2019    PVD    Bilateral carotid artery stenosis 08/29/2019    Bilateral leg edema     Carotid artery stenosis 08/11/2016    Right    Cataract Jan 2022    Chest pain on breathing 08/17/2017    Cholelithiasis     Colon polyp Feb 2023    Coronary artery disease     Depression     Diabetes mellitus     Dizziness     GERD (gastroesophageal reflux disease)     Glaucoma     Headache     Hyperlipidemia     Hypertension     Kidney stone     Localized edema 11/12/2015    Localized edema 08/11/2016    Orthostatic hypotension 08/11/2016    Pancreatitis     Peripheral vascular disease with claudication 11/12/2015    bilateral legs    Rectal bleeding 03/24/2022    Added automatically from request for surgery [id number]     Shortness of breath     Sleep apnea     Stroke 2021    Tobacco abuse     Urinary tract infection     Varicose veins of bilateral lower extremities with pain 2021    Visual impairment     Macular degeneration    Vitamin D deficiency          SURGICAL HISTORY  Past Surgical History:   Procedure Laterality Date    ABDOMINAL SURGERY      ANAL FISSURECTOMY      BRAIN SURGERY      temporaL ARTERY ON RIGHT SIDE    CARDIAC CATHETERIZATION N/A 2016    Procedure: Coronary angiography;  Surgeon: Fredrick Kapoor MD;  Location:  [facility];  Service:     CARDIAC CATHETERIZATION N/A 2016    Procedure: Left heart cath;  Surgeon: Fredrick Kapoor MD;  Location:  [facility];  Service:     CARDIAC CATHETERIZATION N/A 2016    Procedure: Left ventriculography;  Surgeon: Fredrick Kapoor MD;  Location:  [facility];  Service:     CARDIAC CATHETERIZATION N/A 2016    Procedure: Right heart cath;  Surgeon: Fredrick Kapoor MD;  Location:  [facility];  Service:     CAROTID ENDARTERECTOMY      CAROTID STENT  May 2021     SECTION      CHOLECYSTECTOMY      COLONOSCOPY N/A 2022    Procedure: COLONOSCOPY with Polypectomy;  Surgeon: Sarwat Church MD;  [place] [facility] [place]  Service: Gastroenterology;  Laterality: N/A;  Rectal polyps x3 and Hemorrhoids    CORONARY ANGIOPLASTY WITH STENT PLACEMENT Right 2021    ENDOSCOPY N/A 2022    Procedure: ESOPHAGOGASTRODUODENOSCOPY;  Surgeon: Sarwat Church MD;  [place] [facility] [place];  Service: Gastroenterology;  Laterality: N/A;  Small Hiatal Hernia    ENDOSCOPY N/A 2025    Procedure: ESOPHAGOGASTRODUODENOSCOPY WITH BIOPSY;  Surgeon: Hayden Lopez MD;  Location: [facility];  Service: Gastroenterology;  Laterality: N/A;  reflux esophagitis-biopsy    ERCP      FRACTURE SURGERY      arm    HYSTERECTOMY      ILIAC ARTERY STENT      SUBTOTAL HYSTERECTOMY       TONSILLECTOMY      UPPER GASTROINTESTINAL ENDOSCOPY           FAMILY HISTORY  Family History   Problem Relation Age of Onset    Heart disease Mother     Hypertension Mother     Hyperlipidemia Mother     Breast cancer Mother     Heart disease Father     Heart attack Sister     Heart disease Sister     Breast cancer Sister     Heart disease Brother     Heart attack Brother     Hyperlipidemia Brother     Diabetes Brother     Heart attack Maternal Grandmother     Heart disease Maternal Grandmother     Heart failure Maternal Grandmother     Heart failure Maternal Grandfather     Heart disease Maternal Grandfather     Heart attack Maternal Grandfather     Heart attack Paternal Grandmother     Heart disease Paternal Grandmother     Heart failure Paternal Grandmother     Hypertension Paternal Grandmother          SOCIAL HISTORY  Social History     Occupational History    Occupation: retired   Tobacco Use    Smoking status: Former     Current packs/day: 0.00     Average packs/day: 0.5 packs/day for 50.0 years (25.0 ttl pk-yrs)     Types: Cigarettes     Start date: 1973     Quit date: 2023     Years since quittin.1    Smokeless tobacco: Never    Tobacco comments:     Patient smokes 6 cigarettes per day   Vaping Use    Vaping status: Never Used   Substance and Sexual Activity    Alcohol use: Yes     Alcohol/week: 1.0 standard drink of alcohol     Types: 1 Glasses of wine per week     Comment: Very occasional    Drug use: Never     Comment: Drug Abuse: none    Sexual activity: Not Currently     Partners: Male     Birth control/protection: None, Hysterectomy     Comment: N/A         CURRENT MEDICATIONS    Current Outpatient Medications:     ALPRAZolam (XANAX) 0.25 MG tablet, Take 1 tablet by mouth Daily., Disp: 30 tablet, Rfl: 3    amLODIPine (NORVASC) 10 MG tablet, Take 1 tablet by mouth Daily., Disp: 90 tablet, Rfl: 1    aspirin 81 MG chewable tablet, Chew 1 tablet Daily., Disp: , Rfl:     cilostazol (PLETAL) 100  MG tablet, Take 1 tablet by mouth 2 (Two) Times a Day., Disp: 60 tablet, Rfl: 60    citalopram (CeleXA) 20 MG tablet, Take 1 tablet by mouth Daily., Disp: 90 tablet, Rfl: 1    clobetasol (TEMOVATE) 0.05 % external solution, APPLY A FEW DROPS TO THE SCALP UP TO TWICE A DAY AS NEEDED FOR ITCHING/FLARES, Disp: , Rfl:     clopidogrel (Plavix) 75 MG tablet, 1 po BID, Disp: 180 tablet, Rfl: 1    clotrimazole-betamethasone (LOTRISONE) 1-0.05 % cream, Apply 1 Application topically to the appropriate area as directed 2 (Two) Times a Day., Disp: 135 g, Rfl: 2    desonide (DESOWEN) 0.05 % ointment, Apply 1 Application topically to the appropriate area as directed., Disp: , Rfl:     ezetimibe (ZETIA) 10 MG tablet, Take 1 tablet by mouth Daily., Disp: 90 tablet, Rfl: 1    fluocinolone acetonide (DERMOTIC) 0.01 % oil otic oil, INSTILL 2 DROPS INTO EACH EAR 2 TIMES A DAY FOR EXTERNAL EAR ECZEMA, Disp: 60 mL, Rfl: 6    fluticasone (FLONASE) 50 MCG/ACT nasal spray, Administer 2 sprays into the nostril(s) as directed by provider Daily., Disp: 48 g, Rfl: 1    levothyroxine (SYNTHROID, LEVOTHROID) 75 MCG tablet, Take 1 tablet by mouth Daily., Disp: 90 tablet, Rfl: 0    losartan (COZAAR) 50 MG tablet, Take 1 tablet by mouth Daily., Disp: 90 tablet, Rfl: 1    lubiprostone (Amitiza) 24 MCG capsule, Take 1 capsule by mouth 2 (Two) Times a Day With Meals. (Patient taking differently: Take 1 capsule by mouth Daily With Breakfast.), Disp: 60 capsule, Rfl: 3    pantoprazole (PROTONIX) 40 MG EC tablet, Take 1 tablet by mouth Daily., Disp: 90 tablet, Rfl: 3    pitavastatin calcium (Livalo) 2 MG tablet tablet, Take 1 tablet by mouth Every Night., Disp: 90 tablet, Rfl: 1    Semaglutide, 1 MG/DOSE, (OZEMPIC) 2 MG/1.5ML solution pen-injector, Inject 1 mg under the skin into the appropriate area as directed 1 (One) Time Per Week., Disp: 9 mL, Rfl: 1    spironolactone (ALDACTONE) 25 MG tablet, Take 1 tablet by mouth Daily., Disp: 90 tablet, Rfl: 1     "terbinafine (lamiSIL) 250 MG tablet, Take 1 tablet by mouth Daily., Disp: 90 tablet, Rfl: 1    trimethoprim-polymyxin b (POLYTRIM) 40270-2.1 UNIT/ML-% ophthalmic solution, Apply 1 drop to eye(s) as directed by provider. Four days before and four days after eye injection., Disp: , Rfl:     ALLERGIES  Ciprofloxacin, Lisinopril, Atorvastatin calcium, Crestor [rosuvastatin], Penicillins, and Seasonal ic [cholestatin]    VITALS  Vitals:    07/03/25 1300   BP: 136/78   Weight: 88.9 kg (196 lb)   Height: 152.4 cm (60\")       PHYSICAL EXAM  Debilities/Disabilities Identified: None  Emotional Behavior: Appropriate  Wt Readings from Last 3 Encounters:   07/03/25 88.9 kg (196 lb)   06/26/25 88.9 kg (196 lb)   06/02/25 88.9 kg (196 lb)     Ht Readings from Last 1 Encounters:   07/03/25 152.4 cm (60\")     Body mass index is 38.28 kg/m².  Physical Exam  Constitutional:       General: She is not in acute distress.     Appearance: Normal appearance. She is not ill-appearing.   HENT:      Head: Normocephalic and atraumatic.      Mouth/Throat:      Mouth: Mucous membranes are moist.      Pharynx: No posterior oropharyngeal erythema.   Eyes:      General: No scleral icterus.  Cardiovascular:      Rate and Rhythm: Normal rate and regular rhythm.      Heart sounds: Normal heart sounds.   Pulmonary:      Effort: Pulmonary effort is normal.      Breath sounds: Normal breath sounds.   Abdominal:      General: Abdomen is flat. Bowel sounds are normal. There is no distension.      Palpations: Abdomen is soft. There is no mass.      Tenderness: There is no abdominal tenderness. There is no guarding or rebound. Negative signs include Hilario's sign.      Hernia: No hernia is present.   Musculoskeletal:      Cervical back: Neck supple.   Skin:     General: Skin is warm.      Capillary Refill: Capillary refill takes less than 2 seconds.   Neurological:      General: No focal deficit present.      Mental Status: She is alert and oriented to " person, place, and time.   Psychiatric:         Mood and Affect: Mood normal.         Behavior: Behavior normal.         Thought Content: Thought content normal.         Judgment: Judgment normal.         CLINICAL DATA REVIEWED   reviewed previous lab results and integrated with today's visit, reviewed notes from other physicians and/or last GI encounter, reviewed previous endoscopy results and available photos, reviewed surgical pathology results from previous biopsies    ASSESSMENT  Diagnoses and all orders for this visit:    Iron deficiency anemia, unspecified iron deficiency anemia type    Gastroesophageal reflux disease without esophagitis  -     pantoprazole (PROTONIX) 40 MG EC tablet; Take 1 tablet by mouth Daily.    Chronic idiopathic constipation          PLAN    Continue daily amitiza, use PRN fleets/sup  Continue daily PPI  Call if bleeding or worsening anemia    Return in about 6 months (around 1/3/2026).    I have discussed the above plan with the patient.  They verbalize understanding and are in agreement with the plan.  They have been advised to contact the office for any questions, concerns, or changes related to their health.

## 2025-07-17 ENCOUNTER — OFFICE VISIT (OUTPATIENT)
Dept: SURGERY | Facility: CLINIC | Age: 70
End: 2025-07-17
Payer: MEDICARE

## 2025-07-17 VITALS
SYSTOLIC BLOOD PRESSURE: 142 MMHG | DIASTOLIC BLOOD PRESSURE: 78 MMHG | WEIGHT: 192.6 LBS | OXYGEN SATURATION: 98 % | HEART RATE: 81 BPM | HEIGHT: 60 IN | BODY MASS INDEX: 37.81 KG/M2

## 2025-07-17 DIAGNOSIS — D17.1 LIPOMA OF BUTTOCK: Primary | ICD-10-CM

## 2025-07-17 PROCEDURE — 1160F RVW MEDS BY RX/DR IN RCRD: CPT | Performed by: PHYSICIAN ASSISTANT

## 2025-07-17 PROCEDURE — 99203 OFFICE O/P NEW LOW 30 MIN: CPT | Performed by: PHYSICIAN ASSISTANT

## 2025-07-17 PROCEDURE — 1159F MED LIST DOCD IN RCRD: CPT | Performed by: PHYSICIAN ASSISTANT

## 2025-07-17 PROCEDURE — 3077F SYST BP >= 140 MM HG: CPT | Performed by: PHYSICIAN ASSISTANT

## 2025-07-17 PROCEDURE — 3078F DIAST BP <80 MM HG: CPT | Performed by: PHYSICIAN ASSISTANT

## 2025-07-17 NOTE — PROGRESS NOTES
I have reviewed the notes, assessments, and/or procedures performed by Ryan Renee, I concur with her/his documentation of Rosalee Hargrove.

## 2025-07-17 NOTE — PROGRESS NOTES
ASSESSMENT/PLAN:    69-year-old lady with an enlarging left buttock lipoma which she wishes to have removed.  Dr. De Guzmna and I discussed her options and she wishes to proceed with excision of this mass.  Risks and rationale were discussed with her and she was willing to proceed with all recommendations.  She will need to hold her Ozempic for 1 week prior to the procedure and her Plavix for 5 days.  All questions were answered at the time of this visit.    CC:     Lipoma of the buttock    HPI:    69-year-old lady presenting to the office today at the request of Dr. Sujit Coats for consultation.  Approximately 1 month ago she noticed a larger lump on her left buttock which she feels has caused her some discomfort in particular with sitting.  She does not feel as if it has changed since initially noticing it.  She denies redness, tenderness, drainage or any additional complaints with the.    ENDOSCOPY:   EGD 2025 reflux esophagitis  Colonoscopy 2022 hyperplastic polyps    RADIOLOGY:   Soft tissue ultrasound: 6.9 x 2.9 x 8.2 cm mass most consistent with lipoma    SOCIAL HISTORY:   Denies tobacco use  Occasional alcohol use    FAMILY HISTORY:    Colorectal cancer: None    PREVIOUS ABDOMINAL SURGERY    Cholecystectomy  Hysterectomy    OTHER SURGERY  Past Surgical History:   Procedure Laterality Date    ABDOMINAL SURGERY      ANAL FISSURECTOMY      BRAIN SURGERY      temporaL ARTERY ON RIGHT SIDE    CARDIAC CATHETERIZATION N/A 09/23/2016    Procedure: Coronary angiography;  Surgeon: Fredrick Kapoor MD;  Location: [facility] INVASIVE LOCATION;  Service:     CARDIAC CATHETERIZATION N/A 09/23/2016    Procedure: Left heart cath;  Surgeon: Fredrick Kapoor MD;  Location: [facility] CATH INVASIVE LOCATION;  Service:     CARDIAC CATHETERIZATION N/A 09/23/2016    Procedure: Left ventriculography;  Surgeon: Fredrick Kapoor MD;  Location: [facility] CATH INVASIVE LOCATION;  Service:     CARDIAC CATHETERIZATION N/A 09/23/2016    Procedure: Right heart  cath;  Surgeon: Fredrick Kapoor MD;  Location:  [facility] CATH INVASIVE LOCATION;  Service:     CAROTID ENDARTERECTOMY      CAROTID STENT  May 2021     SECTION      CHOLECYSTECTOMY      COLONOSCOPY N/A 2022    Procedure: COLONOSCOPY with Polypectomy;  Surgeon: Sarwat Church MD;  Location: [facility];  Service: Gastroenterology;  Laterality: N/A;  Rectal polyps x3 and Hemorrhoids    CORONARY ANGIOPLASTY WITH STENT PLACEMENT Right 2021    ENDOSCOPY N/A 2022    Procedure: ESOPHAGOGASTRODUODENOSCOPY;  Surgeon: Sarwat Church MD;  Location: [facility];  Service: Gastroenterology;  Laterality: N/A;  Small Hiatal Hernia    ENDOSCOPY N/A 2025    Procedure: ESOPHAGOGASTRODUODENOSCOPY WITH BIOPSY;  Surgeon: Hayden Lopez MD;  Location:  [facility];  Service: Gastroenterology;  Laterality: N/A;  reflux esophagitis-biopsy    ERCP      FRACTURE SURGERY      arm    HYSTERECTOMY      ILIAC ARTERY STENT      SUBTOTAL HYSTERECTOMY      TONSILLECTOMY      UPPER GASTROINTESTINAL ENDOSCOPY         PAST MEDICAL HISTORY:    Past Medical History:   Diagnosis Date    Afib 2021    Anemia     Anxiety     Arthritis     Atheroembolism of other site 2015    Atherosclerosis of aorta 2015    Atherosclerosis of native arteries of extremities with intermittent claudication, bilateral legs 2019    PVD    Bilateral carotid artery stenosis 2019    Bilateral leg edema     Carotid artery stenosis 2016    Right    Cataract 2022    Chest pain on breathing 2017    Cholelithiasis     Colon polyp 2023    Coronary artery disease     Depression     Diabetes mellitus     Dizziness     Fissure, anal     GERD (gastroesophageal reflux disease)     Glaucoma     Headache     Hyperlipidemia     Hypertension     Kidney stone     Localized edema 2015    Localized edema 2016    Orthostatic hypotension 2016    Pancreatitis     Peripheral vascular disease  with claudication 11/12/2015    bilateral legs    Rectal bleeding 03/24/2022    Added automatically from request for surgery [id number]    Shortness of breath     Sleep apnea     Stroke 09/09/2021    TIA (transient ischemic attack)     Tobacco abuse     Urinary tract infection     Varicose veins of bilateral lower extremities with pain 09/09/2021    Visual impairment     Macular degeneration    Vitamin D deficiency        MEDICATIONS:     Current Outpatient Medications:     ALPRAZolam (XANAX) 0.25 MG tablet, Take 1 tablet by mouth Daily., Disp: 30 tablet, Rfl: 3    amLODIPine (NORVASC) 10 MG tablet, Take 1 tablet by mouth Daily., Disp: 90 tablet, Rfl: 1    aspirin 81 MG chewable tablet, Chew 1 tablet Daily., Disp: , Rfl:     cilostazol (PLETAL) 100 MG tablet, Take 1 tablet by mouth 2 (Two) Times a Day., Disp: 60 tablet, Rfl: 60    citalopram (CeleXA) 20 MG tablet, Take 1 tablet by mouth Daily., Disp: 90 tablet, Rfl: 1    clobetasol (TEMOVATE) 0.05 % external solution, APPLY A FEW DROPS TO THE SCALP UP TO TWICE A DAY AS NEEDED FOR ITCHING/FLARES, Disp: , Rfl:     clopidogrel (Plavix) 75 MG tablet, 1 po BID, Disp: 180 tablet, Rfl: 1    clotrimazole-betamethasone (LOTRISONE) 1-0.05 % cream, Apply 1 Application topically to the appropriate area as directed 2 (Two) Times a Day., Disp: 135 g, Rfl: 2    desonide (DESOWEN) 0.05 % ointment, Apply 1 Application topically to the appropriate area as directed., Disp: , Rfl:     ezetimibe (ZETIA) 10 MG tablet, Take 1 tablet by mouth Daily., Disp: 90 tablet, Rfl: 1    fluocinolone acetonide (DERMOTIC) 0.01 % oil otic oil, INSTILL 2 DROPS INTO EACH EAR 2 TIMES A DAY FOR EXTERNAL EAR ECZEMA, Disp: 60 mL, Rfl: 6    fluticasone (FLONASE) 50 MCG/ACT nasal spray, Administer 2 sprays into the nostril(s) as directed by provider Daily., Disp: 48 g, Rfl: 1    levothyroxine (SYNTHROID, LEVOTHROID) 75 MCG tablet, Take 1 tablet by mouth Daily., Disp: 90 tablet, Rfl: 0    losartan (COZAAR) 50 MG  "tablet, Take 1 tablet by mouth Daily., Disp: 90 tablet, Rfl: 1    lubiprostone (Amitiza) 24 MCG capsule, Take 1 capsule by mouth 2 (Two) Times a Day With Meals. (Patient taking differently: Take 1 capsule by mouth Daily With Breakfast.), Disp: 60 capsule, Rfl: 3    pantoprazole (PROTONIX) 40 MG EC tablet, Take 1 tablet by mouth Daily., Disp: 90 tablet, Rfl: 3    pitavastatin calcium (Livalo) 2 MG tablet tablet, Take 1 tablet by mouth Every Night., Disp: 90 tablet, Rfl: 1    Semaglutide, 1 MG/DOSE, (OZEMPIC) 2 MG/1.5ML solution pen-injector, Inject 1 mg under the skin into the appropriate area as directed 1 (One) Time Per Week., Disp: 9 mL, Rfl: 1    spironolactone (ALDACTONE) 25 MG tablet, Take 1 tablet by mouth Daily., Disp: 90 tablet, Rfl: 1    terbinafine (lamiSIL) 250 MG tablet, Take 1 tablet by mouth Daily., Disp: 90 tablet, Rfl: 1    trimethoprim-polymyxin b (POLYTRIM) 59099-4.1 UNIT/ML-% ophthalmic solution, Apply 1 drop to eye(s) as directed by provider. Four days before and four days after eye injection., Disp: , Rfl:     ALLERGIES:   Allergies   Allergen Reactions    Ciprofloxacin Hives and Swelling    Lisinopril Swelling and Other (See Comments)     Cough     Atorvastatin Calcium Myalgia    Crestor [Rosuvastatin] Myalgia     Muscle weakness    Penicillins Itching    Seasonal Ic [Cholestatin] Other (See Comments)     Cough, congestion       PHYSICAL EXAM:   Constitutional: Well-developed well-nourished, no acute distress  Vital signs:   Height 60\"  Weight 192  BMI 37.6  Neck: Supple, no palpable mass, trachea midline  Skin: Left upper buttock soft tissue mass that is mobile measuring greater than 6 cm transversely consistent with lipoma  Psychiatric: Alert and oriented ×3, normal affect          Ryan Renee PA-C    [facility]  [address], [address]  [place] [address]    [address] [facility] [address]  [place] [address]    Office: [phone]  Fax: " [phone]

## 2025-07-22 DIAGNOSIS — E03.9 ACQUIRED HYPOTHYROIDISM: ICD-10-CM

## 2025-07-22 RX ORDER — LEVOTHYROXINE SODIUM 75 UG/1
75 TABLET ORAL DAILY
Qty: 90 TABLET | Refills: 0 | Status: SHIPPED | OUTPATIENT
Start: 2025-07-22

## 2025-07-23 ENCOUNTER — TREATMENT (OUTPATIENT)
Dept: PHYSICAL THERAPY | Facility: CLINIC | Age: 70
End: 2025-07-23
Payer: MEDICARE

## 2025-07-23 DIAGNOSIS — R29.898 WEAKNESS OF BOTH LOWER EXTREMITIES: ICD-10-CM

## 2025-07-23 DIAGNOSIS — R26.2 DIFFICULTY WALKING: Primary | ICD-10-CM

## 2025-07-23 PROCEDURE — 97530 THERAPEUTIC ACTIVITIES: CPT | Performed by: PHYSICAL THERAPIST

## 2025-07-23 PROCEDURE — 97110 THERAPEUTIC EXERCISES: CPT | Performed by: PHYSICAL THERAPIST

## 2025-07-23 PROCEDURE — 97164 PT RE-EVAL EST PLAN CARE: CPT | Performed by: PHYSICAL THERAPIST

## 2025-07-23 NOTE — PROGRESS NOTES
Physical Therapy Recertification     [facility]  [address] [place] [address]  [phone] (phone)  [phone] (fax)    Patient: Rosalee Hargrove   : 1955  Diagnosis/ICD-10 Code:  Difficulty walking [R26.2]  Referring practitioner: Eddie Slater DO  Date of Initial Visit: 2025  Today's Date:  2025  Patient seen for 15 sessions           Clinical Progress: improved  Home Program Compliance: Yes     Subjective   Patient reports her body seems to be moving around better in general.    Objective     Neurological Testing      Sensation      Lumbar   Left   Intact: light touch     Right   Intact: light touch     Active Range of Motion   Left Hip   Flexion: 115 degrees      Right Hip   Flexion: 115 degrees      Left Knee   Flexion: 120 degrees      Right Knee   Flexion: 120 degrees      Strength/Myotome Testing      Left Hip   Planes of Motion   Flexion: 4  Abduction: 4     Right Hip   Planes of Motion   Flexion: 4+  Abduction: 4     Left Knee   Flexion: 4+  Extension: 4+     Right Knee   Flexion: 5  Extension: 5     Left Ankle/Foot   Dorsiflexion: 5     Right Ankle/Foot   Dorsiflexion: 5     Functional Assessment      Comments  30 Second STS: 11 reps  Single leg balance: 4 sec on L, 5 sec on R         See Exercise, Manual, and Modality Logs for complete treatment.         Functional Outcome Score:  LEFS 28/80          Assessment/Plan  Impairments: abnormal gait, abnormal muscle tone, abnormal or restricted ROM, activity intolerance, impaired balance, impaired physical strength, lacks appropriate home exercise program, pain with function and safety issue   Functional limitations: sleeping, walking, uncomfortable because of pain, sitting and standing   Rosalee Hargrove has been seen for 15 physical therapy sessions for physical therapy for B LE weakness .  Treatment has included therapeutic exercise, therapeutic activity, and patient education with home exercise program .  Progress to physical therapy goals is good. Patient demonstrates good improvement in L LE strength. No significant difference in SLS balance or 30 sec STS reps today, however walking tolerance has increased from 15 min to ~20 min compared to last month. Patient also reports increased ease with community mobility and transfers. She continues to avoid stairs and relies on the railing for stability. She will benefit from continued skilled physical therapy to address remaining impairments and functional limitations.                  Goals  Plan Goals: Goals  Plan Goals: STGs to be completed within 30 days:  -Patient will demonstrate compliance and independence with initial HEP (MET)  -Patient will increase bilateral hip flexion AROM to 100 degrees to help normalize gait mechanics and increase ease with transfers (MET)  -Patient will increase walking tolerance from 10 minutes to 15 minutes to increase ability w/ completing daily activities. (MET)  -Patient will improve score on LEFS from 16 at eval to 30 or greater to improve quality of life (ONGOING)     LTGs to be completed within 90 days:  -Patient will increase bilateral hip flexion AROM to 110 or more degrees to help normalize gait mechanics and increase ease with transfers (MET)  -Patient will increase L hip strength to 4/5 to promote safety and independence w/ ambulation and stairs. (MET)  -Patient will improve score on LEFS from 16 at eval to 45 or greater to improve quality of life (ONGOING)  -Patient will increase walking tolerance from 10 minutes at eval to 30 minutes to increase ability w/ completing daily activities. (ONGOING, 20 min )  -Patient will navigate down 4-5 stairs without requiring handrail support to increase ease with household mobility (ONGOING)        Recommendations: Continue as planned  Timeframe: 1 month; 2x/week  Prognosis to achieve goals: good      Timed:  Manual Therapy:         mins  90380;  Therapeutic Exercise:    15     mins  27059;      Neuromuscular Hilary:        mins  65755;    Therapeutic Activity:     10     mins  01357;     Gait Training:           mins  42463;     Ultrasound:          mins  53112;    Iontophoresis         mins 05462    Untimed:  Electrical Stimulation:         mins  20273 ( );  Traction:         mins  10020;   Dry Needling   (1-2 muscles)       mins 62291 (Self-pay)  Dry Needling (3-4 muscles)        mins 20561 (Self-pay)  Dry Needling Trial          mins DRYNDLTRIAL  (No Charge)  Re Eval    10   mins 15773     Timed Treatment:   25   mins   Total Treatment:     55   mins    PT SIGNATURE: Chelita Shabazz PT     [place] PT License Number: [id number]    Electronically signed by Chelita Shabazz PT, 07/23/25, 7:58 AM EDT]    DATE TREATMENT INITIATED: 7/23/2025    90 Day Recertification  Certification Period: 10/21/2025  I certify that the therapy services are furnished while this patient is under my care.  The services outlined above are required by this patient, and will be reviewed every 90 days.     PHYSICIAN: Eddie Slater DO   NPI: [id number]                                         DATE:

## 2025-07-24 ENCOUNTER — TREATMENT (OUTPATIENT)
Dept: PHYSICAL THERAPY | Facility: CLINIC | Age: 70
End: 2025-07-24
Payer: MEDICARE

## 2025-07-24 ENCOUNTER — PRE-ADMISSION TESTING (OUTPATIENT)
Dept: PREADMISSION TESTING | Facility: HOSPITAL | Age: 70
End: 2025-07-24
Payer: MEDICARE

## 2025-07-24 VITALS
RESPIRATION RATE: 16 BRPM | BODY MASS INDEX: 37.77 KG/M2 | OXYGEN SATURATION: 98 % | SYSTOLIC BLOOD PRESSURE: 131 MMHG | WEIGHT: 192.4 LBS | HEIGHT: 60 IN | DIASTOLIC BLOOD PRESSURE: 70 MMHG | HEART RATE: 70 BPM

## 2025-07-24 DIAGNOSIS — R29.898 WEAKNESS OF BOTH LOWER EXTREMITIES: ICD-10-CM

## 2025-07-24 DIAGNOSIS — R26.2 DIFFICULTY WALKING: Primary | ICD-10-CM

## 2025-07-24 LAB
ANION GAP SERPL CALCULATED.3IONS-SCNC: 9.9 MMOL/L (ref 5–15)
BUN SERPL-MCNC: 29.7 MG/DL (ref 8–23)
BUN/CREAT SERPL: 17.8 (ref 7–25)
CALCIUM SPEC-SCNC: 9 MG/DL (ref 8.6–10.5)
CHLORIDE SERPL-SCNC: 103 MMOL/L (ref 98–107)
CO2 SERPL-SCNC: 21.1 MMOL/L (ref 22–29)
CREAT SERPL-MCNC: 1.67 MG/DL (ref 0.57–1)
DEPRECATED RDW RBC AUTO: 45.2 FL (ref 37–54)
EGFRCR SERPLBLD CKD-EPI 2021: 32.6 ML/MIN/1.73
ERYTHROCYTE [DISTWIDTH] IN BLOOD BY AUTOMATED COUNT: 13.8 % (ref 12.3–15.4)
GLUCOSE SERPL-MCNC: 76 MG/DL (ref 65–99)
HCT VFR BLD AUTO: 37.3 % (ref 34–46.6)
HGB BLD-MCNC: 12.4 G/DL (ref 12–15.9)
MCH RBC QN AUTO: 29.9 PG (ref 26.6–33)
MCHC RBC AUTO-ENTMCNC: 33.2 G/DL (ref 31.5–35.7)
MCV RBC AUTO: 89.9 FL (ref 79–97)
PLATELET # BLD AUTO: 323 10*3/MM3 (ref 140–450)
PMV BLD AUTO: 9.4 FL (ref 6–12)
POTASSIUM SERPL-SCNC: 5 MMOL/L (ref 3.5–5.2)
RBC # BLD AUTO: 4.15 10*6/MM3 (ref 3.77–5.28)
SODIUM SERPL-SCNC: 134 MMOL/L (ref 136–145)
WBC NRBC COR # BLD AUTO: 9.22 10*3/MM3 (ref 3.4–10.8)

## 2025-07-24 PROCEDURE — 36415 COLL VENOUS BLD VENIPUNCTURE: CPT

## 2025-07-24 PROCEDURE — 80048 BASIC METABOLIC PNL TOTAL CA: CPT

## 2025-07-24 PROCEDURE — 97110 THERAPEUTIC EXERCISES: CPT | Performed by: PHYSICAL THERAPIST

## 2025-07-24 PROCEDURE — 85027 COMPLETE CBC AUTOMATED: CPT

## 2025-07-24 PROCEDURE — 97530 THERAPEUTIC ACTIVITIES: CPT | Performed by: PHYSICAL THERAPIST

## 2025-07-24 RX ORDER — PEDI MV NO.227/FERROUS SULFATE 10 MG
1 TABLET,CHEWABLE ORAL DAILY
COMMUNITY

## 2025-07-24 NOTE — PROGRESS NOTES
Physical Therapy Daily Treatment Note    [facility]  [address] [place] [address]  [phone] (phone)  [phone] (fax)    Patient: Rosalee Hargrove   : 1955  Diagnosis/ICD-10 Code:  Difficulty walking [R26.2]  Referring practitioner: Eddie Slater DO  Date of Initial Visit: Type: THERAPY  Noted: 2025  Today's Date:  2025  Patient seen for 16 sessions           Subjective   Patient reports she is trying to get things ready for surgery that she is having for a tumor removal    Objective     See Exercise, Manual, and Modality Logs for complete treatment.     Assessment/Plan  Patient demonstrates better stability with heel-toe walks. Continued to focus on B LE strength in order to increase ease with functional mobility. L LE still has less endurance than the R LE with all hip and knee exercises. Added piriformis stretch to help improve low back tightness which helped improve pain levels.         Timed:    Manual Therapy:         mins  88971;  Therapeutic Exercise:    20     mins  30982;     Neuromuscular Hilary:        mins  43054;    Therapeutic Activity:     10     mins  45684;     Gait Training:           mins  21749;     Ultrasound:          mins  51744;    Electrical Stimulation:         mins  37515 ( );  Iontophoresis         mins 84563;  Aquatic Therapy         mins 67316;  Self Care                           mins 64941     Untimed:  Electrical Stimulation:         mins  12584 ( );  Traction:         mins  82748;   Dry Needling   (1-2 muscles)       mins 61620 (Self-pay)  Dry Needling (3-4 muscles)        mins  (Self-pay)  Dry Needling Trial          mins DRYNDLTRIAL  (No Charge)    Timed Treatment:   30   mins   Total Treatment:     30   mins    Chelita Shabazz PT  Physical Therapist    [place] License:[id number]

## 2025-07-25 ENCOUNTER — ANESTHESIA EVENT (OUTPATIENT)
Dept: PERIOP | Facility: HOSPITAL | Age: 70
End: 2025-07-25
Payer: MEDICARE

## 2025-07-28 ENCOUNTER — TREATMENT (OUTPATIENT)
Dept: PHYSICAL THERAPY | Facility: CLINIC | Age: 70
End: 2025-07-28
Payer: MEDICARE

## 2025-07-28 DIAGNOSIS — R26.2 DIFFICULTY WALKING: Primary | ICD-10-CM

## 2025-07-28 DIAGNOSIS — R29.898 WEAKNESS OF BOTH LOWER EXTREMITIES: ICD-10-CM

## 2025-07-28 PROCEDURE — 97530 THERAPEUTIC ACTIVITIES: CPT | Performed by: PHYSICAL THERAPIST

## 2025-07-28 PROCEDURE — 97110 THERAPEUTIC EXERCISES: CPT | Performed by: PHYSICAL THERAPIST

## 2025-07-28 NOTE — PROGRESS NOTES
Physical Therapy Daily Treatment Note    [facility]  [address], [place] [address] [phone] (phone)  [phone] (fax)    Patient: Rosalee Hargrove   : 1955  Diagnosis/ICD-10 Code:  Difficulty walking [R26.2]  Referring practitioner: Eddie Slater DO  Date of Initial Visit: Type: THERAPY  Noted: 2025  Today's Date:  2025  Patient seen for 17 sessions           Subjective   Patient reports she is feeling good today, feeling stronger.    Objective     See Exercise, Manual, and Modality Logs for complete treatment.     Assessment/Plan  Patient was able to complete heel-toe walks along rail with only intermittent UE support. Still working on maintaining core engagement during supine marches. Also trying to promote muscle endurance to increase tolerance to community mobility. L LE continues to fatigue more quickly than R LE.         Timed:    Manual Therapy:         mins  81748;  Therapeutic Exercise:    20     mins  58276;     Neuromuscular Hilary:        mins  31119;    Therapeutic Activity:     10     mins  80336;     Gait Training:           mins  10579;     Ultrasound:          mins  18754;    Electrical Stimulation:         mins  34751 ( );  Iontophoresis         mins 91485;  Aquatic Therapy         mins 98409;  Self Care                           mins 81262     Untimed:  Electrical Stimulation:         mins  73274 ( );  Traction:         mins  16273;   Dry Needling   (1-2 muscles)       mins 47344 (Self-pay)  Dry Needling (3-4 muscles)        mins  (Self-pay)  Dry Needling Trial          mins DRYNDLTRIAL  (No Charge)    Timed Treatment:   30   mins   Total Treatment:     55   mins    Chelita Shabazz PT  Physical Therapist    [place] License:[id number]

## 2025-07-30 ENCOUNTER — TELEPHONE (OUTPATIENT)
Age: 70
End: 2025-07-30
Payer: MEDICARE

## 2025-07-30 NOTE — TELEPHONE ENCOUNTER
Spoke to Charlee in general surgeon office and advised that pt can hold Plavix for 5 days prior to surgery, she will fax a new clearance form if needed by their office.

## 2025-07-30 NOTE — TELEPHONE ENCOUNTER
General Surgery called on this patient. They had sent over a clearance that was signed by APRN and faxed back [phone]; however, they are needing to know what blood thinner the patient needs to hold, and how long to hold it. They are needing this information in order to proceed with surgery.     Call back is [phone]

## 2025-07-31 ENCOUNTER — TELEPHONE (OUTPATIENT)
Dept: FAMILY MEDICINE CLINIC | Facility: CLINIC | Age: 70
End: 2025-07-31
Payer: MEDICARE

## 2025-07-31 ENCOUNTER — TELEPHONE (OUTPATIENT)
Dept: SURGERY | Facility: CLINIC | Age: 70
End: 2025-07-31
Payer: MEDICARE

## 2025-07-31 ENCOUNTER — TELEPHONE (OUTPATIENT)
Dept: CARDIOLOGY | Age: 70
End: 2025-07-31
Payer: MEDICARE

## 2025-07-31 NOTE — TELEPHONE ENCOUNTER
Received clarification from Lidia: per Dr. De Guzman, if a patient is taking aspirin for CAD or any other heart related conditions then they do not have to hold it.     Jackelin,  Patient needs clearance for excision of mass left buttock under MAC with Dr. De Guzman.  Procedure is scheduled for 8/4/2025.  She also needs clearance to hold Plavix 5 days before procedure and direction on when to resume.  She does not need to hold aspirin if she is on it for CAD/any heart related conditions.    Please let me know how you would like to proceed.    Thank you,  Margarita COOK RN  Triage Nurse [facility]  07/31/25  09:59 EDT

## 2025-07-31 NOTE — TELEPHONE ENCOUNTER
Transmission successful:      Thank you,  Margarita COOK RN  Triage Nurse [facility]  07/31/25 13:28 EDT

## 2025-07-31 NOTE — TELEPHONE ENCOUNTER
Lidia (General Surgery, [phone]) called regarding Rosalee Hargrove.    Patient needs clearance to proceed with excision of mass left buttock under MAC with Dr. De Guzman.  Procedure is scheduled for 8/4/2025.  She also needs clearance to hold Plavix 5 days before procedure and direction on when to resume.  After disconnecting call, chart review shows patient is on aspirin 81 mg daily.  Attempted to return call twice to Lidia, however there was no answer.  Please provide recommendations for aspirin prior to and after procedure as well.    LOV: 4/8/2025 with DELMY Earl    Please let me know how you would like to proceed.    Thank you,  Margarita COOK RN  Triage Nurse [facility]  07/31/25  08:32 EDT

## 2025-07-31 NOTE — TELEPHONE ENCOUNTER
Dr. De Guzman's office called and left a voicemail stating that they got a cardiac clearance from patient's cardiologist, however, they still need a medical clearance from you stating that her Plavix can be held 5 days prior to surgery because you are the one that prescribes it. Please advise. She is having surgery on 8/4/2025.

## 2025-07-31 NOTE — TELEPHONE ENCOUNTER
I called patient and left her a message to call me. We are having a difficult time trying to obtain clearance from the right doctor that is prescribing her Plavix clearance.

## 2025-07-31 NOTE — TELEPHONE ENCOUNTER
Faxed to [phone] as requested.    Thank you,  Margarita COOK RN  Triage Nurse [facility]  07/31/25 13:24 EDT

## 2025-08-04 ENCOUNTER — HOSPITAL ENCOUNTER (OUTPATIENT)
Facility: HOSPITAL | Age: 70
Setting detail: HOSPITAL OUTPATIENT SURGERY
Discharge: HOME | End: 2025-08-04
Attending: SURGERY | Admitting: SURGERY
Payer: MEDICARE

## 2025-08-04 ENCOUNTER — ANESTHESIA (OUTPATIENT)
Dept: PERIOP | Facility: HOSPITAL | Age: 70
End: 2025-08-04
Payer: MEDICARE

## 2025-08-04 VITALS
TEMPERATURE: 97.7 F | DIASTOLIC BLOOD PRESSURE: 96 MMHG | RESPIRATION RATE: 18 BRPM | WEIGHT: 195.2 LBS | HEART RATE: 67 BPM | BODY MASS INDEX: 38.12 KG/M2 | SYSTOLIC BLOOD PRESSURE: 129 MMHG | OXYGEN SATURATION: 95 %

## 2025-08-04 DIAGNOSIS — D17.1 LIPOMA OF BUTTOCK: ICD-10-CM

## 2025-08-04 LAB — GLUCOSE BLDC GLUCOMTR-MCNC: 84 MG/DL (ref 70–130)

## 2025-08-04 PROCEDURE — 25010000002 LIDOCAINE 2% SOLUTION

## 2025-08-04 PROCEDURE — 25010000002 SUCCINYLCHOLINE PER 20 MG

## 2025-08-04 PROCEDURE — 88304 TISSUE EXAM BY PATHOLOGIST: CPT | Performed by: SURGERY

## 2025-08-04 PROCEDURE — 25010000002 ONDANSETRON PER 1 MG: Performed by: ANESTHESIOLOGY

## 2025-08-04 PROCEDURE — 25010000002 FENTANYL CITRATE (PF) 50 MCG/ML SOLUTION

## 2025-08-04 PROCEDURE — 25010000002 PROPOFOL 10 MG/ML EMULSION

## 2025-08-04 PROCEDURE — 25010000002 DEXAMETHASONE PER 1 MG: Performed by: ANESTHESIOLOGY

## 2025-08-04 PROCEDURE — 25010000002 FAMOTIDINE 10 MG/ML SOLUTION: Performed by: ANESTHESIOLOGY

## 2025-08-04 PROCEDURE — 82948 REAGENT STRIP/BLOOD GLUCOSE: CPT

## 2025-08-04 PROCEDURE — 25810000003 LACTATED RINGERS PER 1000 ML: Performed by: ANESTHESIOLOGY

## 2025-08-04 RX ORDER — DEXMEDETOMIDINE HYDROCHLORIDE 100 UG/ML
INJECTION, SOLUTION INTRAVENOUS AS NEEDED
Status: DISCONTINUED | OUTPATIENT
Start: 2025-08-04 | End: 2025-08-04 | Stop reason: SURG

## 2025-08-04 RX ORDER — SODIUM CHLORIDE, SODIUM LACTATE, POTASSIUM CHLORIDE, CALCIUM CHLORIDE 600; 310; 30; 20 MG/100ML; MG/100ML; MG/100ML; MG/100ML
100 INJECTION, SOLUTION INTRAVENOUS ONCE
Status: DISCONTINUED | OUTPATIENT
Start: 2025-08-04 | End: 2025-08-04 | Stop reason: HOSPADM

## 2025-08-04 RX ORDER — DIPHENHYDRAMINE HYDROCHLORIDE 50 MG/ML
12.5 INJECTION, SOLUTION INTRAMUSCULAR; INTRAVENOUS ONCE AS NEEDED
Status: DISCONTINUED | OUTPATIENT
Start: 2025-08-04 | End: 2025-08-04 | Stop reason: HOSPADM

## 2025-08-04 RX ORDER — FENTANYL CITRATE 50 UG/ML
INJECTION, SOLUTION INTRAMUSCULAR; INTRAVENOUS AS NEEDED
Status: DISCONTINUED | OUTPATIENT
Start: 2025-08-04 | End: 2025-08-04 | Stop reason: SURG

## 2025-08-04 RX ORDER — SUCCINYLCHOLINE CHLORIDE 20 MG/ML
INJECTION INTRAMUSCULAR; INTRAVENOUS AS NEEDED
Status: DISCONTINUED | OUTPATIENT
Start: 2025-08-04 | End: 2025-08-04 | Stop reason: SURG

## 2025-08-04 RX ORDER — PROPOFOL 10 MG/ML
VIAL (ML) INTRAVENOUS AS NEEDED
Status: DISCONTINUED | OUTPATIENT
Start: 2025-08-04 | End: 2025-08-04 | Stop reason: SURG

## 2025-08-04 RX ORDER — ONDANSETRON 2 MG/ML
4 INJECTION INTRAMUSCULAR; INTRAVENOUS ONCE AS NEEDED
Status: DISCONTINUED | OUTPATIENT
Start: 2025-08-04 | End: 2025-08-04 | Stop reason: HOSPADM

## 2025-08-04 RX ORDER — SODIUM CHLORIDE 0.9 % (FLUSH) 0.9 %
10 SYRINGE (ML) INJECTION EVERY 12 HOURS SCHEDULED
Status: DISCONTINUED | OUTPATIENT
Start: 2025-08-04 | End: 2025-08-04 | Stop reason: HOSPADM

## 2025-08-04 RX ORDER — EPHEDRINE SULFATE 50 MG/ML
INJECTION INTRAVENOUS AS NEEDED
Status: DISCONTINUED | OUTPATIENT
Start: 2025-08-04 | End: 2025-08-04 | Stop reason: SURG

## 2025-08-04 RX ORDER — FENTANYL CITRATE 50 UG/ML
25 INJECTION, SOLUTION INTRAMUSCULAR; INTRAVENOUS
Status: DISCONTINUED | OUTPATIENT
Start: 2025-08-04 | End: 2025-08-04 | Stop reason: HOSPADM

## 2025-08-04 RX ORDER — LIDOCAINE HYDROCHLORIDE 10 MG/ML
0.5 INJECTION, SOLUTION EPIDURAL; INFILTRATION; INTRACAUDAL; PERINEURAL ONCE AS NEEDED
Status: DISCONTINUED | OUTPATIENT
Start: 2025-08-04 | End: 2025-08-04 | Stop reason: HOSPADM

## 2025-08-04 RX ORDER — HYDROCODONE BITARTRATE AND ACETAMINOPHEN 5; 325 MG/1; MG/1
1 TABLET ORAL EVERY 4 HOURS PRN
Qty: 10 TABLET | Refills: 0 | Status: SHIPPED | OUTPATIENT
Start: 2025-08-04

## 2025-08-04 RX ORDER — SODIUM CHLORIDE, SODIUM LACTATE, POTASSIUM CHLORIDE, CALCIUM CHLORIDE 600; 310; 30; 20 MG/100ML; MG/100ML; MG/100ML; MG/100ML
9 INJECTION, SOLUTION INTRAVENOUS CONTINUOUS
Status: DISCONTINUED | OUTPATIENT
Start: 2025-08-04 | End: 2025-08-04 | Stop reason: HOSPADM

## 2025-08-04 RX ORDER — MAGNESIUM HYDROXIDE 1200 MG/15ML
LIQUID ORAL AS NEEDED
Status: DISCONTINUED | OUTPATIENT
Start: 2025-08-04 | End: 2025-08-04 | Stop reason: HOSPADM

## 2025-08-04 RX ORDER — ONDANSETRON 2 MG/ML
4 INJECTION INTRAMUSCULAR; INTRAVENOUS ONCE AS NEEDED
Status: COMPLETED | OUTPATIENT
Start: 2025-08-04 | End: 2025-08-04

## 2025-08-04 RX ORDER — ONDANSETRON 4 MG/1
4 TABLET, FILM COATED ORAL EVERY 6 HOURS PRN
Qty: 10 TABLET | Refills: 0 | Status: SHIPPED | OUTPATIENT
Start: 2025-08-04

## 2025-08-04 RX ORDER — FAMOTIDINE 10 MG/ML
20 INJECTION, SOLUTION INTRAVENOUS
Status: COMPLETED | OUTPATIENT
Start: 2025-08-04 | End: 2025-08-04

## 2025-08-04 RX ORDER — HYDROCODONE BITARTRATE AND ACETAMINOPHEN 5; 325 MG/1; MG/1
1 TABLET ORAL ONCE AS NEEDED
Status: DISCONTINUED | OUTPATIENT
Start: 2025-08-04 | End: 2025-08-04 | Stop reason: HOSPADM

## 2025-08-04 RX ORDER — LIDOCAINE HYDROCHLORIDE 20 MG/ML
INJECTION, SOLUTION INFILTRATION; PERINEURAL AS NEEDED
Status: DISCONTINUED | OUTPATIENT
Start: 2025-08-04 | End: 2025-08-04 | Stop reason: SURG

## 2025-08-04 RX ORDER — SODIUM CHLORIDE 9 MG/ML
40 INJECTION, SOLUTION INTRAVENOUS AS NEEDED
Status: DISCONTINUED | OUTPATIENT
Start: 2025-08-04 | End: 2025-08-04 | Stop reason: HOSPADM

## 2025-08-04 RX ORDER — SODIUM CHLORIDE 0.9 % (FLUSH) 0.9 %
10 SYRINGE (ML) INJECTION AS NEEDED
Status: DISCONTINUED | OUTPATIENT
Start: 2025-08-04 | End: 2025-08-04 | Stop reason: HOSPADM

## 2025-08-04 RX ORDER — BUPIVACAINE HYDROCHLORIDE AND EPINEPHRINE 5; 5 MG/ML; UG/ML
INJECTION, SOLUTION EPIDURAL; INTRACAUDAL; PERINEURAL AS NEEDED
Status: DISCONTINUED | OUTPATIENT
Start: 2025-08-04 | End: 2025-08-04 | Stop reason: HOSPADM

## 2025-08-04 RX ORDER — DEXAMETHASONE SODIUM PHOSPHATE 4 MG/ML
8 INJECTION, SOLUTION INTRA-ARTICULAR; INTRALESIONAL; INTRAMUSCULAR; INTRAVENOUS; SOFT TISSUE ONCE AS NEEDED
Status: COMPLETED | OUTPATIENT
Start: 2025-08-04 | End: 2025-08-04

## 2025-08-04 RX ADMIN — FAMOTIDINE 20 MG: 10 INJECTION INTRAVENOUS at 10:11

## 2025-08-04 RX ADMIN — DEXAMETHASONE SODIUM PHOSPHATE 8 MG: 4 INJECTION, SOLUTION INTRA-ARTICULAR; INTRALESIONAL; INTRAMUSCULAR; INTRAVENOUS; SOFT TISSUE at 10:11

## 2025-08-04 RX ADMIN — FENTANYL CITRATE 25 MCG: 50 INJECTION, SOLUTION INTRAMUSCULAR; INTRAVENOUS at 10:58

## 2025-08-04 RX ADMIN — DEXMEDETOMIDINE HYDROCHLORIDE 8 MCG: 100 INJECTION, SOLUTION INTRAVENOUS at 11:11

## 2025-08-04 RX ADMIN — PROPOFOL 30 MG: 10 INJECTION, EMULSION INTRAVENOUS at 10:45

## 2025-08-04 RX ADMIN — EPHEDRINE SULFATE 5 MG: 50 INJECTION INTRAVENOUS at 11:05

## 2025-08-04 RX ADMIN — LIDOCAINE HYDROCHLORIDE 80 MG: 20 INJECTION, SOLUTION INFILTRATION; PERINEURAL at 10:42

## 2025-08-04 RX ADMIN — PROPOFOL 20 MG: 10 INJECTION, EMULSION INTRAVENOUS at 10:53

## 2025-08-04 RX ADMIN — FENTANYL CITRATE 25 MCG: 50 INJECTION, SOLUTION INTRAMUSCULAR; INTRAVENOUS at 10:43

## 2025-08-04 RX ADMIN — SUCCINYLCHOLINE CHLORIDE 120 MG: 20 INJECTION, SOLUTION INTRAMUSCULAR; INTRAVENOUS at 10:45

## 2025-08-04 RX ADMIN — SODIUM CHLORIDE, POTASSIUM CHLORIDE, SODIUM LACTATE AND CALCIUM CHLORIDE: 600; 310; 30; 20 INJECTION, SOLUTION INTRAVENOUS at 10:37

## 2025-08-04 RX ADMIN — ONDANSETRON 4 MG: 2 INJECTION, SOLUTION INTRAMUSCULAR; INTRAVENOUS at 10:11

## 2025-08-04 RX ADMIN — PROPOFOL 70 MG: 10 INJECTION, EMULSION INTRAVENOUS at 10:43

## 2025-08-05 ENCOUNTER — TELEPHONE (OUTPATIENT)
Dept: SURGERY | Facility: CLINIC | Age: 70
End: 2025-08-05
Payer: MEDICARE

## 2025-08-08 LAB
CYTO UR: NORMAL
DX PRELIMINARY: NORMAL
LAB AP CASE REPORT: NORMAL
PATH REPORT.FINAL DX SPEC: NORMAL
PATH REPORT.GROSS SPEC: NORMAL

## 2025-08-14 ENCOUNTER — OFFICE VISIT (OUTPATIENT)
Dept: SURGERY | Facility: CLINIC | Age: 70
End: 2025-08-14
Payer: MEDICARE

## 2025-08-14 VITALS
OXYGEN SATURATION: 98 % | HEART RATE: 92 BPM | SYSTOLIC BLOOD PRESSURE: 140 MMHG | DIASTOLIC BLOOD PRESSURE: 72 MMHG | WEIGHT: 195.4 LBS | HEIGHT: 60 IN | BODY MASS INDEX: 38.36 KG/M2

## 2025-08-14 DIAGNOSIS — Z48.89 POSTOPERATIVE VISIT: Primary | ICD-10-CM

## 2025-08-14 PROCEDURE — 3077F SYST BP >= 140 MM HG: CPT | Performed by: SURGERY

## 2025-08-14 PROCEDURE — 1159F MED LIST DOCD IN RCRD: CPT | Performed by: SURGERY

## 2025-08-14 PROCEDURE — 3078F DIAST BP <80 MM HG: CPT | Performed by: SURGERY

## 2025-08-14 PROCEDURE — 99024 POSTOP FOLLOW-UP VISIT: CPT | Performed by: SURGERY

## 2025-08-14 PROCEDURE — 1160F RVW MEDS BY RX/DR IN RCRD: CPT | Performed by: SURGERY

## 2025-08-15 ENCOUNTER — TREATMENT (OUTPATIENT)
Dept: PHYSICAL THERAPY | Facility: CLINIC | Age: 70
End: 2025-08-15
Payer: MEDICARE

## 2025-08-15 DIAGNOSIS — R29.898 WEAKNESS OF BOTH LOWER EXTREMITIES: ICD-10-CM

## 2025-08-15 DIAGNOSIS — R26.2 DIFFICULTY WALKING: Primary | ICD-10-CM

## 2025-08-15 PROCEDURE — 97530 THERAPEUTIC ACTIVITIES: CPT | Performed by: PHYSICAL THERAPIST

## 2025-08-15 PROCEDURE — 97110 THERAPEUTIC EXERCISES: CPT | Performed by: PHYSICAL THERAPIST

## 2025-08-20 ENCOUNTER — TREATMENT (OUTPATIENT)
Dept: PHYSICAL THERAPY | Facility: CLINIC | Age: 70
End: 2025-08-20
Payer: MEDICARE

## 2025-08-20 DIAGNOSIS — R29.898 WEAKNESS OF BOTH LOWER EXTREMITIES: ICD-10-CM

## 2025-08-20 DIAGNOSIS — R26.2 DIFFICULTY WALKING: Primary | ICD-10-CM

## 2025-08-20 PROCEDURE — 97164 PT RE-EVAL EST PLAN CARE: CPT | Performed by: PHYSICAL THERAPIST

## 2025-08-20 PROCEDURE — 97530 THERAPEUTIC ACTIVITIES: CPT | Performed by: PHYSICAL THERAPIST

## 2025-08-20 PROCEDURE — 97110 THERAPEUTIC EXERCISES: CPT | Performed by: PHYSICAL THERAPIST

## 2025-08-21 ENCOUNTER — RESULTS FOLLOW-UP (OUTPATIENT)
Dept: LAB | Facility: HOSPITAL | Age: 70
End: 2025-08-21
Payer: MEDICARE

## 2025-08-21 ENCOUNTER — OFFICE VISIT (OUTPATIENT)
Dept: ONCOLOGY | Facility: CLINIC | Age: 70
End: 2025-08-21
Payer: MEDICARE

## 2025-08-21 ENCOUNTER — LAB (OUTPATIENT)
Dept: LAB | Facility: HOSPITAL | Age: 70
End: 2025-08-21
Payer: MEDICARE

## 2025-08-21 VITALS
TEMPERATURE: 98.4 F | HEART RATE: 83 BPM | HEIGHT: 60 IN | BODY MASS INDEX: 38.72 KG/M2 | OXYGEN SATURATION: 100 % | WEIGHT: 197.2 LBS | SYSTOLIC BLOOD PRESSURE: 116 MMHG | DIASTOLIC BLOOD PRESSURE: 69 MMHG | RESPIRATION RATE: 16 BRPM

## 2025-08-21 DIAGNOSIS — D50.9 IRON DEFICIENCY ANEMIA, UNSPECIFIED IRON DEFICIENCY ANEMIA TYPE: Primary | ICD-10-CM

## 2025-08-25 ENCOUNTER — TREATMENT (OUTPATIENT)
Dept: PHYSICAL THERAPY | Facility: CLINIC | Age: 70
End: 2025-08-25
Payer: MEDICARE

## 2025-08-25 DIAGNOSIS — R29.898 WEAKNESS OF BOTH LOWER EXTREMITIES: ICD-10-CM

## 2025-08-25 DIAGNOSIS — F41.9 ANXIETY: ICD-10-CM

## 2025-08-25 DIAGNOSIS — F41.0 PANIC DISORDER: ICD-10-CM

## 2025-08-25 DIAGNOSIS — E03.9 ACQUIRED HYPOTHYROIDISM: ICD-10-CM

## 2025-08-25 DIAGNOSIS — R26.2 DIFFICULTY WALKING: Primary | ICD-10-CM

## 2025-08-25 PROCEDURE — 97112 NEUROMUSCULAR REEDUCATION: CPT | Performed by: PHYSICAL THERAPIST

## 2025-08-25 PROCEDURE — 97530 THERAPEUTIC ACTIVITIES: CPT | Performed by: PHYSICAL THERAPIST

## 2025-08-25 PROCEDURE — 97110 THERAPEUTIC EXERCISES: CPT | Performed by: PHYSICAL THERAPIST

## 2025-08-25 RX ORDER — LEVOTHYROXINE SODIUM 75 UG/1
75 TABLET ORAL DAILY
Qty: 90 TABLET | Refills: 0 | Status: SHIPPED | OUTPATIENT
Start: 2025-08-25

## 2025-08-25 RX ORDER — CITALOPRAM HYDROBROMIDE 20 MG/1
20 TABLET ORAL DAILY
Qty: 90 TABLET | Refills: 1 | OUTPATIENT
Start: 2025-08-25

## 2025-08-27 ENCOUNTER — TREATMENT (OUTPATIENT)
Dept: PHYSICAL THERAPY | Facility: CLINIC | Age: 70
End: 2025-08-27
Payer: MEDICARE

## 2025-08-27 DIAGNOSIS — R29.898 WEAKNESS OF BOTH LOWER EXTREMITIES: ICD-10-CM

## 2025-08-27 DIAGNOSIS — R26.2 DIFFICULTY WALKING: Primary | ICD-10-CM

## 2025-08-27 PROCEDURE — 97110 THERAPEUTIC EXERCISES: CPT | Performed by: PHYSICAL THERAPIST

## 2025-08-27 PROCEDURE — 97530 THERAPEUTIC ACTIVITIES: CPT | Performed by: PHYSICAL THERAPIST

## 2025-08-27 PROCEDURE — 97112 NEUROMUSCULAR REEDUCATION: CPT | Performed by: PHYSICAL THERAPIST

## (undated) DEVICE — Device

## (undated) DEVICE — GLV SURG SENSICARE PI MIC PF SZ8 LF STRL

## (undated) DEVICE — LINER SURG CANSTR SXN S/RIGD 1500CC

## (undated) DEVICE — MSK ENDO PORT O2 POM ELITE CURAPLEX A/

## (undated) DEVICE — LAG MINOR PROCEDURE: Brand: MEDLINE INDUSTRIES, INC.

## (undated) DEVICE — SINGLE-USE BIOPSY FORCEPS: Brand: RADIAL JAW 4

## (undated) DEVICE — HYPODERMIC SAFETY NEEDLE: Brand: MAGELLAN

## (undated) DEVICE — FLEX ADVANTAGE 1500CC: Brand: FLEX ADVANTAGE

## (undated) DEVICE — GOWN ,SIRUS,NONREINFORCED 3XL: Brand: MEDLINE

## (undated) DEVICE — VIAL FORMLN CAP 10PCT 20ML

## (undated) DEVICE — VIAL FORMALIN CAP 10P 40ML

## (undated) DEVICE — CANN NASL CO2 TRULINK W/O2 A/

## (undated) DEVICE — JACKT LAB F/R KNIT CUFF/COLR XLG BLU

## (undated) DEVICE — ADAPT CLN BIOGUARD AIR/H2O DISP

## (undated) DEVICE — BITEBLOCK OMNI BLOC

## (undated) DEVICE — LASSO POLYPECTOMY SNARE: Brand: LASSO

## (undated) DEVICE — KT ORCA ORCAPOD DISP STRL

## (undated) DEVICE — FRCP BX RADJAW4 NDL 2.8 240CM LG OG BX40

## (undated) DEVICE — GLV SURG PREMIERPRO ORTHO LTX PF SZ7.5 BRN

## (undated) DEVICE — GOWN ISOL W/THUMB UNIV BLU BX/15

## (undated) DEVICE — TUBING, SUCTION, 1/4" X 20', STRAIGHT: Brand: MEDLINE INDUSTRIES, INC.

## (undated) DEVICE — BW-412T DISP COMBO CLEANING BRUSH: Brand: SINGLE USE COMBINATION CLEANING BRUSH

## (undated) DEVICE — SOL IRR H2O BO 1000ML STRL

## (undated) DEVICE — SKIN PREP TRAY W/CHG: Brand: MEDLINE INDUSTRIES, INC.

## (undated) DEVICE — EXOFIN PRECISION PEN HIGH VISCOSITY TOPICAL SKIN ADHESIVE: Brand: EXOFIN PRECISION PEN, 1G

## (undated) DEVICE — SUT VIC 5/0 PS2 18IN J495H

## (undated) DEVICE — SYR LL W/SCALE/MARK 3ML STRL

## (undated) DEVICE — ANTIBACTERIAL UNDYED BRAIDED (POLYGLACTIN 910), SYNTHETIC ABSORBABLE SUTURE: Brand: COATED VICRYL

## (undated) DEVICE — THE BITE BLOCK MAXI, LATEX FREE STRAP IS USED TO PROTECT THE ENDOSCOPE INSERTION TUBE FROM BEING BITTEN BY THE PATIENT.